# Patient Record
Sex: MALE | Race: BLACK OR AFRICAN AMERICAN | NOT HISPANIC OR LATINO | Employment: OTHER | URBAN - METROPOLITAN AREA
[De-identification: names, ages, dates, MRNs, and addresses within clinical notes are randomized per-mention and may not be internally consistent; named-entity substitution may affect disease eponyms.]

---

## 2017-03-23 DIAGNOSIS — Z01.812 PRE-OPERATIVE LABORATORY EXAMINATION: ICD-10-CM

## 2017-03-23 DIAGNOSIS — Z01.810 PRE-OPERATIVE CARDIOVASCULAR EXAMINATION: ICD-10-CM

## 2017-03-23 LAB
ALBUMIN SERPL BCP-MCNC: 4.1 G/DL (ref 3.2–4.9)
ALBUMIN/GLOB SERPL: 1.3 G/DL
ALP SERPL-CCNC: 89 U/L (ref 30–99)
ALT SERPL-CCNC: 18 U/L (ref 2–50)
ANION GAP SERPL CALC-SCNC: 5 MMOL/L (ref 0–11.9)
APTT PPP: 32 SEC (ref 24.7–36)
AST SERPL-CCNC: 24 U/L (ref 12–45)
BASOPHILS # BLD AUTO: 0.9 % (ref 0–1.8)
BASOPHILS # BLD: 0.04 K/UL (ref 0–0.12)
BILIRUB SERPL-MCNC: 0.8 MG/DL (ref 0.1–1.5)
BUN SERPL-MCNC: 6 MG/DL (ref 8–22)
CALCIUM SERPL-MCNC: 9.1 MG/DL (ref 8.4–10.2)
CHLORIDE SERPL-SCNC: 101 MMOL/L (ref 96–112)
CO2 SERPL-SCNC: 33 MMOL/L (ref 20–33)
CREAT SERPL-MCNC: 0.96 MG/DL (ref 0.5–1.4)
EOSINOPHIL # BLD AUTO: 0.39 K/UL (ref 0–0.51)
EOSINOPHIL NFR BLD: 8.6 % (ref 0–6.9)
ERYTHROCYTE [DISTWIDTH] IN BLOOD BY AUTOMATED COUNT: 58.4 FL (ref 35.9–50)
GFR SERPL CREATININE-BSD FRML MDRD: >60 ML/MIN/1.73 M 2
GLOBULIN SER CALC-MCNC: 3.2 G/DL (ref 1.9–3.5)
GLUCOSE SERPL-MCNC: 109 MG/DL (ref 65–99)
HCT VFR BLD AUTO: 41.4 % (ref 42–52)
HGB BLD-MCNC: 13.9 G/DL (ref 14–18)
IMM GRANULOCYTES # BLD AUTO: 0.01 K/UL (ref 0–0.11)
IMM GRANULOCYTES NFR BLD AUTO: 0.2 % (ref 0–0.9)
INR PPP: 0.96 (ref 0.87–1.13)
LIPASE SERPL-CCNC: 19 U/L (ref 7–58)
LYMPHOCYTES # BLD AUTO: 1.61 K/UL (ref 1–4.8)
LYMPHOCYTES NFR BLD: 35.3 % (ref 22–41)
MCH RBC QN AUTO: 34.4 PG (ref 27–33)
MCHC RBC AUTO-ENTMCNC: 33.6 G/DL (ref 33.7–35.3)
MCV RBC AUTO: 102.5 FL (ref 81.4–97.8)
MONOCYTES # BLD AUTO: 0.36 K/UL (ref 0–0.85)
MONOCYTES NFR BLD AUTO: 7.9 % (ref 0–13.4)
NEUTROPHILS # BLD AUTO: 2.15 K/UL (ref 1.82–7.42)
NEUTROPHILS NFR BLD: 47.1 % (ref 44–72)
NRBC # BLD AUTO: 0 K/UL
NRBC BLD AUTO-RTO: 0 /100 WBC
PLATELET # BLD AUTO: 171 K/UL (ref 164–446)
PMV BLD AUTO: 10.2 FL (ref 9–12.9)
POTASSIUM SERPL-SCNC: 3.9 MMOL/L (ref 3.6–5.5)
PROT SERPL-MCNC: 7.3 G/DL (ref 6–8.2)
PROTHROMBIN TIME: 12.6 SEC (ref 12–14.6)
RBC # BLD AUTO: 4.04 M/UL (ref 4.7–6.1)
SODIUM SERPL-SCNC: 139 MMOL/L (ref 135–145)
WBC # BLD AUTO: 4.6 K/UL (ref 4.8–10.8)

## 2017-03-23 PROCEDURE — 85025 COMPLETE CBC W/AUTO DIFF WBC: CPT

## 2017-03-23 PROCEDURE — 80053 COMPREHEN METABOLIC PANEL: CPT

## 2017-03-23 PROCEDURE — 85730 THROMBOPLASTIN TIME PARTIAL: CPT

## 2017-03-23 PROCEDURE — 36415 COLL VENOUS BLD VENIPUNCTURE: CPT

## 2017-03-23 PROCEDURE — 83690 ASSAY OF LIPASE: CPT

## 2017-03-23 PROCEDURE — 85610 PROTHROMBIN TIME: CPT

## 2017-03-23 RX ORDER — CETIRIZINE HYDROCHLORIDE 10 MG/1
10 TABLET ORAL
COMMUNITY
End: 2021-03-13

## 2017-03-23 RX ORDER — DIPHENHYDRAMINE HCL 25 MG
25 TABLET ORAL NIGHTLY PRN
Status: ON HOLD | COMMUNITY
End: 2019-06-18

## 2017-03-24 ENCOUNTER — HOSPITAL ENCOUNTER (OUTPATIENT)
Facility: MEDICAL CENTER | Age: 82
End: 2017-03-24
Attending: INTERNAL MEDICINE | Admitting: INTERNAL MEDICINE
Payer: OTHER MISCELLANEOUS

## 2017-03-24 VITALS
OXYGEN SATURATION: 94 % | BODY MASS INDEX: 26.1 KG/M2 | RESPIRATION RATE: 18 BRPM | TEMPERATURE: 97.7 F | HEIGHT: 68 IN | SYSTOLIC BLOOD PRESSURE: 127 MMHG | DIASTOLIC BLOOD PRESSURE: 64 MMHG | WEIGHT: 172.18 LBS | HEART RATE: 98 BPM

## 2017-03-24 PROBLEM — C25.0 MALIGNANT NEOPLASM OF HEAD OF PANCREAS (HCC): Status: ACTIVE | Noted: 2017-03-24

## 2017-03-24 LAB
EKG IMPRESSION: NORMAL
PATHOLOGY CONSULT NOTE: NORMAL

## 2017-03-24 PROCEDURE — 700101 HCHG RX REV CODE 250

## 2017-03-24 PROCEDURE — 160048 HCHG OR STATISTICAL LEVEL 1-5: Performed by: INTERNAL MEDICINE

## 2017-03-24 PROCEDURE — 160036 HCHG PACU - EA ADDL 30 MINS PHASE I: Performed by: INTERNAL MEDICINE

## 2017-03-24 PROCEDURE — 88307 TISSUE EXAM BY PATHOLOGIST: CPT

## 2017-03-24 PROCEDURE — A4606 OXYGEN PROBE USED W OXIMETER: HCPCS | Performed by: INTERNAL MEDICINE

## 2017-03-24 PROCEDURE — 700111 HCHG RX REV CODE 636 W/ 250 OVERRIDE (IP): Performed by: INTERNAL MEDICINE

## 2017-03-24 PROCEDURE — 700111 HCHG RX REV CODE 636 W/ 250 OVERRIDE (IP)

## 2017-03-24 PROCEDURE — 88341 IMHCHEM/IMCYTCHM EA ADD ANTB: CPT | Mod: 91

## 2017-03-24 PROCEDURE — 160047 HCHG PACU  - EA ADDL 30 MINS PHASE II: Performed by: INTERNAL MEDICINE

## 2017-03-24 PROCEDURE — 88173 CYTOPATH EVAL FNA REPORT: CPT

## 2017-03-24 PROCEDURE — 160046 HCHG PACU - 1ST 60 MINS PHASE II: Performed by: INTERNAL MEDICINE

## 2017-03-24 PROCEDURE — 160009 HCHG ANES TIME/MIN: Performed by: INTERNAL MEDICINE

## 2017-03-24 PROCEDURE — 160025 RECOVERY II MINUTES (STATS): Performed by: INTERNAL MEDICINE

## 2017-03-24 PROCEDURE — 502240 HCHG MISC OR SUPPLY RC 0272: Performed by: INTERNAL MEDICINE

## 2017-03-24 PROCEDURE — 88342 IMHCHEM/IMCYTCHM 1ST ANTB: CPT

## 2017-03-24 PROCEDURE — 160203 HCHG ENDO MINUTES - 1ST 30 MINS LEVEL 4: Performed by: INTERNAL MEDICINE

## 2017-03-24 PROCEDURE — 500066 HCHG BITE BLOCK, ECT: Performed by: INTERNAL MEDICINE

## 2017-03-24 PROCEDURE — 160002 HCHG RECOVERY MINUTES (STAT): Performed by: INTERNAL MEDICINE

## 2017-03-24 PROCEDURE — 160035 HCHG PACU - 1ST 60 MINS PHASE I: Performed by: INTERNAL MEDICINE

## 2017-03-24 PROCEDURE — 160208 HCHG ENDO MINUTES - EA ADDL 1 MIN LEVEL 4: Performed by: INTERNAL MEDICINE

## 2017-03-24 PROCEDURE — 88172 CYTP DX EVAL FNA 1ST EA SITE: CPT

## 2017-03-24 PROCEDURE — 88177 CYTP FNA EVAL EA ADDL: CPT | Mod: 91

## 2017-03-24 RX ORDER — CIPROFLOXACIN 500 MG/1
500 TABLET, FILM COATED ORAL 2 TIMES DAILY
Qty: 9 TAB | Refills: 0 | Status: SHIPPED | OUTPATIENT
Start: 2017-03-24 | End: 2017-03-29

## 2017-03-24 RX ORDER — CIPROFLOXACIN 2 MG/ML
INJECTION, SOLUTION INTRAVENOUS
Status: DISCONTINUED
Start: 2017-03-24 | End: 2017-03-24 | Stop reason: HOSPADM

## 2017-03-24 RX ORDER — LIDOCAINE HYDROCHLORIDE 10 MG/ML
INJECTION, SOLUTION INFILTRATION; PERINEURAL
Status: COMPLETED
Start: 2017-03-24 | End: 2017-03-24

## 2017-03-24 RX ORDER — MIDAZOLAM HYDROCHLORIDE 1 MG/ML
INJECTION INTRAMUSCULAR; INTRAVENOUS
Status: DISPENSED
Start: 2017-03-24 | End: 2017-03-24

## 2017-03-24 RX ORDER — SODIUM CHLORIDE, SODIUM LACTATE, POTASSIUM CHLORIDE, CALCIUM CHLORIDE 600; 310; 30; 20 MG/100ML; MG/100ML; MG/100ML; MG/100ML
INJECTION, SOLUTION INTRAVENOUS CONTINUOUS
Status: DISCONTINUED | OUTPATIENT
Start: 2017-03-24 | End: 2017-03-24 | Stop reason: HOSPADM

## 2017-03-24 RX ADMIN — SODIUM CHLORIDE, POTASSIUM CHLORIDE, SODIUM LACTATE AND CALCIUM CHLORIDE: 600; 310; 30; 20 INJECTION, SOLUTION INTRAVENOUS at 07:00

## 2017-03-24 RX ADMIN — LIDOCAINE HYDROCHLORIDE 0.1 ML: 10 INJECTION, SOLUTION INFILTRATION; PERINEURAL at 07:00

## 2017-03-24 ASSESSMENT — PAIN SCALES - GENERAL
PAINLEVEL_OUTOF10: 0

## 2017-03-24 NOTE — IP AVS SNAPSHOT
" Home Care Instructions                                                                                                                Name:April Becker  Medical Record Number:[id number]  CSN: [id number]    YOB: 1932   Age: 84 y.o.  Sex: male  HT:1.727 m (5' 8\") WT: 78.1 kg (172 lb 2.9 oz)          Admit Date: 3/24/2017     Discharge Date:   Today's Date: 3/24/2017  Attending Doctor:  Raphael Crawford M.D.                  Allergies:  Review of patient's allergies indicates no known allergies.                Discharge Instructions         ENDOSCOPY HOME CARE INSTRUCTIONS    GASTROSCOPY OR ERCP  1. Don't eat or drink anything for about an hour after the test. You can then resume your regular diet.  2. Don't drive or drink alcohol for 24 hours. The medication you received will make you too drowsy.  3. Don't take any coffee, tea, or aspirin products until after you see your doctor. These can harm the lining of your stomach.  4. If you begin to vomit bloody material, or develop black or bloody stools, call your doctor as soon as possible.  5. If you have any neck, chest, abdominal pain or temp of 100 degrees, call your doctor.  6. See your doctor Follow up as indicated  7. Additional instructions: call your Dr Crawford in 1-2 weeks if you have not received your test results  8. Prescriptions: Cipro    You should call [phone] if you develop problems with breathing or chest pain.  If any questions arise, call your doctor. If your doctor is not available, please feel free to call [phone]. You can also call the HEALTH HOTLINE open 24 hours/day, 7 days/week and speak to a nurse at [phone], or toll free [phone].    Depression / Suicide Risk    As you are discharged from this [facility] facility, it is important to learn how to keep safe from harming yourself.    Recognize the warning signs:  Abrupt changes in personality, positive or negative- including increase in energy   Giving away " possessions  Change in eating patterns- significant weight changes-  positive or negative  Change in sleeping patterns- unable to sleep or sleeping all the time   Unwillingness or inability to communicate  Depression  Unusual sadness, discouragement and loneliness  Talk of wanting to die  Neglect of personal appearance   Rebelliousness- reckless behavior  Withdrawal from people/activities they love  Confusion- inability to concentrate     If you or a loved one observes any of these behaviors or has concerns about self-harm, here's what you can do:  Talk about it- your feelings and reasons for harming yourself  Remove any means that you might use to hurt yourself (examples: pills, rope, extension cords, firearm)  Get professional help from the community (Mental Health, Substance Abuse, psychological counseling)  Do not be alone:Call your Safe Contact- someone whom you trust who will be there for you.  Call your local CRISIS HOTLINE 090-8982 or 030-118-8697  Call your local Children's Mobile Crisis Response Team [place] (377) 774-1750 or www.svh24.de  Call the toll free National Suicide Prevention Hotlines   National Suicide Prevention Lifeline 328-487-BDKH (7488)  [place] Hope Line Network 800-SUICIDE (731-9767)    I acknowledge receipt and understanding of these Home Care Instructions.       Medication List      START taking these medications        Instructions    ciprofloxacin 500 MG Tabs   Commonly known as:  CIPRO    Doctor's comments:  Start evening of 3/24/2017   Take 1 Tab by mouth 2 times a day for 9 doses.   Dose:  500 mg         CONTINUE taking these medications        Instructions    aspirin EC 81 MG Tbec   Commonly known as:  ECOTRIN    Take 81 mg by mouth every day.   Dose:  81 mg       cetirizine 10 MG Tabs   Commonly known as:  ZYRTEC    Take 10 mg by mouth every day.   Dose:  10 mg       diphenhydrAMINE 25 MG Tabs   Commonly known as:  BENADRYL    Take 25 mg by mouth every day.   Dose:   25 mg       hydrochlorothiazide 12.5 MG capsule   Commonly known as:  MICROZIDE    Take 12.5 mg by mouth every day.   Dose:  12.5 mg       irbesartan 75 MG tablet   Commonly known as:  AVAPRO    Take 75 mg by mouth every evening.   Dose:  75 mg       LIPITOR 10 MG Tabs   Generic drug:  atorvastatin    QDAY.       omeprazole 40 MG delayed-release capsule   Commonly known as:  PRILOSEC    Take 40 mg by mouth every day.   Dose:  40 mg               Medication Information     Above and/or attached are the medications your physician expects you to take upon discharge. Review all of your home medications and newly ordered medications with your doctor and/or pharmacist. Follow medication instructions as directed by your doctor and/or pharmacist. Please keep your medication list with you and share with your physician. Update the information when medications are discontinued, doses are changed, or new medications (including over-the-counter products) are added; and carry medication information at all times in the event of emergency situations.        Resources     Quit Smoking / Tobacco Use:    I understand the use of any tobacco products increases my chance of suffering from future heart disease or stroke and could cause other illnesses which may shorten my life. Quitting the use of tobacco products is the single most important thing I can do to improve my health. For further information on smoking / tobacco cessation call a Toll Free Quit Line at 1-345.232.1293 (*National [employer] [place]) or 1-469.112.7549 (American Lung Association) or you can access the web based program at www.lungusa.org.    [place] Tobacco Users Help Line:  (523) 100-7840       Toll Free: 1-484.992.4444  Quit Tobacco Program [facility] (125)920-3927    Crisis Hotline:    [place] Crisis Hotline:  4-143-LCSEJDC or 1-814.607.7059    [place] Crisis Hotline:    1-199.523.5519 or 676-269-5729    Discharge Survey:   Thank you for  choosing [facility]. We hope we did everything we could to make your hospital stay a pleasant one. You may be receiving a survey and we would appreciate your time and participation in answering the questions. Your input is very valuable to us in our efforts to improve our service to our patients and their families.            Signatures     My signature on this form indicates that:    1. I acknowledge receipt and understanding of these Home Care Instruction.  2. My questions regarding this information have been answered to my satisfaction.  3. I have formulated a plan with my discharge nurse to obtain my prescribed medications for home.    __________________________________      __________________________________                   Patient Signature                                 Guardian/Responsible Adult Signature      __________________________________                 __________       ________                       Nurse Signature                                               Date                 Time

## 2017-03-24 NOTE — OR NURSING
0924- pt to PACU via nilsa.  VSS.  abd soft bowel sounds hypoactive x4Q.   0940- VSS. Pt awake, coughing up phlegm, Oral suctioning as needed.  0955- Report to YOLI Man.  1015- report from April RN.  Pt on RA.  Maintaining with occassional desats to 88%, pt rebounds without stimulation to breath.  1030-VSS.  Enc coughing/ deep breathing.  1051- Pt resting quietly.  Pt staying mostly in 90's on O2 sat.  April RN spoke to glenroy Jimenez to send pt to stage 2 and Ok for pt to go home with brief dips to 89% with rebound.  1055- Report to Gill KENT in stage 2.

## 2017-03-24 NOTE — IP AVS SNAPSHOT
3/24/2017          April Becker  [address]  [place] [address]    Dear April:    [facility] wants to ensure your discharge home is safe and you or your loved ones have had all your questions answered regarding your care after you leave the hospital.    You may receive a telephone call within two days of your discharge.  This call is to make certain you understand your discharge instructions as well as ensure we provided you with the best care possible during your stay with us.     The call will only last approximately 3-5 minutes and will be done by a nurse.    Once again, we want to ensure your discharge home is safe and that you have a clear understanding of any next steps in your care.  If you have any questions or concerns, please do not hesitate to contact us, we are here for you.  Thank you for choosing [facility] for your healthcare needs.    Sincerely,    Clifton Conley    [facility]

## 2017-03-24 NOTE — OR SURGEON
Immediate Post-Operative Note      PreOp Diagnosis: Pancreatic head mass     PostOp Diagnosis: Heterogenous lesion in the head of pancreas. Measure 3.28cm x 2.9cm. Not enough for send for fluid analysis. Core biopsy x 8 passes with 22G Sharkcore/ Acquire 22G FNB needle. Abuts SMV no invasion. Dilated CBD to 0.76cm minimum     Procedure(s):  GASTROSCOPY - Wound Class: Clean Contaminated  EGD W/ENDOSCOPIC ULTRASOUND- UPPER, LINEAR, RADIAL ON STANDBY - Wound Class: Clean Contaminated  EGD WITH ASP/BX - FINE NEEDLE ASPIRATION - Wound Class: Clean Contaminated    Surgeon(s):  Raphael Crawford M.D.    Anesthesiologist/Type of Anesthesia:  Anesthesiologist: Sandie Marin M.D./MAC    Surgical Staff:  Circulator: Adelso Joy R.N.  Endoscopy Technician: Flores Kruse    Specimen: Pancreatic mass    Estimated Blood Loss: None    Findings:   EGD:  Esophagus: tortuous, GEJ 40cm  Stomach: significant amount of solid food in the fundus, patient converted to general anesthesia  Duodenum: Normal    EUS:   Celiac axis/ SMA axis normal. Celiac with an abernt early take off vessl.   Proximal PD (upstream dilation) to 0.4cm  Heterogenous mass with solid and cystic component noted 3.28cm x 2.9cm. Puncture of the cystic/anechoic component revealed bloody fluid, not enough for analysis for CEA/Amylase.   CBD HOP 0.6cm and PD HOP 0.28cm  Heterogenous pancreatic head.  No LN seen  FNB 22G Sharkcore/ FNB Acquire 22G  X 8 passes total  SMV abuts lesion.    Splenic normal.     Complications: None    Recommendations:   1. Await pathology  2. Start Ciprofloxacin 500mg BID x 5 days total.    [id number]        3/24/2017 9:14 AM Raphael Crawford

## 2017-03-24 NOTE — OR NURSING
Patient allergies and NPO status verified, home medication reconciliation completed, belongings secured. Patient verbalizes understanding of pain scale, expected course of stay and plan of care. Surgical site verified with patient, IV access established.

## 2017-03-24 NOTE — DISCHARGE INSTRUCTIONS
ENDOSCOPY HOME CARE INSTRUCTIONS    GASTROSCOPY OR ERCP  1. Don't eat or drink anything for about an hour after the test. You can then resume your regular diet.  2. Don't drive or drink alcohol for 24 hours. The medication you received will make you too drowsy.  3. Don't take any coffee, tea, or aspirin products until after you see your doctor. These can harm the lining of your stomach.  4. If you begin to vomit bloody material, or develop black or bloody stools, call your doctor as soon as possible.  5. If you have any neck, chest, abdominal pain or temp of 100 degrees, call your doctor.  6. See your doctor Follow up as indicated  7. Additional instructions: call your Dr Crawford in 1-2 weeks if you have not received your test results  8. Prescriptions: Cipro    You should call 911 if you develop problems with breathing or chest pain.  If any questions arise, call your doctor. If your doctor is not available, please feel free to call [phone]. You can also call the HEALTH HOTLINE open 24 hours/day, 7 days/week and speak to a nurse at [phone], or toll free [phone].    Depression / Suicide Risk    As you are discharged from this [facility] facility, it is important to learn how to keep safe from harming yourself.    Recognize the warning signs:  Abrupt changes in personality, positive or negative- including increase in energy   Giving away possessions  Change in eating patterns- significant weight changes-  positive or negative  Change in sleeping patterns- unable to sleep or sleeping all the time   Unwillingness or inability to communicate  Depression  Unusual sadness, discouragement and loneliness  Talk of wanting to die  Neglect of personal appearance   Rebelliousness- reckless behavior  Withdrawal from people/activities they love  Confusion- inability to concentrate     If you or a loved one observes any of these behaviors or has concerns about self-harm, here's what you can do:  Talk about  it- your feelings and reasons for harming yourself  Remove any means that you might use to hurt yourself (examples: pills, rope, extension cords, firearm)  Get professional help from the community (Mental Health, Substance Abuse, psychological counseling)  Do not be alone:Call your Safe Contact- someone whom you trust who will be there for you.  Call your local CRISIS HOTLINE 420-2530 or [phone]  Call your local Children's Mobile Crisis Response Team [place] (848) 760-9767 or www.NaHere  Call the toll free National Suicide Prevention Hotlines   National Suicide Prevention Lifeline 873-356-PDRX (2693)  National Hope Line Network 800-SUICIDE (177-5060)    I acknowledge receipt and understanding of these Home Care Instructions.

## 2017-03-24 NOTE — OR NURSING
0955 Pt awake and denies pain or nausea. Resting quietly on gurney. Breathing easy and unlabored.   1010 Titrated to RA. No changes.  1015 report to YOLI Duffy

## 2017-03-24 NOTE — PROCEDURES
DATE OF SERVICE:  03/24/2017    PROCEDURE PERFORMED:  Endoscopic gastroduodenoscopy/endoscopic ultrasound with   fine needle biopsy.    PREOPERATIVE DIAGNOSIS:  Pancreatic head mass.    POSTOPERATIVE DIAGNOSES:  Heterogeneous lesion in the head of pancreas.    Measured approximately 3.28 cm x 2.9 cm.  There were some anechoic areas,   however, not enough to sent for fluid analysis on biopsy.  Core biopsy x8   passes with 22-gauge SharkCore as well as 22-gauge Acquire needle performed.    Core sample sent.  Preliminary, but not epithelial cells, no malignancy cells   appreciated.  The lesion does abut the SMV, but no invasion.  SMA and celiac   axis were normal.  There was dilated common bile duct approximately 0.76 cm,   minimum.    PRE-ANESTHESIA ASSESSMENT:  Prior to procedure, history and physical was   performed and patient medication allergies were reviewed.  Patient's tolerance   of previous anesthesia also reviewed.    CONSENT:  Risk and benefits of procedure and option sedation risks were   discussed with patient including, but not limited to sedation, bleeding,   perforation, missed diagnosis and missed lesion as well as pancreatitis and   infection.  Patient states understanding and would like to proceed.    DESCRIPTION OF PROCEDURE:  After I obtained informed consent from the patient,   the patient was placed in the left lateral position.  Appropriate timeout   protocol was followed including correct patient, correct procedure, correct   equipment in the room were confirmed.  Throughout the procedure, patient's   blood pressure and pulse and oxygen saturation were monitored continuously by   the anesthesiologist.  After appropriate level of sedation achieved, the scope   was inserted through the oropharynx, esophagus intubated and passed down in   the gastrointestinal tract to the small intestine.  The scope was then   removed.  The endoscopic ultrasound scope was then inserted in similar   fashion.  Fine  needle biopsies performed.  Finding and intervention is   documented below.  The scope was then withdrawn.  Patient tolerated the   procedure well.  There were no immediate postoperative complications.    Preprocedure, ciprofloxacin 400 mg IV was given.    SEDATION:  As per Dr. Sandie Marin with monitored anesthesia care converted to   full general anesthesia with intubation due to food material noted in the   stomach.    SPECIMEN:  Pancreatic head mass.    ESTIMATED BLOOD LOSS:  None.    FINDINGS:    EGD:  1.  Esophagus, tortuous GE junction at 40 cm.  2.  Stomach, significant amount of solid food in the fundus, unable to be   suctioned out.  Patient was converted to general anesthesia with the scope   withdrawn.  This is performed for respiratory protection.  3.  Duodenum normal.    Bile was noted in the duodenum.    ENDOSCOPIC ULTRASOUND:  Endoscopic exam with the side-viewing echoendoscope   was normal.  The major papilla appears normal endoscopically and   sonographically.    The bile duct was dilated up to 0.76 cm proximally and 0.6 cm distally.  There   was no gallbladder seen.  There was no stone or sludge.  The pancreatic   parenchyma appears abnormal.  There were no significant features of chronic   pancreatitis.  There was a heterogeneous lesion in the head of the pancreas   measuring at least 3.28x2.9 cm.  There were some anechoic areas in this   heterogeneous lesion.  Main pancreatic duct was difficult to trace to the head   of the pancreas from the body.  Fine needle biopsy was performed with a   22-gauge SharkCore needle as well as the 22-gauge Acquire needle.  Puncture of   the anechoic area revealed some blood, but no actual fluid, not enough to send   for analysis (amylase/CEA).  Multiple passes x8 were performed utilizing sonar Doppler to   ensure there is no intervening vessel.  Core tissue was sent.  Preliminary   cytology positive for epithelial cells, but NO malignancy.    Pancreatic duct at  the head of the pancreas measured 0.28 cm.  The body of the   pancreas was 0.21 cm.  The SMA does not appear to involve.  Celiac axis was   normal.  SMV appears to abut the lesion.  Spleen appears normal.  Left   adrenal appears normal.    COMPLICATIONS:  None.    RECOMMENDATIONS:  1.  Await for pathology.  2.  Start ciprofloxacin 500 mg b.i.d. p.o. times total of 5 days.       ____________________________________     Raphael NAHUN Julio / SUSANNA    DD:  03/24/2017 09:56:16  DT:  03/24/2017 11:14:36    D#:  [id number]  Job#:  [id number]

## 2017-03-24 NOTE — PROGRESS NOTES
Indication: Pancreatic mass.   Risks, benefits, and alternatives were discussed with consenting person(s). Consenting person(s) were given an opportunity to ask questions and discuss other options. Risks including but not limited to failed or incomplete EUS, ineffective therapy, pancreatitis (with potential future complications), perforation, infection, bleeding, missed lesion(s), missed diagnosis, cardiac and/or pulmonary event, aspiration, stroke, possible need for surgery, hospitalization possibly prolonged, discomfort, unsuccessful and/or incomplete procedure, possible need for repeat procedures and/or additional testings, damage to adjacent organs and/or vascular structures, medication reaction, disability, death, and other adverse events possibly life-threatening. Discussion was undertaken with Layman's terms. Consenting persons stated understanding and acceptance of these risks, and wished to proceed. Consent was given in clear state of mind.

## 2017-03-31 ENCOUNTER — HOSPITAL ENCOUNTER (OUTPATIENT)
Dept: RADIOLOGY | Facility: MEDICAL CENTER | Age: 82
End: 2017-03-31
Attending: SURGERY
Payer: OTHER MISCELLANEOUS

## 2017-03-31 DIAGNOSIS — R10.9 ABDOMINAL PAIN, UNSPECIFIED SITE: ICD-10-CM

## 2017-03-31 DIAGNOSIS — R19.09 ABDOMINAL OR PELVIC SWELLING, MASS, OR LUMP, OTHER SPECIFIED SITE: ICD-10-CM

## 2017-03-31 PROCEDURE — 700117 HCHG RX CONTRAST REV CODE 255: Performed by: SURGERY

## 2017-03-31 PROCEDURE — 74170 CT ABD WO CNTRST FLWD CNTRST: CPT

## 2017-03-31 RX ADMIN — IOHEXOL 100 ML: 350 INJECTION, SOLUTION INTRAVENOUS at 15:45

## 2017-04-07 ENCOUNTER — HOSPITAL ENCOUNTER (OUTPATIENT)
Dept: RADIOLOGY | Facility: MEDICAL CENTER | Age: 82
End: 2017-04-07
Attending: INTERNAL MEDICINE
Payer: COMMERCIAL

## 2017-04-07 DIAGNOSIS — R19.7 DIARRHEA, UNSPECIFIED TYPE: ICD-10-CM

## 2017-04-07 DIAGNOSIS — R06.03 ACUTE RESPIRATORY DISTRESS: ICD-10-CM

## 2017-04-07 DIAGNOSIS — R63.4 LOSS OF WEIGHT: ICD-10-CM

## 2017-04-07 PROCEDURE — 71020 DX-CHEST-2 VIEWS: CPT

## 2017-04-08 ENCOUNTER — HOSPITAL ENCOUNTER (OUTPATIENT)
Facility: MEDICAL CENTER | Age: 82
End: 2017-04-08
Attending: INTERNAL MEDICINE
Payer: COMMERCIAL

## 2017-04-08 PROCEDURE — 83630 LACTOFERRIN FECAL (QUAL): CPT

## 2017-04-08 PROCEDURE — 87046 STOOL CULTR AEROBIC BACT EA: CPT

## 2017-04-08 PROCEDURE — 87045 FECES CULTURE AEROBIC BACT: CPT

## 2017-04-11 LAB — LACTOFERRIN STL QL IA: NEGATIVE

## 2017-04-12 LAB
BACTERIA STL CULT: NORMAL
SIGNIFICANT IND 70042: NORMAL
SOURCE SOURCE: NORMAL

## 2017-04-21 ENCOUNTER — HOSPITAL ENCOUNTER (OUTPATIENT)
Dept: RADIOLOGY | Facility: MEDICAL CENTER | Age: 82
End: 2017-04-21
Attending: SURGERY
Payer: COMMERCIAL

## 2017-04-21 DIAGNOSIS — D13.6 BENIGN NEOPLASM OF PANCREAS, EXCEPT ISLETS OF LANGERHANS: ICD-10-CM

## 2017-04-21 DIAGNOSIS — R19.09 ABDOMINAL OR PELVIC SWELLING, MASS, OR LUMP, OTHER SPECIFIED SITE: ICD-10-CM

## 2017-04-24 ENCOUNTER — HOSPITAL ENCOUNTER (OUTPATIENT)
Dept: RADIOLOGY | Facility: MEDICAL CENTER | Age: 82
End: 2017-04-24
Attending: SURGERY
Payer: OTHER MISCELLANEOUS

## 2017-04-24 PROCEDURE — A9552 F18 FDG: HCPCS

## 2017-06-21 ENCOUNTER — HOSPITAL ENCOUNTER (OUTPATIENT)
Dept: RADIOLOGY | Facility: MEDICAL CENTER | Age: 82
End: 2017-06-21
Attending: INTERNAL MEDICINE
Payer: COMMERCIAL

## 2017-06-21 ENCOUNTER — HOSPITAL ENCOUNTER (OUTPATIENT)
Dept: LAB | Facility: MEDICAL CENTER | Age: 82
End: 2017-06-21
Attending: INTERNAL MEDICINE
Payer: COMMERCIAL

## 2017-06-21 DIAGNOSIS — R19.8 ALTERED BOWEL FUNCTION: ICD-10-CM

## 2017-06-21 PROCEDURE — 36415 COLL VENOUS BLD VENIPUNCTURE: CPT

## 2017-06-21 PROCEDURE — 85652 RBC SED RATE AUTOMATED: CPT

## 2017-06-21 PROCEDURE — 84443 ASSAY THYROID STIM HORMONE: CPT

## 2017-06-21 PROCEDURE — 74000 DX-ABDOMEN-1 VIEW: CPT

## 2017-06-21 PROCEDURE — 86140 C-REACTIVE PROTEIN: CPT

## 2017-06-22 LAB
CRP SERPL HS-MCNC: 0.04 MG/DL (ref 0–0.75)
ERYTHROCYTE [SEDIMENTATION RATE] IN BLOOD BY WESTERGREN METHOD: 10 MM/HOUR (ref 0–20)

## 2017-07-03 LAB — TEST NAME 95000: NORMAL

## 2017-10-31 ENCOUNTER — OFFICE VISIT (OUTPATIENT)
Dept: URGENT CARE | Facility: PHYSICIAN GROUP | Age: 82
End: 2017-10-31
Payer: COMMERCIAL

## 2017-10-31 VITALS
DIASTOLIC BLOOD PRESSURE: 62 MMHG | TEMPERATURE: 97.8 F | OXYGEN SATURATION: 96 % | HEIGHT: 68 IN | BODY MASS INDEX: 25.46 KG/M2 | SYSTOLIC BLOOD PRESSURE: 122 MMHG | WEIGHT: 168 LBS | RESPIRATION RATE: 16 BRPM | HEART RATE: 70 BPM

## 2017-10-31 DIAGNOSIS — Z23 NEED FOR INFLUENZA VACCINATION: ICD-10-CM

## 2017-10-31 DIAGNOSIS — B00.1 COLD SORE: ICD-10-CM

## 2017-10-31 PROCEDURE — 90662 IIV NO PRSV INCREASED AG IM: CPT | Performed by: FAMILY MEDICINE

## 2017-10-31 PROCEDURE — G0008 ADMIN INFLUENZA VIRUS VAC: HCPCS | Performed by: FAMILY MEDICINE

## 2017-10-31 PROCEDURE — 99214 OFFICE O/P EST MOD 30 MIN: CPT | Mod: 25 | Performed by: FAMILY MEDICINE

## 2017-10-31 NOTE — PROGRESS NOTES
"SUBJECTIVE      Chief Complaint   Patient presents with   • Oral Pain     x1 week, sore under tongue               Sore   This is a new problem. The problem has been gradually worsening since onset. Pain location:  Under tongue.  The area is characterized by: an open sore and stinging, constant pain, worse with eating. Pt was exposed to nothing. Pertinent negatives include no congestion, cough, fatigue, fever or shortness of breath. Past treatments include nothing.     History   Substance Use Topics   • Smoking status: Never Smoker    • Smokeless tobacco: No    • Alcohol Use: No      Past medical history was unremarkable and not pertinent to current issue      History reviewed. No pertinent family history.      Review of Systems   Constitutional: Negative for fever and fatigue.   HENT: Negative for congestion.    Respiratory: Negative for cough and shortness of breath.    Cardiovascular: Negative for chest pain.   Gastrointestinal: Negative for abdominal pain.   Skin: Positive for rash.   Neurological: Negative for dizziness.   All other systems reviewed and are negative.         Objective:     Blood pressure 122/62, pulse 70, temperature 36.6 °C (97.8 °F), resp. rate 16, height 1.727 m (5' 8\"), weight 76.2 kg (168 lb), SpO2 96 %.      Physical Exam   Constitutional: pt is oriented to person, place, and time. Pt appears well-developed and well-nourished. No distress.   HENT:   Head: Normocephalic and atraumatic.   Oral cavity - shallow, erythematous ulceration under tongue  Lymph - no cervical LAD  Eyes: Conjunctivae are normal. No scleral icterus.   Cardiovascular: Normal rate and regular rhythm.    Pulmonary/Chest: Effort normal and breath sounds normal. No respiratory distress.        Neurological: pt is alert and oriented to person, place, and time. No cranial nerve deficit.   Skin: Skin is warm. Pt is not diaphoretic.             Nursing note and vitals reviewed.              Assessment/Plan:     1. Cold sore "      - Alum & Mag Hydroxide-Simeth (MBX) Suspension; Take 5 mL by mouth every 6 hours as needed.  Dispense: 100 mL; Refill: 0      Follow up in one week if no improvement, sooner if symptoms worsen.       - INFLUENZA VACCINE, HIGH DOSE (65+ ONLY)

## 2017-10-31 NOTE — NON-PROVIDER
"April Becker is a 85 y.o. male here for a non-provider visit for:   FLU    Reason for immunization: Annual Flu Vaccine  Immunization records indicate need for vaccine: Yes, confirmed with Epic  Minimum interval has been met for this vaccine: Yes  ABN completed: Not Indicated    Order and dose verified by: ANITRA BUENROSTRO Dated  08/07/2015 was given to patient: Yes  All IAC Questionnaire questions were answered \"No.\"    Patient tolerated injection and no adverse effects were observed or reported: Yes    Pt scheduled for next dose in series: Not Indicated    "

## 2017-11-12 ENCOUNTER — OFFICE VISIT (OUTPATIENT)
Dept: URGENT CARE | Facility: PHYSICIAN GROUP | Age: 82
End: 2017-11-12
Payer: COMMERCIAL

## 2017-11-12 VITALS
DIASTOLIC BLOOD PRESSURE: 64 MMHG | OXYGEN SATURATION: 95 % | HEART RATE: 76 BPM | RESPIRATION RATE: 16 BRPM | TEMPERATURE: 97.9 F | BODY MASS INDEX: 25.01 KG/M2 | SYSTOLIC BLOOD PRESSURE: 122 MMHG | HEIGHT: 68 IN | WEIGHT: 165 LBS

## 2017-11-12 DIAGNOSIS — J06.9 UPPER RESPIRATORY TRACT INFECTION, UNSPECIFIED TYPE: ICD-10-CM

## 2017-11-12 DIAGNOSIS — B37.0 ORAL THRUSH: ICD-10-CM

## 2017-11-12 DIAGNOSIS — K14.1 GEOGRAPHICAL TONGUE: ICD-10-CM

## 2017-11-12 PROCEDURE — 99214 OFFICE O/P EST MOD 30 MIN: CPT | Performed by: PHYSICIAN ASSISTANT

## 2017-11-12 RX ORDER — DEXTROMETHORPHAN HYDROBROMIDE AND PROMETHAZINE HYDROCHLORIDE 15; 6.25 MG/5ML; MG/5ML
SYRUP ORAL
Qty: 180 ML | Refills: 0 | Status: SHIPPED | OUTPATIENT
Start: 2017-11-12 | End: 2018-11-30

## 2017-11-12 ASSESSMENT — ENCOUNTER SYMPTOMS
HEADACHES: 0
CHILLS: 0
COUGH: 1
FEVER: 0

## 2017-11-13 NOTE — PROGRESS NOTES
"Subjective:      April Becker is a 85 y.o. male who presents with Sore (tongue, burning type pain x 3 days)            Subjective: Patient is a pleasant 85-year-old male who comes in stating that he was seen on the 31st with cough and cold symptoms and he still coughing. Denies shortness of breath chest pain or chest tightness. Is asking for cough medicine. Patient also states that he has a burning sensation of his tongue and some fissuring in the front of his tongue with some missing taste buds. He states it burns very bad. Denies recent antibiotic use but was given Magic mouthwash for a canker sore that he had. He states that the canker sore resolved. States the back of his throat also feels sore. Liquids make it feel better spell she couldn't make it worse        Review of Systems   Constitutional: Negative for chills and fever.   Respiratory: Positive for cough.    Cardiovascular: Negative for chest pain.   Skin: Negative for rash.   Neurological: Negative for headaches.          Objective:     /64   Pulse 76   Temp 36.6 °C (97.9 °F)   Resp 16   Ht 1.727 m (5' 8\")   Wt 74.8 kg (165 lb)   SpO2 95%   BMI 25.09 kg/m²      Physical Exam       Gen.: Patient is A and O ×3, no acute distress, well-nourished well-hydrated  Vitals: Are listed and unremarkable  HEENT: Heads normocephalic, atraumatic, PERRLA, extraocular movements intact, TMs are clear. Tongue has some atrophic, very red flat lesions on the palate some patchy areas of loss of filiform on the dorsum of the tongue and some spotty red areas on the buccal mucosa. There is no white plaques seen anywhere  Neck: Soft supple without cervical lymphadenopathy  Cardiovascular: Regular rate and rhythm normal S1 and S2. No murmurs, rubs or gallops  Lungs are clear to auscultation bilaterally. no wheezes rales or rhonchi  Abdomen is soft, nontender, nondistended with good bowel sounds, no hepatosplenomegaly  Skin: Is well perfused without " evidence of rash or lesions  Neurological:  cranial nerves II through XII were assessed and intact.  Musculoskeletal: Full range of motion, 5 out of 5 strength against resistance       Assessment/Plan:     1. Geographical tongue      2. Oral thrush    - nystatin (MYCOSTATIN) 693175 UNIT/ML Suspension; Take 5 mL by mouth 4 times a day for 7 days. (swish in mouth and then swallow)  Dispense: 140 mL; Refill: 0    3. Upper respiratory tract infection, unspecified type    - promethazine-dextromethorphan (PROMETHAZINE-DM) 6.25-15 MG/5ML syrup; Take 5mls every six hours as needed for cough  Dispense: 180 mL; Refill: 0

## 2018-01-09 ENCOUNTER — APPOINTMENT (OUTPATIENT)
Dept: RADIOLOGY | Facility: MEDICAL CENTER | Age: 83
End: 2018-01-09
Attending: EMERGENCY MEDICINE
Payer: COMMERCIAL

## 2018-01-09 ENCOUNTER — OFFICE VISIT (OUTPATIENT)
Dept: URGENT CARE | Facility: PHYSICIAN GROUP | Age: 83
End: 2018-01-09
Payer: COMMERCIAL

## 2018-01-09 ENCOUNTER — HOSPITAL ENCOUNTER (EMERGENCY)
Facility: MEDICAL CENTER | Age: 83
End: 2018-01-10
Attending: EMERGENCY MEDICINE
Payer: COMMERCIAL

## 2018-01-09 VITALS
TEMPERATURE: 98.6 F | OXYGEN SATURATION: 97 % | BODY MASS INDEX: 25.01 KG/M2 | WEIGHT: 165 LBS | HEART RATE: 84 BPM | DIASTOLIC BLOOD PRESSURE: 78 MMHG | HEIGHT: 68 IN | SYSTOLIC BLOOD PRESSURE: 134 MMHG

## 2018-01-09 DIAGNOSIS — R11.2 NAUSEA AND VOMITING, INTRACTABILITY OF VOMITING NOT SPECIFIED, UNSPECIFIED VOMITING TYPE: ICD-10-CM

## 2018-01-09 LAB
ALBUMIN SERPL BCP-MCNC: 4.6 G/DL (ref 3.2–4.9)
ALBUMIN/GLOB SERPL: 1.8 G/DL
ALP SERPL-CCNC: 81 U/L (ref 30–99)
ALT SERPL-CCNC: 13 U/L (ref 2–50)
ANION GAP SERPL CALC-SCNC: 8 MMOL/L (ref 0–11.9)
APTT PPP: 35.6 SEC (ref 24.7–36)
AST SERPL-CCNC: 20 U/L (ref 12–45)
BASOPHILS # BLD AUTO: 0.2 % (ref 0–1.8)
BASOPHILS # BLD: 0.01 K/UL (ref 0–0.12)
BILIRUB SERPL-MCNC: 1.2 MG/DL (ref 0.1–1.5)
BNP SERPL-MCNC: 22 PG/ML (ref 0–100)
BUN SERPL-MCNC: 10 MG/DL (ref 8–22)
CALCIUM SERPL-MCNC: 9.6 MG/DL (ref 8.5–10.5)
CHLORIDE SERPL-SCNC: 99 MMOL/L (ref 96–112)
CO2 SERPL-SCNC: 28 MMOL/L (ref 20–33)
CREAT SERPL-MCNC: 0.81 MG/DL (ref 0.5–1.4)
EKG IMPRESSION: NORMAL
EOSINOPHIL # BLD AUTO: 0.08 K/UL (ref 0–0.51)
EOSINOPHIL NFR BLD: 1.3 % (ref 0–6.9)
ERYTHROCYTE [DISTWIDTH] IN BLOOD BY AUTOMATED COUNT: 56.8 FL (ref 35.9–50)
GLOBULIN SER CALC-MCNC: 2.6 G/DL (ref 1.9–3.5)
GLUCOSE SERPL-MCNC: 100 MG/DL (ref 65–99)
HCT VFR BLD AUTO: 37.5 % (ref 42–52)
HGB BLD-MCNC: 13.5 G/DL (ref 14–18)
IMM GRANULOCYTES # BLD AUTO: 0.02 K/UL (ref 0–0.11)
IMM GRANULOCYTES NFR BLD AUTO: 0.3 % (ref 0–0.9)
INR PPP: 1.13 (ref 0.87–1.13)
LIPASE SERPL-CCNC: 9 U/L (ref 11–82)
LYMPHOCYTES # BLD AUTO: 0.84 K/UL (ref 1–4.8)
LYMPHOCYTES NFR BLD: 13.3 % (ref 22–41)
MCH RBC QN AUTO: 38 PG (ref 27–33)
MCHC RBC AUTO-ENTMCNC: 36 G/DL (ref 33.7–35.3)
MCV RBC AUTO: 105.6 FL (ref 81.4–97.8)
MONOCYTES # BLD AUTO: 0.25 K/UL (ref 0–0.85)
MONOCYTES NFR BLD AUTO: 4 % (ref 0–13.4)
NEUTROPHILS # BLD AUTO: 5.11 K/UL (ref 1.82–7.42)
NEUTROPHILS NFR BLD: 80.9 % (ref 44–72)
NRBC # BLD AUTO: 0 K/UL
NRBC BLD-RTO: 0 /100 WBC
PLATELET # BLD AUTO: 156 K/UL (ref 164–446)
PMV BLD AUTO: 10.8 FL (ref 9–12.9)
POTASSIUM SERPL-SCNC: 3.4 MMOL/L (ref 3.6–5.5)
PROT SERPL-MCNC: 7.2 G/DL (ref 6–8.2)
PROTHROMBIN TIME: 14.2 SEC (ref 12–14.6)
RBC # BLD AUTO: 3.55 M/UL (ref 4.7–6.1)
SODIUM SERPL-SCNC: 135 MMOL/L (ref 135–145)
TROPONIN I SERPL-MCNC: 0.01 NG/ML (ref 0–0.04)
WBC # BLD AUTO: 6.3 K/UL (ref 4.8–10.8)

## 2018-01-09 PROCEDURE — 700111 HCHG RX REV CODE 636 W/ 250 OVERRIDE (IP): Performed by: EMERGENCY MEDICINE

## 2018-01-09 PROCEDURE — 85610 PROTHROMBIN TIME: CPT

## 2018-01-09 PROCEDURE — 700117 HCHG RX CONTRAST REV CODE 255: Performed by: EMERGENCY MEDICINE

## 2018-01-09 PROCEDURE — 84484 ASSAY OF TROPONIN QUANT: CPT

## 2018-01-09 PROCEDURE — 85025 COMPLETE CBC W/AUTO DIFF WBC: CPT

## 2018-01-09 PROCEDURE — 96374 THER/PROPH/DIAG INJ IV PUSH: CPT

## 2018-01-09 PROCEDURE — 93005 ELECTROCARDIOGRAM TRACING: CPT | Performed by: EMERGENCY MEDICINE

## 2018-01-09 PROCEDURE — 83690 ASSAY OF LIPASE: CPT

## 2018-01-09 PROCEDURE — 99285 EMERGENCY DEPT VISIT HI MDM: CPT

## 2018-01-09 PROCEDURE — 96375 TX/PRO/DX INJ NEW DRUG ADDON: CPT

## 2018-01-09 PROCEDURE — 80053 COMPREHEN METABOLIC PANEL: CPT

## 2018-01-09 PROCEDURE — 71045 X-RAY EXAM CHEST 1 VIEW: CPT

## 2018-01-09 PROCEDURE — 99214 OFFICE O/P EST MOD 30 MIN: CPT | Performed by: PHYSICIAN ASSISTANT

## 2018-01-09 PROCEDURE — 74177 CT ABD & PELVIS W/CONTRAST: CPT

## 2018-01-09 PROCEDURE — 96361 HYDRATE IV INFUSION ADD-ON: CPT

## 2018-01-09 PROCEDURE — 700105 HCHG RX REV CODE 258: Performed by: EMERGENCY MEDICINE

## 2018-01-09 PROCEDURE — 83880 ASSAY OF NATRIURETIC PEPTIDE: CPT

## 2018-01-09 PROCEDURE — 85730 THROMBOPLASTIN TIME PARTIAL: CPT

## 2018-01-09 PROCEDURE — 81003 URINALYSIS AUTO W/O SCOPE: CPT

## 2018-01-09 RX ORDER — SODIUM CHLORIDE, SODIUM LACTATE, POTASSIUM CHLORIDE, CALCIUM CHLORIDE 600; 310; 30; 20 MG/100ML; MG/100ML; MG/100ML; MG/100ML
1000 INJECTION, SOLUTION INTRAVENOUS ONCE
Status: COMPLETED | OUTPATIENT
Start: 2018-01-09 | End: 2018-01-09

## 2018-01-09 RX ORDER — MORPHINE SULFATE 4 MG/ML
4 INJECTION, SOLUTION INTRAMUSCULAR; INTRAVENOUS ONCE
Status: DISCONTINUED | OUTPATIENT
Start: 2018-01-09 | End: 2018-01-10 | Stop reason: HOSPADM

## 2018-01-09 RX ORDER — ONDANSETRON 4 MG/1
8 TABLET, ORALLY DISINTEGRATING ORAL ONCE
Status: DISCONTINUED | OUTPATIENT
Start: 2018-01-09 | End: 2018-01-09

## 2018-01-09 RX ORDER — ONDANSETRON 2 MG/ML
4 INJECTION INTRAMUSCULAR; INTRAVENOUS ONCE
Status: COMPLETED | OUTPATIENT
Start: 2018-01-09 | End: 2018-01-09

## 2018-01-09 RX ADMIN — ONDANSETRON 4 MG: 2 INJECTION INTRAMUSCULAR; INTRAVENOUS at 22:21

## 2018-01-09 RX ADMIN — ONDANSETRON 4 MG: 2 INJECTION INTRAMUSCULAR; INTRAVENOUS at 19:15

## 2018-01-09 RX ADMIN — IOHEXOL 100 ML: 350 INJECTION, SOLUTION INTRAVENOUS at 23:30

## 2018-01-09 RX ADMIN — SODIUM CHLORIDE, POTASSIUM CHLORIDE, SODIUM LACTATE AND CALCIUM CHLORIDE 1000 ML: 600; 310; 30; 20 INJECTION, SOLUTION INTRAVENOUS at 22:24

## 2018-01-09 ASSESSMENT — ENCOUNTER SYMPTOMS
PALPITATIONS: 0
DIARRHEA: 0
CHANGE IN BOWEL HABIT: 1
CONSTIPATION: 1
FEVER: 0
NUMBER OF EPISODES OF EMESIS TODAY: 1
VOMITING: 1
ABDOMINAL PAIN: 1
BLOOD IN STOOL: 0
COUGH: 0
CHILLS: 0

## 2018-01-09 ASSESSMENT — PAIN SCALES - GENERAL: PAINLEVEL_OUTOF10: 6

## 2018-01-10 VITALS
TEMPERATURE: 98 F | OXYGEN SATURATION: 94 % | DIASTOLIC BLOOD PRESSURE: 57 MMHG | HEART RATE: 95 BPM | SYSTOLIC BLOOD PRESSURE: 148 MMHG | BODY MASS INDEX: 24.79 KG/M2 | RESPIRATION RATE: 18 BRPM | HEIGHT: 68 IN | WEIGHT: 163.58 LBS

## 2018-01-10 LAB
APPEARANCE UR: CLEAR
BILIRUB UR QL STRIP.AUTO: NEGATIVE
COLOR UR: YELLOW
GLUCOSE UR STRIP.AUTO-MCNC: NEGATIVE MG/DL
KETONES UR STRIP.AUTO-MCNC: ABNORMAL MG/DL
LEUKOCYTE ESTERASE UR QL STRIP.AUTO: NEGATIVE
MICRO URNS: ABNORMAL
NITRITE UR QL STRIP.AUTO: NEGATIVE
PH UR STRIP.AUTO: 6 [PH]
PROT UR QL STRIP: NEGATIVE MG/DL
RBC UR QL AUTO: NEGATIVE
SP GR UR STRIP.AUTO: 1.01
UROBILINOGEN UR STRIP.AUTO-MCNC: 1 MG/DL

## 2018-01-10 RX ORDER — OMEPRAZOLE 20 MG/1
20 CAPSULE, DELAYED RELEASE ORAL DAILY
Qty: 30 CAP | Refills: 0 | Status: SHIPPED | OUTPATIENT
Start: 2018-01-10 | End: 2018-11-30

## 2018-01-10 RX ORDER — OMEPRAZOLE 20 MG/1
20 CAPSULE, DELAYED RELEASE ORAL DAILY
Qty: 30 CAP | Refills: 0 | Status: SHIPPED | OUTPATIENT
Start: 2018-01-10 | End: 2018-01-10

## 2018-01-10 RX ORDER — ONDANSETRON 4 MG/1
4 TABLET, ORALLY DISINTEGRATING ORAL EVERY 6 HOURS PRN
Qty: 20 TAB | Refills: 0 | Status: SHIPPED | OUTPATIENT
Start: 2018-01-10 | End: 2018-01-10

## 2018-01-10 RX ORDER — ONDANSETRON 4 MG/1
4 TABLET, ORALLY DISINTEGRATING ORAL EVERY 6 HOURS PRN
Qty: 20 TAB | Refills: 0 | Status: SHIPPED | OUTPATIENT
Start: 2018-01-10 | End: 2018-11-30

## 2018-01-10 NOTE — ED NOTES
"Chief Complaint   Patient presents with   • Sent from Urgent Care      concerned about possible SBO/pancreatitis. Recent Dx of pancreatic head mass. Denies chemo/radiation. Pt reports \"I started throwing up this morning and sick in the pit of my stomach\". Also c/o weakness today and heartburn \"for awhile\". +chills.    • N/V     3 episodes emesis today; unable to tolerate fluids   • Heartburn     Pt amb to triage with above. Denies abd pain, reports \"severe nausea\". Concerned for dehydration. Given medication for nausea at  with minimal relief.  VSS. Pt to senior hilda. Educated on triage process and to inform staff of any changes.     /57   Pulse 89   Temp 36.7 °C (98 °F)   Resp 18   Ht 1.727 m (5' 8\")   Wt 74.2 kg (163 lb 9.3 oz)   SpO2 94%   BMI 24.87 kg/m²       "

## 2018-01-10 NOTE — ED NOTES
Hypoactive bowel sounds ausculted, pt denies pain upon palpation. Pt states he has not passed gas today and had a small bm this morning.

## 2018-01-10 NOTE — PROGRESS NOTES
Subjective:      April Becker is a 85 y.o. male who presents with Emesis (nausea, vomiting onset 4am this morning)    PMH:  has a past medical history of Anesthesia; Arthritis; Bowel habit changes; Breath shortness; Cataract; Cold (1/2017); Heart burn; Hypertension; Pain (2017); Parkinson's disease; and Pneumonia.  MEDS:   Current Outpatient Prescriptions:   •  hydrochlorothiazide (MICROZIDE) 12.5 MG capsule, Take 12.5 mg by mouth every day., Disp: , Rfl:   •  irbesartan (AVAPRO) 75 MG tablet, Take 75 mg by mouth every evening., Disp: , Rfl:   •  LIPITOR 10 MG PO TABS, QDAY. , Disp: , Rfl:   •  promethazine-dextromethorphan (PROMETHAZINE-DM) 6.25-15 MG/5ML syrup, Take 5mls every six hours as needed for cough, Disp: 180 mL, Rfl: 0  •  Alum & Mag Hydroxide-Simeth (MBX) Suspension, Take 5 mL by mouth every 6 hours as needed., Disp: 100 mL, Rfl: 0  •  aspirin EC (ECOTRIN) 81 MG Tablet Delayed Response, Take 81 mg by mouth every day., Disp: , Rfl:   •  cetirizine (ZYRTEC) 10 MG Tab, Take 10 mg by mouth every day., Disp: , Rfl:   •  diphenhydrAMINE (BENADRYL) 25 MG Tab, Take 25 mg by mouth every day., Disp: , Rfl:   •  omeprazole (PRILOSEC) 40 MG delayed-release capsule, Take 40 mg by mouth every day., Disp: , Rfl:     Current Facility-Administered Medications:   •  ondansetron (ZOFRAN) syringe/vial injection 4 mg, 4 mg, Intravenous, Once, Latricia Brar P.A.-C.  ALLERGIES: No Known Allergies  SURGHX:   Past Surgical History:   Procedure Laterality Date   • GASTROSCOPY N/A 3/24/2017    Procedure: GASTROSCOPY;  Surgeon: Raphael Crawford M.D.;  Location: SURGERY [facility];  Service:    • EGD W/ENDOSCOPIC ULTRASOUND N/A 3/24/2017    Procedure: EGD W/ENDOSCOPIC ULTRASOUND- UPPER, LINEAR, RADIAL ON STANDBY;  Surgeon: Raphael Crawford M.D.;  Location: SURGERY [facility];  Service:    • EGD WITH ASP/BX N/A 3/24/2017    Procedure: EGD WITH ASP/BX - FINE NEEDLE ASPIRATION;  Surgeon: Raphael Crawford M.D.;  Location:  SURGERY [facility];  Service:    • CATARACT EXTRACTION WITH IOL Bilateral 2016   • CHOLECYSTECTOMY  2013   • OTHER ORTHOPEDIC SURGERY  1995    Removed spinal discs   • OTHER  1990    TURP   • ULCER EXCISION  1955    gastric ulcer      SOCHX:  reports that he quit smoking about 35 years ago. He has never used smokeless tobacco. He reports that he drinks alcohol. He reports that he does not use drugs.  FH:  Reviewed with patient/family. Not pertinent to this complaint.          Patient presents to clinic today with a complaint of nausea and vomiting since 4:00 this morning. Patient has been unable to keep any food or liquid down at all today. Patient states he was constipated yesterday had a very small bowel movement, and no bowel movement today. Patient has a history of the pancreatic head mass, he is supposed to have an MRI completed on Friday to evaluate any changes in the mass.     Patient denies recent fever, chills, headache, cough, chest pain, shortness of breath.    Patient is actively vomiting, only bile, as he states he has not had any thing to eat or drink in many hours.      Emesis   This is a new problem. The current episode started today. The problem occurs constantly. The problem has been gradually worsening. Associated symptoms include abdominal pain, a change in bowel habit (constipation) and vomiting. Pertinent negatives include no chest pain, chills, coughing or fever. Nothing aggravates the symptoms. He has tried nothing for the symptoms. The treatment provided no relief.       Review of Systems   Constitutional: Negative for chills and fever.   Respiratory: Negative for cough.    Cardiovascular: Negative for chest pain and palpitations.   Gastrointestinal: Positive for abdominal pain, change in bowel habit (constipation), constipation and vomiting. Negative for blood in stool, diarrhea and melena.   All other systems reviewed and are negative.         Objective:     /78   Pulse 84    "Temp 37 °C (98.6 °F)   Ht 1.727 m (5' 8\")   Wt 74.8 kg (165 lb)   SpO2 97%   BMI 25.09 kg/m²      Physical Exam   Constitutional: He is oriented to person, place, and time. He appears well-developed and well-nourished. No distress.   HENT:   Head: Normocephalic.   Nose: Nose normal.   Mouth/Throat: Oropharynx is clear and moist.   Eyes: Conjunctivae and EOM are normal. Pupils are equal, round, and reactive to light.   Neck: Normal range of motion.   Cardiovascular: Regular rhythm, normal heart sounds and intact distal pulses.    Pulmonary/Chest: Effort normal and breath sounds normal.   Abdominal: Soft. There is tenderness in the epigastric area.   Musculoskeletal: Normal range of motion.   Neurological: He is alert and oriented to person, place, and time.   Skin: Skin is warm and dry. Capillary refill takes less than 2 seconds.   Psychiatric: He has a normal mood and affect.   Nursing note and vitals reviewed.              Assessment/Plan:     1. Nausea and vomiting, intractability of vomiting not specified, unspecified vomiting type  ondansetron (ZOFRAN) syringe/vial injection 4 mg    DISCONTINUED: ondansetron (ZOFRAN ODT) dispertab 8 mg   Due to patient's history of pancreatic head mass, I'm concerned that he may have either pancreatitis, small bowel obstruction, or an increase in size of the mass that could be causing this vomiting. The patient needs to be seen at the emergency room because we cannot fully evaluate his condition in the urgent care as we do not have stat labs or imaging. Patient verbalizes understanding of this and agrees to go to the emergency room.    PT requires evaluation and treatment at a facility that can provide a higher level of care due to acuity of illness/complaint.     Patient will go to the ER at [facility], his daughter will drive him.      "

## 2018-01-10 NOTE — ED PROVIDER NOTES
"ED Provider Note      ER PROVIDER NOTE        CHIEF COMPLAINT  Chief Complaint   Patient presents with   • Sent from Urgent Care     UC concerned about possible SBO/pancreatitis. Recent Dx of pancreatic head mass. Denies chemo/radiation. Pt reports \"I started throwing up this morning and sick in the pit of my stomach\". Also c/o weakness today and heartburn \"for awhile\". +chills.    • N/V     3 episodes emesis today; unable to tolerate fluids   • Heartburn       HPI  Vertrus Shanna Becker is a 85 y.o. male who presents to the emergency department complaining ofNausea vomiting and epigastric pain. Patient reports was feeling normal yesterday, he awoke this morning and shortly after eating began vomiting. He has had vomiting off and on all day, no blood. Initially he had some sharp epigastric pain but this has resolved.  He has not had a bowel movement today, did have a small bowel movement yesterday. No fevers or chills. No chest pain or difficulty breathing.  He does have a history of pancreatic mass diagnosed in April of last year although has not had any further imaging and is being followed by both surgery and GI for this.    Prior history of cholecystectomy, surgery for an ulcer    REVIEW OF SYSTEMS  Pertinent positives include nausea and vomiting. Pertinent negatives include no chest pain. See HPI for details. All other systems reviewed and are negative.    PAST MEDICAL HISTORY   has a past medical history of Anesthesia; Arthritis; Bowel habit changes; Breath shortness; Cataract; Cold (1/2017); Heart burn; Hypertension; Pain (2017); Parkinson's disease; and Pneumonia.    SURGICAL HISTORY   has a past surgical history that includes other orthopedic surgery (1995); cataract extraction with iol (Bilateral, 2016); cholecystectomy (2013); ulcer excision (1955); other (1990); gastroscopy (N/A, 3/24/2017); egd w/endoscopic ultrasound (N/A, 3/24/2017); and egd with asp/bx (N/A, 3/24/2017).    FAMILY HISTORY  No " "family history on file.    SOCIAL HISTORY  Social History     Social History   • Marital status:      Spouse name: N/A   • Number of children: N/A   • Years of education: N/A     Social History Main Topics   • Smoking status: Former Smoker     Quit date: 3/23/1982   • Smokeless tobacco: Never Used   • Alcohol use Yes      Comment: 2-3 per month   • Drug use: No   • Sexual activity: Not Currently     Other Topics Concern   • Not on file     Social History Narrative   • No narrative on file      History   Drug Use No       CURRENT MEDICATIONS  Home Medications     Reviewed by Saskia Shah R.N. (Registered Nurse) on 01/09/18 at 2212  Med List Status: <None>   Medication Last Dose Status   Alum & Mag Hydroxide-Simeth (MBX) Suspension  Active   aspirin EC (ECOTRIN) 81 MG Tablet Delayed Response 3/23/2017 Active   cetirizine (ZYRTEC) 10 MG Tab 3/23/2017 Active   diphenhydrAMINE (BENADRYL) 25 MG Tab 3/23/2017 Active   hydrochlorothiazide (MICROZIDE) 12.5 MG capsule 1/9/2018 Active   irbesartan (AVAPRO) 75 MG tablet 1/9/2018 Active   LIPITOR 10 MG PO TABS 1/9/2018 Active   omeprazole (PRILOSEC) 40 MG delayed-release capsule 3/23/2017 Active   promethazine-dextromethorphan (PROMETHAZINE-DM) 6.25-15 MG/5ML syrup  Active                ALLERGIES  No Known Allergies    PHYSICAL EXAM  VITAL SIGNS: /57   Pulse 98   Temp 36.7 °C (98 °F)   Resp 18   Ht 1.727 m (5' 8\")   Wt 74.2 kg (163 lb 9.3 oz)   SpO2 95%   BMI 24.87 kg/m²   Pulse ox interpretation: I interpret this pulse ox as normal.    Constitutional: Alert in no apparent distress.  HENT: No signs of trauma, Bilateral external ears normal, Nose normal. Mucous membranes mildly dry  Eyes: Pupils are equal and reactive, Conjunctiva normal, Non-icteric.   Neck: Normal range of motion, No tenderness, Supple, No stridor.   Lymphatic: No lymphadenopathy noted.   Cardiovascular: Regular rate and rhythm, no murmurs.   Thorax & Lungs: Normal breath sounds, No " respiratory distress, No wheezing, No chest tenderness.   Abdomen: Bowel sounds normal, Soft, No tenderness, No masses, No pulsatile masses. No peritoneal signs.  Skin: Warm, Dry, No erythema, No rash.   Back: No bony tenderness, No CVA tenderness.   Extremities: Intact distal pulses, No edema, No tenderness, No cyanosis, Negative Blanca's sign.  Musculoskeletal: Good range of motion in all major joints. No tenderness to palpation or major deformities noted.   Neurologic: Alert , Normal motor function, Normal sensory function, No focal deficits noted.   Psychiatric: Affect normal, Judgment normal, Mood normal.     DIAGNOSTIC STUDIES / PROCEDURES    EKG  Interpreted by me    Rhythm:  Normal sinus rhythm   Rate: 83  Axis: normal  Intervals: normal  Ectopy: none  Conduction: normal  ST Segments: no acute change  T Waves: no acute change  Q Waves: none  Old EKG shows no significant changes.    LABS  Labs Reviewed   CBC WITH DIFFERENTIAL - Abnormal; Notable for the following:        Result Value    RBC 3.55 (*)     Hemoglobin 13.5 (*)     Hematocrit 37.5 (*)     .6 (*)     MCH 38.0 (*)     MCHC 36.0 (*)     RDW 56.8 (*)     Platelet Count 156 (*)     Neutrophils-Polys 80.90 (*)     Lymphocytes 13.30 (*)     Lymphs (Absolute) 0.84 (*)     All other components within normal limits    Narrative:     Indicate which anticoagulants the patient is on:->UNKNOWN   COMP METABOLIC PANEL - Abnormal; Notable for the following:     Potassium 3.4 (*)     Glucose 100 (*)     All other components within normal limits    Narrative:     Indicate which anticoagulants the patient is on:->UNKNOWN   LIPASE - Abnormal; Notable for the following:     Lipase 9 (*)     All other components within normal limits    Narrative:     Indicate which anticoagulants the patient is on:->UNKNOWN   URINALYSIS - Abnormal; Notable for the following:     Ketones Trace (*)     All other components within normal limits   TROPONIN    Narrative:     Indicate  which anticoagulants the patient is on:->UNKNOWN   BTYPE NATRIURETIC PEPTIDE    Narrative:     Indicate which anticoagulants the patient is on:->UNKNOWN   PROTHROMBIN TIME    Narrative:     Indicate which anticoagulants the patient is on:->UNKNOWN   APTT    Narrative:     Indicate which anticoagulants the patient is on:->UNKNOWN   ESTIMATED GFR    Narrative:     Indicate which anticoagulants the patient is on:->UNKNOWN       All labs reviewed by me.    RADIOLOGY  CT-ABDOMEN-PELVIS WITH   Final Result         1. No acute inflammatory change identified in the abdomen or pelvis.      2. Redemonstration of multilobulated cystic mass in the head of the pancreas.      3. Moderate amount of stool throughout the colon.         DX-CHEST-LIMITED (1 VIEW)   Final Result         No acute cardiopulmonary abnormalities are identified.        The radiologist's interpretation of all radiological studies have been reviewed by me.    COURSE & MEDICAL DECISION MAKING  Nursing notes, VS, PMSFHx reviewed in chart.    9:59 PM Patient seen and examined at bedside. Patient will be treated withZofran, and IV fluids as he does appear dehydrated with his dry mucous membranes and vomiting all day. Ordered for labs, CT to evaluate his symptoms.     10:57 PM  Patient reevaluated, states his nausea is much improved. Updated on results of labs, pending CT    12:12 AM  Patient reevaluated, he is still fully asymptomatic and states feels much better. Abdomen soft and nontender. We'll plan for discharge      Decision Making:  This is a 85 y.o. male presenting with resolved nausea and vomiting. Unclear exact etiology for his symptoms although they have fully resolved and he is quite well-appearing, tolerating by mouth well and comfortable at the time of discharge. His CT demonstrates no surgical intra-abdominal process such as bowel obstruction or perforation and these would be unlikely given his lack of abdominal pain and resolution of symptoms as  well. He does have a pancreatic mass, this has been followed by both surgery and GI and actually has a follow-up appointment which will also serve for recheck on Friday.  I do not think this is referred cardiac process given the nature of his symptoms and workup performed here     I discussed home care, follow up and return precautions with the patient who understands well and agrees.       The patient will return for new or worsening symptoms and is stable at the time of discharge.    The patient is referred to a primary physician for blood pressure management, diabetic screening, and for all other preventative health concerns.      DISPOSITION:  Patient will be discharged home in stable condition.    FOLLOW UP:        at your appointment on Friday      OUTPATIENT MEDICATIONS:  New Prescriptions    OMEPRAZOLE (PRILOSEC) 20 MG DELAYED-RELEASE CAPSULE    Take 1 Cap by mouth every day.    ONDANSETRON (ZOFRAN ODT) 4 MG TABLET DISPERSIBLE    Take 1 Tab by mouth every 6 hours as needed.         FINAL IMPRESSION  1. Nausea and vomiting, intractability of vomiting not specified, unspecified vomiting type         The note accurately reflects work and decisions made by me.  Leonel Cross  1/10/2018  12:13 AM

## 2018-01-10 NOTE — DISCHARGE INSTRUCTIONS
Your CT scan and blood tests showed no complication or cause for your symptoms. You still have the pancreatic mass but it does not appear to be blocking anything. Please follow-up at your appointment on Friday, seek more immediate medical attention for uncontrolled vomiting, new pain, fevers or any other concerns      Nausea and Vomiting  Nausea is a sick feeling that often comes before throwing up (vomiting). Vomiting is a reflex where stomach contents come out of your mouth. Vomiting can cause severe loss of body fluids (dehydration). Children and elderly adults can become dehydrated quickly, especially if they also have diarrhea. Nausea and vomiting are symptoms of a condition or disease. It is important to find the cause of your symptoms.  CAUSES   · Direct irritation of the stomach lining. This irritation can result from increased acid production (gastroesophageal reflux disease), infection, food poisoning, taking certain medicines (such as nonsteroidal anti-inflammatory drugs), alcohol use, or tobacco use.  · Signals from the brain. These signals could be caused by a headache, heat exposure, an inner ear disturbance, increased pressure in the brain from injury, infection, a tumor, or a concussion, pain, emotional stimulus, or metabolic problems.  · An obstruction in the gastrointestinal tract (bowel obstruction).  · Illnesses such as diabetes, hepatitis, gallbladder problems, appendicitis, kidney problems, cancer, sepsis, atypical symptoms of a heart attack, or eating disorders.  · Medical treatments such as chemotherapy and radiation.  · Receiving medicine that makes you sleep (general anesthetic) during surgery.  DIAGNOSIS  Your caregiver may ask for tests to be done if the problems do not improve after a few days. Tests may also be done if symptoms are severe or if the reason for the nausea and vomiting is not clear. Tests may include:  · Urine tests.  · Blood tests.  · Stool tests.  · Cultures (to look for  evidence of infection).  · X-rays or other imaging studies.  Test results can help your caregiver make decisions about treatment or the need for additional tests.  TREATMENT  You need to stay well hydrated. Drink frequently but in small amounts. You may wish to drink water, sports drinks, clear broth, or eat frozen ice pops or gelatin dessert to help stay hydrated. When you eat, eating slowly may help prevent nausea. There are also some antinausea medicines that may help prevent nausea.  HOME CARE INSTRUCTIONS   · Take all medicine as directed by your caregiver.  · If you do not have an appetite, do not force yourself to eat. However, you must continue to drink fluids.  · If you have an appetite, eat a normal diet unless your caregiver tells you differently.  ¨ Eat a variety of complex carbohydrates (rice, wheat, potatoes, bread), lean meats, yogurt, fruits, and vegetables.  ¨ Avoid high-fat foods because they are more difficult to digest.  · Drink enough water and fluids to keep your urine clear or pale yellow.  · If you are dehydrated, ask your caregiver for specific rehydration instructions. Signs of dehydration may include:  ¨ Severe thirst.  ¨ Dry lips and mouth.  ¨ Dizziness.  ¨ Dark urine.  ¨ Decreasing urine frequency and amount.  ¨ Confusion.  ¨ Rapid breathing or pulse.  SEEK IMMEDIATE MEDICAL CARE IF:   · You have blood or brown flecks (like coffee grounds) in your vomit.  · You have black or bloody stools.  · You have a severe headache or stiff neck.  · You are confused.  · You have severe abdominal pain.  · You have chest pain or trouble breathing.  · You do not urinate at least once every 8 hours.  · You develop cold or clammy skin.  · You continue to vomit for longer than 24 to 48 hours.  · You have a fever.  MAKE SURE YOU:   · Understand these instructions.  · Will watch your condition.  · Will get help right away if you are not doing well or get worse.     This information is not intended to replace  advice given to you by your health care provider. Make sure you discuss any questions you have with your health care provider.     Document Released: 12/18/2006 Document Revised: 03/11/2013 Document Reviewed: 05/16/2012  Elsevier Interactive Patient Education ©2016 [employer]

## 2018-01-11 ENCOUNTER — PATIENT OUTREACH (OUTPATIENT)
Dept: HEALTH INFORMATION MANAGEMENT | Facility: OTHER | Age: 83
End: 2018-01-11

## 2018-01-12 ENCOUNTER — APPOINTMENT (OUTPATIENT)
Dept: RADIOLOGY | Facility: MEDICAL CENTER | Age: 83
End: 2018-01-12
Attending: SURGERY
Payer: COMMERCIAL

## 2018-01-24 ENCOUNTER — HOSPITAL ENCOUNTER (OUTPATIENT)
Dept: RADIOLOGY | Facility: MEDICAL CENTER | Age: 83
End: 2018-01-24
Attending: SURGERY
Payer: COMMERCIAL

## 2018-01-24 DIAGNOSIS — D13.6 BENIGN NEOPLASM OF PANCREAS, EXCEPT ISLETS OF LANGERHANS: ICD-10-CM

## 2018-01-24 PROCEDURE — 74181 MRI ABDOMEN W/O CONTRAST: CPT

## 2018-02-22 ENCOUNTER — HOSPITAL ENCOUNTER (OUTPATIENT)
Dept: RADIOLOGY | Facility: MEDICAL CENTER | Age: 83
End: 2018-02-22
Attending: OTOLARYNGOLOGY
Payer: COMMERCIAL

## 2018-02-22 DIAGNOSIS — R13.14 DYSPHAGIA, PHARYNGOESOPHAGEAL PHASE: ICD-10-CM

## 2018-02-22 DIAGNOSIS — R07.0 THROAT PAIN: ICD-10-CM

## 2018-02-22 PROCEDURE — A9270 NON-COVERED ITEM OR SERVICE: HCPCS | Performed by: OTOLARYNGOLOGY

## 2018-02-22 PROCEDURE — 700112 HCHG RX REV CODE 229: Performed by: OTOLARYNGOLOGY

## 2018-02-22 PROCEDURE — 700101 HCHG RX REV CODE 250: Performed by: OTOLARYNGOLOGY

## 2018-02-22 PROCEDURE — 74220 X-RAY XM ESOPHAGUS 1CNTRST: CPT

## 2018-02-22 RX ADMIN — BARIUM SULFATE 700 MG: 700 TABLET ORAL at 15:00

## 2018-02-22 RX ADMIN — ANTACID/ANTIFLATULENT 1 PACKET: 380; 550; 10; 10 GRANULE, EFFERVESCENT ORAL at 15:00

## 2018-08-31 ENCOUNTER — HOSPITAL ENCOUNTER (OUTPATIENT)
Dept: LAB | Facility: MEDICAL CENTER | Age: 83
End: 2018-08-31
Attending: SURGERY
Payer: COMMERCIAL

## 2018-08-31 LAB
BUN SERPL-MCNC: 11 MG/DL (ref 8–22)
CREAT SERPL-MCNC: 0.94 MG/DL (ref 0.5–1.4)

## 2018-08-31 PROCEDURE — 36415 COLL VENOUS BLD VENIPUNCTURE: CPT

## 2018-08-31 PROCEDURE — 82565 ASSAY OF CREATININE: CPT

## 2018-08-31 PROCEDURE — 84520 ASSAY OF UREA NITROGEN: CPT

## 2018-09-05 ENCOUNTER — HOSPITAL ENCOUNTER (OUTPATIENT)
Dept: LAB | Facility: MEDICAL CENTER | Age: 83
End: 2018-09-05
Attending: FAMILY MEDICINE
Payer: COMMERCIAL

## 2018-09-05 ENCOUNTER — HOSPITAL ENCOUNTER (OUTPATIENT)
Dept: RADIOLOGY | Facility: MEDICAL CENTER | Age: 83
End: 2018-09-05
Attending: SURGERY
Payer: COMMERCIAL

## 2018-09-05 DIAGNOSIS — D13.6 PANCREATIC CYSTADENOMA: ICD-10-CM

## 2018-09-05 LAB
BASOPHILS # BLD AUTO: 1 % (ref 0–1.8)
BASOPHILS # BLD: 0.05 K/UL (ref 0–0.12)
CRP SERPL HS-MCNC: 0.04 MG/DL (ref 0–0.75)
EOSINOPHIL # BLD AUTO: 0.27 K/UL (ref 0–0.51)
EOSINOPHIL NFR BLD: 5.6 % (ref 0–6.9)
ERYTHROCYTE [DISTWIDTH] IN BLOOD BY AUTOMATED COUNT: 47.8 FL (ref 35.9–50)
HCT VFR BLD AUTO: 41.8 % (ref 42–52)
HGB BLD-MCNC: 13.5 G/DL (ref 14–18)
IMM GRANULOCYTES # BLD AUTO: 0.01 K/UL (ref 0–0.11)
IMM GRANULOCYTES NFR BLD AUTO: 0.2 % (ref 0–0.9)
LYMPHOCYTES # BLD AUTO: 1.28 K/UL (ref 1–4.8)
LYMPHOCYTES NFR BLD: 26.7 % (ref 22–41)
MCH RBC QN AUTO: 29.1 PG (ref 27–33)
MCHC RBC AUTO-ENTMCNC: 32.3 G/DL (ref 33.7–35.3)
MCV RBC AUTO: 90.1 FL (ref 81.4–97.8)
MONOCYTES # BLD AUTO: 0.57 K/UL (ref 0–0.85)
MONOCYTES NFR BLD AUTO: 11.9 % (ref 0–13.4)
NEUTROPHILS # BLD AUTO: 2.62 K/UL (ref 1.82–7.42)
NEUTROPHILS NFR BLD: 54.6 % (ref 44–72)
NRBC # BLD AUTO: 0 K/UL
NRBC BLD-RTO: 0 /100 WBC
PLATELET # BLD AUTO: 173 K/UL (ref 164–446)
PMV BLD AUTO: 10.5 FL (ref 9–12.9)
RBC # BLD AUTO: 4.64 M/UL (ref 4.7–6.1)
WBC # BLD AUTO: 4.8 K/UL (ref 4.8–10.8)

## 2018-09-05 PROCEDURE — 86140 C-REACTIVE PROTEIN: CPT

## 2018-09-05 PROCEDURE — 36415 COLL VENOUS BLD VENIPUNCTURE: CPT | Mod: GZ

## 2018-09-05 PROCEDURE — 85025 COMPLETE CBC W/AUTO DIFF WBC: CPT | Mod: GZ

## 2018-09-05 PROCEDURE — 700117 HCHG RX CONTRAST REV CODE 255: Performed by: SURGERY

## 2018-09-05 PROCEDURE — 74170 CT ABD WO CNTRST FLWD CNTRST: CPT

## 2018-09-05 RX ADMIN — IOHEXOL 100 ML: 350 INJECTION, SOLUTION INTRAVENOUS at 15:23

## 2018-11-02 ENCOUNTER — HOSPITAL ENCOUNTER (OUTPATIENT)
Facility: MEDICAL CENTER | Age: 83
End: 2018-11-02
Attending: ORTHOPAEDIC SURGERY | Admitting: ORTHOPAEDIC SURGERY
Payer: COMMERCIAL

## 2018-11-30 VITALS — HEIGHT: 68 IN | WEIGHT: 181 LBS | BODY MASS INDEX: 27.43 KG/M2

## 2018-11-30 DIAGNOSIS — Z01.812 PRE-OPERATIVE LABORATORY EXAMINATION: ICD-10-CM

## 2018-11-30 DIAGNOSIS — Z01.810 PRE-OPERATIVE CARDIOVASCULAR EXAMINATION: ICD-10-CM

## 2018-11-30 LAB
ANION GAP SERPL CALC-SCNC: 9 MMOL/L (ref 0–11.9)
APPEARANCE UR: CLEAR
BILIRUB UR QL STRIP.AUTO: NEGATIVE
BUN SERPL-MCNC: 13 MG/DL (ref 8–22)
CALCIUM SERPL-MCNC: 9.4 MG/DL (ref 8.5–10.5)
CHLORIDE SERPL-SCNC: 101 MMOL/L (ref 96–112)
CO2 SERPL-SCNC: 29 MMOL/L (ref 20–33)
COLOR UR: YELLOW
CREAT SERPL-MCNC: 0.96 MG/DL (ref 0.5–1.4)
ERYTHROCYTE [DISTWIDTH] IN BLOOD BY AUTOMATED COUNT: 44.4 FL (ref 35.9–50)
GLUCOSE SERPL-MCNC: 87 MG/DL (ref 65–99)
GLUCOSE UR STRIP.AUTO-MCNC: NEGATIVE MG/DL
HCT VFR BLD AUTO: 45.2 % (ref 42–52)
HGB BLD-MCNC: 14.6 G/DL (ref 14–18)
KETONES UR STRIP.AUTO-MCNC: NEGATIVE MG/DL
LEUKOCYTE ESTERASE UR QL STRIP.AUTO: NEGATIVE
MCH RBC QN AUTO: 29.3 PG (ref 27–33)
MCHC RBC AUTO-ENTMCNC: 32.3 G/DL (ref 33.7–35.3)
MCV RBC AUTO: 90.6 FL (ref 81.4–97.8)
MICRO URNS: NORMAL
NITRITE UR QL STRIP.AUTO: NEGATIVE
PH UR STRIP.AUTO: 6 [PH]
PLATELET # BLD AUTO: 193 K/UL (ref 164–446)
PMV BLD AUTO: 10.5 FL (ref 9–12.9)
POTASSIUM SERPL-SCNC: 3.7 MMOL/L (ref 3.6–5.5)
PROT UR QL STRIP: NEGATIVE MG/DL
RBC # BLD AUTO: 4.99 M/UL (ref 4.7–6.1)
RBC UR QL AUTO: NEGATIVE
SCCMEC + MECA PNL NOSE NAA+PROBE: NEGATIVE
SCCMEC + MECA PNL NOSE NAA+PROBE: NEGATIVE
SODIUM SERPL-SCNC: 139 MMOL/L (ref 135–145)
SP GR UR STRIP.AUTO: 1.01
UROBILINOGEN UR STRIP.AUTO-MCNC: 0.2 MG/DL
WBC # BLD AUTO: 5.5 K/UL (ref 4.8–10.8)

## 2018-11-30 PROCEDURE — 80048 BASIC METABOLIC PNL TOTAL CA: CPT

## 2018-11-30 PROCEDURE — 87086 URINE CULTURE/COLONY COUNT: CPT

## 2018-11-30 PROCEDURE — 81003 URINALYSIS AUTO W/O SCOPE: CPT

## 2018-11-30 PROCEDURE — 87640 STAPH A DNA AMP PROBE: CPT

## 2018-11-30 PROCEDURE — 87641 MR-STAPH DNA AMP PROBE: CPT

## 2018-11-30 PROCEDURE — 36415 COLL VENOUS BLD VENIPUNCTURE: CPT

## 2018-11-30 PROCEDURE — 85027 COMPLETE CBC AUTOMATED: CPT

## 2018-11-30 RX ORDER — LORATADINE 10 MG/1
10 TABLET, ORALLY DISINTEGRATING ORAL
Status: ON HOLD | COMMUNITY
End: 2019-06-18

## 2018-11-30 RX ORDER — ACETAMINOPHEN 500 MG
1000 TABLET ORAL EVERY 6 HOURS PRN
Status: ON HOLD | COMMUNITY
End: 2019-06-18

## 2018-11-30 RX ORDER — IBUPROFEN 200 MG
800 TABLET ORAL EVERY 6 HOURS PRN
Status: ON HOLD | COMMUNITY
End: 2019-06-18

## 2018-11-30 NOTE — DISCHARGE PLANNING
DISCHARGE PLANNING NOTE - TOTAL JOINT     Procedure: Procedure(s):  HIP ARTHROPLASTY TOTAL  Procedure Date: 12/10/2018  Insurance:  Payor: [employer] / Plan: [employer] CHOICE PLUS / Product Type: *No Product type* /   Equipment currently available at home? cane  Steps into the home? 1 from garage and 2 from front  Steps within the home? 14  Toilet height? Standard  Type of shower? walk-in shower with grab bars  Who will be with you during your recovery? daughter, spouse--Annamaria  Is Outpatient Physical Therapy set up after surgery? No  will discuss with MD at appointment prior to surgery.  Did you take the Total Joint Class and where? No Scheduled for 12/4 at [facility].     Plan: Met with patient and spouse during preadmission appointment.  Reviewed Equipment Resource list and provided a copy to the patient with Care Chest recommendation.  Provided and reviewed Home Safety Checklist.  Quiet Hours form given and reviewed.There are no identified discharge needs at this time; however, spouse and pt have indicated rehab request as there will be no one home during the day and pt is quite unsteady.   No DME order found, pt will need a FWW and informed they will be signing for this after surgery.

## 2018-11-30 NOTE — OR NURSING
"Pre-admit appointment completed. \"Preparing for your procedure\" sheet given to pt along with verbal and written instructions. Pt instructed to continue regularly prescribed medications through the day before surgery. Pt will take tylenol if needed.   "

## 2018-12-03 LAB
BACTERIA UR CULT: NORMAL
SIGNIFICANT IND 70042: NORMAL
SITE SITE: NORMAL
SOURCE SOURCE: NORMAL

## 2018-12-04 ENCOUNTER — APPOINTMENT (OUTPATIENT)
Dept: OTHER | Facility: IMAGING CENTER | Age: 83
End: 2018-12-04

## 2018-12-04 ENCOUNTER — TELEPHONE (OUTPATIENT)
Dept: HEMATOLOGY ONCOLOGY | Facility: MEDICAL CENTER | Age: 83
End: 2018-12-04

## 2018-12-04 NOTE — TELEPHONE ENCOUNTER
1st attempt to schedule patient:    Left voicemail for patient requesting a return call to schedule a new patient Hematology appointment with Dr. Boswell.    NP/[facility]/Ref: Dr. Dontae Sotelo/Dx: Deficiency of other specified B group vitamins

## 2019-01-14 ENCOUNTER — APPOINTMENT (OUTPATIENT)
Dept: HEMATOLOGY ONCOLOGY | Facility: MEDICAL CENTER | Age: 84
End: 2019-01-14
Payer: COMMERCIAL

## 2019-01-24 ENCOUNTER — OFFICE VISIT (OUTPATIENT)
Dept: HEMATOLOGY ONCOLOGY | Facility: MEDICAL CENTER | Age: 84
End: 2019-01-24
Payer: COMMERCIAL

## 2019-01-24 VITALS
DIASTOLIC BLOOD PRESSURE: 79 MMHG | OXYGEN SATURATION: 95 % | HEART RATE: 75 BPM | RESPIRATION RATE: 18 BRPM | BODY MASS INDEX: 27.45 KG/M2 | HEIGHT: 68 IN | SYSTOLIC BLOOD PRESSURE: 118 MMHG | TEMPERATURE: 97.3 F | WEIGHT: 181.11 LBS

## 2019-01-24 DIAGNOSIS — E53.8 B12 DEFICIENCY: ICD-10-CM

## 2019-01-24 PROCEDURE — 99203 OFFICE O/P NEW LOW 30 MIN: CPT | Mod: GC | Performed by: INTERNAL MEDICINE

## 2019-01-24 ASSESSMENT — PAIN SCALES - GENERAL: PAINLEVEL: NO PAIN

## 2019-01-24 NOTE — PROGRESS NOTES
"Date of visit: 1/24/2019  9:12 AM      Chief Complaint- Vitamin B12 deficiency       Identification/Prior relevant history: April Becker  is a 86 y.o. year old male who is here for evaluation of vitamin B12 deficiency     Patient has been having Vitamin B12 deficiency for the past 7 years. Patient reported that at the beginning he was getting vitamin B12 shots every week. Eventually his levels were within normal and he did not required any additional shots. Then 10 months ago he was noted to have Vitamin B12 deficiency on routine labs and was started on vitamin B12 shots every month.    Patient reported that at age 25 he had abdominal surgery where part of his stomach and duodenum were removed. He is not sure why he had the surgery.  Now patient reported numbness and tingling sensation on hands and feet. He has history of osteoarthritis and require hip replacement, but he cancel the surgery. Patient denies any problems with balance, but feels fatigue and has some shortness of breath with activity. Patient also has history of heart burn that he controls with Tums. He has had EGD and colonoscopy in the past and according to patient it was normal.     Past Medical History:      Past Medical History:   Diagnosis Date   • Anesthesia     PONV, and hard to wake up   • Arthritis     osteoarthritis, hips   • Breath shortness     \"sob came before 11/2016, did a chest x-ray, and it didn't show anything\"   • Cataract     Bilateral IOL implants   • Heart burn     no meds   • High cholesterol     no meds   • Hip pain, bilateral 11/30/2018    and lower back.   • Hypertension    • Pain 2017    stomach, hips, back   • Pancreatic tumor     Inconclusive biopsy which caused pancreatitis. Follow with CT every 6 months and no changes.    • Parkinson's disease     no meds   • Pneumonia     November 2016, no admission        Past surgical history:       Past Surgical History:   Procedure Laterality Date   • GASTROSCOPY N/A " "3/24/2017    Procedure: GASTROSCOPY;  Surgeon: Raphael Crawford M.D.;  Location: SURGERY [facility];  Service:    • EGD W/ENDOSCOPIC ULTRASOUND N/A 3/24/2017    Procedure: EGD W/ENDOSCOPIC ULTRASOUND- UPPER, LINEAR, RADIAL ON STANDBY;  Surgeon: Raphael Crawford M.D.;  Location: SURGERY [facility];  Service:    • EGD WITH ASP/BX N/A 3/24/2017    Procedure: EGD WITH ASP/BX - FINE NEEDLE ASPIRATION;  Surgeon: Raphael Crawford M.D.;  Location: SURGERY [facility];  Service:    • CATARACT EXTRACTION WITH IOL Bilateral 2016   • CHOLECYSTECTOMY  2013   • KNEE ARTHROSCOPY Left 2013   • COLONOSCOPY  2013   • OTHER SURGICAL PROCEDURE  2000    salivary gland removed.    • LUMBAR LAMINECTOMY DISKECTOMY  1995    L5 \"cleaned up\"   • TURP-VAPOR  1990    TURP   • OTHER SURGICAL PROCEDURE  1988    salivary gland removed.   • ULCER EXCISION  1955    gastric ulcer        Allergies:       Patient has no known allergies.    Medications:         Current Outpatient Prescriptions   Medication Sig Dispense Refill   • hydrochlorothiazide (MICROZIDE) 12.5 MG capsule Take 12.5 mg by mouth every day.     • irbesartan (AVAPRO) 75 MG tablet Take 75 mg by mouth every evening.     • acetaminophen (TYLENOL) 500 MG Tab Take 1,000 mg by mouth every 6 hours as needed.     • ibuprofen (MOTRIN) 200 MG Tab Take 800 mg by mouth every 6 hours as needed.     • loratadine (CLARITIN REDITABS) 10 MG dissolvable tablet Take 10 mg by mouth 1 time daily as needed.     • cetirizine (ZYRTEC) 10 MG Tab Take 10 mg by mouth 1 time daily as needed.     • diphenhydrAMINE (BENADRYL) 25 MG Tab Take 25 mg by mouth at bedtime as needed.       No current facility-administered medications for this visit.          Social History:     Social History     Social History   • Marital status:      Spouse name: N/A   • Number of children: N/A   • Years of education: N/A     Occupational History   • Not on file.     Social History Main Topics   • Smoking status: Former " "Smoker     Packs/day: 0.20     Years: 10.00     Types: Cigarettes, Cigars     Quit date: 1987   • Smokeless tobacco: Never Used   • Alcohol use Yes      Comment: 2-3 per month   • Drug use: No   • Sexual activity: Not Currently     Other Topics Concern   • Not on file     Social History Narrative   • No narrative on file       Family History:    No family history on file.    Review of Systems:  All other review of systems are negative except what was mentioned above in the HPI.    Constitutional: Negative for fever, chills, weight loss and malaise. Positive for fatigue.    HEENT: No new auditory or visual complaints. No sore throat and neck pain.     Respiratory: Negative for cough, sputum production,and wheezing.  Positive for Shortness of breath   Cardiovascular: Negative for chest pain, palpitations, orthopnea and leg swelling.    Gastrointestinal: Positive for heartburn. Negative for nausea, vomiting and abdominal pain.    Genitourinary: Negative for dysuria, hematuria    Musculoskeletal: No new arthralgias or myalgias   Skin: Negative for rash and itching.    Neurological: Negative for focal weakness and headaches. Positive for numbness and tingling sensation on hands and feet   Endo/Heme/Allergies: No abnormal bleed/bruise.    Psychiatric/Behavioral: No new depression/anxiety.    Physical Exam:  Vitals: /79 (BP Location: Right arm, Patient Position: Sitting)   Pulse 75   Temp 36.3 °C (97.3 °F) (Temporal)   Resp 18   Ht 1.727 m (5' 8\")   Wt 82.2 kg (181 lb 1.7 oz)   SpO2 95%   BMI 27.54 kg/m²     General: Not in acute distress, alert and oriented x 3  HEENT: No pallor, icterus. Oropharynx clear.   Neck: Supple, no palpable masses.  Lymph nodes: No palpable cervical, supraclavicular, axillary or inguinal lymphadenopathy.    CVS: regular rate and rhythm, no rubs or gallops  RESP: Clear to auscultate bilaterally, no wheezing or crackles.   ABD: Soft, non tender, non distended, positive bowel sounds, " no palpable organomegaly  EXT: No edema or cyanosis  CNS: Alert and oriented x3, No focal deficits.  Skin- No rash      Labs:   No visits with results within 1 Week(s) from this visit.   Latest known visit with results is:   Orders Only on 11/30/2018   Component Date Value Ref Range Status   • WBC 11/30/2018 5.5  4.8 - 10.8 K/uL Final   • RBC 11/30/2018 4.99  4.70 - 6.10 M/uL Final   • Hemoglobin 11/30/2018 14.6  14.0 - 18.0 g/dL Final   • Hematocrit 11/30/2018 45.2  42.0 - 52.0 % Final   • MCV 11/30/2018 90.6  81.4 - 97.8 fL Final   • MCH 11/30/2018 29.3  27.0 - 33.0 pg Final   • MCHC 11/30/2018 32.3* 33.7 - 35.3 g/dL Final   • RDW 11/30/2018 44.4  35.9 - 50.0 fL Final   • Platelet Count 11/30/2018 193  164 - 446 K/uL Final   • MPV 11/30/2018 10.5  9.0 - 12.9 fL Final   • Sodium 11/30/2018 139  135 - 145 mmol/L Final   • Potassium 11/30/2018 3.7  3.6 - 5.5 mmol/L Final   • Chloride 11/30/2018 101  96 - 112 mmol/L Final   • Co2 11/30/2018 29  20 - 33 mmol/L Final   • Glucose 11/30/2018 87  65 - 99 mg/dL Final   • Bun 11/30/2018 13  8 - 22 mg/dL Final   • Creatinine 11/30/2018 0.96  0.50 - 1.40 mg/dL Final   • Calcium 11/30/2018 9.4  8.5 - 10.5 mg/dL Final   • Anion Gap 11/30/2018 9.0  0.0 - 11.9 Final   • Color 11/30/2018 Yellow   Final   • Character 11/30/2018 Clear   Final   • Specific Gravity 11/30/2018 1.010  <1.035 Final   • Ph 11/30/2018 6.0  5.0 - 8.0 Final   • Glucose 11/30/2018 Negative  Negative mg/dL Final   • Ketones 11/30/2018 Negative  Negative mg/dL Final   • Protein 11/30/2018 Negative  Negative mg/dL Final   • Bilirubin 11/30/2018 Negative  Negative Final   • Urobilinogen, Urine 11/30/2018 0.2  Negative Final   • Nitrite 11/30/2018 Negative  Negative Final   • Leukocyte Esterase 11/30/2018 Negative  Negative Final   • Occult Blood 11/30/2018 Negative  Negative Final   • Micro Urine Req 11/30/2018 see below   Final    Comment: Microscopic examination not performed when specimen is clear  and  chemically negative for protein, blood, leukocyte esterase  and nitrite.     • Significant Indicator 11/30/2018 NEG   Final   • Source 11/30/2018 UR   Final   • Urine Culture 11/30/2018 No growth at 48 hours   Final   • Staph aureus by PCR 11/30/2018 Negative  Negative Final   • MRSA by PCR 11/30/2018 Negative  Negative Final   • GFR If  11/30/2018 >60  >60 mL/min/1.73 m 2 Final   • GFR If Non  11/30/2018 >60  >60 mL/min/1.73 m 2 Final       Assessment and Plan:      B12 deficiency  Patient with chronic history of vitamin B12 deficiency requiring shots every month. \  He had part of stomach and duodenum removed at age 25. This could explain why he has low VItamin VB12 as it will be absorbed in this are. Also if patient were to be switch to oral supplements he may not be able to absorb enough of the vitamin. Patient should continue with IM vitamin B12 every month.   Will send additional test to evaluate for other causes of Vitamin B12 deficiency     Relevant Orders    CELIAC DISEASE AB PANEL    INTRINSIC FACTOR AB    PARIETAL CELL ABS    FERRITIN    VITAMIN B12        He agreed and verbalized  agreement and understanding with the current plan.  I answered all questions and concerns at this time     Please note that this dictation was created using voice recognition software. I have made every reasonable attempt to correct obvious errors, but I expect that there are errors of grammar and possibly content that I did not discover before finalizing the note.      SIGNATURES:  Dinah Cadet    CC:  Dontae Sotelo M.D.  Dontae Sotelo M.D.

## 2019-01-28 LAB
ENDOMYSIUM IGA SER QL: NEGATIVE
FERRITIN SERPL-MCNC: 45 NG/ML (ref 30–400)
IF BLOCK AB SER-ACNC: 324.3 AU/ML (ref 0–1.1)
IGA SERPL-MCNC: 188 MG/DL (ref 61–437)
PCA AB SER-ACNC: 41.7 UNITS (ref 0–20)
TTG IGA SER-ACNC: <2 U/ML (ref 0–3)
VIT B12 SERPL-MCNC: 284 PG/ML (ref 232–1245)

## 2019-06-18 ENCOUNTER — PATIENT OUTREACH (OUTPATIENT)
Dept: HEALTH INFORMATION MANAGEMENT | Facility: OTHER | Age: 84
End: 2019-06-18

## 2019-06-18 ENCOUNTER — APPOINTMENT (OUTPATIENT)
Dept: RADIOLOGY | Facility: MEDICAL CENTER | Age: 84
DRG: 871 | End: 2019-06-18
Attending: NURSE PRACTITIONER
Payer: COMMERCIAL

## 2019-06-18 ENCOUNTER — APPOINTMENT (OUTPATIENT)
Dept: RADIOLOGY | Facility: MEDICAL CENTER | Age: 84
DRG: 871 | End: 2019-06-18
Attending: INTERNAL MEDICINE
Payer: COMMERCIAL

## 2019-06-18 ENCOUNTER — APPOINTMENT (OUTPATIENT)
Dept: RADIOLOGY | Facility: MEDICAL CENTER | Age: 84
DRG: 871 | End: 2019-06-18
Attending: EMERGENCY MEDICINE
Payer: COMMERCIAL

## 2019-06-18 ENCOUNTER — APPOINTMENT (OUTPATIENT)
Dept: CARDIOLOGY | Facility: MEDICAL CENTER | Age: 84
DRG: 871 | End: 2019-06-18
Attending: NURSE PRACTITIONER
Payer: COMMERCIAL

## 2019-06-18 ENCOUNTER — APPOINTMENT (OUTPATIENT)
Dept: RADIOLOGY | Facility: MEDICAL CENTER | Age: 84
DRG: 871 | End: 2019-06-18
Attending: HOSPITALIST
Payer: COMMERCIAL

## 2019-06-18 ENCOUNTER — HOSPITAL ENCOUNTER (INPATIENT)
Facility: MEDICAL CENTER | Age: 84
LOS: 3 days | DRG: 871 | End: 2019-06-22
Attending: EMERGENCY MEDICINE | Admitting: INTERNAL MEDICINE
Payer: COMMERCIAL

## 2019-06-18 DIAGNOSIS — K86.89 PANCREATIC MASS: ICD-10-CM

## 2019-06-18 DIAGNOSIS — R10.10 PAIN OF UPPER ABDOMEN: ICD-10-CM

## 2019-06-18 PROBLEM — R74.01 TRANSAMINITIS: Status: ACTIVE | Noted: 2019-06-18

## 2019-06-18 PROBLEM — I10 ESSENTIAL HYPERTENSION: Status: ACTIVE | Noted: 2019-06-18

## 2019-06-18 PROBLEM — R10.13 EPIGASTRIC ABDOMINAL PAIN: Status: ACTIVE | Noted: 2019-06-18

## 2019-06-18 PROBLEM — E87.6 HYPOKALEMIA: Status: ACTIVE | Noted: 2019-06-18

## 2019-06-18 LAB
ALBUMIN SERPL BCP-MCNC: 3.9 G/DL (ref 3.2–4.9)
ALBUMIN/GLOB SERPL: 1.3 G/DL
ALP SERPL-CCNC: 128 U/L (ref 30–99)
ALT SERPL-CCNC: 58 U/L (ref 2–50)
ANION GAP SERPL CALC-SCNC: 10 MMOL/L (ref 0–11.9)
APTT PPP: 26.3 SEC (ref 24.7–36)
AST SERPL-CCNC: 124 U/L (ref 12–45)
BASOPHILS # BLD AUTO: 0.6 % (ref 0–1.8)
BASOPHILS # BLD: 0.04 K/UL (ref 0–0.12)
BILIRUB SERPL-MCNC: 0.7 MG/DL (ref 0.1–1.5)
BUN SERPL-MCNC: 14 MG/DL (ref 8–22)
CALCIUM SERPL-MCNC: 9 MG/DL (ref 8.5–10.5)
CHLORIDE SERPL-SCNC: 103 MMOL/L (ref 96–112)
CHOLEST SERPL-MCNC: 205 MG/DL (ref 100–199)
CO2 SERPL-SCNC: 24 MMOL/L (ref 20–33)
CREAT SERPL-MCNC: 1.07 MG/DL (ref 0.5–1.4)
EKG IMPRESSION: NORMAL
EKG IMPRESSION: NORMAL
EOSINOPHIL # BLD AUTO: 0.32 K/UL (ref 0–0.51)
EOSINOPHIL NFR BLD: 4.5 % (ref 0–6.9)
ERYTHROCYTE [DISTWIDTH] IN BLOOD BY AUTOMATED COUNT: 45.9 FL (ref 35.9–50)
EST. AVERAGE GLUCOSE BLD GHB EST-MCNC: 111 MG/DL
GLOBULIN SER CALC-MCNC: 2.9 G/DL (ref 1.9–3.5)
GLUCOSE SERPL-MCNC: 158 MG/DL (ref 65–99)
HBA1C MFR BLD: 5.5 % (ref 0–5.6)
HCT VFR BLD AUTO: 42 % (ref 42–52)
HDLC SERPL-MCNC: 53 MG/DL
HGB BLD-MCNC: 14.3 G/DL (ref 14–18)
IMM GRANULOCYTES # BLD AUTO: 0.02 K/UL (ref 0–0.11)
IMM GRANULOCYTES NFR BLD AUTO: 0.3 % (ref 0–0.9)
INR PPP: 1 (ref 0.87–1.13)
LACTATE BLD-SCNC: 1 MMOL/L (ref 0.5–2)
LACTATE BLD-SCNC: 1.2 MMOL/L (ref 0.5–2)
LACTATE BLD-SCNC: 1.9 MMOL/L (ref 0.5–2)
LACTATE BLD-SCNC: 2.3 MMOL/L (ref 0.5–2)
LDLC SERPL CALC-MCNC: 140 MG/DL
LIPASE SERPL-CCNC: 20 U/L (ref 11–82)
LV EJECT FRACT  99904: 60
LV EJECT FRACT MOD 4C 99902: 68.31
LYMPHOCYTES # BLD AUTO: 1.85 K/UL (ref 1–4.8)
LYMPHOCYTES NFR BLD: 25.8 % (ref 22–41)
MCH RBC QN AUTO: 30.4 PG (ref 27–33)
MCHC RBC AUTO-ENTMCNC: 34 G/DL (ref 33.7–35.3)
MCV RBC AUTO: 89.2 FL (ref 81.4–97.8)
MONOCYTES # BLD AUTO: 0.66 K/UL (ref 0–0.85)
MONOCYTES NFR BLD AUTO: 9.2 % (ref 0–13.4)
NEUTROPHILS # BLD AUTO: 4.28 K/UL (ref 1.82–7.42)
NEUTROPHILS NFR BLD: 59.6 % (ref 44–72)
NRBC # BLD AUTO: 0 K/UL
NRBC BLD-RTO: 0 /100 WBC
PLATELET # BLD AUTO: 151 K/UL (ref 164–446)
PMV BLD AUTO: 10.7 FL (ref 9–12.9)
POTASSIUM SERPL-SCNC: 3.3 MMOL/L (ref 3.6–5.5)
PROT SERPL-MCNC: 6.8 G/DL (ref 6–8.2)
PROTHROMBIN TIME: 13.5 SEC (ref 12–14.6)
RBC # BLD AUTO: 4.71 M/UL (ref 4.7–6.1)
SODIUM SERPL-SCNC: 137 MMOL/L (ref 135–145)
TRIGL SERPL-MCNC: 60 MG/DL (ref 0–149)
TROPONIN I SERPL-MCNC: 0.01 NG/ML (ref 0–0.04)
TROPONIN I SERPL-MCNC: 0.02 NG/ML (ref 0–0.04)
TROPONIN I SERPL-MCNC: <0.01 NG/ML (ref 0–0.04)
TSH SERPL DL<=0.005 MIU/L-ACNC: 1.72 UIU/ML (ref 0.38–5.33)
WBC # BLD AUTO: 7.2 K/UL (ref 4.8–10.8)

## 2019-06-18 PROCEDURE — 93970 EXTREMITY STUDY: CPT | Mod: 26,GZ | Performed by: INTERNAL MEDICINE

## 2019-06-18 PROCEDURE — 96375 TX/PRO/DX INJ NEW DRUG ADDON: CPT

## 2019-06-18 PROCEDURE — 700105 HCHG RX REV CODE 258: Performed by: INTERNAL MEDICINE

## 2019-06-18 PROCEDURE — 83036 HEMOGLOBIN GLYCOSYLATED A1C: CPT

## 2019-06-18 PROCEDURE — 83605 ASSAY OF LACTIC ACID: CPT

## 2019-06-18 PROCEDURE — 87040 BLOOD CULTURE FOR BACTERIA: CPT | Mod: 91

## 2019-06-18 PROCEDURE — 93010 ELECTROCARDIOGRAM REPORT: CPT | Performed by: INTERNAL MEDICINE

## 2019-06-18 PROCEDURE — 700117 HCHG RX CONTRAST REV CODE 255: Performed by: HOSPITALIST

## 2019-06-18 PROCEDURE — 80061 LIPID PANEL: CPT

## 2019-06-18 PROCEDURE — 71045 X-RAY EXAM CHEST 1 VIEW: CPT

## 2019-06-18 PROCEDURE — G0378 HOSPITAL OBSERVATION PER HR: HCPCS

## 2019-06-18 PROCEDURE — 700117 HCHG RX CONTRAST REV CODE 255: Performed by: EMERGENCY MEDICINE

## 2019-06-18 PROCEDURE — 74177 CT ABD & PELVIS W/CONTRAST: CPT

## 2019-06-18 PROCEDURE — 87338 HPYLORI STOOL AG IA: CPT

## 2019-06-18 PROCEDURE — 85025 COMPLETE CBC W/AUTO DIFF WBC: CPT

## 2019-06-18 PROCEDURE — 80053 COMPREHEN METABOLIC PANEL: CPT

## 2019-06-18 PROCEDURE — 99285 EMERGENCY DEPT VISIT HI MDM: CPT

## 2019-06-18 PROCEDURE — 84484 ASSAY OF TROPONIN QUANT: CPT

## 2019-06-18 PROCEDURE — 87077 CULTURE AEROBIC IDENTIFY: CPT | Mod: 91

## 2019-06-18 PROCEDURE — 96376 TX/PRO/DX INJ SAME DRUG ADON: CPT

## 2019-06-18 PROCEDURE — 93970 EXTREMITY STUDY: CPT

## 2019-06-18 PROCEDURE — 85610 PROTHROMBIN TIME: CPT

## 2019-06-18 PROCEDURE — 71275 CT ANGIOGRAPHY CHEST: CPT

## 2019-06-18 PROCEDURE — 83690 ASSAY OF LIPASE: CPT

## 2019-06-18 PROCEDURE — 700102 HCHG RX REV CODE 250 W/ 637 OVERRIDE(OP): Performed by: INTERNAL MEDICINE

## 2019-06-18 PROCEDURE — 304561 HCHG STAT O2

## 2019-06-18 PROCEDURE — 700111 HCHG RX REV CODE 636 W/ 250 OVERRIDE (IP): Performed by: NURSE PRACTITIONER

## 2019-06-18 PROCEDURE — A9270 NON-COVERED ITEM OR SERVICE: HCPCS | Performed by: INTERNAL MEDICINE

## 2019-06-18 PROCEDURE — 96366 THER/PROPH/DIAG IV INF ADDON: CPT

## 2019-06-18 PROCEDURE — 87186 SC STD MICRODIL/AGAR DIL: CPT | Mod: 91

## 2019-06-18 PROCEDURE — 93005 ELECTROCARDIOGRAM TRACING: CPT | Performed by: EMERGENCY MEDICINE

## 2019-06-18 PROCEDURE — 85730 THROMBOPLASTIN TIME PARTIAL: CPT

## 2019-06-18 PROCEDURE — 84443 ASSAY THYROID STIM HORMONE: CPT

## 2019-06-18 PROCEDURE — 700111 HCHG RX REV CODE 636 W/ 250 OVERRIDE (IP): Performed by: EMERGENCY MEDICINE

## 2019-06-18 PROCEDURE — 700102 HCHG RX REV CODE 250 W/ 637 OVERRIDE(OP): Performed by: NURSE PRACTITIONER

## 2019-06-18 PROCEDURE — 700105 HCHG RX REV CODE 258: Performed by: NURSE PRACTITIONER

## 2019-06-18 PROCEDURE — 76705 ECHO EXAM OF ABDOMEN: CPT

## 2019-06-18 PROCEDURE — A9270 NON-COVERED ITEM OR SERVICE: HCPCS | Performed by: NURSE PRACTITIONER

## 2019-06-18 PROCEDURE — 93306 TTE W/DOPPLER COMPLETE: CPT

## 2019-06-18 PROCEDURE — 93005 ELECTROCARDIOGRAM TRACING: CPT | Performed by: INTERNAL MEDICINE

## 2019-06-18 PROCEDURE — 99220 PR INITIAL OBSERVATION CARE,LEVL III: CPT | Performed by: INTERNAL MEDICINE

## 2019-06-18 PROCEDURE — 36415 COLL VENOUS BLD VENIPUNCTURE: CPT

## 2019-06-18 PROCEDURE — 93306 TTE W/DOPPLER COMPLETE: CPT | Mod: 26 | Performed by: INTERNAL MEDICINE

## 2019-06-18 PROCEDURE — 700111 HCHG RX REV CODE 636 W/ 250 OVERRIDE (IP): Performed by: INTERNAL MEDICINE

## 2019-06-18 PROCEDURE — 96365 THER/PROPH/DIAG IV INF INIT: CPT

## 2019-06-18 RX ORDER — ASPIRIN 325 MG
325 TABLET ORAL ONCE
Status: COMPLETED | OUTPATIENT
Start: 2019-06-18 | End: 2019-06-18

## 2019-06-18 RX ORDER — IRBESARTAN 150 MG/1
75 TABLET ORAL NIGHTLY
Status: DISCONTINUED | OUTPATIENT
Start: 2019-06-18 | End: 2019-06-19

## 2019-06-18 RX ORDER — ONDANSETRON 4 MG/1
4 TABLET, ORALLY DISINTEGRATING ORAL EVERY 4 HOURS PRN
Status: DISCONTINUED | OUTPATIENT
Start: 2019-06-18 | End: 2019-06-22 | Stop reason: HOSPADM

## 2019-06-18 RX ORDER — OXYCODONE HYDROCHLORIDE 5 MG/1
2.5 TABLET ORAL
Status: DISCONTINUED | OUTPATIENT
Start: 2019-06-18 | End: 2019-06-22 | Stop reason: HOSPADM

## 2019-06-18 RX ORDER — SODIUM CHLORIDE 9 MG/ML
INJECTION, SOLUTION INTRAVENOUS CONTINUOUS
Status: DISCONTINUED | OUTPATIENT
Start: 2019-06-18 | End: 2019-06-18

## 2019-06-18 RX ORDER — ASPIRIN 81 MG/1
324 TABLET, CHEWABLE ORAL ONCE
Status: COMPLETED | OUTPATIENT
Start: 2019-06-18 | End: 2019-06-18

## 2019-06-18 RX ORDER — ONDANSETRON 2 MG/ML
4 INJECTION INTRAMUSCULAR; INTRAVENOUS EVERY 4 HOURS PRN
Status: DISCONTINUED | OUTPATIENT
Start: 2019-06-18 | End: 2019-06-22 | Stop reason: HOSPADM

## 2019-06-18 RX ORDER — ASPIRIN 300 MG/1
300 SUPPOSITORY RECTAL ONCE
Status: COMPLETED | OUTPATIENT
Start: 2019-06-18 | End: 2019-06-18

## 2019-06-18 RX ORDER — AMOXICILLIN 250 MG
2 CAPSULE ORAL 2 TIMES DAILY
Status: DISCONTINUED | OUTPATIENT
Start: 2019-06-18 | End: 2019-06-22 | Stop reason: HOSPADM

## 2019-06-18 RX ORDER — BISACODYL 10 MG
10 SUPPOSITORY, RECTAL RECTAL
Status: DISCONTINUED | OUTPATIENT
Start: 2019-06-18 | End: 2019-06-22 | Stop reason: HOSPADM

## 2019-06-18 RX ORDER — ONDANSETRON 2 MG/ML
4 INJECTION INTRAMUSCULAR; INTRAVENOUS ONCE
Status: COMPLETED | OUTPATIENT
Start: 2019-06-18 | End: 2019-06-18

## 2019-06-18 RX ORDER — POLYETHYLENE GLYCOL 3350 17 G/17G
1 POWDER, FOR SOLUTION ORAL
Status: DISCONTINUED | OUTPATIENT
Start: 2019-06-18 | End: 2019-06-22 | Stop reason: HOSPADM

## 2019-06-18 RX ORDER — CANDESARTAN 32 MG/1
32 TABLET ORAL DAILY
Status: ON HOLD | COMMUNITY
Start: 2019-05-15 | End: 2019-07-13

## 2019-06-18 RX ORDER — MORPHINE SULFATE 4 MG/ML
4 INJECTION, SOLUTION INTRAMUSCULAR; INTRAVENOUS ONCE
Status: COMPLETED | OUTPATIENT
Start: 2019-06-18 | End: 2019-06-18

## 2019-06-18 RX ORDER — ACETAMINOPHEN 325 MG/1
650 TABLET ORAL EVERY 6 HOURS PRN
Status: DISCONTINUED | OUTPATIENT
Start: 2019-06-18 | End: 2019-06-22 | Stop reason: HOSPADM

## 2019-06-18 RX ORDER — MORPHINE SULFATE 4 MG/ML
2 INJECTION, SOLUTION INTRAMUSCULAR; INTRAVENOUS
Status: DISCONTINUED | OUTPATIENT
Start: 2019-06-18 | End: 2019-06-22 | Stop reason: HOSPADM

## 2019-06-18 RX ORDER — OXYCODONE HYDROCHLORIDE 5 MG/1
5 TABLET ORAL
Status: DISCONTINUED | OUTPATIENT
Start: 2019-06-18 | End: 2019-06-22 | Stop reason: HOSPADM

## 2019-06-18 RX ADMIN — SODIUM CHLORIDE: 9 INJECTION, SOLUTION INTRAVENOUS at 05:36

## 2019-06-18 RX ADMIN — ONDANSETRON 4 MG: 2 INJECTION INTRAMUSCULAR; INTRAVENOUS at 23:01

## 2019-06-18 RX ADMIN — ACETAMINOPHEN 650 MG: 325 TABLET, FILM COATED ORAL at 17:24

## 2019-06-18 RX ADMIN — ACETAMINOPHEN 650 MG: 325 TABLET, FILM COATED ORAL at 23:13

## 2019-06-18 RX ADMIN — MORPHINE SULFATE 4 MG: 4 INJECTION INTRAVENOUS at 03:08

## 2019-06-18 RX ADMIN — ONDANSETRON 4 MG: 2 INJECTION INTRAMUSCULAR; INTRAVENOUS at 09:05

## 2019-06-18 RX ADMIN — ASPIRIN 325 MG: 325 TABLET, FILM COATED ORAL at 05:36

## 2019-06-18 RX ADMIN — IOHEXOL 100 ML: 350 INJECTION, SOLUTION INTRAVENOUS at 02:52

## 2019-06-18 RX ADMIN — ONDANSETRON 4 MG: 2 INJECTION INTRAMUSCULAR; INTRAVENOUS at 03:07

## 2019-06-18 RX ADMIN — MORPHINE SULFATE 2 MG: 4 INJECTION INTRAVENOUS at 16:51

## 2019-06-18 RX ADMIN — IRBESARTAN 75 MG: 150 TABLET ORAL at 20:54

## 2019-06-18 RX ADMIN — MORPHINE SULFATE 2 MG: 4 INJECTION INTRAVENOUS at 23:02

## 2019-06-18 RX ADMIN — IOHEXOL 100 ML: 350 INJECTION, SOLUTION INTRAVENOUS at 18:33

## 2019-06-18 RX ADMIN — LIDOCAINE HYDROCHLORIDE 30 ML: 20 SOLUTION OROPHARYNGEAL at 13:45

## 2019-06-18 RX ADMIN — POTASSIUM CHLORIDE: 2 INJECTION, SOLUTION, CONCENTRATE INTRAVENOUS at 15:46

## 2019-06-18 RX ADMIN — ONDANSETRON 4 MG: 2 INJECTION INTRAMUSCULAR; INTRAVENOUS at 13:01

## 2019-06-18 ASSESSMENT — ENCOUNTER SYMPTOMS
BRUISES/BLEEDS EASILY: 0
DIARRHEA: 0
ORTHOPNEA: 0
VOMITING: 0
WHEEZING: 0
NAUSEA: 1
PALPITATIONS: 0
BLURRED VISION: 0
SEIZURES: 0
CHILLS: 0
HEADACHES: 0
COUGH: 0
BACK PAIN: 1
MYALGIAS: 0
SORE THROAT: 0
BACK PAIN: 0
ABDOMINAL PAIN: 1
DIAPHORESIS: 0
WEAKNESS: 0
SHORTNESS OF BREATH: 1
DEPRESSION: 0
WEIGHT LOSS: 0
SPUTUM PRODUCTION: 0
FOCAL WEAKNESS: 0
HEMOPTYSIS: 0
NECK PAIN: 0
FLANK PAIN: 0
CLAUDICATION: 0
CONSTIPATION: 0
DIZZINESS: 0
DIAPHORESIS: 1
BLOOD IN STOOL: 0
HEARTBURN: 0
SHORTNESS OF BREATH: 0
FEVER: 0

## 2019-06-18 ASSESSMENT — COPD QUESTIONNAIRES
COPD SCREENING SCORE: 4
IN THE PAST 12 MONTHS DO YOU DO LESS THAN YOU USED TO BECAUSE OF YOUR BREATHING PROBLEMS: DISAGREE/UNSURE
DO YOU EVER COUGH UP ANY MUCUS OR PHLEGM?: NO/ONLY WITH OCCASIONAL COLDS OR INFECTIONS
HAVE YOU SMOKED AT LEAST 100 CIGARETTES IN YOUR ENTIRE LIFE: YES
COPD SCREENING SCORE: 4
DURING THE PAST 4 WEEKS HOW MUCH DID YOU FEEL SHORT OF BREATH: NONE/LITTLE OF THE TIME
HAVE YOU SMOKED AT LEAST 100 CIGARETTES IN YOUR ENTIRE LIFE: YES
DO YOU EVER COUGH UP ANY MUCUS OR PHLEGM?: NO/ONLY WITH OCCASIONAL COLDS OR INFECTIONS
DURING THE PAST 4 WEEKS HOW MUCH DID YOU FEEL SHORT OF BREATH: NONE/LITTLE OF THE TIME

## 2019-06-18 ASSESSMENT — PATIENT HEALTH QUESTIONNAIRE - PHQ9
SUM OF ALL RESPONSES TO PHQ9 QUESTIONS 1 AND 2: 0
2. FEELING DOWN, DEPRESSED, IRRITABLE, OR HOPELESS: NOT AT ALL
1. LITTLE INTEREST OR PLEASURE IN DOING THINGS: NOT AT ALL

## 2019-06-18 ASSESSMENT — LIFESTYLE VARIABLES
ALCOHOL_USE: NO
EVER_SMOKED: YES
EVER_SMOKED: YES

## 2019-06-18 NOTE — CARE PLAN
Problem: Pain Management  Goal: Pain level will decrease to patient's comfort goal  Outcome: PROGRESSING AS EXPECTED      Problem: Bowel/Gastric:  Goal: Will not experience complications related to bowel motility  Outcome: PROGRESSING AS EXPECTED  Pt medicated per MAR for nausea, epigastric region tender to palpation and pt is distended, will monitor, emesis bag, bed elevated to prevent risk of aspiration pt educated verbalized understanding US ordered     Problem: Knowledge Deficit  Goal: Knowledge of disease process/condition, treatment plan, diagnostic tests, and medications will improve  Outcome: PROGRESSING AS EXPECTED  POC discussed, verbalized understanding

## 2019-06-18 NOTE — DISCHARGE PLANNING
Care Transition Team Assessment    Spoke with patient at bedside. Anticipate no needs @ present time. Spouse will be ride @ D/C.    Information Source  Orientation : Oriented x 4  Information Given By: Patient    Readmission Evaluation  Is this a readmission?: No    Interdisciplinary Discharge Planning  Does Admitting Nurse Feel This Could be a Complex Discharge?: No  Primary Care Physician: Dontae Sotelo  Lives with - Patient's Self Care Capacity: Spouse  Patient or legal guardian wants to designate a caregiver (see row info): No  Support Systems: Children, Spouse / Significant Other  Housing / Facility: [address]  Do You Take your Prescribed Medications Regularly: Yes  Able to Return to Previous ADL's: Yes  Mobility Issues: Yes  Prior Services: Home-Independent  Patient Expects to be Discharged to:: Home  Assistance Needed: No  Durable Medical Equipment: Other - Specify (Cane)    Discharge Preparedness  What are your discharge supports?: Child, Spouse  Prior Functional Level: Uses Cane    Functional Assesment  Prior Functional Level: Uses Cane    Finances  Prescription Coverage: Yes    Anticipated Discharge Information  Anticipated discharge disposition: Home  Discharge Address: [facility] [address]  Discharge Contact Phone Number: [phone]

## 2019-06-18 NOTE — ED TRIAGE NOTES
"Pt bib EMS for sudden epigastric/substernal pain that awoke him from his sleep an hour ago that radiates to his back. Pt states he woke up in a sweat and felt like his stomach was, \"going to burst\". Pt has hx of pancreatic benign tumor, PUD and htn. Pt hooked to monitor, resting on 1.5 lpm at baseline o2 and changed into gown. Erp at bedside.   "

## 2019-06-18 NOTE — PROGRESS NOTES
[facility] Medicine Daily Progress Note    Date of Service  6/18/2019    Chief Complaint  86 y.o. male admitted 6/18/2019 with nausea, epigastric pain and abdominal distention and inability to take p.o.    Hospital Course    Patient is an 86-year-old -American male with history of pancreatic mass, hypertension, remote peptic ulcer status post partial gastrectomy, who developed epigastric pain and bloating and nausea.  Pain reached a crescendo of 10 out of 10 in severity.  There was radiation to his back and thus his CT a aortogram was obtained and was negative for aneurysm.  He also reported swelling of his lower extremities over the past week.  With regard to his pancreatic mass this is been present for at least the last 10 years.  He underwent biopsy that was inconclusive due to low yield.  He had a astrid post operative course and thus refused to undergo endoscopy with biopsy again in the future.  He has been undergoing serial MRI and then CT scan to evaluate potential changes.  These have been minimal if any.  He follows with Dr. Garcia.       Interval Problem Update  Lactic acid 2.3, repeat showed normalization at 1.9  Hemoglobin A1c normal at 5.5  Troponins negative x3  Ultrasound right upper quadrant borderline CBD dilatation  Echocardiogram benign  Venous Doppler bilateral normal  No response today with GI cocktail  Poorly tolerant clear liquid diet  No clear response to antiemetics  Abdomen remains bloated  Partially distended with epigastric tenderness    Consultants/Specialty  None    Code Status  Full    Disposition  To be determined    Review of Systems  Review of Systems   Constitutional: Negative for chills, diaphoresis, fever, malaise/fatigue and weight loss.   Respiratory: Negative for cough, hemoptysis, sputum production, shortness of breath and wheezing.    Cardiovascular: Positive for leg swelling. Negative for chest pain, palpitations, orthopnea and claudication.   Gastrointestinal:  Positive for abdominal pain and nausea. Negative for blood in stool, constipation, diarrhea, heartburn, melena and vomiting.   Genitourinary: Negative for dysuria and urgency.   Musculoskeletal: Positive for back pain. Negative for joint pain, myalgias and neck pain.   Skin: Negative for rash.   Neurological: Negative for dizziness, weakness and headaches.   Psychiatric/Behavioral: Negative for depression.   All other systems reviewed and are negative.       Physical Exam  Temp:  [36.5 °C (97.7 °F)-37.5 °C (99.5 °F)] 37.5 °C (99.5 °F)  Pulse:  [74-92] 86  Resp:  [16-19] 19  BP: (126-135)/(55-61) 132/61  SpO2:  [89 %-99 %] 96 %    Physical Exam   Constitutional: He is oriented to person, place, and time. He appears well-developed and well-nourished. No distress.   HENT:   Head: Normocephalic and atraumatic.   Eyes: Pupils are equal, round, and reactive to light.   Neck: Neck supple.   Cardiovascular: Normal rate, regular rhythm and normal heart sounds.    Pulmonary/Chest: Effort normal and breath sounds normal. No respiratory distress. He has no wheezes. He has no rales.   Abdominal: Soft. He exhibits distension. There is tenderness in the epigastric area. There is no rigidity, no rebound, no guarding and no CVA tenderness.   Neurological: He is alert and oriented to person, place, and time. No cranial nerve deficit.   Skin: Skin is warm and dry.       Fluids    Intake/Output Summary (Last 24 hours) at 06/18/19 1505  Last data filed at 06/18/19 0536   Gross per 24 hour   Intake              200 ml   Output                0 ml   Net              200 ml       Laboratory  Recent Labs      06/18/19 0224   WBC  7.2   RBC  4.71   HEMOGLOBIN  14.3   HEMATOCRIT  42.0   MCV  89.2   MCH  30.4   MCHC  34.0   RDW  45.9   PLATELETCT  151*   MPV  10.7     Recent Labs      06/18/19 0224   SODIUM  137   POTASSIUM  3.3*   CHLORIDE  103   CO2  24   GLUCOSE  158*   BUN  14   CREATININE  1.07   CALCIUM  9.0     Recent Labs       06/18/19   0224   APTT  26.3   INR  1.00         Recent Labs      06/18/19   0654   TRIGLYCERIDE  60   HDL  53   LDL  140*       Imaging  EC-ECHOCARDIOGRAM COMPLETE W/O CONT   Final Result      US-EXTREMITY VENOUS LOWER BILAT   Final Result      US-RUQ   Final Result         1.  Borderline common bile duct dilatation, most commonly associated with postcholecystectomy physiology. Consider causes of biliary obstruction with additional workup as clinically appropriate.   2.  Mild renal cortical thinning, appearance suggests medical renal disease.   3.  Echogenic liver, compatible with fatty change versus fibrosis      CT-CTA COMPLETE THORACOABDOMINAL AORTA   Final Result         1.  Normal caliber aorta without visualized aneurysm or dissection.   2.  Enlargement of the pancreatic head with low density, concerning for infiltrating pancreatic mass. Recommend follow-up pancreatic mass protocol MRI for further characterization as clinically appropriate.   3.  Scattered low-density hepatic lesions, consider cysts versus hemangioma or other hepatic lesion.   4.  Atherosclerosis and atherosclerotic coronary artery disease      These findings were discussed with the patient's clinician, DIANA RUTLEDGE, on 6/18/2019 3:25 AM.      CT-ABDOMEN-PELVIS WITH    (Results Pending)        Assessment/Plan  Epigastric abdominal pain- (present on admission)   Assessment & Plan    Likely secondary to pancreatic mass, rule out ACS and aortic pathology  CTA thoracoabdominal aorta negative for aneurysm or dissection.  Initial EKG and troponin negative for ischemia  Continuous cardiac monitoring with serial EKG and troponin  Lipid panel, TSH and hemoglobin A1c  Recommended follow up with Dr. Goyal  Pain control with Tylenol, oxycodone and IV morphine for breakthrough.  Monitor respiratory status closely       Pancreatic mass- (present on admission)   Assessment & Plan    Repeat CT abdomen and pelvis with IV and oral contrast  Follow up  with Dr. Goyal       Essential hypertension- (present on admission)   Assessment & Plan    Continue Avapro     Transaminitis- (present on admission)   Assessment & Plan    Consistent with alcoholic transaminitis however patient denies excessive alcohol use  RUQ ultrasound overall benign  Hepatitis panel pending     Hypokalemia- (present on admission)   Assessment & Plan    Replete with K. Dur 40  Follow magnesium and potassium  Change potassium to IV given poor p.o. intake, and nausea          VTE prophylaxis: SCDs

## 2019-06-18 NOTE — ASSESSMENT & PLAN NOTE
Repeat CT abdomen and pelvis with IV and oral contrast shows stability  MRCP showed stable multiloculated cyst  Follow up with Dr. Goyal

## 2019-06-18 NOTE — ASSESSMENT & PLAN NOTE
Likely secondary to pancreatic mass, rule out ACS and aortic pathology  CTA thoracoabdominal aorta negative for aneurysm or dissection.  Serial troponins have been normal.  His pain is currently resolved.  MRCP neg for cause  Advance diet

## 2019-06-18 NOTE — ED NOTES
Pt medicated for pain and updated on POC. Pt denies needs at this time.     Pt placed on O2 after morphine administration.

## 2019-06-18 NOTE — ED NOTES
Med Rec complete per Pt's home pharmacy and RN interview with Pt  Allergies Reviewed  No ABX filled in the last 14 days

## 2019-06-18 NOTE — DISCHARGE PLANNING
Medical Social Work    Referral: Code Aorta    Intervention: MSW responded to BL19 for code aorta.  Pt is an 86 year old male.  Pt is April Becker (: 1932).  There are multiple family members at bedside including pt's wife, Annamaria ([phone]).      Plan: SW will follow as needed.

## 2019-06-18 NOTE — PROGRESS NOTES
Pt down to . SKYLAR Villasenor gave verbal order to be off telemetry during transport. VSS, no dysrythmia noted.

## 2019-06-18 NOTE — ASSESSMENT & PLAN NOTE
Consistent with alcoholic transaminitis however patient denies excessive alcohol use  RUQ ultrasound overall benign  Hepatitis panel benign  MR showed stable pancreatic cyst, normal bile duct  Resolving

## 2019-06-18 NOTE — ED PROVIDER NOTES
"ED Provider Note    Scribed for Michele Witt M.D. by Leslie Anne. 6/18/2019, 2:17 AM.    Primary care provider: Dontae Sotelo M.D.  Means of arrival: EMS  History obtained from: Patient  History limited by: None    CHIEF COMPLAINT  Chief Complaint   Patient presents with   • Epigastric Pain       HPI  April Shanna Becker is a 86 y.o. male who presents to the Emergency Department for a chief complaint of acute, constant abdominal pain onset 1 hour ago. The patient states that before going to sleep he felt as if his abdomen was bloated. He then woke up from his sleep due to his  Acute abdominal pain, he notes \" it felt as if my stomach was going to burst\". The patient rates his pain as a 6/10 in severity. He describes his abdominal pain as tight in nature and radiating into his back. The patient does not state exacerbating or alleviating factors. He denies eating or drinking out of proportion. He notes that his last bowel movement was 1 day ago. Associated symptoms of nausea, diaphoresis and shortness of breath. Negative vomiting or chest pain. Pertinent medical history includes hyperlipidemia. The patient denies smoking cigarettes.     REVIEW OF SYSTEMS  See HPI for further details. All other systems are negative.     PAST MEDICAL HISTORY  The patient has a past medical history of Anesthesia; Arthritis; Breath shortness; Cataract; Heart burn; High cholesterol; Hip pain, bilateral (11/30/2018); Hypertension; Pain (2017); Pancreatic tumor; Parkinson's disease; and Pneumonia.    SURGICAL HISTORY   The patient has a past surgical history that includes cataract extraction with iol (Bilateral, 2016); cholecystectomy (2013); ulcer excision (1955); gastroscopy (N/A, 3/24/2017); egd w/endoscopic ultrasound (N/A, 3/24/2017); egd with asp/bx (N/A, 3/24/2017); knee arthroscopy (Left, 2013); turp-vapor (1990); other surgical procedure (1988); other surgical procedure (2000); lumbar laminectomy diskectomy (1995); and " "colonoscopy (2013).    SOCIAL HISTORY  Social History   Substance Use Topics   • Smoking status: Former Smoker     Packs/day: 0.20     Years: 10.00     Types: Cigarettes, Cigars     Quit date: 1987   • Smokeless tobacco: Never Used   • Alcohol use Yes      Comment: 2-3 per month      History   Drug Use No       FAMILY HISTORY  No family history on file.    CURRENT MEDICATIONS  No current facility-administered medications for this encounter.     Current Outpatient Prescriptions:   •  acetaminophen (TYLENOL) 500 MG Tab, Take 1,000 mg by mouth every 6 hours as needed., Disp: , Rfl:   •  ibuprofen (MOTRIN) 200 MG Tab, Take 800 mg by mouth every 6 hours as needed., Disp: , Rfl:   •  loratadine (CLARITIN REDITABS) 10 MG dissolvable tablet, Take 10 mg by mouth 1 time daily as needed., Disp: , Rfl:   •  cetirizine (ZYRTEC) 10 MG Tab, Take 10 mg by mouth 1 time daily as needed., Disp: , Rfl:   •  diphenhydrAMINE (BENADRYL) 25 MG Tab, Take 25 mg by mouth at bedtime as needed., Disp: , Rfl:   •  hydrochlorothiazide (MICROZIDE) 12.5 MG capsule, Take 12.5 mg by mouth every day., Disp: , Rfl:   •  irbesartan (AVAPRO) 75 MG tablet, Take 75 mg by mouth every evening., Disp: , Rfl:        ALLERGIES  None noted    PHYSICAL EXAM  VITAL SIGNS: /55   Pulse 74   Temp 36.8 °C (98.2 °F) (Temporal)   Resp 18   Ht 1.727 m (5' 8\")   Wt 79.8 kg (176 lb)   SpO2 95%   BMI 26.76 kg/m²   Constitutional: Alert in no apparent distress.  HENT: No signs of trauma, Bilateral external ears normal, Nose normal.   Eyes: Pupils are equal and reactive, Conjunctiva normal, Non-icteric.   Neck: Normal range of motion, No tenderness, Supple, No stridor.   Cardiovascular: Regular rate and rhythm, no murmurs.   Thorax & Lungs: Normal breath sounds, No respiratory distress, No wheezing, No chest tenderness.   Abdomen: Palpable mass in epigastric that is non-pulsatile. Bowel sounds normal, Soft, No peritoneal signs.  Skin: Warm, Dry, No erythema, No " rash.   Back: No bony tenderness, No CVA tenderness.   Extremities: Intact distal pulses, No edema, No tenderness, No cyanosis  Musculoskeletal: Good range of motion in all major joints. No tenderness to palpation or major deformities noted.   Neurologic: Alert , Normal motor function, Normal sensory function, No focal deficits noted.   Psychiatric: Affect normal, Judgment normal, Mood normal.       DIAGNOSTIC STUDIES / PROCEDURES     LABS  Results for orders placed or performed during the hospital encounter of 06/18/19   CBC w/ Differential   Result Value Ref Range    WBC 7.2 4.8 - 10.8 K/uL    RBC 4.71 4.70 - 6.10 M/uL    Hemoglobin 14.3 14.0 - 18.0 g/dL    Hematocrit 42.0 42.0 - 52.0 %    MCV 89.2 81.4 - 97.8 fL    MCH 30.4 27.0 - 33.0 pg    MCHC 34.0 33.7 - 35.3 g/dL    RDW 45.9 35.9 - 50.0 fL    Platelet Count 151 (L) 164 - 446 K/uL    MPV 10.7 9.0 - 12.9 fL    Neutrophils-Polys 59.60 44.00 - 72.00 %    Lymphocytes 25.80 22.00 - 41.00 %    Monocytes 9.20 0.00 - 13.40 %    Eosinophils 4.50 0.00 - 6.90 %    Basophils 0.60 0.00 - 1.80 %    Immature Granulocytes 0.30 0.00 - 0.90 %    Nucleated RBC 0.00 /100 WBC    Neutrophils (Absolute) 4.28 1.82 - 7.42 K/uL    Lymphs (Absolute) 1.85 1.00 - 4.80 K/uL    Monos (Absolute) 0.66 0.00 - 0.85 K/uL    Eos (Absolute) 0.32 0.00 - 0.51 K/uL    Baso (Absolute) 0.04 0.00 - 0.12 K/uL    Immature Granulocytes (abs) 0.02 0.00 - 0.11 K/uL    NRBC (Absolute) 0.00 K/uL   Complete Metabolic Panel (CMP)   Result Value Ref Range    Sodium 137 135 - 145 mmol/L    Potassium 3.3 (L) 3.6 - 5.5 mmol/L    Chloride 103 96 - 112 mmol/L    Co2 24 20 - 33 mmol/L    Anion Gap 10.0 0.0 - 11.9    Glucose 158 (H) 65 - 99 mg/dL    Bun 14 8 - 22 mg/dL    Creatinine 1.07 0.50 - 1.40 mg/dL    Calcium 9.0 8.5 - 10.5 mg/dL    AST(SGOT) 124 (H) 12 - 45 U/L    ALT(SGPT) 58 (H) 2 - 50 U/L    Alkaline Phosphatase 128 (H) 30 - 99 U/L    Total Bilirubin 0.7 0.1 - 1.5 mg/dL    Albumin 3.9 3.2 - 4.9 g/dL    Total  Protein 6.8 6.0 - 8.2 g/dL    Globulin 2.9 1.9 - 3.5 g/dL    A-G Ratio 1.3 g/dL   Troponin STAT   Result Value Ref Range    Troponin I 0.01 0.00 - 0.04 ng/mL   Lipase   Result Value Ref Range    Lipase 20 11 - 82 U/L   LACTIC ACID   Result Value Ref Range    Lactic Acid 1.0 0.5 - 2.0 mmol/L   PT/INR   Result Value Ref Range    PT 13.5 12.0 - 14.6 sec    INR 1.00 0.87 - 1.13   APTT   Result Value Ref Range    APTT 26.3 24.7 - 36.0 sec   ESTIMATED GFR   Result Value Ref Range    GFR If African American >60 >60 mL/min/1.73 m 2    GFR If Non African American >60 >60 mL/min/1.73 m 2   EKG   Result Value Ref Range    Report       [facility] Emergency Dept.    Test Date:  2019  Pt Name:    AUGIE ROLLINS                Department: ER  MRN:        [id number]                      Room:       [facility]  Gender:     Male                         Technician: [id number]  :        [id number]                   Requested By:MICHELE RUTLEDGE  Order #:    [id number]                    Reading MD: Michele Rutledge    Measurements  Intervals                                Axis  Rate:       69                           P:          51  PA:         196                          QRS:        -16  QRSD:       112                          T:          30  QT:         432  QTc:        463    Interpretive Statements  SINUS RHYTHM  NONSPECIFIC INTRAVENTRICULAR CONDUCTION DELAY  Compared to ECG 2018 22:03:18  Intraventricular conduction delay now present    Electronically Signed On 2019 3:40:09 PDT by Michele Rutledge       All labs were reviewed by me.    EKG  12 Lead EKG interpreted by me to show:  Sinus rhythm  Rate 69  Left axis  Intervals: Normal  No acute ST-T wave changes  No change from prior EKG from 2018     RADIOLOGY  CT-CTA COMPLETE THORACOABDOMINAL AORTA   Final Result         1.  Normal caliber aorta without visualized aneurysm or dissection.   2.  Enlargement of the pancreatic head with low density,  concerning for infiltrating pancreatic mass. Recommend follow-up pancreatic mass protocol MRI for further characterization as clinically appropriate.   3.  Scattered low-density hepatic lesions, consider cysts versus hemangioma or other hepatic lesion.   4.  Atherosclerosis and atherosclerotic coronary artery disease      These findings were discussed with the patient's clinician, DIANA RUTLEDGE, on 6/18/2019 3:25 AM.        The radiologist's interpretation of all radiological studies and images have been reviewed by me.    COURSE & MEDICAL DECISION MAKING  Pertinent Labs & Imaging studies reviewed. (See chart for details)    2:17 AM - Patient seen and examined at bedside. Plan of care was discussed with patient. Patient will be treated with IV fluids. Ordered CT-CTA complete thoracoabdominal aorta, lactic acid, CBC with differential, CMP, troponin stat, lipase, lactic acid, PT/INR, APTT, EKG to evaluate his symptoms.     3:04 AM- Patient will be treated with morphine 4 mg and Zofran 4 mg    3:34 AM- Recheck: Patient re-evaluated at beside. Patient reports feeling improved. Discussed patient's condition and treatment plan. I discussed his lab and radiology results with the patient, which were overall unremarkable.     3:40 am -I discussed case with Dr. Estevez which is agreeable ongoing inpatient care.  Primarily at this point ACS rule out.  Additionally patient may require further evaluation of his pancreatic mass.      Decision Making:  This is a 86 y.o. year old male who presents with above complaint.  Patient presenting with awakening with chest/upper abdominal pain, shortness of breath, nausea, diaphoresis.  Concerning for ACS.  Overall low heart score although borderline to moderate risk.  Initial troponin and EKG are negative.  Patient does have a palpable mass which is nonpalpable percentile.  Blood pressures and pulses were symmetric on initial physical exam.  Code aorta was called given the presentation and  radiation to back.  Fortunately there was no evidence of dissection or aneurysm.  Furthermore there is no other significant findings in the chest abdomen pelvis CT.  He did have the known pancreatic mass which is again identified.  Furthermore there is a large renal cyst which is likely incidental and noncontributory.  At this point the patient continues to have some mild discomfort and remains nauseated.  Given the over all well appearance of labs and imaging I am concerned that there may still be a possibility of underlying ACS etiology.  At this point I discussed the case again with Dr. Estevez which revealed ongoing inpatient care at this time.    -ADMIT-      FINAL IMPRESSION  1. Pain of upper abdomen    2. Pancreatic mass          Leslei AREVALO (Scribe), am scribing for, and in the presence of, Michele Witt M.D..    Electronically signed by: Leslie Anne (Rajiv), 6/18/2019    Michele AREVALO M.D. personally performed the services described in this documentation, as scribed by Leslie Anne in my presence, and it is both accurate and complete.    C    The note accurately reflects work and decisions made by me.  Michele Witt  6/18/2019  3:59 AM

## 2019-06-18 NOTE — PROGRESS NOTES
Bedside report received 0710. POC discussed with pt; Pt c/o nausea; Medicated per mar; c/o pain to abdomen, that is tolerable at this time; No overnight events; Sister in law at bedside; all questions answered at this time.

## 2019-06-18 NOTE — H&P
Hospital Medicine History & Physical Note    Date of Service  6/18/2019    Primary Care Physician  Dontae Sotelo M.D.    Consultants  None    Code Status  Full code    Chief Complaint  Abdominal pain    History of Presenting Illness  86 y.o. male with a past medical history of pancreatic mass, hypertension who presented 6/18/2019 with abdominal pain.  The patient was at a family dinner after which he felt bloated.  He went to bed and awoke from sleep an hour later with severe epigastric abdominal pain rated at 10/10.  The pain radiated to the back.  He reported associated nausea, diaphoresis and shortness of breath.  He also reports bilateral swelling of his lower extremities for the past week.  He denies any fevers, chills or diarrhea.  He received IV pain meds in the ER with some improvement of his pain.  He states he drinks alcohol occasionally.  He denies any history of alcohol withdrawal.  Patient states that he has been diagnosed with a pancreatic mass 10 years ago.  He underwent a biopsy that was inconclusive to do low yield.  The patient and wife think it is not malignant since the cancer has not grown in size.  He follows with Dr. Goyal.    EKG interpreted by me reveals sinus rhythm with nonspecific intraventricular conduction delay.  No ST elevation or ST depression is noted    Review of Systems  Review of Systems   Constitutional: Positive for diaphoresis. Negative for chills and fever.   HENT: Negative for hearing loss and sore throat.    Eyes: Negative for blurred vision.   Respiratory: Positive for shortness of breath. Negative for cough, sputum production and wheezing.    Cardiovascular: Negative for chest pain, palpitations and leg swelling.   Gastrointestinal: Positive for abdominal pain and nausea. Negative for blood in stool, diarrhea and vomiting.   Genitourinary: Negative for dysuria, flank pain and urgency.   Musculoskeletal: Negative for back pain, joint pain, myalgias and neck pain.    Skin: Negative for rash.   Neurological: Negative for dizziness, focal weakness, seizures and headaches.   Endo/Heme/Allergies: Does not bruise/bleed easily.   Psychiatric/Behavioral: Negative for suicidal ideas.   All other systems reviewed and are negative.      Past Medical History   has a past medical history of Anesthesia; Arthritis; Breath shortness; Cataract; Heart burn; High cholesterol; Hip pain, bilateral (11/30/2018); Hypertension; Pain (2017); Pancreatic tumor; Parkinson's disease; and Pneumonia.    Surgical History   has a past surgical history that includes cataract extraction with iol (Bilateral, 2016); cholecystectomy (2013); ulcer excision (1955); gastroscopy (N/A, 3/24/2017); egd w/endoscopic ultrasound (N/A, 3/24/2017); egd with asp/bx (N/A, 3/24/2017); knee arthroscopy (Left, 2013); turp-vapor (1990); other surgical procedure (1988); other surgical procedure (2000); lumbar laminectomy diskectomy (1995); and colonoscopy (2013).     Family History  No pertinent family history    Social History   reports that he quit smoking about 32 years ago. His smoking use included Cigarettes and Cigars. He has a 2.00 pack-year smoking history. He has never used smokeless tobacco. He reports that he drinks alcohol. He reports that he does not use drugs.    Allergies  No Known Allergies    Medications  Prior to Admission Medications   Prescriptions Last Dose Informant Patient Reported? Taking?   acetaminophen (TYLENOL) 500 MG Tab   Yes No   Sig: Take 1,000 mg by mouth every 6 hours as needed.   cetirizine (ZYRTEC) 10 MG Tab 6/17/2019 at Unknown time  Yes No   Sig: Take 10 mg by mouth 1 time daily as needed.   diphenhydrAMINE (BENADRYL) 25 MG Tab   Yes No   Sig: Take 25 mg by mouth at bedtime as needed.   hydrochlorothiazide (MICROZIDE) 12.5 MG capsule 6/17/2019 at Unknown time  Yes No   Sig: Take 12.5 mg by mouth every day.   ibuprofen (MOTRIN) 200 MG Tab   Yes No   Sig: Take 800 mg by mouth every 6 hours as  needed.   irbesartan (AVAPRO) 75 MG tablet 6/17/2019 at Unknown time  Yes No   Sig: Take 75 mg by mouth every evening.   loratadine (CLARITIN REDITABS) 10 MG dissolvable tablet   Yes No   Sig: Take 10 mg by mouth 1 time daily as needed.      Facility-Administered Medications: None       Physical Exam  Temp:  [36.8 °C (98.2 °F)] 36.8 °C (98.2 °F)  Pulse:  [74-81] 81  Resp:  [16-18] 16  BP: (135)/(55) 135/55  SpO2:  [89 %-99 %] 97 %    Physical Exam   Constitutional: He is oriented to person, place, and time. He appears well-developed and well-nourished. No distress.   HENT:   Head: Normocephalic and atraumatic.   Mouth/Throat: Oropharynx is clear and moist.   Eyes: Pupils are equal, round, and reactive to light. Conjunctivae are normal. No scleral icterus.   Neck: Normal range of motion. Neck supple.   Cardiovascular: Normal rate, regular rhythm and normal heart sounds.    Pulmonary/Chest: Effort normal and breath sounds normal. No respiratory distress. He has no wheezes. He has no rales.   Abdominal: Soft. Bowel sounds are normal. He exhibits no distension. There is tenderness (Epigastric). There is no rebound.   Musculoskeletal: Normal range of motion. He exhibits no edema or tenderness.   Lymphadenopathy:     He has no cervical adenopathy.   Neurological: He is alert and oriented to person, place, and time. No cranial nerve deficit. Coordination normal.   Skin: Skin is warm. No rash noted.   Psychiatric: He has a normal mood and affect. His behavior is normal.   Nursing note and vitals reviewed.      Laboratory:  Recent Labs      06/18/19 0224   WBC  7.2   RBC  4.71   HEMOGLOBIN  14.3   HEMATOCRIT  42.0   MCV  89.2   MCH  30.4   MCHC  34.0   RDW  45.9   PLATELETCT  151*   MPV  10.7     Recent Labs      06/18/19 0224   SODIUM  137   POTASSIUM  3.3*   CHLORIDE  103   CO2  24   GLUCOSE  158*   BUN  14   CREATININE  1.07   CALCIUM  9.0     Recent Labs      06/18/19 0224   ALTSGPT  58*   ASTSGOT  124*    ALKPHOSPHAT  128*   TBILIRUBIN  0.7   LIPASE  20   GLUCOSE  158*     Recent Labs      06/18/19   0224   APTT  26.3   INR  1.00             Recent Labs      06/18/19   0224   TROPONINI  0.01       Urinalysis:    No results found     Imaging:  CT-CTA COMPLETE THORACOABDOMINAL AORTA   Final Result         1.  Normal caliber aorta without visualized aneurysm or dissection.   2.  Enlargement of the pancreatic head with low density, concerning for infiltrating pancreatic mass. Recommend follow-up pancreatic mass protocol MRI for further characterization as clinically appropriate.   3.  Scattered low-density hepatic lesions, consider cysts versus hemangioma or other hepatic lesion.   4.  Atherosclerosis and atherosclerotic coronary artery disease      These findings were discussed with the patient's clinician, DIANA RUTLEDGE, on 6/18/2019 3:25 AM.      US-RUQ    (Results Pending)         Assessment/Plan:  I anticipate this patient is appropriate for observation status at this time.    Epigastric abdominal pain- (present on admission)   Assessment & Plan    Likely secondary to pancreatic mass, rule out ACS and aortic pathology  CTA thoracoabdominal aorta was negative for aortic aneurysm or dissection.  Initial EKG and troponin negative for ischemia  Continuous cardiac monitoring with serial EKG and troponin  Patient has been given full dose of aspirin  Check lipid panel, TSH and hemoglobin A1c  Recommended follow up with Dr. Goyal  Pain control with Tylenol, oxycodone and IV morphine for breakthrough.  Monitor respiratory status closely       Pancreatic mass- (present on admission)   Assessment & Plan    Follow up with Dr. Goyal       Essential hypertension- (present on admission)   Assessment & Plan    Continue Avapro     Transaminitis- (present on admission)   Assessment & Plan    Consistent with alcoholic transaminitis however patient denies excessive alcohol use  Check RUQ ultrasound  Check hepatitis  panel     Hypokalemia- (present on admission)   Assessment & Plan    Replete with K. Dur 40  Check mag         VTE prophylaxis: scd

## 2019-06-18 NOTE — PROGRESS NOTES
Pt arrived to unit via [facility] telemonitored with ACLS RN at 0530. Pt oriented to room, unit, and plan of care. Tele-monitor placed.SR. VSS. Admit profile and assessment completed. AOx4, no neuro deficits. 4/10 ab pain at this time, just received pain management in ER will reassess. Pt has distended abdomen tender to palpation in epigastric region otherwise non tender. Pt states nausea, medicated per MAR, emesis bag at bedside. Unlabored and even breathing, 2LNC nonproductive cough present. Swallow eval passed, clear liquid diet. Skin intact. Extra pillows and blankets. IV infusing per MAR. Unable to obtain lab draw will call phlebotomist. Family at bedside. Whiteboard updated. All questions answered at this time. Call light within reach; fall precautions in place. MD notifed of patients arrival. Pt to RR steady gait, SBA

## 2019-06-19 ENCOUNTER — APPOINTMENT (OUTPATIENT)
Dept: RADIOLOGY | Facility: MEDICAL CENTER | Age: 84
DRG: 871 | End: 2019-06-19
Attending: NURSE PRACTITIONER
Payer: COMMERCIAL

## 2019-06-19 PROBLEM — R11.0 NAUSEA: Status: ACTIVE | Noted: 2019-06-19

## 2019-06-19 PROBLEM — E83.39 HYPOPHOSPHATEMIA: Status: ACTIVE | Noted: 2019-06-19

## 2019-06-19 PROBLEM — A41.9 SEPSIS (HCC): Status: ACTIVE | Noted: 2019-06-19

## 2019-06-19 PROBLEM — R78.81 BACTEREMIA: Status: ACTIVE | Noted: 2019-06-19

## 2019-06-19 LAB
ALBUMIN SERPL BCP-MCNC: 3.3 G/DL (ref 3.2–4.9)
ALBUMIN/GLOB SERPL: 1.4 G/DL
ALP SERPL-CCNC: 141 U/L (ref 30–99)
ALT SERPL-CCNC: 261 U/L (ref 2–50)
ANION GAP SERPL CALC-SCNC: 7 MMOL/L (ref 0–11.9)
APPEARANCE UR: CLEAR
AST SERPL-CCNC: 175 U/L (ref 12–45)
BASOPHILS # BLD AUTO: 0.4 % (ref 0–1.8)
BASOPHILS # BLD: 0.03 K/UL (ref 0–0.12)
BILIRUB SERPL-MCNC: 1.1 MG/DL (ref 0.1–1.5)
BILIRUB UR QL STRIP.AUTO: NEGATIVE
BUN SERPL-MCNC: 13 MG/DL (ref 8–22)
CALCIUM SERPL-MCNC: 8 MG/DL (ref 8.5–10.5)
CHLORIDE SERPL-SCNC: 104 MMOL/L (ref 96–112)
CO2 SERPL-SCNC: 23 MMOL/L (ref 20–33)
COLOR UR: YELLOW
CREAT SERPL-MCNC: 1.14 MG/DL (ref 0.5–1.4)
EOSINOPHIL # BLD AUTO: 0.02 K/UL (ref 0–0.51)
EOSINOPHIL NFR BLD: 0.2 % (ref 0–6.9)
ERYTHROCYTE [DISTWIDTH] IN BLOOD BY AUTOMATED COUNT: 47.5 FL (ref 35.9–50)
ERYTHROCYTE [DISTWIDTH] IN BLOOD BY AUTOMATED COUNT: 49 FL (ref 35.9–50)
GLOBULIN SER CALC-MCNC: 2.4 G/DL (ref 1.9–3.5)
GLUCOSE SERPL-MCNC: 123 MG/DL (ref 65–99)
GLUCOSE UR STRIP.AUTO-MCNC: NEGATIVE MG/DL
HAV IGM SERPL QL IA: NEGATIVE
HBV CORE IGM SER QL: NEGATIVE
HBV SURFACE AG SER QL: NEGATIVE
HCT VFR BLD AUTO: 35.8 % (ref 42–52)
HCT VFR BLD AUTO: 38.6 % (ref 42–52)
HCV AB SER QL: NEGATIVE
HGB BLD-MCNC: 12.1 G/DL (ref 14–18)
HGB BLD-MCNC: 12.8 G/DL (ref 14–18)
IMM GRANULOCYTES # BLD AUTO: 0.03 K/UL (ref 0–0.11)
IMM GRANULOCYTES NFR BLD AUTO: 0.4 % (ref 0–0.9)
KETONES UR STRIP.AUTO-MCNC: NEGATIVE MG/DL
LACTATE BLD-SCNC: 1.2 MMOL/L (ref 0.5–2)
LACTATE BLD-SCNC: 1.5 MMOL/L (ref 0.5–2)
LEUKOCYTE ESTERASE UR QL STRIP.AUTO: NEGATIVE
LYMPHOCYTES # BLD AUTO: 0.25 K/UL (ref 1–4.8)
LYMPHOCYTES NFR BLD: 2.9 % (ref 22–41)
MAGNESIUM SERPL-MCNC: 1.8 MG/DL (ref 1.5–2.5)
MCH RBC QN AUTO: 30 PG (ref 27–33)
MCH RBC QN AUTO: 30.2 PG (ref 27–33)
MCHC RBC AUTO-ENTMCNC: 33.2 G/DL (ref 33.7–35.3)
MCHC RBC AUTO-ENTMCNC: 33.8 G/DL (ref 33.7–35.3)
MCV RBC AUTO: 89.3 FL (ref 81.4–97.8)
MCV RBC AUTO: 90.4 FL (ref 81.4–97.8)
MICRO URNS: NORMAL
MONOCYTES # BLD AUTO: 0.39 K/UL (ref 0–0.85)
MONOCYTES NFR BLD AUTO: 4.6 % (ref 0–13.4)
NEUTROPHILS # BLD AUTO: 7.77 K/UL (ref 1.82–7.42)
NEUTROPHILS NFR BLD: 91.5 % (ref 44–72)
NITRITE UR QL STRIP.AUTO: NEGATIVE
NRBC # BLD AUTO: 0 K/UL
NRBC BLD-RTO: 0 /100 WBC
PH UR STRIP.AUTO: 6.5 [PH]
PHOSPHATE SERPL-MCNC: 1.2 MG/DL (ref 2.5–4.5)
PLATELET # BLD AUTO: 110 K/UL (ref 164–446)
PLATELET # BLD AUTO: 117 K/UL (ref 164–446)
PMV BLD AUTO: 10.5 FL (ref 9–12.9)
PMV BLD AUTO: 10.8 FL (ref 9–12.9)
POTASSIUM SERPL-SCNC: 3.5 MMOL/L (ref 3.6–5.5)
PROT SERPL-MCNC: 5.7 G/DL (ref 6–8.2)
PROT UR QL STRIP: NEGATIVE MG/DL
RBC # BLD AUTO: 4.01 M/UL (ref 4.7–6.1)
RBC # BLD AUTO: 4.27 M/UL (ref 4.7–6.1)
RBC UR QL AUTO: NEGATIVE
SODIUM SERPL-SCNC: 134 MMOL/L (ref 135–145)
SP GR UR STRIP.AUTO: <=1.005
UROBILINOGEN UR STRIP.AUTO-MCNC: 0.2 MG/DL
WBC # BLD AUTO: 12 K/UL (ref 4.8–10.8)
WBC # BLD AUTO: 8.5 K/UL (ref 4.8–10.8)

## 2019-06-19 PROCEDURE — 36415 COLL VENOUS BLD VENIPUNCTURE: CPT

## 2019-06-19 PROCEDURE — 96367 TX/PROPH/DG ADDL SEQ IV INF: CPT

## 2019-06-19 PROCEDURE — 96368 THER/DIAG CONCURRENT INF: CPT

## 2019-06-19 PROCEDURE — 770020 HCHG ROOM/CARE - TELE (206)

## 2019-06-19 PROCEDURE — 700111 HCHG RX REV CODE 636 W/ 250 OVERRIDE (IP): Performed by: NURSE PRACTITIONER

## 2019-06-19 PROCEDURE — 700111 HCHG RX REV CODE 636 W/ 250 OVERRIDE (IP): Performed by: INTERNAL MEDICINE

## 2019-06-19 PROCEDURE — 99233 SBSQ HOSP IP/OBS HIGH 50: CPT | Performed by: HOSPITALIST

## 2019-06-19 PROCEDURE — 80074 ACUTE HEPATITIS PANEL: CPT

## 2019-06-19 PROCEDURE — 700102 HCHG RX REV CODE 250 W/ 637 OVERRIDE(OP): Performed by: INTERNAL MEDICINE

## 2019-06-19 PROCEDURE — 84100 ASSAY OF PHOSPHORUS: CPT

## 2019-06-19 PROCEDURE — 85027 COMPLETE CBC AUTOMATED: CPT

## 2019-06-19 PROCEDURE — 80053 COMPREHEN METABOLIC PANEL: CPT

## 2019-06-19 PROCEDURE — 85025 COMPLETE CBC W/AUTO DIFF WBC: CPT

## 2019-06-19 PROCEDURE — 700105 HCHG RX REV CODE 258: Performed by: HOSPITALIST

## 2019-06-19 PROCEDURE — 700105 HCHG RX REV CODE 258: Performed by: NURSE PRACTITIONER

## 2019-06-19 PROCEDURE — 83735 ASSAY OF MAGNESIUM: CPT

## 2019-06-19 PROCEDURE — 96366 THER/PROPH/DIAG IV INF ADDON: CPT

## 2019-06-19 PROCEDURE — 96376 TX/PRO/DX INJ SAME DRUG ADON: CPT

## 2019-06-19 PROCEDURE — 96375 TX/PRO/DX INJ NEW DRUG ADDON: CPT

## 2019-06-19 PROCEDURE — 99221 1ST HOSP IP/OBS SF/LOW 40: CPT | Performed by: INTERNAL MEDICINE

## 2019-06-19 PROCEDURE — 81003 URINALYSIS AUTO W/O SCOPE: CPT

## 2019-06-19 PROCEDURE — 700101 HCHG RX REV CODE 250: Performed by: NURSE PRACTITIONER

## 2019-06-19 PROCEDURE — 83605 ASSAY OF LACTIC ACID: CPT

## 2019-06-19 PROCEDURE — A9270 NON-COVERED ITEM OR SERVICE: HCPCS | Performed by: INTERNAL MEDICINE

## 2019-06-19 RX ORDER — SODIUM CHLORIDE, SODIUM LACTATE, POTASSIUM CHLORIDE, CALCIUM CHLORIDE 600; 310; 30; 20 MG/100ML; MG/100ML; MG/100ML; MG/100ML
INJECTION, SOLUTION INTRAVENOUS CONTINUOUS
Status: DISCONTINUED | OUTPATIENT
Start: 2019-06-19 | End: 2019-06-21

## 2019-06-19 RX ORDER — IRBESARTAN 150 MG/1
75 TABLET ORAL DAILY
Status: DISCONTINUED | OUTPATIENT
Start: 2019-06-20 | End: 2019-06-22 | Stop reason: HOSPADM

## 2019-06-19 RX ORDER — SODIUM CHLORIDE 9 MG/ML
30 INJECTION, SOLUTION INTRAVENOUS
Status: DISCONTINUED | OUTPATIENT
Start: 2019-06-19 | End: 2019-06-22 | Stop reason: HOSPADM

## 2019-06-19 RX ORDER — SODIUM CHLORIDE 9 MG/ML
500 INJECTION, SOLUTION INTRAVENOUS ONCE
Status: COMPLETED | OUTPATIENT
Start: 2019-06-19 | End: 2019-06-19

## 2019-06-19 RX ORDER — SODIUM CHLORIDE 9 MG/ML
500 INJECTION, SOLUTION INTRAVENOUS
Status: DISCONTINUED | OUTPATIENT
Start: 2019-06-19 | End: 2019-06-22 | Stop reason: HOSPADM

## 2019-06-19 RX ORDER — LORAZEPAM 2 MG/ML
.5-1 INJECTION INTRAMUSCULAR
Status: COMPLETED | OUTPATIENT
Start: 2019-06-19 | End: 2019-06-19

## 2019-06-19 RX ADMIN — LORAZEPAM 1 MG: 2 INJECTION INTRAMUSCULAR; INTRAVENOUS at 11:40

## 2019-06-19 RX ADMIN — PIPERACILLIN SODIUM AND TAZOBACTAM SODIUM 3.38 G: 3; .375 INJECTION, POWDER, FOR SOLUTION INTRAVENOUS at 13:21

## 2019-06-19 RX ADMIN — MORPHINE SULFATE 2 MG: 4 INJECTION INTRAVENOUS at 12:22

## 2019-06-19 RX ADMIN — POTASSIUM PHOSPHATE, MONOBASIC AND POTASSIUM PHOSPHATE, DIBASIC 30 MMOL: 224; 236 INJECTION, SOLUTION, CONCENTRATE INTRAVENOUS at 08:45

## 2019-06-19 RX ADMIN — ACETAMINOPHEN 650 MG: 325 TABLET, FILM COATED ORAL at 05:51

## 2019-06-19 RX ADMIN — PIPERACILLIN SODIUM AND TAZOBACTAM SODIUM 3.38 G: 3; .375 INJECTION, POWDER, FOR SOLUTION INTRAVENOUS at 13:59

## 2019-06-19 RX ADMIN — SODIUM CHLORIDE 500 ML: 9 INJECTION, SOLUTION INTRAVENOUS at 01:30

## 2019-06-19 RX ADMIN — PIPERACILLIN SODIUM AND TAZOBACTAM SODIUM 3.38 G: 3; .375 INJECTION, POWDER, FOR SOLUTION INTRAVENOUS at 20:46

## 2019-06-19 ASSESSMENT — ENCOUNTER SYMPTOMS
CHILLS: 1
CONSTIPATION: 0
WEAKNESS: 1
MYALGIAS: 0
ORTHOPNEA: 0
DEPRESSION: 0
HEMOPTYSIS: 0
NAUSEA: 1
VOMITING: 0
COUGH: 0
BACK PAIN: 1
DIAPHORESIS: 0
DIARRHEA: 0
CLAUDICATION: 0
NECK PAIN: 0
PALPITATIONS: 0
DIZZINESS: 0
BLOOD IN STOOL: 0
ABDOMINAL PAIN: 1
FEVER: 1
WEIGHT LOSS: 0
SPUTUM PRODUCTION: 0
WHEEZING: 0
SHORTNESS OF BREATH: 0
HEADACHES: 0
HEARTBURN: 0

## 2019-06-19 NOTE — CONSULTS
"ADULT INFECTIOUS DISEASE CONSULT     Date of Service: 6/19/2019    Consult Requested By: Chloé Acharya M.D.    Reason for Consultation: Bacteremia    History of Present Illness:   April Becker is a 86 y.o. male with history of pancreatic tumor, Parkinson's disease presented to the emergency room on 6/18/2019 with complaints of constant abdominal pain.  He said he felt that his fluid was going to burst.  He was found to have increased LFTs.  The CT of the abdomen showed the persistent loculated mass in the pancreas.  CTA of the abdominal aorta did not show any leakage.  He was found to have fevers up to 101.2.  His blood cultures have come back positive for gram-negative rods.  In view of that infectious disease consult has been called.    Review Of Systems:  Gen.-has been having fevers. Denies any chills.  Complains of malaise and fatigue  HEENT- denies any sore throat, headache or vision changes  Pulmonary- denies any cough, shortness of breath  Cardiovascular- denies any chest pain, leg swelling.    GI-complains of mid epigastric pain as well as nausea and vomiting  Musculoskeletal- denies any joint pains or swelling  Neuro- denies any weakness or sensory change  Psych- denies any depression or suicidal ideation  Genitourinary- denies any frequency or dysuria        PMH:   Past Medical History:   Diagnosis Date   • Anesthesia     PONV, and hard to wake up   • Arthritis     osteoarthritis, hips   • Breath shortness     \"sob came before 11/2016, did a chest x-ray, and it didn't show anything\"   • Cataract     Bilateral IOL implants   • Heart burn     no meds   • High cholesterol     no meds   • Hip pain, bilateral 11/30/2018    and lower back.   • Hypertension    • Pain 2017    stomach, hips, back   • Pancreatic tumor     Inconclusive biopsy which caused pancreatitis. Follow with CT every 6 months and no changes.    • Parkinson's disease     no meds   • Pneumonia     November 2016, no admission  " "        PSH:  Past Surgical History:   Procedure Laterality Date   • GASTROSCOPY N/A 3/24/2017    Procedure: GASTROSCOPY;  Surgeon: Raphael Crawford M.D.;  Location: SURGERY [facility];  Service:    • EGD W/ENDOSCOPIC ULTRASOUND N/A 3/24/2017    Procedure: EGD W/ENDOSCOPIC ULTRASOUND- UPPER, LINEAR, RADIAL ON STANDBY;  Surgeon: Raphael Crawford M.D.;  Location: SURGERY [facility];  Service:    • EGD WITH ASP/BX N/A 3/24/2017    Procedure: EGD WITH ASP/BX - FINE NEEDLE ASPIRATION;  Surgeon: Raphael Crawford M.D.;  Location: SURGERY [facility];  Service:    • CATARACT EXTRACTION WITH IOL Bilateral 2016   • CHOLECYSTECTOMY  2013   • KNEE ARTHROSCOPY Left 2013   • COLONOSCOPY  2013   • OTHER SURGICAL PROCEDURE  2000    salivary gland removed.    • LUMBAR LAMINECTOMY DISKECTOMY  1995    L5 \"cleaned up\"   • TURP-VAPOR  1990    TURP   • OTHER SURGICAL PROCEDURE  1988    salivary gland removed.   • ULCER EXCISION  1955    gastric ulcer        FAMILY HX:  No family history on file.    SOCIAL HX:  Social History     Social History   • Marital status:      Spouse name: N/A   • Number of children: N/A   • Years of education: N/A     Occupational History   • Not on file.     Social History Main Topics   • Smoking status: Former Smoker     Packs/day: 0.20     Years: 10.00     Types: Cigarettes, Cigars     Quit date: 1987   • Smokeless tobacco: Never Used   • Alcohol use Yes      Comment: 2-3 per month   • Drug use: No   • Sexual activity: Not Currently     Other Topics Concern   • Not on file     Social History Narrative   • No narrative on file     History   Smoking Status   • Former Smoker   • Packs/day: 0.20   • Years: 10.00   • Types: Cigarettes, Cigars   • Quit date: 1987   Smokeless Tobacco   • Never Used     History   Alcohol Use   • Yes     Comment: 2-3 per month       Allergies/Intolerances:  No Known Allergies    History reviewed with the patient    Other Current Medications:    Current " Facility-Administered Medications:   •  potassium phosphates 30 mmol in D5W 500 mL ivpb, 30 mmol, Intravenous, Once, Laci M Olde, A.P.N., Last Rate: 83.3 mL/hr at 06/19/19 0845, 30 mmol at 06/19/19 0845  •  lactated ringers infusion, , Intravenous, Continuous, Laci M Olde, A.P.N., Last Rate: 100 mL/hr at 06/19/19 0800  •  piperacillin-tazobactam (ZOSYN) 3.375 g in  mL IVPB, 3.375 g, Intravenous, Once **AND** piperacillin-tazobactam (ZOSYN) 3.375 g in  mL IVPB, 3.375 g, Intravenous, Q8HRS, Laci M Olde, A.P.N.  •  NS infusion 2,394 mL, 30 mL/kg, Intravenous, Once PRN, Laci M Olde, A.P.N.  •  NS (BOLUS) infusion 500 mL, 500 mL, Intravenous, Once PRN, Laci M Olde, A.P.N.  •  senna-docusate (PERICOLACE or SENOKOT S) 8.6-50 MG per tablet 2 Tab, 2 Tab, Oral, BID **AND** polyethylene glycol/lytes (MIRALAX) PACKET 1 Packet, 1 Packet, Oral, QDAY PRN **AND** magnesium hydroxide (MILK OF MAGNESIA) suspension 30 mL, 30 mL, Oral, QDAY PRN **AND** bisacodyl (DULCOLAX) suppository 10 mg, 10 mg, Rectal, QDAY PRN, Rishi Estevez M.D.  •  Respiratory Care per Protocol, , Nebulization, Continuous RT, Rishi Estevez M.D.  •  acetaminophen (TYLENOL) tablet 650 mg, 650 mg, Oral, Q6HRS PRN, Rishi Estevez M.D., 650 mg at 06/19/19 0551  •  Notify provider if pain remains uncontrolled, , , CONTINUOUS **AND** Use the numeric rating scale (NRS-11) on regular floors and Critical-Care Pain Observation Tool (CPOT) on ICUs/Trauma to assess pain, , , CONTINUOUS **AND** Pulse Ox (Oximetry), , , CONTINUOUS **AND** Pharmacy Consult Request ...Pain Management Review 1 Each, 1 Each, Other, PHARMACY TO DOSE **AND** If patient difficult to arouse and/or has respiratory depression, stop any opiates that are currently infusing and call a Rapid Response., , , CONTINUOUS **AND** oxyCODONE immediate-release (ROXICODONE) tablet 2.5 mg, 2.5 mg, Oral, Q3HRS PRN **AND** oxyCODONE immediate-release (ROXICODONE) tablet 5 mg, 5 mg, Oral, Q3HRS PRN **AND**  "morphine (pf) 4 mg/ml injection 2 mg, 2 mg, Intravenous, Q3HRS PRN, Rishi Estevez M.D., 2 mg at 19  •  ondansetron (ZOFRAN) syringe/vial injection 4 mg, 4 mg, Intravenous, Q4HRS PRN, Rishi Estevez M.D., 4 mg at 19  •  ondansetron (ZOFRAN ODT) dispertab 4 mg, 4 mg, Oral, Q4HRS PRN, Rishi Estevez M.D.  •  hyoscyamine-maalox plus-lidocaine viscous (GI COCKTAIL) oral susp 30 mL, 30 mL, Oral, Q6HRS PRN, Rishi Estevez M.D.  •  irbesartan (AVAPRO) tablet 75 mg, 75 mg, Oral, Nightly, Rishi Estevez M.D., 75 mg at 19  [unfilled]    Most Recent Vital Signs:  BP (!) 164/65   Pulse (!) 109   Temp (!) 38.4 °C (101.2 °F) (Temporal)   Resp 20   Ht 1.727 m (5' 8\")   Wt 79.8 kg (176 lb)   SpO2 93%   BMI 26.76 kg/m²   Temp  Av.6 °C (99.7 °F)  Min: 36.5 °C (97.7 °F)  Max: 38.4 °C (101.2 °F)    Physical Exam:  General: Looks ill but in no acute distress  HEENT: sclera anicteric, PERRL, EOMI, MMM, no oral lesions  Neck: supple, no lymphadenopathy  Chest: CTAB, no r/r/w, normal work of breathing.  Cardiac: Regular, no murmurs no gallops heard  Abdomen: Soft.  Old surgical scar.  Epigastric tenderness  Extremities: No edema. No joint swelling.  Skin: no rashes or erythema  Neuro: Alert and oriented times 3, non-focal exam    Pertinent Lab Results:  Recent Labs      19   0224  19   0354  19   1035   WBC  7.2  8.5  12.0*      Recent Labs      19   0224  19   0354  19   1035   HEMOGLOBIN  14.3  12.1*  12.8*   HEMATOCRIT  42.0  35.8*  38.6*   MCV  89.2  89.3  90.4   MCH  30.4  30.2  30.0   PLATELETCT  151*  110*  117*         Recent Labs      194  19   0354   SODIUM  137  134*   POTASSIUM  3.3*  3.5*   CHLORIDE  103  104   CO2  24  23   CREATININE  1.07  1.14        Recent Labs      194  19   0354   ALBUMIN  3.9  3.3        Pertinent Micro:  Results     Procedure Component Value Units Date/Time    BLOOD CULTURE x2 " "[[id number]]  (Abnormal) Collected:  06/18/19 1746    Order Status:  Completed Specimen:  Blood from Peripheral Updated:  06/19/19 1149     Significant Indicator POS (POS)     Source BLD     Site PERIPHERAL     Culture Result Growth detected by Bactec instrument. 06/19/2019  11:44  Gram Stain: Gram negative rods.   (A)    Narrative:       Per Hospital Policy: Only change Specimen Src: to \"Line\" if  specified by physician order.  Right AC    BLOOD CULTURE x2 [[id number]]  (Abnormal) Collected:  06/18/19 1746    Order Status:  Completed Specimen:  Blood from Peripheral Updated:  06/19/19 1104     Significant Indicator POS (POS)     Source BLD     Site PERIPHERAL     Culture Result Growth detected by Bactec instrument. 06/19/2019  11:01  Gram Stain: Gram negative rods.   (A)    Narrative:       CALL  [facility]  [facility] tel. [phone],  CALLED  CPU tel. [phone] 06/19/2019, 11:04, RB PERF. RESULTS CALLED  TO:[id number]  Per Hospital Policy: Only change Specimen Src: to \"Line\" if  specified by physician order.  Right Hand    URINALYSIS [[id number]] Collected:  06/19/19 0119    Order Status:  Completed Specimen:  Urine from Urine, Clean Catch Updated:  06/19/19 0149     Color Yellow     Character Clear     Specific Gravity <=1.005     Ph 6.5     Glucose Negative mg/dL      Ketones Negative mg/dL      Protein Negative mg/dL      Bilirubin Negative     Urobilinogen, Urine 0.2     Nitrite Negative     Leukocyte Esterase Negative     Occult Blood Negative     Micro Urine Req see below     Comment: Microscopic examination not performed when specimen is clear  and chemically negative for protein, blood, leukocyte esterase  and nitrite.         URINALYSIS [[id number]]     Order Status:  Canceled Specimen:  Urine         Blood Culture   Date Value Ref Range Status   02/16/2008   Final    Blood culture testing and Gram stain, if indicated, are  performed at [facility], [address] [place] [address].  " Positive blood cultures are  sent to [facility], [address] [place] [address], for organism identification and  susceptibility testing.  No growth after 5 days of incubation.        Studies:  Ct-abdomen-pelvis With    Result Date: 6/18/2019 6/18/2019 6:21 PM HISTORY/REASON FOR EXAM:  Abdominal distention and pain. History of partial gastrectomy in pancreatic mass. TECHNIQUE/EXAM DESCRIPTION:   CT scan of the abdomen and pelvis with contrast. Contrast-enhanced helical scanning was obtained from the diaphragmatic domes through the pubic symphysis following the bolus administration of nonionic contrast without complication. 100 mL of Omnipaque 350 nonionic contrast was administered without complication. Low dose optimization technique was utilized for this CT exam including automated exposure control and adjustment of the mA and/or kV according to patient size. COMPARISON: 9/5/2018 FINDINGS: There is atelectasis in the lower lobes. CT Abdomen: Hypodense lesions are unchanged in the prior exam and likely represent cysts. The spleen and adrenal glands are unremarkable. A multiloculated cystic mass in the pancreatic head measures approximately 4 x 3.9 cm, previously 3.9 x 3.7 cm. The mass abuts the main portal vein and superior mesenteric vein. No definite dilatation of the pancreatic duct is appreciated. The gallbladder has been removed. There are postoperative changes at the gastroesophageal junction with a small hiatal hernia. The bowel is grossly unremarkable. No pneumoperitoneum is identified. There is mild scattered arterial calcification. The kidneys enhance symmetrically. A hypodense mass in the lower pole of the left kidney is consistent with a cyst. Hypodense masses in the upper pole of the right kidney are consistent with cysts on the prior noncontrast CT. CT Pelvis: The bladder is unremarkable. The prostate appears enlarged. No significant free fluid or adenopathy is identified.  Degenerative changes are in the spine and sacroiliac joints.     1.  No acute intra-abdominal findings. 2.  Multiloculated cystic pancreatic mass is not significantly changed from the prior exam in 2018. 3.  Small hiatal hernia with postoperative changes at the gastroesophageal junction. 4.  Atherosclerosis.    Dx-chest-portable (1 View)    Result Date: 2019 5:37 PM HISTORY/REASON FOR EXAM: Shortness of Breath. Hypertension and nausea. TECHNIQUE/EXAM DESCRIPTION AND NUMBER OF VIEWS: Single AP view of the chest. COMPARISON: 2018 FINDINGS: Hardware: Vascular clips of the gastroesophageal junction Lungs: Decreased lung volumes with some linear basilar opacity. Minor fissure is newly visualized. Pleura:  No pleural space process is seen. Heart and mediastinum: Upper normal heart size     New basal atelectasis and minor fissure visualization which could be from edema or atelectasis    Us-extremity Venous Lower Bilat    Result Date: 2019   Vascular Laboratory  CONCLUSIONS  Normal bilateral superficial and deep venous examination of the lower  extremities.  No acute thrombosis is identified.  The peroneal and posterior tibial veins are difficult to assess for  compressibility, but flow response to augmentation is demonstrated.  No prior study is available for comparison.  AUGIE ROLLINS  Exam Date:     2019 09:59  Room #:     Inpatient  Priority:     Routine  Ht (in):             Wt (lb):  Ordering Physician:        JESSICA RODRIGUEZ  Referring Physician:       [id number] Mcmullen  Sonographer:               Dulce Virgen RVT  Study Type:                Complete Bilateral  Technical Quality:         Adequate  Age:    86    Gender:     M  MRN:    [id number]  :    [id number]      BSA:  Indications:     Localized swelling, mass and lump, lower limb, bilateral  CPT Codes:       43670  ICD Codes:       R22.43  History:         Off-and-on bilateral lower extremity swelling for about one                    week; No previous duplex on file.  Limitations:  PROCEDURES:  Bilateral lower extremity venous duplex imaging.  The following venous structures were evaluated: common femoral, profunda  femoral, greater saphenous, femoral, popliteal , peroneal and posterior  tibial veins.  Serial compression, augmentation maneuvers,  color and spectral Doppler  flow evaluations were performed.  FINDINGS:  Bilateral lower extremities -  Complete color filling and compressibility with normal venous flow dynamics  including spontaneous flow, response to augmentation maneuvers, and  respiratory phasicity.  The peroneal and posterior tibial veins are difficult to assess for  compressibility, but flow response to augmentation is demonstrated.  Tiff Cross M.D.  (Electronically Signed)  Final Date:      18 June 2019                   10:43    Us-ruq    Result Date: 6/18/2019 6/18/2019 6:59 AM HISTORY/REASON FOR EXAM:  Abdominal pain TECHNIQUE/EXAM DESCRIPTION AND NUMBER OF VIEWS:  Real-time sonography of the liver and biliary tree. COMPARISON: None FINDINGS: The liver is normal in contour. There is no evidence of solid mass lesion. The liver measures 15.06 cm. The liver is echogenic. The gallbladder is is surgically absent by history. The common duct measures 0.61 cm. The pancreas is obscured by bowel gas. The aorta is obscured by bowel gas. Intrahepatic IVC is patent. The portal vein is patent with hepatopetal flow. The MPV measures 1.21 cm. The right kidney measures 10.33 cm. 10.9 mm peripelvic right renal cyst is seen. Mild renal cortical thinning is noted. There is no ascites.     1.  Borderline common bile duct dilatation, most commonly associated with postcholecystectomy physiology. Consider causes of biliary obstruction with additional workup as clinically appropriate. 2.  Mild renal cortical thinning, appearance suggests medical renal disease. 3.  Echogenic liver, compatible with fatty change versus  fibrosis    Ec-echocardiogram Complete W/o Cont    Result Date: 2019  Transthoracic Echo Report Echocardiography Laboratory CONCLUSIONS No prior study is available for comparison. Left ventricular ejection fraction is visually estimated to be 60%. Mild mitral regurgitation. Aortic sclerosis without stenosis. Mild tricuspid regurgitation. Estimated right ventricular systolic pressure  is 40 mmHg. AUGIE ROLLINS Exam Date:         2019                    09:26 Exam Location:     Inpatient Priority:          Routine Ordering Physician:        SEVERIANO BRANTLEY Referring Physician:       [id number] Macedo Sonographer:               Yael Stevens RDCS Age:    86     Gender:    M MRN:    [id number] :    1932 BSA:    1.94   Ht (in):    68     Wt (lb):    176 Exam Type:     Complete Indications:     Edema ICD Codes:       782.3 CPT Codes:       12052 BP:   135    /   55     HR:   80 Technical Quality:       Fair MEASUREMENTS  (Male / Female) Normal Values 2D ECHO LV Diastolic Diameter PLAX        3.2 cm                4.2 - 5.9 / 3.9 - 5.3 cm LV Systolic Diameter PLAX         1.7 cm                2.1 - 4.0 cm IVS Diastolic Thickness           1 cm                  LVPW Diastolic Thickness          1 cm                  LVOT Diameter                     2.1 cm                Estimated LV Ejection Fraction    60 %                  LV Ejection Fraction MOD 4C       68.3 %                DOPPLER AV Peak Velocity                  1.6 m/s               AV Peak Gradient                  10.5 mmHg             AV Mean Gradient                  5.2 mmHg              LVOT Peak Velocity                0.94 m/s              AV Area Cont Eq vti               2.4 cm²               Mitral E Point Velocity           0.81 m/s              Mitral E to A Ratio               0.87                  MV Pressure Half Time             42.2 ms               MV Area PHT                       5.2 cm²               MV Deceleration Time               146 ms                TR Peak Velocity                  273 cm/s              PV Peak Velocity                  0.93 m/s              PV Peak Gradient                  3.5 mmHg              RVOT Peak Velocity                0.73 m/s              MV E' Velocity                    6.9 cm/s              * Indicates values subject to auto-interpretation LV EF:  60    % FINDINGS Left Ventricle Normal left ventricular size and systolic function. Normal regional wall motion. Left ventricular ejection fraction is visually estimated to be 60%. Normal diastolic function. Prominent septal knuckle. Right Ventricle Normal right ventricular size and systolic function. Right Atrium Normal right atrial size. Normal inferior vena cava size and inspiratory collapse. Left Atrium Normal left atrial size. Mitral Valve Thickened mitral valve leaflets. Mild mitral regurgitation. No mitral stenosis. Aortic Valve Tricuspid aortic valve. Aortic sclerosis without stenosis. No aortic insufficiency. Tricuspid Valve Structurally normal tricuspid valve. Mild tricuspid regurgitation. Right atrial pressure is estimated to be 3 mmHg. Estimated right ventricular systolic pressure  is 40 mmHg. Pulmonic Valve Structurally normal pulmonic valve without significant stenosis or regurgitation. Pericardium Normal pericardium without effusion. Aorta The aortic root is normal.  Ascending aorta diameter is 2.9 cm. Evelyn Beck MD (Electronically Signed) Final Date:     18 June 2019                 13:11    Ct-cta Complete Thoracoabdominal Aorta    Result Date: 6/18/2019 6/18/2019 2:31 AM HISTORY/REASON FOR EXAM:  midepigastric/ cp with rad to back. known pancreatic mass TECHNIQUE/EXAM DESCRIPTION:  CT angiogram of the chest and abdomen with and without reconstructions. Initial precontrast images were obtained from the lung apices through the diaphragmatic domes. Following this, 100 mL of Omnipaque 350 nonionic contrast was administered at 5 mL/sec  and helical scanning was obtained from the lung apices thru the iliac crest and bifurcation. Thick and thin section multiplanar volume reformats were generated from the axial data set in the sagittal and coronal planes. 3D angiographic images were reviewed on PACS. Maximum intensity projection (MIP) images were generated and reviewed. Low dose optimization technique was utilized for this CT exam including automated exposure control and adjustment of the mA and/or kV according to patient size. COMPARISON:   None. FINDINGS: Noncontrast images: There is no intramural hematoma. Contrast images: Aorta and vasculature: Atherosclerotic calcifications are seen including atherosclerotic coronary artery calcifications. The superior mesenteric artery, celiac artery, bilateral renal arteries, and inferior mesenteric artery appear grossly normal. There is no mediastinal or hilar adenopathy. There is no pleural effusion or pericardial effusion. Linear densities in the bilateral lung bases favor atelectasis. Hepatomegaly is identified. Scattered low-density hepatic lesions are seen. Spleen is normal in size. Enlargement of the pancreatic head is seen with low-density Postsurgical changes of cholecystectomy are noted. Adrenal glands are normal. 6.2 cm exophytic left renal cyst is seen, otherwise the kidneys enhance symmetrically. Bowel is unremarkable. There is no adenopathy or free fluid. 3D angiographic/MIP images of the vasculature confirm the vascular findings as described above. .    1.  Normal caliber aorta without visualized aneurysm or dissection. 2.  Enlargement of the pancreatic head with low density, concerning for infiltrating pancreatic mass. Recommend follow-up pancreatic mass protocol MRI for further characterization as clinically appropriate. 3.  Scattered low-density hepatic lesions, consider cysts versus hemangioma or other hepatic lesion. 4.  Atherosclerosis and atherosclerotic coronary artery disease These  findings were discussed with the patient's clinician, DIANA RUTLEDGE, on 6/18/2019 3:25 AM.  IMPRESSION:   Gram-negative bacteremia  Likely cholangitis  Pancreatic mass  History of Parkinson's disease        PLAN:   April Becker is a 86 y.o. male with history of Parkinson's and pancreatic mass  Is being admitted with abdominal pain.  Likely causes cholangitis.  He is going for his MRCP tomorrow.  Continue with the Zosyn.  Await the ID of the cultures.  Continue with the supportive measures.    Discussed with IM. Will continue to follow    Lynn Carmen M.D.     Please note that this dictation was created using voice recognition software. I have worked with technical experts from [facility] to optimize the interface.  I have made every reasonable attempt to correct obvious errors, but there may be errors of grammar and possibly content that I did not discover before finalizing the note.

## 2019-06-19 NOTE — ASSESSMENT & PLAN NOTE
This is sepsis (without associated acute organ dysfunction).   Leukocytosis 12,000  Febrile  Bacteremia with primary source inconclusive but likely GI  Resolving

## 2019-06-19 NOTE — PROGRESS NOTES
Bedside report received at 1900. Assessment done. VSS. Telemonitored, SR. AOx4 no deficits. Unlabored and even breathing. 2LNC. 4/10 ab pain at this time, declines tx at this time. States no nausea at this time. Skin intact. IV infusing per MAR. 25% of dinner finished. Hourly patient rounding done. Pt to RR steady gait, calls for assistance. Fall precautions in place.  Call light in place, bed locked and in lowest position. Pt states claustrophobia, door left open team educated. White board updated. Reviewed POC. All questions answered at this time.

## 2019-06-19 NOTE — ASSESSMENT & PLAN NOTE
E coli  Repeat blood are neg  MR neg for biliary cause. UA was neg  ID consulted, change zosyn to cipro

## 2019-06-19 NOTE — PROGRESS NOTES
Pt rechecked. VSS other than temperature of 100.6F. Cold compress applied to head and Ice pack to chest, will continue to monitor

## 2019-06-19 NOTE — PROGRESS NOTES
Pt febrile; MD updated; Tylenol administered; Ice packs placed on chest and thighs. Orders received for labs.

## 2019-06-19 NOTE — PROGRESS NOTES
Rosemarie from Lab called with critical result of blood culture 1/2 results of gram - mary at 1105. Critical lab result read back to Rosemarie.   Dr. cAharya notified of critical lab result at 1109.  Critical lab result read back by Dr. Acharya.   Orders received, Pt updated.

## 2019-06-19 NOTE — PROGRESS NOTES
Report to YOLI Asencio [id number]. Pt transferred with all personal belongings, chart and medications.

## 2019-06-19 NOTE — PROGRESS NOTES
[facility] Medicine Daily Progress Note    Date of Service  6/19/2019    Chief Complaint  86 y.o. male admitted 6/18/2019 with nausea, epigastric pain and abdominal distention and inability to take p.o.    Hospital Course    Patient is an 86-year-old -American male with history of pancreatic mass, hypertension, remote peptic ulcer status post partial gastrectomy, who developed epigastric pain and bloating and nausea.  Pain reached a crescendo of 10 out of 10 in severity.  There was radiation to his back and thus his CT a aortogram was obtained and was negative for aneurysm.  He also reported swelling of his lower extremities over the past week.  With regard to his pancreatic mass this is been present for at least the last 10 years.  He underwent biopsy that was inconclusive due to low yield.  He had a astrid post operative course and thus refused to undergo endoscopy with biopsy again in the future.  He has been undergoing serial MRI and then CT scan to evaluate potential changes.  These have been minimal if any.  He follows with Dr. Garcia.       Interval Problem Update  Febrile  Leukocytosis 12,000  Blood cultures positive for gram-negative rods 1/2 so far  Intractable epigastric abdominal pain, nausea  Transaminitis worsening with , , alk phos 141  Patient still with complaints of epigastric pain radiating bilaterally to the upper quadrants, nausea, inability to tolerate even clear liquid diet, with associated abdominal bloating and distention.  He had rigors and malaise last night and this morning.  MRI abdomen with and without failed even with IV Ativan stomach: We will attempt with anesthesia  Urinalysis benign  Hepatitis panel benign  Normocytic anemia stable  Phosphorus and potassium low    Consultants/Specialty  None    Code Status  Full    Disposition  To be determined    Review of Systems  Review of Systems   Constitutional: Positive for chills, fever and malaise/fatigue. Negative  for diaphoresis and weight loss.   Respiratory: Negative for cough, hemoptysis, sputum production, shortness of breath and wheezing.    Cardiovascular: Negative for chest pain, palpitations, orthopnea, claudication and leg swelling.   Gastrointestinal: Positive for abdominal pain and nausea. Negative for blood in stool, constipation, diarrhea, heartburn, melena and vomiting.   Genitourinary: Negative for dysuria and urgency.   Musculoskeletal: Positive for back pain. Negative for joint pain, myalgias and neck pain.   Skin: Negative for rash.   Neurological: Positive for weakness. Negative for dizziness and headaches.   Psychiatric/Behavioral: Negative for depression.   All other systems reviewed and are negative.       Physical Exam  Temp:  [37.1 °C (98.8 °F)-38.3 °C (100.9 °F)] 37.1 °C (98.8 °F)  Pulse:  [] 75  Resp:  [14-20] 17  BP: (104-136)/(53-90) 104/57  SpO2:  [92 %-99 %] 93 %    Physical Exam   Constitutional: He is oriented to person, place, and time. He appears well-developed and well-nourished. No distress.   HENT:   Head: Normocephalic and atraumatic.   Eyes: Pupils are equal, round, and reactive to light. Scleral icterus is present.   Neck: Neck supple.   Cardiovascular: Normal rate, regular rhythm and normal heart sounds.    Pulmonary/Chest: Effort normal and breath sounds normal. No respiratory distress. He has no wheezes. He has no rales.   Abdominal: Soft. He exhibits distension. Bowel sounds are decreased. There is tenderness in the epigastric area. There is no rigidity, no rebound, no guarding and no CVA tenderness.   Neurological: He is alert and oriented to person, place, and time. No cranial nerve deficit.   Skin: Skin is warm and dry.       Fluids    Intake/Output Summary (Last 24 hours) at 06/19/19 1245  Last data filed at 06/18/19 2000   Gross per 24 hour   Intake              200 ml   Output                0 ml   Net              200 ml       Laboratory  Recent Labs      06/18/19    0224  06/19/19   0354  06/19/19   1035   WBC  7.2  8.5  12.0*   RBC  4.71  4.01*  4.27*   HEMOGLOBIN  14.3  12.1*  12.8*   HEMATOCRIT  42.0  35.8*  38.6*   MCV  89.2  89.3  90.4   MCH  30.4  30.2  30.0   MCHC  34.0  33.8  33.2*   RDW  45.9  47.5  49.0   PLATELETCT  151*  110*  117*   MPV  10.7  10.5  10.8     Recent Labs      06/18/19   0224  06/19/19   0354   SODIUM  137  134*   POTASSIUM  3.3*  3.5*   CHLORIDE  103  104   CO2  24  23   GLUCOSE  158*  123*   BUN  14  13   CREATININE  1.07  1.14   CALCIUM  9.0  8.0*     Recent Labs      06/18/19 0224   APTT  26.3   INR  1.00         Recent Labs      06/18/19   0654   TRIGLYCERIDE  60   HDL  53   LDL  140*       Imaging  CT-ABDOMEN-PELVIS WITH   Final Result      1.  No acute intra-abdominal findings.      2.  Multiloculated cystic pancreatic mass is not significantly changed from the prior exam in 2018.      3.  Small hiatal hernia with postoperative changes at the gastroesophageal junction.      4.  Atherosclerosis.      DX-CHEST-PORTABLE (1 VIEW)   Final Result      New basal atelectasis and minor fissure visualization which could be from edema or atelectasis      EC-ECHOCARDIOGRAM COMPLETE W/O CONT   Final Result      US-EXTREMITY VENOUS LOWER BILAT   Final Result      US-RUQ   Final Result         1.  Borderline common bile duct dilatation, most commonly associated with postcholecystectomy physiology. Consider causes of biliary obstruction with additional workup as clinically appropriate.   2.  Mild renal cortical thinning, appearance suggests medical renal disease.   3.  Echogenic liver, compatible with fatty change versus fibrosis      CT-CTA COMPLETE THORACOABDOMINAL AORTA   Final Result         1.  Normal caliber aorta without visualized aneurysm or dissection.   2.  Enlargement of the pancreatic head with low density, concerning for infiltrating pancreatic mass. Recommend follow-up pancreatic mass protocol MRI for further characterization as clinically  appropriate.   3.  Scattered low-density hepatic lesions, consider cysts versus hemangioma or other hepatic lesion.   4.  Atherosclerosis and atherosclerotic coronary artery disease      These findings were discussed with the patient's clinician, DIANA RUTLEDGE, on 6/18/2019 3:25 AM.      MR-ABDOMEN-WITH & W/O    (Results Pending)   MR-ABDOMEN-WITH & W/O    (Results Pending)        Assessment/Plan  Sepsis (HCC)   Assessment & Plan    This is sepsis (without associated acute organ dysfunction).   Leukocytosis 12,000  Febrile  Bacteremia with primary source inconclusive but likely GI     Bacteremia   Assessment & Plan    Gram-negative rods  Suspect GI source and likely liver versus pancreas given transaminitis and pancreatic mass  Zosyn for now  ID consult     Epigastric abdominal pain- (present on admission)   Assessment & Plan    Likely secondary to pancreatic mass, rule out ACS and aortic pathology  CTA thoracoabdominal aorta negative for aneurysm or dissection.  Initial EKG and troponin negative for ischemia  Continuous cardiac monitoring with serial EKG and troponin  Lipid panel, TSH and hemoglobin A1c  Recommended follow up with Dr. Goyal  Pain control with Tylenol, oxycodone and IV morphine for breakthrough.  Monitor respiratory status closely       Nausea   Assessment & Plan    Unable to tolerate clear liquid diet  Antiemetics PRN  Continue IV hydration  Electrolyte monitoring and replacement     Pancreatic mass- (present on admission)   Assessment & Plan    Repeat CT abdomen and pelvis with IV and oral contrast  Follow up with Dr. Goyal       Essential hypertension- (present on admission)   Assessment & Plan    Continue Avapro     Transaminitis- (present on admission)   Assessment & Plan    Consistent with alcoholic transaminitis however patient denies excessive alcohol use  RUQ ultrasound overall benign  Hepatitis panel pending     Hypokalemia- (present on admission)   Assessment & Plan     Follow magnesium and potassium  Change potassium to IV given poor p.o. intake, and nausea     Hypophosphatemia   Assessment & Plan    IV potassium phosphate replacement  Trend and replace          VTE prophylaxis: SCDs

## 2019-06-19 NOTE — PROGRESS NOTES
Pt complaining of uncontrollable shivers, vitals rechecked T 100.9, HR 130s and /90. Paged Dr. Rainey ordered a UA and a 500ml bolus of NS. Stated to recall in an hour if vital signs don't stablize

## 2019-06-19 NOTE — CARE PLAN
Problem: Pain Management  Goal: Pain level will decrease to patient's comfort goal  Outcome: PROGRESSING AS EXPECTED  Medicated per MAR    Problem: Knowledge Deficit  Goal: Knowledge of disease process/condition, treatment plan, diagnostic tests, and medications will improve  Outcome: PROGRESSING AS EXPECTED  POC discussed, verbalized understanding

## 2019-06-19 NOTE — PROGRESS NOTES
Pt states 8/10 ab pain, chills and nausea and cough. Pt medicated per MAR given warmed blankets, temp 100.5 given tylenol. Will continue to monitor

## 2019-06-19 NOTE — PROGRESS NOTES
Bedside report received 0715. POC discussed with pt; Pt denies abdominal pain and nausea; Son at bedside updated; Pt remains to be febrile, MD updated; MRI screening obtained; Pt verbalizes claustrophobia, Orders received; all questions answered at this time.

## 2019-06-20 ENCOUNTER — APPOINTMENT (OUTPATIENT)
Dept: RADIOLOGY | Facility: MEDICAL CENTER | Age: 84
DRG: 871 | End: 2019-06-20
Attending: NURSE PRACTITIONER
Payer: COMMERCIAL

## 2019-06-20 LAB
ALBUMIN SERPL BCP-MCNC: 3.5 G/DL (ref 3.2–4.9)
ALBUMIN/GLOB SERPL: 1.4 G/DL
ALP SERPL-CCNC: 127 U/L (ref 30–99)
ALT SERPL-CCNC: 184 U/L (ref 2–50)
ANION GAP SERPL CALC-SCNC: 9 MMOL/L (ref 0–11.9)
AST SERPL-CCNC: 104 U/L (ref 12–45)
BASOPHILS # BLD AUTO: 0.5 % (ref 0–1.8)
BASOPHILS # BLD: 0.05 K/UL (ref 0–0.12)
BILIRUB SERPL-MCNC: 0.9 MG/DL (ref 0.1–1.5)
BUN SERPL-MCNC: 19 MG/DL (ref 8–22)
CALCIUM SERPL-MCNC: 8.2 MG/DL (ref 8.5–10.5)
CHLORIDE SERPL-SCNC: 103 MMOL/L (ref 96–112)
CO2 SERPL-SCNC: 25 MMOL/L (ref 20–33)
CREAT SERPL-MCNC: 1.39 MG/DL (ref 0.5–1.4)
EOSINOPHIL # BLD AUTO: 0.24 K/UL (ref 0–0.51)
EOSINOPHIL NFR BLD: 2.6 % (ref 0–6.9)
ERYTHROCYTE [DISTWIDTH] IN BLOOD BY AUTOMATED COUNT: 49.7 FL (ref 35.9–50)
GLOBULIN SER CALC-MCNC: 2.5 G/DL (ref 1.9–3.5)
GLUCOSE SERPL-MCNC: 90 MG/DL (ref 65–99)
HCT VFR BLD AUTO: 38.1 % (ref 42–52)
HGB BLD-MCNC: 12.6 G/DL (ref 14–18)
IMM GRANULOCYTES # BLD AUTO: 0.03 K/UL (ref 0–0.11)
IMM GRANULOCYTES NFR BLD AUTO: 0.3 % (ref 0–0.9)
INR PPP: 1.27 (ref 0.87–1.13)
LACTATE BLD-SCNC: 1.9 MMOL/L (ref 0.5–2)
LYMPHOCYTES # BLD AUTO: 0.63 K/UL (ref 1–4.8)
LYMPHOCYTES NFR BLD: 6.8 % (ref 22–41)
MCH RBC QN AUTO: 30 PG (ref 27–33)
MCHC RBC AUTO-ENTMCNC: 33.1 G/DL (ref 33.7–35.3)
MCV RBC AUTO: 90.7 FL (ref 81.4–97.8)
MONOCYTES # BLD AUTO: 0.65 K/UL (ref 0–0.85)
MONOCYTES NFR BLD AUTO: 7.1 % (ref 0–13.4)
NEUTROPHILS # BLD AUTO: 7.61 K/UL (ref 1.82–7.42)
NEUTROPHILS NFR BLD: 82.7 % (ref 44–72)
NRBC # BLD AUTO: 0 K/UL
NRBC BLD-RTO: 0 /100 WBC
PLATELET # BLD AUTO: 115 K/UL (ref 164–446)
PMV BLD AUTO: 10.8 FL (ref 9–12.9)
POTASSIUM SERPL-SCNC: 3.5 MMOL/L (ref 3.6–5.5)
PROT SERPL-MCNC: 6 G/DL (ref 6–8.2)
PROTHROMBIN TIME: 16.2 SEC (ref 12–14.6)
RBC # BLD AUTO: 4.2 M/UL (ref 4.7–6.1)
SODIUM SERPL-SCNC: 137 MMOL/L (ref 135–145)
WBC # BLD AUTO: 9.2 K/UL (ref 4.8–10.8)

## 2019-06-20 PROCEDURE — 700105 HCHG RX REV CODE 258: Performed by: NURSE PRACTITIONER

## 2019-06-20 PROCEDURE — 700111 HCHG RX REV CODE 636 W/ 250 OVERRIDE (IP)

## 2019-06-20 PROCEDURE — 4410588 MR-ABDOMEN-WITH & W/O

## 2019-06-20 PROCEDURE — 85025 COMPLETE CBC W/AUTO DIFF WBC: CPT

## 2019-06-20 PROCEDURE — 87040 BLOOD CULTURE FOR BACTERIA: CPT

## 2019-06-20 PROCEDURE — 700117 HCHG RX CONTRAST REV CODE 255: Performed by: INTERNAL MEDICINE

## 2019-06-20 PROCEDURE — 74183 MRI ABD W/O CNTR FLWD CNTR: CPT

## 2019-06-20 PROCEDURE — 700111 HCHG RX REV CODE 636 W/ 250 OVERRIDE (IP): Performed by: NURSE PRACTITIONER

## 2019-06-20 PROCEDURE — 700102 HCHG RX REV CODE 250 W/ 637 OVERRIDE(OP): Performed by: INTERNAL MEDICINE

## 2019-06-20 PROCEDURE — 85610 PROTHROMBIN TIME: CPT

## 2019-06-20 PROCEDURE — A9585 GADOBUTROL INJECTION: HCPCS | Performed by: INTERNAL MEDICINE

## 2019-06-20 PROCEDURE — A9270 NON-COVERED ITEM OR SERVICE: HCPCS | Performed by: INTERNAL MEDICINE

## 2019-06-20 PROCEDURE — 83605 ASSAY OF LACTIC ACID: CPT

## 2019-06-20 PROCEDURE — 770020 HCHG ROOM/CARE - TELE (206)

## 2019-06-20 PROCEDURE — 36415 COLL VENOUS BLD VENIPUNCTURE: CPT

## 2019-06-20 PROCEDURE — 99233 SBSQ HOSP IP/OBS HIGH 50: CPT | Performed by: INTERNAL MEDICINE

## 2019-06-20 PROCEDURE — 99232 SBSQ HOSP IP/OBS MODERATE 35: CPT | Performed by: INTERNAL MEDICINE

## 2019-06-20 PROCEDURE — 700101 HCHG RX REV CODE 250

## 2019-06-20 PROCEDURE — 80053 COMPREHEN METABOLIC PANEL: CPT

## 2019-06-20 PROCEDURE — 160002 HCHG RECOVERY MINUTES (STAT)

## 2019-06-20 RX ORDER — SODIUM CHLORIDE, SODIUM LACTATE, POTASSIUM CHLORIDE, CALCIUM CHLORIDE 600; 310; 30; 20 MG/100ML; MG/100ML; MG/100ML; MG/100ML
INJECTION, SOLUTION INTRAVENOUS CONTINUOUS
Status: DISCONTINUED | OUTPATIENT
Start: 2019-06-20 | End: 2019-06-21

## 2019-06-20 RX ORDER — METOPROLOL TARTRATE 1 MG/ML
1 INJECTION, SOLUTION INTRAVENOUS
Status: DISCONTINUED | OUTPATIENT
Start: 2019-06-20 | End: 2019-06-20 | Stop reason: HOSPADM

## 2019-06-20 RX ORDER — ONDANSETRON 2 MG/ML
4 INJECTION INTRAMUSCULAR; INTRAVENOUS
Status: DISCONTINUED | OUTPATIENT
Start: 2019-06-20 | End: 2019-06-20 | Stop reason: HOSPADM

## 2019-06-20 RX ORDER — GADOBUTROL 604.72 MG/ML
7.5 INJECTION INTRAVENOUS ONCE
Status: COMPLETED | OUTPATIENT
Start: 2019-06-20 | End: 2019-06-20

## 2019-06-20 RX ORDER — OXYCODONE HCL 5 MG/5 ML
10 SOLUTION, ORAL ORAL
Status: DISCONTINUED | OUTPATIENT
Start: 2019-06-20 | End: 2019-06-20 | Stop reason: HOSPADM

## 2019-06-20 RX ORDER — HALOPERIDOL 5 MG/ML
1 INJECTION INTRAMUSCULAR
Status: DISCONTINUED | OUTPATIENT
Start: 2019-06-20 | End: 2019-06-20 | Stop reason: HOSPADM

## 2019-06-20 RX ORDER — HYDRALAZINE HYDROCHLORIDE 20 MG/ML
5 INJECTION INTRAMUSCULAR; INTRAVENOUS
Status: DISCONTINUED | OUTPATIENT
Start: 2019-06-20 | End: 2019-06-20 | Stop reason: HOSPADM

## 2019-06-20 RX ORDER — DIPHENHYDRAMINE HYDROCHLORIDE 50 MG/ML
12.5 INJECTION INTRAMUSCULAR; INTRAVENOUS
Status: DISCONTINUED | OUTPATIENT
Start: 2019-06-20 | End: 2019-06-20 | Stop reason: HOSPADM

## 2019-06-20 RX ORDER — OXYCODONE HCL 5 MG/5 ML
5 SOLUTION, ORAL ORAL
Status: DISCONTINUED | OUTPATIENT
Start: 2019-06-20 | End: 2019-06-20 | Stop reason: HOSPADM

## 2019-06-20 RX ADMIN — IRBESARTAN 75 MG: 150 TABLET ORAL at 05:41

## 2019-06-20 RX ADMIN — PIPERACILLIN SODIUM AND TAZOBACTAM SODIUM 3.38 G: 3; .375 INJECTION, POWDER, FOR SOLUTION INTRAVENOUS at 05:41

## 2019-06-20 RX ADMIN — PIPERACILLIN SODIUM AND TAZOBACTAM SODIUM 3.38 G: 3; .375 INJECTION, POWDER, FOR SOLUTION INTRAVENOUS at 13:29

## 2019-06-20 RX ADMIN — SODIUM CHLORIDE, POTASSIUM CHLORIDE, SODIUM LACTATE AND CALCIUM CHLORIDE: 600; 310; 30; 20 INJECTION, SOLUTION INTRAVENOUS at 13:19

## 2019-06-20 RX ADMIN — SUGAMMADEX: 100 INJECTION, SOLUTION INTRAVENOUS at 15:50

## 2019-06-20 RX ADMIN — GADOBUTROL 7.5 ML: 604.72 INJECTION INTRAVENOUS at 15:53

## 2019-06-20 RX ADMIN — PIPERACILLIN SODIUM AND TAZOBACTAM SODIUM 3.38 G: 3; .375 INJECTION, POWDER, FOR SOLUTION INTRAVENOUS at 21:28

## 2019-06-20 RX ADMIN — ACETAMINOPHEN 650 MG: 325 TABLET, FILM COATED ORAL at 08:24

## 2019-06-20 RX ADMIN — SODIUM CHLORIDE, POTASSIUM CHLORIDE, SODIUM LACTATE AND CALCIUM CHLORIDE: 600; 310; 30; 20 INJECTION, SOLUTION INTRAVENOUS at 05:44

## 2019-06-20 ASSESSMENT — ENCOUNTER SYMPTOMS
MYALGIAS: 0
CHILLS: 0
VOMITING: 0
NAUSEA: 0
ABDOMINAL PAIN: 0
FLANK PAIN: 0
SHORTNESS OF BREATH: 0
HEADACHES: 0
COUGH: 0
DIARRHEA: 0
FEVER: 0
DIZZINESS: 0

## 2019-06-20 NOTE — PROGRESS NOTES
Pt arrived on unit, oriented to room. Assessment WNL except: States some nausea. Epigastric pain, radiating to the back. Murmur. Tele: ST/SR. IV Zosyn running. LR running at 100ml/hr.3L O2 per nc. Up c cane. BLE edema 1+.

## 2019-06-20 NOTE — PROGRESS NOTES
Infectious Disease Progress Note    Author: Joleen Sung M.D. Date & Time of service: 2019  9:15 AM    Chief Complaint:  Follow-up for gram-negative bacteremia    Interval History:  86 y.o. male with history of pancreatic tumor, Parkinson's disease presented to the emergency room on 2019 with complaints of constant abdominal pain.  CT of the abdomen showed persistent loculated pancreatic mass.  Blood cultures with gram-negative bacteremia.     T-max 99.9 WBC 9.2.  Patient denies any abdominal pain.  No diarrhea.  Plan for MRCP today    Labs Reviewed and Radiology Reviewed.    Review of Systems:  Review of Systems   Constitutional: Negative for chills and fever.   Respiratory: Negative for cough and shortness of breath.    Gastrointestinal: Negative for abdominal pain, diarrhea, nausea and vomiting.   Neurological: Negative for dizziness and headaches.       Hemodynamics:  Temp (24hrs), Av.3 °C (99.2 °F), Min:36.4 °C (97.5 °F), Max:38.4 °C (101.2 °F)  Temperature: (!) 38.2 °C (100.8 °F) (Nurse notified)  Pulse  Av.8  Min: 74  Max: 130   Blood Pressure : 104/59       Physical Exam:  Physical Exam   Constitutional: He is oriented to person, place, and time. He appears well-developed.   HENT:   Mouth/Throat: No oropharyngeal exudate.   Eyes: Pupils are equal, round, and reactive to light. Conjunctivae and EOM are normal.   Neck: Neck supple.   Cardiovascular: Normal rate, regular rhythm and normal heart sounds.    Pulmonary/Chest: Effort normal. He has no wheezes. He has no rales.   Abdominal: Soft. He exhibits no distension. There is no tenderness. There is no guarding.   Musculoskeletal: He exhibits edema.   Neurological: He is alert and oriented to person, place, and time. No cranial nerve deficit. Coordination normal.   Skin: Skin is warm and dry. He is not diaphoretic.   Psychiatric: He has a normal mood and affect. His behavior is normal.       Meds:    Current Facility-Administered  Medications:   •  LR  •  [COMPLETED] piperacillin-tazobactam **AND** piperacillin-tazobactam  •  NS  •  NS  •  irbesartan  •  senna-docusate **AND** polyethylene glycol/lytes **AND** magnesium hydroxide **AND** bisacodyl  •  Respiratory Care per Protocol  •  acetaminophen  •  Notify provider if pain remains uncontrolled **AND** Use the numeric rating scale (NRS-11) on regular floors and Critical-Care Pain Observation Tool (CPOT) on ICUs/Trauma to assess pain **AND** Pulse Ox (Oximetry) **AND** Pharmacy Consult Request **AND** If patient difficult to arouse and/or has respiratory depression, stop any opiates that are currently infusing and call a Rapid Response. **AND** oxyCODONE immediate-release **AND** oxyCODONE immediate-release **AND** morphine injection  •  ondansetron  •  ondansetron  •  hyoscyamine-maalox plus-lidocaine viscous    Labs:  Recent Labs      06/18/19 0224 06/19/19 0354 06/19/19   1035  06/20/19   0112   WBC  7.2  8.5  12.0*  9.2   RBC  4.71  4.01*  4.27*  4.20*   HEMOGLOBIN  14.3  12.1*  12.8*  12.6*   HEMATOCRIT  42.0  35.8*  38.6*  38.1*   MCV  89.2  89.3  90.4  90.7   MCH  30.4  30.2  30.0  30.0   RDW  45.9  47.5  49.0  49.7   PLATELETCT  151*  110*  117*  115*   MPV  10.7  10.5  10.8  10.8   NEUTSPOLYS  59.60  91.50*   --   82.70*   LYMPHOCYTES  25.80  2.90*   --   6.80*   MONOCYTES  9.20  4.60   --   7.10   EOSINOPHILS  4.50  0.20   --   2.60   BASOPHILS  0.60  0.40   --   0.50     Recent Labs      06/18/19 0224 06/19/19   0354  06/20/19   0112   SODIUM  137  134*  137   POTASSIUM  3.3*  3.5*  3.5*   CHLORIDE  103  104  103   CO2  24  23  25   GLUCOSE  158*  123*  90   BUN  14  13  19     Recent Labs      06/18/19   0224  06/19/19   0354  06/20/19   0112   ALBUMIN  3.9  3.3  3.5   TBILIRUBIN  0.7  1.1  0.9   ALKPHOSPHAT  128*  141*  127*   TOTPROTEIN  6.8  5.7*  6.0   ALTSGPT  58*  261*  184*   ASTSGOT  124*  175*  104*   CREATININE  1.07  1.14  1.39        Imaging:  Ct-abdomen-pelvis With    Result Date: 6/18/2019 6/18/2019 6:21 PM HISTORY/REASON FOR EXAM:  Abdominal distention and pain. History of partial gastrectomy in pancreatic mass. TECHNIQUE/EXAM DESCRIPTION:   CT scan of the abdomen and pelvis with contrast. Contrast-enhanced helical scanning was obtained from the diaphragmatic domes through the pubic symphysis following the bolus administration of nonionic contrast without complication. 100 mL of Omnipaque 350 nonionic contrast was administered without complication. Low dose optimization technique was utilized for this CT exam including automated exposure control and adjustment of the mA and/or kV according to patient size. COMPARISON: 9/5/2018 FINDINGS: There is atelectasis in the lower lobes. CT Abdomen: Hypodense lesions are unchanged in the prior exam and likely represent cysts. The spleen and adrenal glands are unremarkable. A multiloculated cystic mass in the pancreatic head measures approximately 4 x 3.9 cm, previously 3.9 x 3.7 cm. The mass abuts the main portal vein and superior mesenteric vein. No definite dilatation of the pancreatic duct is appreciated. The gallbladder has been removed. There are postoperative changes at the gastroesophageal junction with a small hiatal hernia. The bowel is grossly unremarkable. No pneumoperitoneum is identified. There is mild scattered arterial calcification. The kidneys enhance symmetrically. A hypodense mass in the lower pole of the left kidney is consistent with a cyst. Hypodense masses in the upper pole of the right kidney are consistent with cysts on the prior noncontrast CT. CT Pelvis: The bladder is unremarkable. The prostate appears enlarged. No significant free fluid or adenopathy is identified. Degenerative changes are in the spine and sacroiliac joints.     1.  No acute intra-abdominal findings. 2.  Multiloculated cystic pancreatic mass is not significantly changed from the prior exam in 2018. 3.   Small hiatal hernia with postoperative changes at the gastroesophageal junction. 4.  Atherosclerosis.    Dx-chest-portable (1 View)    Result Date: 2019 5:37 PM HISTORY/REASON FOR EXAM: Shortness of Breath. Hypertension and nausea. TECHNIQUE/EXAM DESCRIPTION AND NUMBER OF VIEWS: Single AP view of the chest. COMPARISON: 2018 FINDINGS: Hardware: Vascular clips of the gastroesophageal junction Lungs: Decreased lung volumes with some linear basilar opacity. Minor fissure is newly visualized. Pleura:  No pleural space process is seen. Heart and mediastinum: Upper normal heart size     New basal atelectasis and minor fissure visualization which could be from edema or atelectasis    Us-extremity Venous Lower Bilat    Result Date: 2019   Vascular Laboratory  CONCLUSIONS  Normal bilateral superficial and deep venous examination of the lower  extremities.  No acute thrombosis is identified.  The peroneal and posterior tibial veins are difficult to assess for  compressibility, but flow response to augmentation is demonstrated.  No prior study is available for comparison.  AUGIE ROLLINS  Exam Date:     2019 09:59  Room #:     Inpatient  Priority:     Routine  Ht (in):             Wt (lb):  Ordering Physician:        JESSICA RODRIGUEZ  Referring Physician:       [id number] Mcmullen  Sonographer:               Dulce Virgen RVT  Study Type:                Complete Bilateral  Technical Quality:         Adequate  Age:    86    Gender:     M  MRN:    [id number]  :    [id number]      BSA:  Indications:     Localized swelling, mass and lump, lower limb, bilateral  CPT Codes:       55409  ICD Codes:       R22.43  History:         Off-and-on bilateral lower extremity swelling for about one                   week; No previous duplex on file.  Limitations:  PROCEDURES:  Bilateral lower extremity venous duplex imaging.  The following venous structures were evaluated: common femoral, profunda  femoral, greater  saphenous, femoral, popliteal , peroneal and posterior  tibial veins.  Serial compression, augmentation maneuvers,  color and spectral Doppler  flow evaluations were performed.  FINDINGS:  Bilateral lower extremities -  Complete color filling and compressibility with normal venous flow dynamics  including spontaneous flow, response to augmentation maneuvers, and  respiratory phasicity.  The peroneal and posterior tibial veins are difficult to assess for  compressibility, but flow response to augmentation is demonstrated.  Tiff Cross M.D.  (Electronically Signed)  Final Date:      18 June 2019                   10:43    Us-ruq    Result Date: 6/18/2019 6/18/2019 6:59 AM HISTORY/REASON FOR EXAM:  Abdominal pain TECHNIQUE/EXAM DESCRIPTION AND NUMBER OF VIEWS:  Real-time sonography of the liver and biliary tree. COMPARISON: None FINDINGS: The liver is normal in contour. There is no evidence of solid mass lesion. The liver measures 15.06 cm. The liver is echogenic. The gallbladder is is surgically absent by history. The common duct measures 0.61 cm. The pancreas is obscured by bowel gas. The aorta is obscured by bowel gas. Intrahepatic IVC is patent. The portal vein is patent with hepatopetal flow. The MPV measures 1.21 cm. The right kidney measures 10.33 cm. 10.9 mm peripelvic right renal cyst is seen. Mild renal cortical thinning is noted. There is no ascites.     1.  Borderline common bile duct dilatation, most commonly associated with postcholecystectomy physiology. Consider causes of biliary obstruction with additional workup as clinically appropriate. 2.  Mild renal cortical thinning, appearance suggests medical renal disease. 3.  Echogenic liver, compatible with fatty change versus fibrosis    Ec-echocardiogram Complete W/o Cont    Result Date: 6/18/2019  Transthoracic Echo Report Echocardiography Laboratory CONCLUSIONS No prior study is available for comparison. Left ventricular ejection fraction is  visually estimated to be 60%. Mild mitral regurgitation. Aortic sclerosis without stenosis. Mild tricuspid regurgitation. Estimated right ventricular systolic pressure  is 40 mmHg. AUGIE ROLLINS Exam Date:         2019                    09:26 Exam Location:     Inpatient Priority:          Routine Ordering Physician:        SEVERIANO BRANTLEY Referring Physician:       [id number] Macedo Sonographer:               Yael Stevens RDCS Age:    86     Gender:    M MRN:    [id number] :    1932 BSA:    1.94   Ht (in):    68     Wt (lb):    176 Exam Type:     Complete Indications:     Edema ICD Codes:       782.3 CPT Codes:       21883 BP:   135    /   55     HR:   80 Technical Quality:       Fair MEASUREMENTS  (Male / Female) Normal Values 2D ECHO LV Diastolic Diameter PLAX        3.2 cm                4.2 - 5.9 / 3.9 - 5.3 cm LV Systolic Diameter PLAX         1.7 cm                2.1 - 4.0 cm IVS Diastolic Thickness           1 cm                  LVPW Diastolic Thickness          1 cm                  LVOT Diameter                     2.1 cm                Estimated LV Ejection Fraction    60 %                  LV Ejection Fraction MOD 4C       68.3 %                DOPPLER AV Peak Velocity                  1.6 m/s               AV Peak Gradient                  10.5 mmHg             AV Mean Gradient                  5.2 mmHg              LVOT Peak Velocity                0.94 m/s              AV Area Cont Eq vti               2.4 cm²               Mitral E Point Velocity           0.81 m/s              Mitral E to A Ratio               0.87                  MV Pressure Half Time             42.2 ms               MV Area PHT                       5.2 cm²               MV Deceleration Time              146 ms                TR Peak Velocity                  273 cm/s              PV Peak Velocity                  0.93 m/s              PV Peak Gradient                  3.5 mmHg              RVOT Peak Velocity                 0.73 m/s              MV E' Velocity                    6.9 cm/s              * Indicates values subject to auto-interpretation LV EF:  60    % FINDINGS Left Ventricle Normal left ventricular size and systolic function. Normal regional wall motion. Left ventricular ejection fraction is visually estimated to be 60%. Normal diastolic function. Prominent septal knuckle. Right Ventricle Normal right ventricular size and systolic function. Right Atrium Normal right atrial size. Normal inferior vena cava size and inspiratory collapse. Left Atrium Normal left atrial size. Mitral Valve Thickened mitral valve leaflets. Mild mitral regurgitation. No mitral stenosis. Aortic Valve Tricuspid aortic valve. Aortic sclerosis without stenosis. No aortic insufficiency. Tricuspid Valve Structurally normal tricuspid valve. Mild tricuspid regurgitation. Right atrial pressure is estimated to be 3 mmHg. Estimated right ventricular systolic pressure  is 40 mmHg. Pulmonic Valve Structurally normal pulmonic valve without significant stenosis or regurgitation. Pericardium Normal pericardium without effusion. Aorta The aortic root is normal.  Ascending aorta diameter is 2.9 cm. Evelyn Beck MD (Electronically Signed) Final Date:     18 June 2019                 13:11    Ct-cta Complete Thoracoabdominal Aorta    Result Date: 6/18/2019 6/18/2019 2:31 AM HISTORY/REASON FOR EXAM:  midepigastric/ cp with rad to back. known pancreatic mass TECHNIQUE/EXAM DESCRIPTION:  CT angiogram of the chest and abdomen with and without reconstructions. Initial precontrast images were obtained from the lung apices through the diaphragmatic domes. Following this, 100 mL of Omnipaque 350 nonionic contrast was administered at 5 mL/sec and helical scanning was obtained from the lung apices thru the iliac crest and bifurcation. Thick and thin section multiplanar volume reformats were generated from the axial data set in the sagittal and coronal planes. 3D  angiographic images were reviewed on PACS. Maximum intensity projection (MIP) images were generated and reviewed. Low dose optimization technique was utilized for this CT exam including automated exposure control and adjustment of the mA and/or kV according to patient size. COMPARISON:   None. FINDINGS: Noncontrast images: There is no intramural hematoma. Contrast images: Aorta and vasculature: Atherosclerotic calcifications are seen including atherosclerotic coronary artery calcifications. The superior mesenteric artery, celiac artery, bilateral renal arteries, and inferior mesenteric artery appear grossly normal. There is no mediastinal or hilar adenopathy. There is no pleural effusion or pericardial effusion. Linear densities in the bilateral lung bases favor atelectasis. Hepatomegaly is identified. Scattered low-density hepatic lesions are seen. Spleen is normal in size. Enlargement of the pancreatic head is seen with low-density Postsurgical changes of cholecystectomy are noted. Adrenal glands are normal. 6.2 cm exophytic left renal cyst is seen, otherwise the kidneys enhance symmetrically. Bowel is unremarkable. There is no adenopathy or free fluid. 3D angiographic/MIP images of the vasculature confirm the vascular findings as described above. .    1.  Normal caliber aorta without visualized aneurysm or dissection. 2.  Enlargement of the pancreatic head with low density, concerning for infiltrating pancreatic mass. Recommend follow-up pancreatic mass protocol MRI for further characterization as clinically appropriate. 3.  Scattered low-density hepatic lesions, consider cysts versus hemangioma or other hepatic lesion. 4.  Atherosclerosis and atherosclerotic coronary artery disease These findings were discussed with the patient's clinician, DIANA RUTLEDGE, on 6/18/2019 3:25 AM.      Micro:  Results     Procedure Component Value Units Date/Time    BLOOD CULTURE x2 [[id number]]  (Abnormal) Collected:  06/18/19 5919  "   Order Status:  Completed Specimen:  Blood from Peripheral Updated:  06/19/19 1149     Significant Indicator POS (POS)     Source BLD     Site PERIPHERAL     Culture Result Growth detected by Bactec instrument. 06/19/2019  11:44  Gram Stain: Gram negative rods.   (A)    Narrative:       Per Hospital Policy: Only change Specimen Src: to \"Line\" if  specified by physician order.  Right AC    BLOOD CULTURE x2 [[id number]]  (Abnormal) Collected:  06/18/19 1746    Order Status:  Completed Specimen:  Blood from Peripheral Updated:  06/19/19 1104     Significant Indicator POS (POS)     Source BLD     Site PERIPHERAL     Culture Result Growth detected by Bactec instrument. 06/19/2019  11:01  Gram Stain: Gram negative rods.   (A)    Narrative:       CALL  [facility]  [facility] tel. [phone],  CALLED  CPU tel. [phone] 06/19/2019, 11:04, RB PERF. RESULTS CALLED  TO:[id number]  Per Hospital Policy: Only change Specimen Src: to \"Line\" if  specified by physician order.  Right Hand    URINALYSIS [[id number]] Collected:  06/19/19 0119    Order Status:  Completed Specimen:  Urine from Urine, Clean Catch Updated:  06/19/19 0149     Color Yellow     Character Clear     Specific Gravity <=1.005     Ph 6.5     Glucose Negative mg/dL      Ketones Negative mg/dL      Protein Negative mg/dL      Bilirubin Negative     Urobilinogen, Urine 0.2     Nitrite Negative     Leukocyte Esterase Negative     Occult Blood Negative     Micro Urine Req see below     Comment: Microscopic examination not performed when specimen is clear  and chemically negative for protein, blood, leukocyte esterase  and nitrite.         URINALYSIS [[id number]]     Order Status:  Canceled Specimen:  Urine           Assessment:  Active Hospital Problems    Diagnosis   • Bacteremia [R78.81]   • Sepsis (HCC) [A41.9]   • Epigastric abdominal pain [R10.13]   • Pancreatic mass [K86.9]   • Transaminitis [R74.0]       Plan:  Sepsis  2/2 Gram-negative bacteremia.  Remains bacteremic, ongoing " work-up   Source enteric  Febrile with T-max of 100.8 this morning  Leukocytosis resolved  Blood culture on 6/18-positive  Repeat blood cultures today  Continue Zosyn for now pending further cultures    Loculated pancreatic mass  Diagnosed 10 years ago.  Prior biopsy was inconclusive  Plan for MRCP today  Follows up with Dr. Garcia      Alcoholic hepatitis  Right upper quadrant ultrasound unremarkable  Hepatitis panel-negative  Avoid hepatotoxins  Monitor    Acute kidney injury, worse today  Avoid nephrotoxins  Renally dose antibiotics accordingly   monitor    Discussed with internal medicine.

## 2019-06-20 NOTE — PROGRESS NOTES
[facility] Medicine Daily Progress Note    Date of Service  6/20/2019    Chief Complaint  86 y.o. male admitted 6/18/2019 with epigastric pain.    Hospital Course    PMH HTN, HLD, parkinson's, GERD who presented with severe epigastric pain. CT aorta showed normal aorta, enlargement of pancreatic head and mass that is unchanged from 2018. While hospitalized he developed fever and grew GNR in blood cx.        Interval Problem Update  Patient denies abdominal pain, nausea, vomiting, chills.  sinus on telemetry  Rise in creat.  UA last night was unremarkable.  Continue on IV fluids and recheck BMP tomorrow  Slightly increased LFTs.  MRI with anesthesia today  Continue on Zosyn    Consultants/Specialty  ID    Code Status  Full    Disposition  Lives in [place] with wife    Review of Systems  Review of Systems   Constitutional: Negative for malaise/fatigue.   HENT: Negative for congestion.    Respiratory: Negative for shortness of breath.    Cardiovascular: Negative for chest pain.   Gastrointestinal: Negative for abdominal pain, nausea and vomiting.   Genitourinary: Negative for flank pain.   Musculoskeletal: Negative for myalgias.   Neurological: Negative for dizziness.        Physical Exam  Temp:  [36.2 °C (97.1 °F)-38.2 °C (100.8 °F)] 36.2 °C (97.1 °F)  Pulse:  [73-87] 86  Resp:  [15-18] 16  BP: ()/(49-90) 125/61  SpO2:  [90 %-98 %] 95 %    Physical Exam   Constitutional: He is oriented to person, place, and time. He appears well-developed and well-nourished.   HENT:   Head: Normocephalic.   Mouth/Throat: Oropharynx is clear and moist.   Eyes: Right eye exhibits no discharge. Left eye exhibits no discharge.   Cardiovascular: Normal rate and regular rhythm.    Pulmonary/Chest: Effort normal. He has no wheezes.   Abdominal: Soft. There is no tenderness. There is no rebound and no guarding.   Musculoskeletal: He exhibits no edema.   Neurological: He is alert and oriented to person, place, and time.   Skin: Skin is warm  and dry.   Nursing note and vitals reviewed.      Fluids  No intake or output data in the 24 hours ending 06/20/19 1611    Laboratory  Recent Labs      06/19/19   0354  06/19/19   1035  06/20/19   0112   WBC  8.5  12.0*  9.2   RBC  4.01*  4.27*  4.20*   HEMOGLOBIN  12.1*  12.8*  12.6*   HEMATOCRIT  35.8*  38.6*  38.1*   MCV  89.3  90.4  90.7   MCH  30.2  30.0  30.0   MCHC  33.8  33.2*  33.1*   RDW  47.5  49.0  49.7   PLATELETCT  110*  117*  115*   MPV  10.5  10.8  10.8     Recent Labs      06/18/19   0224  06/19/19   0354  06/20/19   0112   SODIUM  137  134*  137   POTASSIUM  3.3*  3.5*  3.5*   CHLORIDE  103  104  103   CO2  24  23  25   GLUCOSE  158*  123*  90   BUN  14  13  19   CREATININE  1.07  1.14  1.39   CALCIUM  9.0  8.0*  8.2*     Recent Labs      06/18/19   0224  06/20/19   0112   APTT  26.3   --    INR  1.00  1.27*         Recent Labs      06/18/19   0654   TRIGLYCERIDE  60   HDL  53   LDL  140*       Imaging  CT-ABDOMEN-PELVIS WITH   Final Result      1.  No acute intra-abdominal findings.      2.  Multiloculated cystic pancreatic mass is not significantly changed from the prior exam in 2018.      3.  Small hiatal hernia with postoperative changes at the gastroesophageal junction.      4.  Atherosclerosis.      DX-CHEST-PORTABLE (1 VIEW)   Final Result      New basal atelectasis and minor fissure visualization which could be from edema or atelectasis      EC-ECHOCARDIOGRAM COMPLETE W/O CONT   Final Result      US-EXTREMITY VENOUS LOWER BILAT   Final Result      US-RUQ   Final Result         1.  Borderline common bile duct dilatation, most commonly associated with postcholecystectomy physiology. Consider causes of biliary obstruction with additional workup as clinically appropriate.   2.  Mild renal cortical thinning, appearance suggests medical renal disease.   3.  Echogenic liver, compatible with fatty change versus fibrosis      CT-CTA COMPLETE THORACOABDOMINAL AORTA   Final Result         1.  Normal  caliber aorta without visualized aneurysm or dissection.   2.  Enlargement of the pancreatic head with low density, concerning for infiltrating pancreatic mass. Recommend follow-up pancreatic mass protocol MRI for further characterization as clinically appropriate.   3.  Scattered low-density hepatic lesions, consider cysts versus hemangioma or other hepatic lesion.   4.  Atherosclerosis and atherosclerotic coronary artery disease      These findings were discussed with the patient's clinician, DIANA RUTLEDGE, on 6/18/2019 3:25 AM.      MR-ABDOMEN-WITH & W/O    (Results Pending)        Assessment/Plan  Sepsis (HCC)   Assessment & Plan    This is sepsis (without associated acute organ dysfunction).   Leukocytosis 12,000  Febrile  Bacteremia with primary source inconclusive but likely GI     Bacteremia   Assessment & Plan    Gram-negative rods  Suspect GI source and likely liver versus pancreas given transaminitis and pancreatic mass  Zosyn for now  ID consult     Epigastric abdominal pain- (present on admission)   Assessment & Plan    Likely secondary to pancreatic mass, rule out ACS and aortic pathology  CTA thoracoabdominal aorta negative for aneurysm or dissection.  Serial troponins have been normal.  His pain is currently resolved.  Given bacteremia possible cholangitis.  MRCP today       Nausea   Assessment & Plan    Resolved today per patient     Pancreatic mass- (present on admission)   Assessment & Plan    Repeat CT abdomen and pelvis with IV and oral contrast shows stability  MRCP today  Follow up with Dr. Goyal       Essential hypertension- (present on admission)   Assessment & Plan    Continue Avapro     Transaminitis- (present on admission)   Assessment & Plan    Consistent with alcoholic transaminitis however patient denies excessive alcohol use  RUQ ultrasound overall benign  Hepatitis panel benign  MRI abdomen with and without contrast poorly tolerated with IV Ativan sedation  Reorder MRI  abdomen with and without with anesthesia     Hypokalemia- (present on admission)   Assessment & Plan    Continue repletion and monitor     Hypophosphatemia   Assessment & Plan    Recheck level          VTE prophylaxis: scds

## 2019-06-20 NOTE — CONSULTS
"Reason for PC Consult: Advance Care Planning    Consulted by: Dr. Acharya    Assessment:  General: 86yoM BIB EMS on 06/18/2019 for c/o abdominal pain with nausea, diaphoresis, and SOB. Pt was admitted for Abdominal pain, pancreatic mass, sepsis, and bacteremia. Blood cultures show gram neg rods and is receiving IVABX.    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    Social:  Pt lives with his wife, Annamaria, his only daughter, Monica, and \"two small lap dogs\". Pt has three sons; two live in [place] and one in [place]. He has been  for 34 years.      Consults:  ID    Dyspnea: No  Last BM: 06/19/19    Pain: No- Pt was medicated PRN Tylenol this AM, intervention was successful per pt  Depression: Mood appropriate for situation- Pt states he is in good spirits and normally feels great.  Dementia: No       Spiritual: Pt is active in his Confucianism, [employer] in [place].  Is Zoroastrianism or spirituality important for coping with this illness? Yes (Mosque)- Pt states someone from his [employer]uc[employer]ism has been by to visit him  Has a  or spiritual provider visit been requested? No    Palliative Performance Scale: 60%    Advance Directive: None- Discussed at this encounter. Pt stated he has not completed an AD and would like get one done. Pt verbalized statements of desire, but would like Annamaria (wife) to help designate an alternative  DPOA: No  POLST: No- Discussed when code status was addressed. Waiting for Annamaria    Code Status: Full- Discussed at this encounter. Pt is more interested in quality of life. He would like to discuss with his Annamaria      Outcome:  Introduced self and role of Palliative Care along with Patricia PC RN.  Pt provided health and social history.Explored pt's values, beliefs, and preferences in order to identify GOC. Pt enjoys driving, " "eating, and going on vacations. He especially enjoys sports and frequently has excursions to professional sporting events in and around the area. His independence is most important to him. He enjoys having the ability to \"do what I want, when I want\". Assessed pt's understanding of their current medical status, overall health picture, and options for future care. Pt has verbalized a good understanding of his acute issues, along w chronic issues. When queried about his current admission, he c/o \"awful stomach pain that woke me.\" He states his pancreatic mass has been in place for more than 10 years and follows up outpatient w Dr. Goyal. He is compliant w all of his appointments and is regularly seen by his PCP. He mentioned he has increasing numbness and \"feels like little needles\" in his hands- right more than left.    Discussed advance directive and pt would like to designate Annamaria. As for alternatives, pt wishes to confer with Annamaria. Assessed pt's understanding of code status and provided education with assistance from SHERLYN Gomez RN. Pt expressed that quality of life was most important and he feels his values are inline with DNAR/DNI. Again, he wishes to discuss with Annamaria.   Active listening, reflection, reminiscing, validation & normalization, empathic support and therapeutic touch utilized throughout this encounter.  All questions answered.  PC contact information given.     Discussed with/Updated: Dr Carreon and BSRMARITA    Plan: Meet with pt and Annamaria to review AD, POLST, and code status after imaging    Recommendations: I do not recommend an ethics or hospice consult at this time because pt would like to discuss POC/GOC with his wife.    Thank you for allowing Palliative Care to participate in this patient's care. Please feel free to call [phone] with any questions or concerns.            "

## 2019-06-20 NOTE — OR NURSING
1558 Patient arrived to unit, arouses to voice.  Report from anesthesia and RN.  Patient denies pain or nausea at this time.  Updated on plan of care, verbalized understanding.  1630 Patient tolerating oral intake.  1650 Report to Karo RN.  1705 Patient transferred to [place] [id number] via [facility] with transport.  YOLI Huddleston notified via phone that patient remains on 2 liter NC.

## 2019-06-20 NOTE — PROGRESS NOTES
Pt escorted off the floor at this time for MRI, via stretcher, accompanied by hospital transport. Monitor room notified.

## 2019-06-21 LAB
ALBUMIN SERPL BCP-MCNC: 3 G/DL (ref 3.2–4.9)
ALBUMIN/GLOB SERPL: 1 G/DL
ALP SERPL-CCNC: 101 U/L (ref 30–99)
ALT SERPL-CCNC: 112 U/L (ref 2–50)
ANION GAP SERPL CALC-SCNC: 11 MMOL/L (ref 0–11.9)
AST SERPL-CCNC: 52 U/L (ref 12–45)
BACTERIA BLD CULT: ABNORMAL
BACTERIA BLD CULT: ABNORMAL
BASOPHILS # BLD AUTO: 0.1 % (ref 0–1.8)
BASOPHILS # BLD: 0.01 K/UL (ref 0–0.12)
BILIRUB SERPL-MCNC: 0.6 MG/DL (ref 0.1–1.5)
BUN SERPL-MCNC: 13 MG/DL (ref 8–22)
CALCIUM SERPL-MCNC: 8 MG/DL (ref 8.5–10.5)
CHLORIDE SERPL-SCNC: 105 MMOL/L (ref 96–112)
CO2 SERPL-SCNC: 22 MMOL/L (ref 20–33)
CREAT SERPL-MCNC: 0.92 MG/DL (ref 0.5–1.4)
EOSINOPHIL # BLD AUTO: 0 K/UL (ref 0–0.51)
EOSINOPHIL NFR BLD: 0 % (ref 0–6.9)
ERYTHROCYTE [DISTWIDTH] IN BLOOD BY AUTOMATED COUNT: 47.8 FL (ref 35.9–50)
GLOBULIN SER CALC-MCNC: 2.9 G/DL (ref 1.9–3.5)
GLUCOSE SERPL-MCNC: 135 MG/DL (ref 65–99)
H PYLORI AG STL QL IA: NOT DETECTED
HCT VFR BLD AUTO: 37 % (ref 42–52)
HGB BLD-MCNC: 12.6 G/DL (ref 14–18)
IMM GRANULOCYTES # BLD AUTO: 0.02 K/UL (ref 0–0.11)
IMM GRANULOCYTES NFR BLD AUTO: 0.3 % (ref 0–0.9)
LYMPHOCYTES # BLD AUTO: 0.39 K/UL (ref 1–4.8)
LYMPHOCYTES NFR BLD: 5.2 % (ref 22–41)
MAGNESIUM SERPL-MCNC: 2.1 MG/DL (ref 1.5–2.5)
MCH RBC QN AUTO: 29.7 PG (ref 27–33)
MCHC RBC AUTO-ENTMCNC: 34.1 G/DL (ref 33.7–35.3)
MCV RBC AUTO: 87.3 FL (ref 81.4–97.8)
MONOCYTES # BLD AUTO: 0.41 K/UL (ref 0–0.85)
MONOCYTES NFR BLD AUTO: 5.5 % (ref 0–13.4)
NEUTROPHILS # BLD AUTO: 6.62 K/UL (ref 1.82–7.42)
NEUTROPHILS NFR BLD: 88.9 % (ref 44–72)
NRBC # BLD AUTO: 0 K/UL
NRBC BLD-RTO: 0 /100 WBC
PHOSPHATE SERPL-MCNC: 2 MG/DL (ref 2.5–4.5)
PLATELET # BLD AUTO: 114 K/UL (ref 164–446)
PMV BLD AUTO: 10.9 FL (ref 9–12.9)
POTASSIUM SERPL-SCNC: 3.7 MMOL/L (ref 3.6–5.5)
PROT SERPL-MCNC: 5.9 G/DL (ref 6–8.2)
RBC # BLD AUTO: 4.24 M/UL (ref 4.7–6.1)
SIGNIFICANT IND 70042: ABNORMAL
SITE SITE: ABNORMAL
SODIUM SERPL-SCNC: 138 MMOL/L (ref 135–145)
SOURCE SOURCE: ABNORMAL
WBC # BLD AUTO: 7.5 K/UL (ref 4.8–10.8)

## 2019-06-21 PROCEDURE — 700111 HCHG RX REV CODE 636 W/ 250 OVERRIDE (IP): Performed by: NURSE PRACTITIONER

## 2019-06-21 PROCEDURE — A9270 NON-COVERED ITEM OR SERVICE: HCPCS | Performed by: INTERNAL MEDICINE

## 2019-06-21 PROCEDURE — 770020 HCHG ROOM/CARE - TELE (206)

## 2019-06-21 PROCEDURE — 700102 HCHG RX REV CODE 250 W/ 637 OVERRIDE(OP): Performed by: INTERNAL MEDICINE

## 2019-06-21 PROCEDURE — 99232 SBSQ HOSP IP/OBS MODERATE 35: CPT | Performed by: INTERNAL MEDICINE

## 2019-06-21 PROCEDURE — 36415 COLL VENOUS BLD VENIPUNCTURE: CPT

## 2019-06-21 PROCEDURE — 80053 COMPREHEN METABOLIC PANEL: CPT

## 2019-06-21 PROCEDURE — 85025 COMPLETE CBC W/AUTO DIFF WBC: CPT

## 2019-06-21 PROCEDURE — 700105 HCHG RX REV CODE 258: Performed by: ANESTHESIOLOGY

## 2019-06-21 PROCEDURE — 84100 ASSAY OF PHOSPHORUS: CPT

## 2019-06-21 PROCEDURE — 83735 ASSAY OF MAGNESIUM: CPT

## 2019-06-21 PROCEDURE — 700105 HCHG RX REV CODE 258: Performed by: NURSE PRACTITIONER

## 2019-06-21 RX ORDER — CIPROFLOXACIN 500 MG/1
500 TABLET, FILM COATED ORAL EVERY 12 HOURS
Status: DISCONTINUED | OUTPATIENT
Start: 2019-06-21 | End: 2019-06-22 | Stop reason: HOSPADM

## 2019-06-21 RX ADMIN — SODIUM CHLORIDE, POTASSIUM CHLORIDE, SODIUM LACTATE AND CALCIUM CHLORIDE: 600; 310; 30; 20 INJECTION, SOLUTION INTRAVENOUS at 04:12

## 2019-06-21 RX ADMIN — CIPROFLOXACIN HYDROCHLORIDE 500 MG: 500 TABLET, FILM COATED ORAL at 21:23

## 2019-06-21 RX ADMIN — PIPERACILLIN SODIUM AND TAZOBACTAM SODIUM 3.38 G: 3; .375 INJECTION, POWDER, FOR SOLUTION INTRAVENOUS at 05:43

## 2019-06-21 RX ADMIN — IRBESARTAN 75 MG: 150 TABLET ORAL at 05:41

## 2019-06-21 RX ADMIN — CIPROFLOXACIN HYDROCHLORIDE 500 MG: 500 TABLET, FILM COATED ORAL at 14:32

## 2019-06-21 ASSESSMENT — ENCOUNTER SYMPTOMS
ABDOMINAL PAIN: 0
COUGH: 0
MYALGIAS: 0
CHILLS: 0
DIZZINESS: 0
DIARRHEA: 0
HEADACHES: 0
FEVER: 0
NAUSEA: 0
SHORTNESS OF BREATH: 0
VOMITING: 0

## 2019-06-21 NOTE — PROGRESS NOTES
Hospital Medicine Daily Progress Note    Date of Service  6/21/2019    Chief Complaint  86 y.o. male admitted 6/18/2019 with epigastric pain.    Hospital Course    PMH HTN, HLD, parkinson's, GERD who presented with severe epigastric pain. Serial troponins were negative. TTE showed EF 60%, mild MR and TR. CT aorta showed normal aorta, enlargement of pancreatic head and mass that is unchanged from 2018. While hospitalized he developed fever and found with E coli bacteremia and transaminitis. Viral hepatitis screen was negative. UA was normal. CT A/P showed stable pancreatic mass. MRCP showed stable multiloculated cyst, no bile duct dilation. Dr. Sung with ID was consulted, he was kept on zosyn and transitioned to cipro. QTC was 468. Repeat blood cx from 6/20 were negative. His epigastric pain and nausea resolved and he was tolerating a diet.       Interval Problem Update  Sinus on tele  transaminitis decreased  MRI showed multiloculated pancreatic cyst, no pancreatic duct dilatation  He complains of mild leg edema  He is tolerating clears, denies abd pain, N/V. Will advance diet    Consultants/Specialty  ID    Code Status  Full    Disposition  Lives in [place] with wife    Review of Systems  Review of Systems   Constitutional: Negative for malaise/fatigue.   Respiratory: Negative for shortness of breath.    Cardiovascular: Positive for leg swelling. Negative for chest pain.   Gastrointestinal: Negative for abdominal pain, nausea and vomiting.   Genitourinary: Negative for dysuria.   Musculoskeletal: Negative for myalgias.   Neurological: Negative for dizziness.        Physical Exam  Temp:  [36.1 °C (97 °F)-37.2 °C (98.9 °F)] 36.8 °C (98.2 °F)  Pulse:  [65-80] 78  Resp:  [16-18] 18  BP: (110-142)/(61-73) 141/73  SpO2:  [92 %-94 %] 94 %    Physical Exam   Constitutional: He is oriented to person, place, and time. He appears well-developed and well-nourished.   pleasant   HENT:   Head: Normocephalic.   Mouth/Throat:  Oropharynx is clear and moist.   Eyes: Right eye exhibits no discharge. Left eye exhibits no discharge.   Cardiovascular: Normal rate and regular rhythm.    Pulmonary/Chest: Effort normal. He has no wheezes.   Abdominal: Soft. There is no tenderness. There is no rebound and no guarding.   Musculoskeletal: He exhibits edema (minimal).   No calf tenderness b/l   Neurological: He is alert and oriented to person, place, and time.   Skin: Skin is warm and dry.   Nursing note and vitals reviewed.      Fluids    Intake/Output Summary (Last 24 hours) at 06/21/19 1723  Last data filed at 06/20/19 2130   Gross per 24 hour   Intake              240 ml   Output                0 ml   Net              240 ml       Laboratory  Recent Labs      06/19/19   1035  06/20/19   0112  06/21/19   0025   WBC  12.0*  9.2  7.5   RBC  4.27*  4.20*  4.24*   HEMOGLOBIN  12.8*  12.6*  12.6*   HEMATOCRIT  38.6*  38.1*  37.0*   MCV  90.4  90.7  87.3   MCH  30.0  30.0  29.7   MCHC  33.2*  33.1*  34.1   RDW  49.0  49.7  47.8   PLATELETCT  117*  115*  114*   MPV  10.8  10.8  10.9     Recent Labs      06/19/19   0354  06/20/19   0112  06/21/19   0025   SODIUM  134*  137  138   POTASSIUM  3.5*  3.5*  3.7   CHLORIDE  104  103  105   CO2  23  25  22   GLUCOSE  123*  90  135*   BUN  13  19  13   CREATININE  1.14  1.39  0.92   CALCIUM  8.0*  8.2*  8.0*     Recent Labs      06/20/19   0112   INR  1.27*               Imaging  MR-ABDOMEN-WITH & W/O   Final Result      1.  The multiloculated cystic mass in the pancreatic head is similar to slightly decreased in size without definite new solid enhancing components. This is most likely a benign multiloculated pseudocyst as a serous or mucinous cystic neoplasm would be    unusual in a male of this age group.   2.  There is no pancreatic ductal dilatation at this time.   3.  Gallbladder is surgically absent and there is no biliary dilatation.            CT-ABDOMEN-PELVIS WITH   Final Result      1.  No acute  intra-abdominal findings.      2.  Multiloculated cystic pancreatic mass is not significantly changed from the prior exam in 2018.      3.  Small hiatal hernia with postoperative changes at the gastroesophageal junction.      4.  Atherosclerosis.      DX-CHEST-PORTABLE (1 VIEW)   Final Result      New basal atelectasis and minor fissure visualization which could be from edema or atelectasis      EC-ECHOCARDIOGRAM COMPLETE W/O CONT   Final Result      US-EXTREMITY VENOUS LOWER BILAT   Final Result      US-RUQ   Final Result         1.  Borderline common bile duct dilatation, most commonly associated with postcholecystectomy physiology. Consider causes of biliary obstruction with additional workup as clinically appropriate.   2.  Mild renal cortical thinning, appearance suggests medical renal disease.   3.  Echogenic liver, compatible with fatty change versus fibrosis      CT-CTA COMPLETE THORACOABDOMINAL AORTA   Final Result         1.  Normal caliber aorta without visualized aneurysm or dissection.   2.  Enlargement of the pancreatic head with low density, concerning for infiltrating pancreatic mass. Recommend follow-up pancreatic mass protocol MRI for further characterization as clinically appropriate.   3.  Scattered low-density hepatic lesions, consider cysts versus hemangioma or other hepatic lesion.   4.  Atherosclerosis and atherosclerotic coronary artery disease      These findings were discussed with the patient's clinician, DIANA RUTLEDGE, on 6/18/2019 3:25 AM.           Assessment/Plan  Sepsis (HCC)   Assessment & Plan    This is sepsis (without associated acute organ dysfunction).   Leukocytosis 12,000  Febrile  Bacteremia with primary source inconclusive but likely GI  Resolving     Bacteremia   Assessment & Plan    E coli  Repeat blood are neg  MR neg for biliary cause. UA was neg  ID consulted, change zosyn to cipro     Epigastric abdominal pain- (present on admission)   Assessment & Plan    Likely  secondary to pancreatic mass, rule out ACS and aortic pathology  CTA thoracoabdominal aorta negative for aneurysm or dissection.  Serial troponins have been normal.  His pain is currently resolved.  MRCP neg for cause  Advance diet       Nausea   Assessment & Plan    Resolved, advance diet     Pancreatic mass- (present on admission)   Assessment & Plan    Repeat CT abdomen and pelvis with IV and oral contrast shows stability  MRCP showed stable multiloculated cyst  Follow up with Dr. Goyal       Essential hypertension- (present on admission)   Assessment & Plan    Continue Avapro     Transaminitis- (present on admission)   Assessment & Plan    Consistent with alcoholic transaminitis however patient denies excessive alcohol use  RUQ ultrasound overall benign  Hepatitis panel benign  MR showed stable pancreatic cyst, normal bile duct  Resolving     Hypokalemia- (present on admission)   Assessment & Plan    Continue repletion and monitor     Hypophosphatemia   Assessment & Plan    Replete          VTE prophylaxis: scds

## 2019-06-21 NOTE — PALLIATIVE CARE
"Palliative Care follow-up  PC RN to pt's bedside. Introduced self and role of palliative care to Annamaria, pt's wife. Annamaria was aware of AD left at bedside. She has a meeting this AM for work, but states, \"I will be back because I'd like to go over it with him.\"     Pt was eager to eat breakfast this AM. Pt's wife informed PCRN that pt doesn't care for apple juice. Luckily, orange juice was noticed on his tray.      Updated: BSRN, Dr. Carreon    Plan:  PCRN to return when Annamaria is at bedside with pt. PC RN can discuss AD is questions arise    Palliative care to continue to follow, provide support, and help facilitate decision making as clinical picture evolves.      Active listening, education, validation, normalization, therapeutic touch, and emotional support provided throughout encounter    Thank you for allowing Palliative Care to participate in this patient's care. Please feel free to call [phone] with any questions or concerns.              "

## 2019-06-21 NOTE — CARE PLAN
Problem: Safety  Goal: Will remain free from injury  Outcome: PROGRESSING AS EXPECTED  Bed locked in low position with fall precautions in place. Call light in reach and pt demonstrated use.  Intervention: Provide assistance with mobility  Report given to CCT r/t  SBA mobility status      Problem: Knowledge Deficit  Goal: Knowledge of disease process/condition, treatment plan, diagnostic tests, and medications will improve  Outcome: PROGRESSING AS EXPECTED  Discussed POC r/t disease process and indication for IV ABX. Pt verbalized understanding.

## 2019-06-21 NOTE — PROGRESS NOTES
Report received by day shift RN. Pt awake alert and oriented x 4 with no c/o pain. Family at bedside and updated to POC. Patient verbalized understanding. ABX and LR infusing. Bed locked in low position with fall precautions in place and call light in reach.

## 2019-06-21 NOTE — PROGRESS NOTES
Infectious Disease Progress Note    Author: Joleen Sung M.D. Date & Time of service: 2019  11:15 AM    Chief Complaint:  Follow-up for gram-negative bacteremia    Interval History:  86 y.o. male with history of pancreatic tumor, Parkinson's disease presented to the emergency room on 2019 with complaints of constant abdominal pain.  CT of the abdomen showed persistent loculated pancreatic mass.  Blood cultures with gram-negative bacteremia.     T-max 99.9 WBC 9.2.  Patient denies any abdominal pain.  No diarrhea.  Plan for MRCP today   afebrile WBC 7.5.  Patient sitting up in bed and eating breakfast without any issues.  He did have a bowel movement overnight.  He denies any abdominal pain.  MRI of the abdomen with no changes but with slight decrease in cystic mass    Labs Reviewed and Radiology Reviewed.    Review of Systems:  Review of Systems   Constitutional: Negative for chills and fever.   Respiratory: Negative for cough and shortness of breath.    Gastrointestinal: Negative for abdominal pain, diarrhea, nausea and vomiting.   Genitourinary: Negative for dysuria.   Neurological: Negative for dizziness and headaches.       Hemodynamics:  Temp (24hrs), Av.5 °C (97.7 °F), Min:36.1 °C (97 °F), Max:37.2 °C (98.9 °F)  Temperature: 36.6 °C (97.8 °F)  Pulse  Av.9  Min: 63  Max: 130Heart Rate (Monitored): 68  Blood Pressure : 110/73, NIBP: 150/66       Physical Exam:  Physical Exam   Constitutional: He is oriented to person, place, and time. He appears well-developed.   HENT:   Mouth/Throat: No oropharyngeal exudate.   Eyes: Pupils are equal, round, and reactive to light. Conjunctivae and EOM are normal.   Neck: Neck supple.   Cardiovascular: Normal rate, regular rhythm and normal heart sounds.    Pulmonary/Chest: Effort normal. He has no wheezes. He has no rales.   Abdominal: Soft. He exhibits no distension. There is no tenderness. There is no guarding.   Musculoskeletal: He exhibits  edema.   Neurological: He is alert and oriented to person, place, and time. No cranial nerve deficit. Coordination normal.   Skin: Skin is warm and dry. He is not diaphoretic.   Psychiatric: He has a normal mood and affect. His behavior is normal.       Meds:    Current Facility-Administered Medications:   •  [COMPLETED] piperacillin-tazobactam **AND** piperacillin-tazobactam  •  NS  •  NS  •  irbesartan  •  senna-docusate **AND** polyethylene glycol/lytes **AND** magnesium hydroxide **AND** bisacodyl  •  Respiratory Care per Protocol  •  acetaminophen  •  Notify provider if pain remains uncontrolled **AND** Use the numeric rating scale (NRS-11) on regular floors and Critical-Care Pain Observation Tool (CPOT) on ICUs/Trauma to assess pain **AND** Pulse Ox (Oximetry) **AND** Pharmacy Consult Request **AND** If patient difficult to arouse and/or has respiratory depression, stop any opiates that are currently infusing and call a Rapid Response. **AND** oxyCODONE immediate-release **AND** oxyCODONE immediate-release **AND** morphine injection  •  ondansetron  •  ondansetron  •  hyoscyamine-maalox plus-lidocaine viscous    Labs:  Recent Labs      06/19/19   0354  06/19/19   1035  06/20/19   0112  06/21/19   0025   WBC  8.5  12.0*  9.2  7.5   RBC  4.01*  4.27*  4.20*  4.24*   HEMOGLOBIN  12.1*  12.8*  12.6*  12.6*   HEMATOCRIT  35.8*  38.6*  38.1*  37.0*   MCV  89.3  90.4  90.7  87.3   MCH  30.2  30.0  30.0  29.7   RDW  47.5  49.0  49.7  47.8   PLATELETCT  110*  117*  115*  114*   MPV  10.5  10.8  10.8  10.9   NEUTSPOLYS  91.50*   --   82.70*  88.90*   LYMPHOCYTES  2.90*   --   6.80*  5.20*   MONOCYTES  4.60   --   7.10  5.50   EOSINOPHILS  0.20   --   2.60  0.00   BASOPHILS  0.40   --   0.50  0.10     Recent Labs      06/19/19   0354  06/20/19   0112  06/21/19   0025   SODIUM  134*  137  138   POTASSIUM  3.5*  3.5*  3.7   CHLORIDE  104  103  105   CO2  23  25  22   GLUCOSE  123*  90  135*   BUN  13  19  13     Recent  Labs      06/19/19   0354  06/20/19   0112  06/21/19   0025   ALBUMIN  3.3  3.5  3.0*   TBILIRUBIN  1.1  0.9  0.6   ALKPHOSPHAT  141*  127*  101*   TOTPROTEIN  5.7*  6.0  5.9*   ALTSGPT  261*  184*  112*   ASTSGOT  175*  104*  52*   CREATININE  1.14  1.39  0.92       Imaging:  Ct-abdomen-pelvis With    Result Date: 6/18/2019 6/18/2019 6:21 PM HISTORY/REASON FOR EXAM:  Abdominal distention and pain. History of partial gastrectomy in pancreatic mass. TECHNIQUE/EXAM DESCRIPTION:   CT scan of the abdomen and pelvis with contrast. Contrast-enhanced helical scanning was obtained from the diaphragmatic domes through the pubic symphysis following the bolus administration of nonionic contrast without complication. 100 mL of Omnipaque 350 nonionic contrast was administered without complication. Low dose optimization technique was utilized for this CT exam including automated exposure control and adjustment of the mA and/or kV according to patient size. COMPARISON: 9/5/2018 FINDINGS: There is atelectasis in the lower lobes. CT Abdomen: Hypodense lesions are unchanged in the prior exam and likely represent cysts. The spleen and adrenal glands are unremarkable. A multiloculated cystic mass in the pancreatic head measures approximately 4 x 3.9 cm, previously 3.9 x 3.7 cm. The mass abuts the main portal vein and superior mesenteric vein. No definite dilatation of the pancreatic duct is appreciated. The gallbladder has been removed. There are postoperative changes at the gastroesophageal junction with a small hiatal hernia. The bowel is grossly unremarkable. No pneumoperitoneum is identified. There is mild scattered arterial calcification. The kidneys enhance symmetrically. A hypodense mass in the lower pole of the left kidney is consistent with a cyst. Hypodense masses in the upper pole of the right kidney are consistent with cysts on the prior noncontrast CT. CT Pelvis: The bladder is unremarkable. The prostate appears enlarged.  No significant free fluid or adenopathy is identified. Degenerative changes are in the spine and sacroiliac joints.     1.  No acute intra-abdominal findings. 2.  Multiloculated cystic pancreatic mass is not significantly changed from the prior exam in 2018. 3.  Small hiatal hernia with postoperative changes at the gastroesophageal junction. 4.  Atherosclerosis.    Dx-chest-portable (1 View)    Result Date: 2019 5:37 PM HISTORY/REASON FOR EXAM: Shortness of Breath. Hypertension and nausea. TECHNIQUE/EXAM DESCRIPTION AND NUMBER OF VIEWS: Single AP view of the chest. COMPARISON: 2018 FINDINGS: Hardware: Vascular clips of the gastroesophageal junction Lungs: Decreased lung volumes with some linear basilar opacity. Minor fissure is newly visualized. Pleura:  No pleural space process is seen. Heart and mediastinum: Upper normal heart size     New basal atelectasis and minor fissure visualization which could be from edema or atelectasis    Us-extremity Venous Lower Bilat    Result Date: 2019   Vascular Laboratory  CONCLUSIONS  Normal bilateral superficial and deep venous examination of the lower  extremities.  No acute thrombosis is identified.  The peroneal and posterior tibial veins are difficult to assess for  compressibility, but flow response to augmentation is demonstrated.  No prior study is available for comparison.  AUGIE ROLLINS  Exam Date:     2019 09:59  Room #:     Inpatient  Priority:     Routine  Ht (in):             Wt (lb):  Ordering Physician:        JESSICA RODRIGUEZ  Referring Physician:       [id number]  Sonographer:               Dulce Virgen RVT  Study Type:                Complete Bilateral  Technical Quality:         Adequate  Age:    86    Gender:     M  MRN:    [id number]  :    [id number]      BSA:  Indications:     Localized swelling, mass and lump, lower limb, bilateral  CPT Codes:       60603  ICD Codes:       R22.43  History:         Off-and-on bilateral  lower extremity swelling for about one                   week; No previous duplex on file.  Limitations:  PROCEDURES:  Bilateral lower extremity venous duplex imaging.  The following venous structures were evaluated: common femoral, profunda  femoral, greater saphenous, femoral, popliteal , peroneal and posterior  tibial veins.  Serial compression, augmentation maneuvers,  color and spectral Doppler  flow evaluations were performed.  FINDINGS:  Bilateral lower extremities -  Complete color filling and compressibility with normal venous flow dynamics  including spontaneous flow, response to augmentation maneuvers, and  respiratory phasicity.  The peroneal and posterior tibial veins are difficult to assess for  compressibility, but flow response to augmentation is demonstrated.  Tiff Cross M.D.  (Electronically Signed)  Final Date:      18 June 2019                   10:43    Us-ruq    Result Date: 6/18/2019 6/18/2019 6:59 AM HISTORY/REASON FOR EXAM:  Abdominal pain TECHNIQUE/EXAM DESCRIPTION AND NUMBER OF VIEWS:  Real-time sonography of the liver and biliary tree. COMPARISON: None FINDINGS: The liver is normal in contour. There is no evidence of solid mass lesion. The liver measures 15.06 cm. The liver is echogenic. The gallbladder is is surgically absent by history. The common duct measures 0.61 cm. The pancreas is obscured by bowel gas. The aorta is obscured by bowel gas. Intrahepatic IVC is patent. The portal vein is patent with hepatopetal flow. The MPV measures 1.21 cm. The right kidney measures 10.33 cm. 10.9 mm peripelvic right renal cyst is seen. Mild renal cortical thinning is noted. There is no ascites.     1.  Borderline common bile duct dilatation, most commonly associated with postcholecystectomy physiology. Consider causes of biliary obstruction with additional workup as clinically appropriate. 2.  Mild renal cortical thinning, appearance suggests medical renal disease. 3.  Echogenic liver,  compatible with fatty change versus fibrosis    Ec-echocardiogram Complete W/o Cont    Result Date: 2019  Transthoracic Echo Report Echocardiography Laboratory CONCLUSIONS No prior study is available for comparison. Left ventricular ejection fraction is visually estimated to be 60%. Mild mitral regurgitation. Aortic sclerosis without stenosis. Mild tricuspid regurgitation. Estimated right ventricular systolic pressure  is 40 mmHg. DAHLIA ROLLINS Exam Date:         2019                    09:26 Exam Location:     Inpatient Priority:          Routine Ordering Physician:        SEVERIANO BRANTLEY Referring Physician:       [id number] Macedo Sonographer:               Yael Stevens RDCS Age:    86     Gender:    M MRN:    [id number] :    1932 BSA:    1.94   Ht (in):    68     Wt (lb):    176 Exam Type:     Complete Indications:     Edema ICD Codes:       782.3 CPT Codes:       89410 BP:   135    /   55     HR:   80 Technical Quality:       Fair MEASUREMENTS  (Male / Female) Normal Values 2D ECHO LV Diastolic Diameter PLAX        3.2 cm                4.2 - 5.9 / 3.9 - 5.3 cm LV Systolic Diameter PLAX         1.7 cm                2.1 - 4.0 cm IVS Diastolic Thickness           1 cm                  LVPW Diastolic Thickness          1 cm                  LVOT Diameter                     2.1 cm                Estimated LV Ejection Fraction    60 %                  LV Ejection Fraction MOD 4C       68.3 %                DOPPLER AV Peak Velocity                  1.6 m/s               AV Peak Gradient                  10.5 mmHg             AV Mean Gradient                  5.2 mmHg              LVOT Peak Velocity                0.94 m/s              AV Area Cont Eq vti               2.4 cm²               Mitral E Point Velocity           0.81 m/s              Mitral E to A Ratio               0.87                  MV Pressure Half Time             42.2 ms               MV Area PHT                       5.2 cm²                MV Deceleration Time              146 ms                TR Peak Velocity                  273 cm/s              PV Peak Velocity                  0.93 m/s              PV Peak Gradient                  3.5 mmHg              RVOT Peak Velocity                0.73 m/s              MV E' Velocity                    6.9 cm/s              * Indicates values subject to auto-interpretation LV EF:  60    % FINDINGS Left Ventricle Normal left ventricular size and systolic function. Normal regional wall motion. Left ventricular ejection fraction is visually estimated to be 60%. Normal diastolic function. Prominent septal knuckle. Right Ventricle Normal right ventricular size and systolic function. Right Atrium Normal right atrial size. Normal inferior vena cava size and inspiratory collapse. Left Atrium Normal left atrial size. Mitral Valve Thickened mitral valve leaflets. Mild mitral regurgitation. No mitral stenosis. Aortic Valve Tricuspid aortic valve. Aortic sclerosis without stenosis. No aortic insufficiency. Tricuspid Valve Structurally normal tricuspid valve. Mild tricuspid regurgitation. Right atrial pressure is estimated to be 3 mmHg. Estimated right ventricular systolic pressure  is 40 mmHg. Pulmonic Valve Structurally normal pulmonic valve without significant stenosis or regurgitation. Pericardium Normal pericardium without effusion. Aorta The aortic root is normal.  Ascending aorta diameter is 2.9 cm. Evelyn Beck MD (Electronically Signed) Final Date:     18 June 2019                 13:11    Ct-cta Complete Thoracoabdominal Aorta    Result Date: 6/18/2019 6/18/2019 2:31 AM HISTORY/REASON FOR EXAM:  midepigastric/ cp with rad to back. known pancreatic mass TECHNIQUE/EXAM DESCRIPTION:  CT angiogram of the chest and abdomen with and without reconstructions. Initial precontrast images were obtained from the lung apices through the diaphragmatic domes. Following this, 100 mL of Omnipaque 350 nonionic  contrast was administered at 5 mL/sec and helical scanning was obtained from the lung apices thru the iliac crest and bifurcation. Thick and thin section multiplanar volume reformats were generated from the axial data set in the sagittal and coronal planes. 3D angiographic images were reviewed on PACS. Maximum intensity projection (MIP) images were generated and reviewed. Low dose optimization technique was utilized for this CT exam including automated exposure control and adjustment of the mA and/or kV according to patient size. COMPARISON:   None. FINDINGS: Noncontrast images: There is no intramural hematoma. Contrast images: Aorta and vasculature: Atherosclerotic calcifications are seen including atherosclerotic coronary artery calcifications. The superior mesenteric artery, celiac artery, bilateral renal arteries, and inferior mesenteric artery appear grossly normal. There is no mediastinal or hilar adenopathy. There is no pleural effusion or pericardial effusion. Linear densities in the bilateral lung bases favor atelectasis. Hepatomegaly is identified. Scattered low-density hepatic lesions are seen. Spleen is normal in size. Enlargement of the pancreatic head is seen with low-density Postsurgical changes of cholecystectomy are noted. Adrenal glands are normal. 6.2 cm exophytic left renal cyst is seen, otherwise the kidneys enhance symmetrically. Bowel is unremarkable. There is no adenopathy or free fluid. 3D angiographic/MIP images of the vasculature confirm the vascular findings as described above. .    1.  Normal caliber aorta without visualized aneurysm or dissection. 2.  Enlargement of the pancreatic head with low density, concerning for infiltrating pancreatic mass. Recommend follow-up pancreatic mass protocol MRI for further characterization as clinically appropriate. 3.  Scattered low-density hepatic lesions, consider cysts versus hemangioma or other hepatic lesion. 4.  Atherosclerosis and  "atherosclerotic coronary artery disease These findings were discussed with the patient's clinician, DIANA RUTLEDGE, on 6/18/2019 3:25 AM.      Micro:  Results     Procedure Component Value Units Date/Time    BLOOD CULTURE x2 [[id number]]  (Abnormal)  (Susceptibility) Collected:  06/18/19 1746    Order Status:  Completed Specimen:  Blood from Peripheral Updated:  06/21/19 0916     Significant Indicator POS (POS)     Source BLD     Site PERIPHERAL     Culture Result Growth detected by Bactec instrument. 06/19/2019  11:44 (A)      Escherichia coli (A)    Narrative:       Per Hospital Policy: Only change Specimen Src: to \"Line\" if  specified by physician order.  Right AC    Culture & Susceptibility     ESCHERICHIA COLI     Antibiotic Sensitivity Microscan Unit Status    Ampicillin Sensitive <=8 mcg/mL Final    Method: JABIER    Cefepime Sensitive <=8 mcg/mL Final    Method: JABIER    Cefotaxime Sensitive <=2 mcg/mL Final    Method: JABIER    Cefotetan Sensitive <=16 mcg/mL Final    Method: JABIER    Ceftazidime Sensitive <=1 mcg/mL Final    Method: JABIER    Ceftriaxone Sensitive <=8 mcg/mL Final    Method: JABIER    Cefuroxime Sensitive <=4 mcg/mL Final    Method: JABIER    Ciprofloxacin Sensitive <=1 mcg/mL Final    Method: JABIER    Ertapenem Sensitive <=1 mcg/mL Final    Method: JABIER    Gentamicin Sensitive <=4 mcg/mL Final    Method: JABIER    Pip/Tazobactam Sensitive <=16 mcg/mL Final    Method: JABIER    Tigecycline Sensitive <=2 mcg/mL Final    Method: JABIER    Tobramycin Sensitive <=4 mcg/mL Final    Method: JABIER    Trimeth/Sulfa Sensitive <=2/38 mcg/mL Final    Method: JABIER                       BLOOD CULTURE [[id number]] Collected:  06/20/19 1315    Order Status:  Completed Specimen:  Blood from Peripheral Updated:  06/21/19 0848     Significant Indicator NEG     Source BLD     Site PERIPHERAL     Culture Result No Growth  Note: Blood cultures are incubated for 5 days and  are monitored continuously.Positive blood cultures  are called to the RN " "and reported as soon as  they are identified.      Narrative:       Per Hospital Policy: Only change Specimen Src: to \"Line\" if  specified by physician order.  Left Hand    BLOOD CULTURE [[id number]] Collected:  06/20/19 1327    Order Status:  Completed Specimen:  Blood from Peripheral Updated:  06/21/19 0848     Significant Indicator NEG     Source BLD     Site PERIPHERAL     Culture Result No Growth  Note: Blood cultures are incubated for 5 days and  are monitored continuously.Positive blood cultures  are called to the RN and reported as soon as  they are identified.      Narrative:       Per Hospital Policy: Only change Specimen Src: to \"Line\" if  specified by physician order.  Left Wrist    BLOOD CULTURE x2 [[id number]]  (Abnormal) Collected:  06/18/19 1746    Order Status:  Completed Specimen:  Blood from Peripheral Updated:  06/20/19 1453     Significant Indicator POS (POS)     Source BLD     Site PERIPHERAL     Culture Result Growth detected by Bactec instrument. 06/19/2019  11:01  Gram Stain: Gram negative rods.   (A)      Lactose fermenting Gram negative mary (A)    Narrative:       CALL  [facility]  CPU tel. [phone],  CALLED  CPU tel. [phone] 06/19/2019, 11:04, RB PERF. RESULTS CALLED  TO:[id number]  Per Hospital Policy: Only change Specimen Src: to \"Line\" if  specified by physician order.  Right Hand    URINALYSIS [[id number]] Collected:  06/19/19 0119    Order Status:  Completed Specimen:  Urine from Urine, Clean Catch Updated:  06/19/19 0149     Color Yellow     Character Clear     Specific Gravity <=1.005     Ph 6.5     Glucose Negative mg/dL      Ketones Negative mg/dL      Protein Negative mg/dL      Bilirubin Negative     Urobilinogen, Urine 0.2     Nitrite Negative     Leukocyte Esterase Negative     Occult Blood Negative     Micro Urine Req see below     Comment: Microscopic examination not performed when specimen is clear  and chemically negative for protein, blood, leukocyte esterase  and nitrite.      "    URINALYSIS [[id number]]     Order Status:  Canceled Specimen:  Urine           Assessment:  Active Hospital Problems    Diagnosis   • Bacteremia [R78.81]   • Sepsis (HCC) [A41.9]   • Epigastric abdominal pain [R10.13]   • Pancreatic mass [K86.9]   • Transaminitis [R74.0]       Plan:  Sepsis  2/2 E. coli bacteremia.  On treatment  Source enteric  Febrile with T-max of 100.8 this morning  Leukocytosis resolved  Blood culture on 6/18-positive  Repeat blood cultures on 6/20-negative to date  Discontinue Zosyn  Changed to p.o. Cipro 500 mg twice daily to complete a 10-day course total from 6/20  EKG reviewed and QTc interval acceptable  Estimated stop date 6/30/2019    Loculated pancreatic mass  Diagnosed 10 years ago.  Prior biopsy was inconclusive  MRI of the abdomen with no changes ?  Benign multiloculated pseudocyst  Follows up with Dr. Garcia      Alcoholic hepatitis  Right upper quadrant ultrasound unremarkable  Hepatitis panel-negative  Avoid hepatotoxins  Monitor     Acute kidney injury, improving overall  Avoid nephrotoxins  Renally dose antibiotics accordingly   monitor    I have performed a physical exam and reviewed and updated ROS and plan today 6/21/2019.  In review of yesterday's note 6/20/2019, there are no changes except as documented above.      Discussed with internal medicine/Dr Carreon

## 2019-06-21 NOTE — PROGRESS NOTES
Pt arrived back to room from MRI at this time. See flowsheet for vitals. Pt placed back on heart monitor.

## 2019-06-22 VITALS
RESPIRATION RATE: 16 BRPM | TEMPERATURE: 98.3 F | WEIGHT: 192.68 LBS | HEART RATE: 81 BPM | OXYGEN SATURATION: 94 % | HEIGHT: 68 IN | BODY MASS INDEX: 29.2 KG/M2 | DIASTOLIC BLOOD PRESSURE: 75 MMHG | SYSTOLIC BLOOD PRESSURE: 146 MMHG

## 2019-06-22 PROBLEM — R10.13 EPIGASTRIC ABDOMINAL PAIN: Status: RESOLVED | Noted: 2019-06-18 | Resolved: 2019-06-22

## 2019-06-22 PROBLEM — A41.9 SEPSIS (HCC): Status: RESOLVED | Noted: 2019-06-19 | Resolved: 2019-06-22

## 2019-06-22 PROBLEM — R11.0 NAUSEA: Status: RESOLVED | Noted: 2019-06-19 | Resolved: 2019-06-22

## 2019-06-22 LAB
ANION GAP SERPL CALC-SCNC: 11 MMOL/L (ref 0–11.9)
BASOPHILS # BLD AUTO: 0.5 % (ref 0–1.8)
BASOPHILS # BLD: 0.04 K/UL (ref 0–0.12)
BUN SERPL-MCNC: 12 MG/DL (ref 8–22)
CALCIUM SERPL-MCNC: 7.7 MG/DL (ref 8.5–10.5)
CHLORIDE SERPL-SCNC: 106 MMOL/L (ref 96–112)
CO2 SERPL-SCNC: 21 MMOL/L (ref 20–33)
CREAT SERPL-MCNC: 0.8 MG/DL (ref 0.5–1.4)
EOSINOPHIL # BLD AUTO: 0.14 K/UL (ref 0–0.51)
EOSINOPHIL NFR BLD: 1.9 % (ref 0–6.9)
ERYTHROCYTE [DISTWIDTH] IN BLOOD BY AUTOMATED COUNT: 47.5 FL (ref 35.9–50)
GLUCOSE SERPL-MCNC: 112 MG/DL (ref 65–99)
HCT VFR BLD AUTO: 36.3 % (ref 42–52)
HGB BLD-MCNC: 11.8 G/DL (ref 14–18)
IMM GRANULOCYTES # BLD AUTO: 0.03 K/UL (ref 0–0.11)
IMM GRANULOCYTES NFR BLD AUTO: 0.4 % (ref 0–0.9)
LYMPHOCYTES # BLD AUTO: 1.25 K/UL (ref 1–4.8)
LYMPHOCYTES NFR BLD: 16.8 % (ref 22–41)
MCH RBC QN AUTO: 28.8 PG (ref 27–33)
MCHC RBC AUTO-ENTMCNC: 32.5 G/DL (ref 33.7–35.3)
MCV RBC AUTO: 88.5 FL (ref 81.4–97.8)
MONOCYTES # BLD AUTO: 0.85 K/UL (ref 0–0.85)
MONOCYTES NFR BLD AUTO: 11.4 % (ref 0–13.4)
NEUTROPHILS # BLD AUTO: 5.13 K/UL (ref 1.82–7.42)
NEUTROPHILS NFR BLD: 69 % (ref 44–72)
NRBC # BLD AUTO: 0 K/UL
NRBC BLD-RTO: 0 /100 WBC
PLATELET # BLD AUTO: 134 K/UL (ref 164–446)
PMV BLD AUTO: 10.4 FL (ref 9–12.9)
POTASSIUM SERPL-SCNC: 3.3 MMOL/L (ref 3.6–5.5)
RBC # BLD AUTO: 4.1 M/UL (ref 4.7–6.1)
SODIUM SERPL-SCNC: 138 MMOL/L (ref 135–145)
WBC # BLD AUTO: 7.4 K/UL (ref 4.8–10.8)

## 2019-06-22 PROCEDURE — A9270 NON-COVERED ITEM OR SERVICE: HCPCS | Performed by: INTERNAL MEDICINE

## 2019-06-22 PROCEDURE — 99239 HOSP IP/OBS DSCHRG MGMT >30: CPT | Performed by: INTERNAL MEDICINE

## 2019-06-22 PROCEDURE — 85025 COMPLETE CBC W/AUTO DIFF WBC: CPT

## 2019-06-22 PROCEDURE — 700102 HCHG RX REV CODE 250 W/ 637 OVERRIDE(OP): Performed by: INTERNAL MEDICINE

## 2019-06-22 PROCEDURE — 36415 COLL VENOUS BLD VENIPUNCTURE: CPT

## 2019-06-22 PROCEDURE — 80048 BASIC METABOLIC PNL TOTAL CA: CPT

## 2019-06-22 PROCEDURE — 99232 SBSQ HOSP IP/OBS MODERATE 35: CPT | Performed by: INTERNAL MEDICINE

## 2019-06-22 RX ORDER — POTASSIUM CHLORIDE 20 MEQ/1
20 TABLET, EXTENDED RELEASE ORAL DAILY
Qty: 7 TAB | Refills: 0 | Status: SHIPPED | OUTPATIENT
Start: 2019-06-22 | End: 2019-06-29

## 2019-06-22 RX ORDER — POTASSIUM CHLORIDE 20 MEQ/1
40 TABLET, EXTENDED RELEASE ORAL ONCE
Status: COMPLETED | OUTPATIENT
Start: 2019-06-22 | End: 2019-06-22

## 2019-06-22 RX ORDER — CIPROFLOXACIN 500 MG/1
500 TABLET, FILM COATED ORAL EVERY 12 HOURS
Qty: 16 TAB | Refills: 0 | Status: SHIPPED | OUTPATIENT
Start: 2019-06-22 | End: 2019-06-30

## 2019-06-22 RX ADMIN — CIPROFLOXACIN HYDROCHLORIDE 500 MG: 500 TABLET, FILM COATED ORAL at 09:16

## 2019-06-22 RX ADMIN — POTASSIUM CHLORIDE 40 MEQ: 1500 TABLET, EXTENDED RELEASE ORAL at 09:16

## 2019-06-22 RX ADMIN — IRBESARTAN 75 MG: 150 TABLET ORAL at 05:32

## 2019-06-22 ASSESSMENT — ENCOUNTER SYMPTOMS
DIARRHEA: 0
ABDOMINAL PAIN: 0
HEADACHES: 0
CHILLS: 0
FEVER: 0
VOMITING: 0
DIZZINESS: 0
NAUSEA: 1
SHORTNESS OF BREATH: 0
COUGH: 0

## 2019-06-22 NOTE — DISCHARGE INSTRUCTIONS
Discharge Instructions    Discharged to home by car with relative. Discharged via wheelchair, hospital escort: Yes.  Special equipment needed: Not Applicable    Be sure to schedule a follow-up appointment with your primary care doctor or any specialists as instructed.     Discharge Plan:   Diet Plan: Discussed  Activity Level: Discussed  Confirmed Follow up Appointment: Patient to Call and Schedule Appointment  Medication Reconciliation Updated: Yes  Influenza Vaccine Indication: Not indicated: Previously immunized this influenza season and > 8 years of age    I understand that a diet low in cholesterol, fat, and sodium is recommended for good health. Unless I have been given specific instructions below for another diet, I accept this instruction as my diet prescription.   Other diet: low fiber GI soft     Special Instructions: None    · Is patient discharged on Warfarin / Coumadin?   No   Sepsis, Adult  Sepsis is a serious infection of your blood or tissues that affects your whole body. The infection that causes sepsis may be bacterial, viral, fungal, or parasitic. Sepsis may be life threatening. Sepsis can cause your blood pressure to drop. This may result in shock. Shock causes your central nervous system and your organs to stop working correctly.  What increases the risk?  Sepsis can happen in anyone, but it is more likely to happen in people who have weakened immune systems.  What are the signs or symptoms?  Symptoms of sepsis can include:  · Fever or low body temperature (hypothermia).  · Rapid breathing (hyperventilation).  · Chills.  · Rapid heartbeat (tachycardia).  · Confusion or light-headedness.  · Trouble breathing.  · Urinating much less than usual.  · Cool, clammy skin or red, flushed skin.  · Other problems with the heart, kidneys, or brain.  How is this diagnosed?  Your health care provider will likely do tests to look for an infection, to see if the infection has spread to your blood, and to see  how serious your condition is. Tests can include:  · Blood tests, including cultures of your blood.  · Cultures of other fluids from your body, such as:  ¨ Urine.  ¨ Pus from wounds.  ¨ Mucus coughed up from your lungs.  · Urine tests other than cultures.  · X-ray exams or other imaging tests.  How is this treated?  Treatment will begin with elimination of the source of infection. If your sepsis is likely caused by a bacterial or fungal infection, you will be given antibiotic or antifungal medicines.  You may also receive:  · Oxygen.  · Fluids through an IV tube.  · Medicines to increase your blood pressure.  · A machine to clean your blood (dialysis) if your kidneys fail.  · A machine to help you breathe if your lungs fail.  Get help right away if:  You get an infection or develop any of the signs and symptoms of sepsis after surgery or a hospitalization.  This information is not intended to replace advice given to you by your health care provider. Make sure you discuss any questions you have with your health care provider.  Document Released: 09/15/2004 Document Revised: 05/25/2017 Document Reviewed: 08/25/2014  [employer] Interactive Patient Education © 2017 [employer]      Bacteremia  Introduction  Bacteremia is the presence of bacteria in the blood. A small amount of bacteria may not cause any symptoms.  Sometimes, the bacteria spread and cause infection in other parts of the body, such as the heart, joints, bones, or brain. Having a great amount of bacteria can cause a serious, sometimes life-threatening infection called sepsis.  What are the causes?  This condition is caused by bacteria that get into the blood. Bacteria can enter the blood:  · During a dental or medical procedure.  · After you brush your teeth so hard that the gums bleed.  · Through a scrape or cut on your skin.  More severe types of bacteremia can be caused by:  · A bacterial infection, such as pneumonia, that spreads to the blood.  · Using  a dirty needle.  What increases the risk?  This condition is more likely to develop in:  · Children and elderly adults.  · People who have a long-lasting (chronic) disease or medical condition.  · People who have an artificial joint or heart valve.  · People who have heart valve disease.  · People who have a tube, such as a catheter or IV tube, that has been inserted for a medical treatment.  · People who have a weak body defense system (immune system).  · People who use IV drugs.  What are the signs or symptoms?  Usually, this condition does not cause symptoms when it is mild. When it is more serious, it may cause:  · Fever.  · Chills.  · Racing heart.  · Shortness of breath.  · Dizziness.  · Weakness.  · Confusion.  · Nausea or vomiting.  · Diarrhea.  Bacteremia that has spread to other parts of the body may cause symptoms in those areas.  How is this diagnosed?  This condition may be diagnosed with a physical exam and tests, such as:  · A complete blood count (CBC). This test looks for signs of infection.  · Blood cultures. These look for bacteria in your blood.  · Tests of any IV tubes. These look for a source of infection.  · Urine tests.  · Imaging tests, such as an X-ray, CT scan, MRI, or heart ultrasound.  How is this treated?  If the condition is mild, treatment is usually not needed. Usually, the body’s immune system will remove the bacteria. If the condition is more serious, it may be treated with:  · Antibiotic medicines through an IV tube. These may be given for about 2 weeks. At first, the antibiotic that is given may kill most types of blood bacteria. If your test results show that a certain kind of bacteria is causing problems, the antibiotic may be changed to kill only the bacteria that are causing problems.  · Antibiotics taken by mouth.  · Removing any catheter or IV tube that is a source of infection.  · Blood pressure and breathing support, if needed.  · Surgery to control the source or spread  of infection, if needed.  Follow these instructions at home:  · Take over-the-counter and prescription medicines only as told by your health care provider.  · If you were prescribed an antibiotic, take it as told by your health care provider. Do not stop taking the antibiotic even if you start to feel better.  · Rest at home until your condition is under control.  · Drink enough fluid to keep your urine clear or pale yellow.  · Keep all follow-up visits as told by your health care provider. This is important.  How is this prevented?  Take these actions to help prevent future episodes of bacteremia:  · Get all vaccinations as recommended by your health care provider.  · Clean and cover scrapes or cuts.  · Bathe regularly.  · Wash your hands often.  · Before any dental or surgical procedure, ask your health care provider if you should take an antibiotic.  Contact a health care provider if:  · Your symptoms get worse.  · You continue to have symptoms after treatment.  · You develop new symptoms after treatment.  Get help right away if:  · You have chest pain or trouble breathing.  · You develop confusion, dizziness, or weakness.  · You develop pale skin.  This information is not intended to replace advice given to you by your health care provider. Make sure you discuss any questions you have with your health care provider.  Document Released: 10/01/2007 Document Revised: 07/07/2017 Document Reviewed: 02/20/2016  © 2017 [employer]    Ciprofloxacin extended-release tablets  What is this medicine?  CIPROFLOXACIN (sip shea FLOX a sin) is a quinolone antibiotic. It is used to treat certain kinds of bacterial infections. It will not work for colds, flu, or other viral infections.  This medicine may be used for other purposes; ask your health care provider or pharmacist if you have questions.  COMMON BRAND NAME(S): Cipro XR, Proquin XR  What should I tell my health care provider before I take this medicine?  They need to know if  you have any of these conditions:  -bone problems  -history of low potassium levels in the blood  -irregular heartbeat  -joint problems  -kidney disease  -myasthenia gravis  -seizures  -tendon problems  -tingling of the fingers or toes, or other nerve disorder  -an unusual or allergic reaction to ciprofloxacin, other antibiotics or medicines, foods, dyes, or preservatives  -pregnant or trying to get pregnant  -breast-feeding  How should I use this medicine?  Take this medicine by mouth with a full glass of water. Follow the directions on the prescription label. Do not split, crush, or chew the tablet. Take your medicine at regular intervals. Do not take your medicine more often than directed. Take all of your medicine as directed even if you think your are better. Do not skip doses or stop your medicine early.  You can take this medicine with food or on an empty stomach. It can be taken with a meal that contains dairy or calcium, but do not take it alone with a dairy product, like milk or yogurt, or calcium-fortified juice.  A special MedGuide will be given to you by the pharmacist with each prescription and refill. Be sure to read this information carefully each time.  Talk to your pediatrician regarding the use of this medicine in children. Special care may be needed.  Overdosage: If you think you have taken too much of this medicine contact a poison control center or emergency room at once.  NOTE: This medicine is only for you. Do not share this medicine with others.  What if I miss a dose?  If you miss a dose, take it as soon as you can. If it is almost time for your next dose, take only that dose. Do not take double or extra doses. Do not take more than one dose in a day.  What may interact with this medicine?  Do not take this medicine with any of the following medications:  -cisapride  -dofetilide  -dronedarone  -flibanserin  -lomitapide  -pimozide  -thioridazine  -tizanidine  -ziprasidone  This medicine may  also interact with the following medications:  -antacids  -birth control pills  -caffeine  -certain medicines for diabetes, like glipizide or glyburide  -certain medicines that treat or prevent blood clots like warfarin  -clozapine  -cyclosporine  -didanosine (ddI) buffered tablets or powder  -duloxetine  -lanthanum carbonate  -lidocaine  -methotrexate  -multivitamins  -NSAIDS, medicines for pain and inflammation, like ibuprofen or naproxen  -olanzapine  -omeprazole  -other medicines that prolong the QT interval (cause an abnormal heart rhythm)  -phenytoin  -probenecid  -ropinirole  -sevelamer  -sildenafil  -sucralfate  -theophylline  -zolpidem  This list may not describe all possible interactions. Give your health care provider a list of all the medicines, herbs, non-prescription drugs, or dietary supplements you use. Also tell them if you smoke, drink alcohol, or use illegal drugs. Some items may interact with your medicine.  What should I watch for while using this medicine?  Tell your doctor or health care professional if your symptoms do not improve.  Do not treat diarrhea with over the counter products. Contact your doctor if you have diarrhea that lasts more than 2 days or if it is severe and watery.  You may get drowsy or dizzy. Do not drive, use machinery, or do anything that needs mental alertness until you know how this medicine affects you. Do not stand or sit up quickly, especially if you are an older patient. This reduces the risk of dizzy or fainting spells.  This medicine can make you more sensitive to the sun. Keep out of the sun. If you cannot avoid being in the sun, wear protective clothing and use sunscreen. Do not use sun lamps or tanning beds/booths.  Avoid antacids, aluminum, calcium, iron, magnesium, and zinc products for 6 hours before and 2 hours after taking a dose of this medicine.  What side effects may I notice from receiving this medicine?  Side effects that you should report to your  doctor or health care professional as soon as possible:  -allergic reactions like skin rash or hives, swelling of the face, lips, or tongue  -anxious  -confusion  -depressed mood  -diarrhea  -fast, irregular heartbeat  -hallucination, loss of contact with reality  -joint, muscle, or tendon pain or swelling  -pain, tingling, numbness in the hands or feet  -suicidal thoughts or other mood changes  -sunburn  -unusually weak or tired  Side effects that usually do not require medical attention (report to your doctor or health care professional if they continue or are bothersome):  -dry mouth  -headache  -nausea  -trouble sleeping  This list may not describe all possible side effects. Call your doctor for medical advice about side effects. You may report side effects to FDA at 3-296-FDA-8058.  Where should I keep my medicine?  Keep out of the reach of children.  Store at room temperature between 15 to 30 degrees C (59 to 86 degrees F). Keep container tightly closed. Throw away any unused medicine after the expiration date.  NOTE: This sheet is a summary. It may not cover all possible information. If you have questions about this medicine, talk to your doctor, pharmacist, or health care provider.  © 2018 [employer] (2017-07-28 14:26:55)    Depression / Suicide Risk    As you are discharged from this [facility] facility, it is important to learn how to keep safe from harming yourself.    Recognize the warning signs:  · Abrupt changes in personality, positive or negative- including increase in energy   · Giving away possessions  · Change in eating patterns- significant weight changes-  positive or negative  · Change in sleeping patterns- unable to sleep or sleeping all the time   · Unwillingness or inability to communicate  · Depression  · Unusual sadness, discouragement and loneliness  · Talk of wanting to die  · Neglect of personal appearance   · Rebelliousness- reckless behavior  · Withdrawal from  people/activities they love  · Confusion- inability to concentrate     If you or a loved one observes any of these behaviors or has concerns about self-harm, here's what you can do:  · Talk about it- your feelings and reasons for harming yourself  · Remove any means that you might use to hurt yourself (examples: pills, rope, extension cords, firearm)  · Get professional help from the community (Mental Health, Substance Abuse, psychological counseling)  · Do not be alone:Call your Safe Contact- someone whom you trust who will be there for you.  · Call your local CRISIS HOTLINE 695-5482 or 105-788-2536  · Call your local Children's Mobile Crisis Response Team [place] (237) 753-5323 or www.Unowhy  · Call the toll free National Suicide Prevention Hotlines   · National Suicide Prevention Lifeline 878-584-QMKQ (5612)  · National Hope Line Network 800-SUICIDE (782-1896)

## 2019-06-22 NOTE — PROGRESS NOTES
Report received by day shift. Pt awake alert oriented x 4 with no c/o pain. Pt updated to POC and verbalized understanding .Bed locked in low position with fall precautions in place and call light in reach.

## 2019-06-22 NOTE — CARE PLAN
Problem: Knowledge Deficit  Goal: Knowledge of disease process/condition, treatment plan, diagnostic tests, and medications will improve    Intervention: Explain information regarding disease process/condition, treatment plan, diagnostic tests, and medications and document in education  Discussed POC and indication for PO antibiotics. PT verbalized understanding.      Problem: Discharge Barriers/Planning  Goal: Patient's continuum of care needs will be met    Intervention: Involve patient and significant other/support system in setting and prioritizing goals for hospital stay and discharge  Discuss POC r/t discharge barriers and expectations. Pt. Verbalized understanding.

## 2019-06-22 NOTE — PROGRESS NOTES
Infectious Disease Progress Note    Author: Joleen Sung M.D. Date & Time of service: 2019  10:52 AM    Chief Complaint:  Follow-up for gram-negative bacteremia    Interval History:  86 y.o. male with history of pancreatic tumor, Parkinson's disease presented to the emergency room on 2019 with complaints of constant abdominal pain.  CT of the abdomen showed persistent loculated pancreatic mass.  Blood cultures with gram-negative bacteremia.     T-max 99.9 WBC 9.2.  Patient denies any abdominal pain.  No diarrhea.  Plan for MRCP today   afebrile WBC 7.5.  Patient sitting up in bed and eating breakfast without any issues.  He did have a bowel movement overnight.  He denies any abdominal pain.  MRI of the abdomen with no changes but with slight decrease in cystic mass   afebrile WBC 7.4.  Patient has some nausea associated with ciprofloxacin but no vomiting or abdominal pain.  Repeat blood cultures remain negative for 48 hours.  Patient eager to go home today    Labs Reviewed and Radiology Reviewed.    Review of Systems:  Review of Systems   Constitutional: Negative for chills and fever.   Respiratory: Negative for cough and shortness of breath.    Gastrointestinal: Positive for nausea. Negative for abdominal pain, diarrhea and vomiting.   Genitourinary: Negative for dysuria.   Neurological: Negative for dizziness and headaches.       Hemodynamics:  Temp (24hrs), Av.7 °C (98.1 °F), Min:36.3 °C (97.4 °F), Max:36.9 °C (98.5 °F)  Temperature: 36.8 °C (98.3 °F)  Pulse  Av.4  Min: 63  Max: 130   Blood Pressure : 146/75       Physical Exam:  Physical Exam   Constitutional: He is oriented to person, place, and time. He appears well-developed.   HENT:   Mouth/Throat: No oropharyngeal exudate.   Eyes: Pupils are equal, round, and reactive to light. Conjunctivae and EOM are normal.   Neck: Neck supple.   Cardiovascular: Normal rate, regular rhythm and normal heart sounds.    Pulmonary/Chest:  Effort normal. He has no wheezes. He has no rales.   Abdominal: Soft. He exhibits no distension. There is no tenderness. There is no guarding.   Musculoskeletal: He exhibits edema.   Neurological: He is alert and oriented to person, place, and time. No cranial nerve deficit. Coordination normal.   Skin: Skin is warm and dry. He is not diaphoretic.   Psychiatric: He has a normal mood and affect. His behavior is normal.       Meds:    Current Facility-Administered Medications:   •  ciprofloxacin  •  NS  •  NS  •  irbesartan  •  senna-docusate **AND** polyethylene glycol/lytes **AND** magnesium hydroxide **AND** bisacodyl  •  Respiratory Care per Protocol  •  acetaminophen  •  Notify provider if pain remains uncontrolled **AND** Use the numeric rating scale (NRS-11) on regular floors and Critical-Care Pain Observation Tool (CPOT) on ICUs/Trauma to assess pain **AND** Pulse Ox (Oximetry) **AND** Pharmacy Consult Request **AND** If patient difficult to arouse and/or has respiratory depression, stop any opiates that are currently infusing and call a Rapid Response. **AND** oxyCODONE immediate-release **AND** oxyCODONE immediate-release **AND** morphine injection  •  ondansetron  •  ondansetron  •  hyoscyamine-maalox plus-lidocaine viscous    Labs:  Recent Labs      06/20/19 0112 06/21/19 0025  06/22/19   0626   WBC  9.2  7.5  7.4   RBC  4.20*  4.24*  4.10*   HEMOGLOBIN  12.6*  12.6*  11.8*   HEMATOCRIT  38.1*  37.0*  36.3*   MCV  90.7  87.3  88.5   MCH  30.0  29.7  28.8   RDW  49.7  47.8  47.5   PLATELETCT  115*  114*  134*   MPV  10.8  10.9  10.4   NEUTSPOLYS  82.70*  88.90*  69.00   LYMPHOCYTES  6.80*  5.20*  16.80*   MONOCYTES  7.10  5.50  11.40   EOSINOPHILS  2.60  0.00  1.90   BASOPHILS  0.50  0.10  0.50     Recent Labs      06/20/19 0112 06/21/19   0025  06/22/19   0316   SODIUM  137  138  138   POTASSIUM  3.5*  3.7  3.3*   CHLORIDE  103  105  106   CO2  25  22  21   GLUCOSE  90  135*  112*   BUN  19  13  12      Recent Labs      06/20/19   0112  06/21/19   0025  06/22/19   0316   ALBUMIN  3.5  3.0*   --    TBILIRUBIN  0.9  0.6   --    ALKPHOSPHAT  127*  101*   --    TOTPROTEIN  6.0  5.9*   --    ALTSGPT  184*  112*   --    ASTSGOT  104*  52*   --    CREATININE  1.39  0.92  0.80       Imaging:  Ct-abdomen-pelvis With    Result Date: 6/18/2019 6/18/2019 6:21 PM HISTORY/REASON FOR EXAM:  Abdominal distention and pain. History of partial gastrectomy in pancreatic mass. TECHNIQUE/EXAM DESCRIPTION:   CT scan of the abdomen and pelvis with contrast. Contrast-enhanced helical scanning was obtained from the diaphragmatic domes through the pubic symphysis following the bolus administration of nonionic contrast without complication. 100 mL of Omnipaque 350 nonionic contrast was administered without complication. Low dose optimization technique was utilized for this CT exam including automated exposure control and adjustment of the mA and/or kV according to patient size. COMPARISON: 9/5/2018 FINDINGS: There is atelectasis in the lower lobes. CT Abdomen: Hypodense lesions are unchanged in the prior exam and likely represent cysts. The spleen and adrenal glands are unremarkable. A multiloculated cystic mass in the pancreatic head measures approximately 4 x 3.9 cm, previously 3.9 x 3.7 cm. The mass abuts the main portal vein and superior mesenteric vein. No definite dilatation of the pancreatic duct is appreciated. The gallbladder has been removed. There are postoperative changes at the gastroesophageal junction with a small hiatal hernia. The bowel is grossly unremarkable. No pneumoperitoneum is identified. There is mild scattered arterial calcification. The kidneys enhance symmetrically. A hypodense mass in the lower pole of the left kidney is consistent with a cyst. Hypodense masses in the upper pole of the right kidney are consistent with cysts on the prior noncontrast CT. CT Pelvis: The bladder is unremarkable. The prostate  appears enlarged. No significant free fluid or adenopathy is identified. Degenerative changes are in the spine and sacroiliac joints.     1.  No acute intra-abdominal findings. 2.  Multiloculated cystic pancreatic mass is not significantly changed from the prior exam in 2018. 3.  Small hiatal hernia with postoperative changes at the gastroesophageal junction. 4.  Atherosclerosis.    Dx-chest-portable (1 View)    Result Date: 2019 5:37 PM HISTORY/REASON FOR EXAM: Shortness of Breath. Hypertension and nausea. TECHNIQUE/EXAM DESCRIPTION AND NUMBER OF VIEWS: Single AP view of the chest. COMPARISON: 2018 FINDINGS: Hardware: Vascular clips of the gastroesophageal junction Lungs: Decreased lung volumes with some linear basilar opacity. Minor fissure is newly visualized. Pleura:  No pleural space process is seen. Heart and mediastinum: Upper normal heart size     New basal atelectasis and minor fissure visualization which could be from edema or atelectasis    Us-extremity Venous Lower Bilat    Result Date: 2019   Vascular Laboratory  CONCLUSIONS  Normal bilateral superficial and deep venous examination of the lower  extremities.  No acute thrombosis is identified.  The peroneal and posterior tibial veins are difficult to assess for  compressibility, but flow response to augmentation is demonstrated.  No prior study is available for comparison.  AUGIE ROLLINS  Exam Date:     2019 09:59  Room #:     Inpatient  Priority:     Routine  Ht (in):             Wt (lb):  Ordering Physician:        JESSICA RODRIGUEZ  Referring Physician:       [id number]  Sonographer:               Dulce Virgen RVT  Study Type:                Complete Bilateral  Technical Quality:         Adequate  Age:    86    Gender:     M  MRN:    [id number]  :    [id number]      BSA:  Indications:     Localized swelling, mass and lump, lower limb, bilateral  CPT Codes:       35510  ICD Codes:       R22.43  History:          Off-and-on bilateral lower extremity swelling for about one                   week; No previous duplex on file.  Limitations:  PROCEDURES:  Bilateral lower extremity venous duplex imaging.  The following venous structures were evaluated: common femoral, profunda  femoral, greater saphenous, femoral, popliteal , peroneal and posterior  tibial veins.  Serial compression, augmentation maneuvers,  color and spectral Doppler  flow evaluations were performed.  FINDINGS:  Bilateral lower extremities -  Complete color filling and compressibility with normal venous flow dynamics  including spontaneous flow, response to augmentation maneuvers, and  respiratory phasicity.  The peroneal and posterior tibial veins are difficult to assess for  compressibility, but flow response to augmentation is demonstrated.  Tiff Cross M.D.  (Electronically Signed)  Final Date:      18 June 2019                   10:43    Us-ruq    Result Date: 6/18/2019 6/18/2019 6:59 AM HISTORY/REASON FOR EXAM:  Abdominal pain TECHNIQUE/EXAM DESCRIPTION AND NUMBER OF VIEWS:  Real-time sonography of the liver and biliary tree. COMPARISON: None FINDINGS: The liver is normal in contour. There is no evidence of solid mass lesion. The liver measures 15.06 cm. The liver is echogenic. The gallbladder is is surgically absent by history. The common duct measures 0.61 cm. The pancreas is obscured by bowel gas. The aorta is obscured by bowel gas. Intrahepatic IVC is patent. The portal vein is patent with hepatopetal flow. The MPV measures 1.21 cm. The right kidney measures 10.33 cm. 10.9 mm peripelvic right renal cyst is seen. Mild renal cortical thinning is noted. There is no ascites.     1.  Borderline common bile duct dilatation, most commonly associated with postcholecystectomy physiology. Consider causes of biliary obstruction with additional workup as clinically appropriate. 2.  Mild renal cortical thinning, appearance suggests medical renal disease. 3.   Echogenic liver, compatible with fatty change versus fibrosis    Ec-echocardiogram Complete W/o Cont    Result Date: 2019  Transthoracic Echo Report Echocardiography Laboratory CONCLUSIONS No prior study is available for comparison. Left ventricular ejection fraction is visually estimated to be 60%. Mild mitral regurgitation. Aortic sclerosis without stenosis. Mild tricuspid regurgitation. Estimated right ventricular systolic pressure  is 40 mmHg. KATHARINA ROLLINSNINA Exam Date:         2019                    09:26 Exam Location:     Inpatient Priority:          Routine Ordering Physician:        SEVERIANO BRANTLEY Referring Physician:       [id number] Macedo Sonographer:               Yael Stevens RDCS Age:    86     Gender:    M MRN:    [id number] :    1932 BSA:    1.94   Ht (in):    68     Wt (lb):    176 Exam Type:     Complete Indications:     Edema ICD Codes:       782.3 CPT Codes:       30862 BP:   135    /   55     HR:   80 Technical Quality:       Fair MEASUREMENTS  (Male / Female) Normal Values 2D ECHO LV Diastolic Diameter PLAX        3.2 cm                4.2 - 5.9 / 3.9 - 5.3 cm LV Systolic Diameter PLAX         1.7 cm                2.1 - 4.0 cm IVS Diastolic Thickness           1 cm                  LVPW Diastolic Thickness          1 cm                  LVOT Diameter                     2.1 cm                Estimated LV Ejection Fraction    60 %                  LV Ejection Fraction MOD 4C       68.3 %                DOPPLER AV Peak Velocity                  1.6 m/s               AV Peak Gradient                  10.5 mmHg             AV Mean Gradient                  5.2 mmHg              LVOT Peak Velocity                0.94 m/s              AV Area Cont Eq vti               2.4 cm²               Mitral E Point Velocity           0.81 m/s              Mitral E to A Ratio               0.87                  MV Pressure Half Time             42.2 ms               MV Area PHT                        5.2 cm²               MV Deceleration Time              146 ms                TR Peak Velocity                  273 cm/s              PV Peak Velocity                  0.93 m/s              PV Peak Gradient                  3.5 mmHg              RVOT Peak Velocity                0.73 m/s              MV E' Velocity                    6.9 cm/s              * Indicates values subject to auto-interpretation LV EF:  60    % FINDINGS Left Ventricle Normal left ventricular size and systolic function. Normal regional wall motion. Left ventricular ejection fraction is visually estimated to be 60%. Normal diastolic function. Prominent septal knuckle. Right Ventricle Normal right ventricular size and systolic function. Right Atrium Normal right atrial size. Normal inferior vena cava size and inspiratory collapse. Left Atrium Normal left atrial size. Mitral Valve Thickened mitral valve leaflets. Mild mitral regurgitation. No mitral stenosis. Aortic Valve Tricuspid aortic valve. Aortic sclerosis without stenosis. No aortic insufficiency. Tricuspid Valve Structurally normal tricuspid valve. Mild tricuspid regurgitation. Right atrial pressure is estimated to be 3 mmHg. Estimated right ventricular systolic pressure  is 40 mmHg. Pulmonic Valve Structurally normal pulmonic valve without significant stenosis or regurgitation. Pericardium Normal pericardium without effusion. Aorta The aortic root is normal.  Ascending aorta diameter is 2.9 cm. Evelyn Beck MD (Electronically Signed) Final Date:     18 June 2019                 13:11    Ct-cta Complete Thoracoabdominal Aorta    Result Date: 6/18/2019 6/18/2019 2:31 AM HISTORY/REASON FOR EXAM:  midepigastric/ cp with rad to back. known pancreatic mass TECHNIQUE/EXAM DESCRIPTION:  CT angiogram of the chest and abdomen with and without reconstructions. Initial precontrast images were obtained from the lung apices through the diaphragmatic domes. Following this, 100 mL of Omnipaque  350 nonionic contrast was administered at 5 mL/sec and helical scanning was obtained from the lung apices thru the iliac crest and bifurcation. Thick and thin section multiplanar volume reformats were generated from the axial data set in the sagittal and coronal planes. 3D angiographic images were reviewed on PACS. Maximum intensity projection (MIP) images were generated and reviewed. Low dose optimization technique was utilized for this CT exam including automated exposure control and adjustment of the mA and/or kV according to patient size. COMPARISON:   None. FINDINGS: Noncontrast images: There is no intramural hematoma. Contrast images: Aorta and vasculature: Atherosclerotic calcifications are seen including atherosclerotic coronary artery calcifications. The superior mesenteric artery, celiac artery, bilateral renal arteries, and inferior mesenteric artery appear grossly normal. There is no mediastinal or hilar adenopathy. There is no pleural effusion or pericardial effusion. Linear densities in the bilateral lung bases favor atelectasis. Hepatomegaly is identified. Scattered low-density hepatic lesions are seen. Spleen is normal in size. Enlargement of the pancreatic head is seen with low-density Postsurgical changes of cholecystectomy are noted. Adrenal glands are normal. 6.2 cm exophytic left renal cyst is seen, otherwise the kidneys enhance symmetrically. Bowel is unremarkable. There is no adenopathy or free fluid. 3D angiographic/MIP images of the vasculature confirm the vascular findings as described above. .    1.  Normal caliber aorta without visualized aneurysm or dissection. 2.  Enlargement of the pancreatic head with low density, concerning for infiltrating pancreatic mass. Recommend follow-up pancreatic mass protocol MRI for further characterization as clinically appropriate. 3.  Scattered low-density hepatic lesions, consider cysts versus hemangioma or other hepatic lesion. 4.  Atherosclerosis and  "atherosclerotic coronary artery disease These findings were discussed with the patient's clinician, DIANA RUTLEDGE, on 6/18/2019 3:25 AM.      Micro:  Results     Procedure Component Value Units Date/Time    BLOOD CULTURE x2 [[id number]]  (Abnormal)  (Susceptibility) Collected:  06/18/19 1746    Order Status:  Completed Specimen:  Blood from Peripheral Updated:  06/21/19 1632     Significant Indicator POS (POS)     Source BLD     Site PERIPHERAL     Culture Result Growth detected by Bactec instrument. 06/19/2019  11:01 (A)      Escherichia coli (A)    Narrative:       CALL  [facility]  CPU tel. [phone],  CALLED  CPU tel. [phone] 06/19/2019, 11:04, RB PERF. RESULTS CALLED  TO:[id number]  Per Hospital Policy: Only change Specimen Src: to \"Line\" if  specified by physician order.  Right Hand    Culture & Susceptibility     ESCHERICHIA COLI     Antibiotic Sensitivity Microscan Unit Status    Ampicillin Sensitive <=8 mcg/mL Preliminary    Method: JABIER    Cefepime Sensitive <=8 mcg/mL Preliminary    Method: JABIER    Cefotaxime Sensitive <=2 mcg/mL Preliminary    Method: JABIER    Cefotetan Sensitive <=16 mcg/mL Preliminary    Method: JABIER    Ceftazidime Sensitive <=1 mcg/mL Preliminary    Method: JABIER    Ceftriaxone Sensitive <=8 mcg/mL Preliminary    Method: JABIER    Cefuroxime Sensitive <=4 mcg/mL Preliminary    Method: JABIER    Ciprofloxacin Sensitive <=1 mcg/mL Preliminary    Method: JABIER    Ertapenem Sensitive <=1 mcg/mL Preliminary    Method: JABIER    Gentamicin Sensitive <=4 mcg/mL Preliminary    Method: JABIER    Pip/Tazobactam Sensitive <=16 mcg/mL Preliminary    Method: JABIER    Tigecycline Sensitive <=2 mcg/mL Preliminary    Method: JABIER    Tobramycin Sensitive <=4 mcg/mL Preliminary    Method: JABIER    Trimeth/Sulfa Sensitive <=2/38 mcg/mL Preliminary    Method: JABIER                       BLOOD CULTURE x2 [[id number]]  (Abnormal)  (Susceptibility) Collected:  06/18/19 1746    Order Status:  Completed Specimen:  Blood from Peripheral " "Updated:  06/21/19 0916     Significant Indicator POS (POS)     Source BLD     Site PERIPHERAL     Culture Result Growth detected by Bactec instrument. 06/19/2019  11:44 (A)      Escherichia coli (A)    Narrative:       Per Hospital Policy: Only change Specimen Src: to \"Line\" if  specified by physician order.  Right AC    Culture & Susceptibility     ESCHERICHIA COLI     Antibiotic Sensitivity Microscan Unit Status    Ampicillin Sensitive <=8 mcg/mL Final    Method: JABIER    Cefepime Sensitive <=8 mcg/mL Final    Method: JABIER    Cefotaxime Sensitive <=2 mcg/mL Final    Method: JABIER    Cefotetan Sensitive <=16 mcg/mL Final    Method: JABIER    Ceftazidime Sensitive <=1 mcg/mL Final    Method: JABIER    Ceftriaxone Sensitive <=8 mcg/mL Final    Method: JABIER    Cefuroxime Sensitive <=4 mcg/mL Final    Method: JABIER    Ciprofloxacin Sensitive <=1 mcg/mL Final    Method: JABIER    Ertapenem Sensitive <=1 mcg/mL Final    Method: JABIER    Gentamicin Sensitive <=4 mcg/mL Final    Method: JABIER    Pip/Tazobactam Sensitive <=16 mcg/mL Final    Method: JABIER    Tigecycline Sensitive <=2 mcg/mL Final    Method: JABIER    Tobramycin Sensitive <=4 mcg/mL Final    Method: JABIER    Trimeth/Sulfa Sensitive <=2/38 mcg/mL Final    Method: JABIER                       BLOOD CULTURE [[id number]] Collected:  06/20/19 1315    Order Status:  Completed Specimen:  Blood from Peripheral Updated:  06/21/19 0848     Significant Indicator NEG     Source BLD     Site PERIPHERAL     Culture Result No Growth  Note: Blood cultures are incubated for 5 days and  are monitored continuously.Positive blood cultures  are called to the RN and reported as soon as  they are identified.      Narrative:       Per Hospital Policy: Only change Specimen Src: to \"Line\" if  specified by physician order.  Left Hand    BLOOD CULTURE [[id number]] Collected:  06/20/19 1327    Order Status:  Completed Specimen:  Blood from Peripheral Updated:  06/21/19 0848     Significant Indicator NEG     Source BLD " "    Site PERIPHERAL     Culture Result No Growth  Note: Blood cultures are incubated for 5 days and  are monitored continuously.Positive blood cultures  are called to the RN and reported as soon as  they are identified.      Narrative:       Per Hospital Policy: Only change Specimen Src: to \"Line\" if  specified by physician order.  Left Wrist    URINALYSIS [[id number]] Collected:  06/19/19 0119    Order Status:  Completed Specimen:  Urine from Urine, Clean Catch Updated:  06/19/19 0149     Color Yellow     Character Clear     Specific Gravity <=1.005     Ph 6.5     Glucose Negative mg/dL      Ketones Negative mg/dL      Protein Negative mg/dL      Bilirubin Negative     Urobilinogen, Urine 0.2     Nitrite Negative     Leukocyte Esterase Negative     Occult Blood Negative     Micro Urine Req see below     Comment: Microscopic examination not performed when specimen is clear  and chemically negative for protein, blood, leukocyte esterase  and nitrite.         URINALYSIS [[id number]]     Order Status:  Canceled Specimen:  Urine           Assessment:  Active Hospital Problems    Diagnosis   • Bacteremia [R78.81]   • Sepsis (HCC) [A41.9]   • Epigastric abdominal pain [R10.13]   • Pancreatic mass [K86.9]   • Transaminitis [R74.0]       Plan:  Sepsis  2/2 E. coli bacteremia.  On treatment  Source enteric  Febrile with T-max of 100.8 this morning  Leukocytosis resolved  Blood culture on 6/18-positive  Repeat blood cultures on 6/20-negative to date  Discontinued Zosyn on 6/21  Continue p.o. Cipro 500 mg twice daily to complete a 10-day course total from 6/20  EKG reviewed and QTc interval acceptable  Estimated stop date 6/30/2019    Loculated pancreatic mass  Diagnosed 10 years ago.  Prior biopsy was inconclusive  MRI of the abdomen with no changes ?  Benign multiloculated pseudocyst  Follows up with Dr. Garcia      Alcoholic hepatitis  Right upper quadrant ultrasound unremarkable  Hepatitis panel-negative  Avoid " hepatotoxins  Monitor     Acute kidney injury, improving overall  Avoid nephrotoxins  Renally dose antibiotics accordingly   monitor    I have performed a physical exam and reviewed and updated ROS and plan today 6/22/2019.  In review of yesterday's note 6/21/2019, there are no changes except as documented above.    No ID clinic follow-up needed.  Plan for discharge today    Discussed with internal medicine/Dr Carreon.  Will sign off.

## 2019-06-22 NOTE — PROGRESS NOTES
Pt. being discharged. Pt. educated on discharge instructions and new prescriptions. Pt verbalizes understanding. Follow up appointment made with PCP. PIV removed, monitor checked in. Pt. Going home with wife. RN to call transport for escort out, will monitor pt. until off unit.

## 2019-06-22 NOTE — PALLIATIVE CARE
"Palliative Care follow-up  PC RN met pt at BS. Pt is getting dressed and states, \"We haven't looked over it together. I could fill it out and bring it back.\" Explained need for notarization. Pt is agreeable. Pt states,\"I'm not going to go to [facility] tomorrow. I think I will rest. I have some swelling on my legs.\" Pt informs PC RN that he has a recliner to elevate his legs. Pt request contact info for PC. Pt given business card.    Updated: BS RN    Plan: Pt to d/c and states he will bring back AD      Active listening, education, validation, normalization, therapeutic touch, and emotional support provided throughout encounter    Thank you for allowing Palliative Care to participate in this patient's care. Please feel free to call [phone] with any questions or concerns.              "

## 2019-06-22 NOTE — PROGRESS NOTES
Bedside report received. No overnight events per night RN. Patient A&O x 4. VS'S. RA. Denies pain or SOB at this time. Pt states the the PO Cipro does give him an upset stomach / bloating.  POC discussed with patient. Pt verbalizes understanding. Call light and belongings with in reach. Bed locked and in lowest position, alarm and fall precautions in place.

## 2019-06-23 LAB
BACTERIA BLD CULT: ABNORMAL
SIGNIFICANT IND 70042: ABNORMAL
SITE SITE: ABNORMAL
SOURCE SOURCE: ABNORMAL

## 2019-06-25 LAB
BACTERIA BLD CULT: NORMAL
BACTERIA BLD CULT: NORMAL
SIGNIFICANT IND 70042: NORMAL
SIGNIFICANT IND 70042: NORMAL
SITE SITE: NORMAL
SITE SITE: NORMAL
SOURCE SOURCE: NORMAL
SOURCE SOURCE: NORMAL

## 2019-07-04 NOTE — ADDENDUM NOTE
Encounter addended by: Melinda Carreon M.D. on: 7/4/2019  4:00 PM<BR>    Actions taken: Sign clinical note

## 2019-07-12 ENCOUNTER — APPOINTMENT (OUTPATIENT)
Dept: RADIOLOGY | Facility: MEDICAL CENTER | Age: 84
End: 2019-07-12
Attending: EMERGENCY MEDICINE
Payer: COMMERCIAL

## 2019-07-12 ENCOUNTER — HOSPITAL ENCOUNTER (OUTPATIENT)
Facility: MEDICAL CENTER | Age: 84
End: 2019-07-13
Attending: EMERGENCY MEDICINE | Admitting: HOSPITALIST
Payer: COMMERCIAL

## 2019-07-12 DIAGNOSIS — K62.5 RECTAL BLEEDING: ICD-10-CM

## 2019-07-12 DIAGNOSIS — R53.1 GENERALIZED WEAKNESS: ICD-10-CM

## 2019-07-12 DIAGNOSIS — I95.9 HYPOTENSION, UNSPECIFIED HYPOTENSION TYPE: ICD-10-CM

## 2019-07-12 DIAGNOSIS — R19.7 DIARRHEA, UNSPECIFIED TYPE: ICD-10-CM

## 2019-07-12 LAB
ABO GROUP BLD: NORMAL
ALBUMIN SERPL BCP-MCNC: 3.9 G/DL (ref 3.2–4.9)
ALBUMIN/GLOB SERPL: 1.2 G/DL
ALP SERPL-CCNC: 85 U/L (ref 30–99)
ALT SERPL-CCNC: 12 U/L (ref 2–50)
ANION GAP SERPL CALC-SCNC: 9 MMOL/L (ref 0–11.9)
APPEARANCE UR: CLEAR
APTT PPP: 31.1 SEC (ref 24.7–36)
AST SERPL-CCNC: 20 U/L (ref 12–45)
BASOPHILS # BLD AUTO: 0.6 % (ref 0–1.8)
BASOPHILS # BLD: 0.03 K/UL (ref 0–0.12)
BILIRUB SERPL-MCNC: 0.4 MG/DL (ref 0.1–1.5)
BILIRUB UR QL STRIP.AUTO: NEGATIVE
BLD GP AB SCN SERPL QL: NORMAL
BUN SERPL-MCNC: 13 MG/DL (ref 8–22)
CALCIUM SERPL-MCNC: 8.7 MG/DL (ref 8.5–10.5)
CHLORIDE SERPL-SCNC: 101 MMOL/L (ref 96–112)
CO2 SERPL-SCNC: 26 MMOL/L (ref 20–33)
COLOR UR: YELLOW
CREAT SERPL-MCNC: 1.25 MG/DL (ref 0.5–1.4)
EKG IMPRESSION: NORMAL
EOSINOPHIL # BLD AUTO: 0.28 K/UL (ref 0–0.51)
EOSINOPHIL NFR BLD: 5.8 % (ref 0–6.9)
ERYTHROCYTE [DISTWIDTH] IN BLOOD BY AUTOMATED COUNT: 46 FL (ref 35.9–50)
GLOBULIN SER CALC-MCNC: 3.2 G/DL (ref 1.9–3.5)
GLUCOSE SERPL-MCNC: 110 MG/DL (ref 65–99)
GLUCOSE UR STRIP.AUTO-MCNC: NEGATIVE MG/DL
HCT VFR BLD AUTO: 39.8 % (ref 42–52)
HGB BLD-MCNC: 13.6 G/DL (ref 14–18)
IMM GRANULOCYTES # BLD AUTO: 0 K/UL (ref 0–0.11)
IMM GRANULOCYTES NFR BLD AUTO: 0 % (ref 0–0.9)
INR PPP: 0.98 (ref 0.87–1.13)
KETONES UR STRIP.AUTO-MCNC: NEGATIVE MG/DL
LACTATE BLD-SCNC: 1.2 MMOL/L (ref 0.5–2)
LEUKOCYTE ESTERASE UR QL STRIP.AUTO: NEGATIVE
LIPASE SERPL-CCNC: 36 U/L (ref 11–82)
LYMPHOCYTES # BLD AUTO: 1.34 K/UL (ref 1–4.8)
LYMPHOCYTES NFR BLD: 28 % (ref 22–41)
MCH RBC QN AUTO: 29.6 PG (ref 27–33)
MCHC RBC AUTO-ENTMCNC: 34.2 G/DL (ref 33.7–35.3)
MCV RBC AUTO: 86.5 FL (ref 81.4–97.8)
MICRO URNS: NORMAL
MONOCYTES # BLD AUTO: 0.98 K/UL (ref 0–0.85)
MONOCYTES NFR BLD AUTO: 20.5 % (ref 0–13.4)
NEUTROPHILS # BLD AUTO: 2.16 K/UL (ref 1.82–7.42)
NEUTROPHILS NFR BLD: 45.1 % (ref 44–72)
NITRITE UR QL STRIP.AUTO: NEGATIVE
NRBC # BLD AUTO: 0 K/UL
NRBC BLD-RTO: 0 /100 WBC
PH UR STRIP.AUTO: 5 [PH]
PLATELET # BLD AUTO: 172 K/UL (ref 164–446)
PMV BLD AUTO: 10.6 FL (ref 9–12.9)
POTASSIUM SERPL-SCNC: 3.5 MMOL/L (ref 3.6–5.5)
PROT SERPL-MCNC: 7.1 G/DL (ref 6–8.2)
PROT UR QL STRIP: NEGATIVE MG/DL
PROTHROMBIN TIME: 13.2 SEC (ref 12–14.6)
RBC # BLD AUTO: 4.6 M/UL (ref 4.7–6.1)
RBC UR QL AUTO: NEGATIVE
RH BLD: NORMAL
SODIUM SERPL-SCNC: 136 MMOL/L (ref 135–145)
SP GR UR STRIP.AUTO: 1.01
UROBILINOGEN UR STRIP.AUTO-MCNC: 0.2 MG/DL
WBC # BLD AUTO: 4.8 K/UL (ref 4.8–10.8)

## 2019-07-12 PROCEDURE — 86901 BLOOD TYPING SEROLOGIC RH(D): CPT

## 2019-07-12 PROCEDURE — 87040 BLOOD CULTURE FOR BACTERIA: CPT | Mod: 91

## 2019-07-12 PROCEDURE — 71045 X-RAY EXAM CHEST 1 VIEW: CPT

## 2019-07-12 PROCEDURE — 81003 URINALYSIS AUTO W/O SCOPE: CPT

## 2019-07-12 PROCEDURE — 80053 COMPREHEN METABOLIC PANEL: CPT

## 2019-07-12 PROCEDURE — G0378 HOSPITAL OBSERVATION PER HR: HCPCS

## 2019-07-12 PROCEDURE — 94760 N-INVAS EAR/PLS OXIMETRY 1: CPT

## 2019-07-12 PROCEDURE — 93005 ELECTROCARDIOGRAM TRACING: CPT | Performed by: EMERGENCY MEDICINE

## 2019-07-12 PROCEDURE — 83690 ASSAY OF LIPASE: CPT

## 2019-07-12 PROCEDURE — 700105 HCHG RX REV CODE 258: Performed by: EMERGENCY MEDICINE

## 2019-07-12 PROCEDURE — 86850 RBC ANTIBODY SCREEN: CPT

## 2019-07-12 PROCEDURE — 82272 OCCULT BLD FECES 1-3 TESTS: CPT

## 2019-07-12 PROCEDURE — 85610 PROTHROMBIN TIME: CPT

## 2019-07-12 PROCEDURE — 85730 THROMBOPLASTIN TIME PARTIAL: CPT

## 2019-07-12 PROCEDURE — 36415 COLL VENOUS BLD VENIPUNCTURE: CPT

## 2019-07-12 PROCEDURE — 99285 EMERGENCY DEPT VISIT HI MDM: CPT

## 2019-07-12 PROCEDURE — 93005 ELECTROCARDIOGRAM TRACING: CPT

## 2019-07-12 PROCEDURE — 83605 ASSAY OF LACTIC ACID: CPT

## 2019-07-12 PROCEDURE — 86900 BLOOD TYPING SEROLOGIC ABO: CPT

## 2019-07-12 PROCEDURE — 85025 COMPLETE CBC W/AUTO DIFF WBC: CPT

## 2019-07-12 PROCEDURE — 87493 C DIFF AMPLIFIED PROBE: CPT

## 2019-07-12 RX ORDER — SODIUM CHLORIDE AND POTASSIUM CHLORIDE 150; 900 MG/100ML; MG/100ML
INJECTION, SOLUTION INTRAVENOUS CONTINUOUS
Status: DISCONTINUED | OUTPATIENT
Start: 2019-07-12 | End: 2019-07-13 | Stop reason: HOSPADM

## 2019-07-12 RX ORDER — BISACODYL 10 MG
10 SUPPOSITORY, RECTAL RECTAL
Status: DISCONTINUED | OUTPATIENT
Start: 2019-07-12 | End: 2019-07-13 | Stop reason: HOSPADM

## 2019-07-12 RX ORDER — AMOXICILLIN 250 MG
2 CAPSULE ORAL 2 TIMES DAILY
Status: DISCONTINUED | OUTPATIENT
Start: 2019-07-12 | End: 2019-07-13 | Stop reason: HOSPADM

## 2019-07-12 RX ORDER — ACETAMINOPHEN 325 MG/1
650 TABLET ORAL EVERY 6 HOURS PRN
Status: DISCONTINUED | OUTPATIENT
Start: 2019-07-12 | End: 2019-07-13 | Stop reason: HOSPADM

## 2019-07-12 RX ORDER — CIPROFLOXACIN 500 MG/1
500 TABLET, FILM COATED ORAL 2 TIMES DAILY
Status: ON HOLD | COMMUNITY
Start: 2019-06-23 | End: 2019-07-13

## 2019-07-12 RX ORDER — POTASSIUM CHLORIDE 20 MEQ/1
20 TABLET, EXTENDED RELEASE ORAL ONCE
Status: COMPLETED | OUTPATIENT
Start: 2019-07-12 | End: 2019-07-13

## 2019-07-12 RX ORDER — HEPARIN SODIUM 5000 [USP'U]/ML
5000 INJECTION, SOLUTION INTRAVENOUS; SUBCUTANEOUS EVERY 8 HOURS
Status: DISCONTINUED | OUTPATIENT
Start: 2019-07-12 | End: 2019-07-13 | Stop reason: HOSPADM

## 2019-07-12 RX ORDER — SODIUM CHLORIDE 9 MG/ML
1000 INJECTION, SOLUTION INTRAVENOUS ONCE
Status: COMPLETED | OUTPATIENT
Start: 2019-07-12 | End: 2019-07-12

## 2019-07-12 RX ORDER — POLYETHYLENE GLYCOL 3350 17 G/17G
1 POWDER, FOR SOLUTION ORAL
Status: DISCONTINUED | OUTPATIENT
Start: 2019-07-12 | End: 2019-07-13 | Stop reason: HOSPADM

## 2019-07-12 RX ADMIN — SODIUM CHLORIDE 1000 ML: 9 INJECTION, SOLUTION INTRAVENOUS at 19:50

## 2019-07-12 NOTE — ED TRIAGE NOTES
Chief Complaint   Patient presents with   • Blood Pressure Problem   • Diarrhea   • Bloody Stools   • Chills   • Generalized Body Aches   • Shortness of Breath   Pt to triage in NAD.  EKG completed.  Pt with multiple complaints x 7-8 days.  Pt reports home BP 85/44 and 84/41.  Pt currently normotensive.  Pt started on new BP medication 2 weeks ago.  Pt reports diarrhea and bright red blood in stools.  Pt recently admitted for Sepsis.  Pt educated on triage process and instructed to notify triage RN of any change in status.

## 2019-07-13 VITALS
RESPIRATION RATE: 16 BRPM | DIASTOLIC BLOOD PRESSURE: 55 MMHG | HEART RATE: 82 BPM | WEIGHT: 173.28 LBS | BODY MASS INDEX: 26.26 KG/M2 | SYSTOLIC BLOOD PRESSURE: 137 MMHG | OXYGEN SATURATION: 95 % | TEMPERATURE: 97.4 F | HEIGHT: 68 IN

## 2019-07-13 PROBLEM — R53.1 GENERALIZED WEAKNESS: Status: ACTIVE | Noted: 2019-07-13

## 2019-07-13 LAB
ANION GAP SERPL CALC-SCNC: 10 MMOL/L (ref 0–11.9)
BUN SERPL-MCNC: 9 MG/DL (ref 8–22)
C DIFF DNA SPEC QL NAA+PROBE: NEGATIVE
C DIFF TOX GENS STL QL NAA+PROBE: NEGATIVE
CALCIUM SERPL-MCNC: 8 MG/DL (ref 8.5–10.5)
CHLORIDE SERPL-SCNC: 107 MMOL/L (ref 96–112)
CO2 SERPL-SCNC: 21 MMOL/L (ref 20–33)
CREAT SERPL-MCNC: 0.91 MG/DL (ref 0.5–1.4)
ERYTHROCYTE [DISTWIDTH] IN BLOOD BY AUTOMATED COUNT: 51.8 FL (ref 35.9–50)
GLUCOSE SERPL-MCNC: 131 MG/DL (ref 65–99)
HCT VFR BLD AUTO: 43.6 % (ref 42–52)
HGB BLD-MCNC: 13.7 G/DL (ref 14–18)
MAGNESIUM SERPL-MCNC: 2 MG/DL (ref 1.5–2.5)
MCH RBC QN AUTO: 29.9 PG (ref 27–33)
MCHC RBC AUTO-ENTMCNC: 31.4 G/DL (ref 33.7–35.3)
MCV RBC AUTO: 95.2 FL (ref 81.4–97.8)
PLATELET # BLD AUTO: 148 K/UL (ref 164–446)
PMV BLD AUTO: 10.8 FL (ref 9–12.9)
POTASSIUM SERPL-SCNC: 3.3 MMOL/L (ref 3.6–5.5)
RBC # BLD AUTO: 4.58 M/UL (ref 4.7–6.1)
SODIUM SERPL-SCNC: 138 MMOL/L (ref 135–145)
WBC # BLD AUTO: 4.5 K/UL (ref 4.8–10.8)

## 2019-07-13 PROCEDURE — 36415 COLL VENOUS BLD VENIPUNCTURE: CPT

## 2019-07-13 PROCEDURE — 80048 BASIC METABOLIC PNL TOTAL CA: CPT

## 2019-07-13 PROCEDURE — 700102 HCHG RX REV CODE 250 W/ 637 OVERRIDE(OP): Performed by: STUDENT IN AN ORGANIZED HEALTH CARE EDUCATION/TRAINING PROGRAM

## 2019-07-13 PROCEDURE — 700111 HCHG RX REV CODE 636 W/ 250 OVERRIDE (IP): Performed by: STUDENT IN AN ORGANIZED HEALTH CARE EDUCATION/TRAINING PROGRAM

## 2019-07-13 PROCEDURE — 83735 ASSAY OF MAGNESIUM: CPT

## 2019-07-13 PROCEDURE — 96372 THER/PROPH/DIAG INJ SC/IM: CPT

## 2019-07-13 PROCEDURE — G0378 HOSPITAL OBSERVATION PER HR: HCPCS

## 2019-07-13 PROCEDURE — A9270 NON-COVERED ITEM OR SERVICE: HCPCS | Performed by: STUDENT IN AN ORGANIZED HEALTH CARE EDUCATION/TRAINING PROGRAM

## 2019-07-13 PROCEDURE — 85027 COMPLETE CBC AUTOMATED: CPT

## 2019-07-13 PROCEDURE — 700101 HCHG RX REV CODE 250: Performed by: STUDENT IN AN ORGANIZED HEALTH CARE EDUCATION/TRAINING PROGRAM

## 2019-07-13 RX ORDER — POTASSIUM CHLORIDE 20 MEQ/1
20 TABLET, EXTENDED RELEASE ORAL ONCE
Status: COMPLETED | OUTPATIENT
Start: 2019-07-13 | End: 2019-07-13

## 2019-07-13 RX ADMIN — POTASSIUM CHLORIDE 20 MEQ: 1500 TABLET, EXTENDED RELEASE ORAL at 08:59

## 2019-07-13 RX ADMIN — POTASSIUM CHLORIDE 20 MEQ: 1500 TABLET, EXTENDED RELEASE ORAL at 01:19

## 2019-07-13 RX ADMIN — HEPARIN SODIUM 5000 UNITS: 5000 INJECTION, SOLUTION INTRAVENOUS; SUBCUTANEOUS at 01:20

## 2019-07-13 RX ADMIN — POTASSIUM CHLORIDE AND SODIUM CHLORIDE: 900; 150 INJECTION, SOLUTION INTRAVENOUS at 01:22

## 2019-07-13 ASSESSMENT — COGNITIVE AND FUNCTIONAL STATUS - GENERAL
TURNING FROM BACK TO SIDE WHILE IN FLAT BAD: A LITTLE
DAILY ACTIVITIY SCORE: 19
MOVING FROM LYING ON BACK TO SITTING ON SIDE OF FLAT BED: A LITTLE
TOILETING: A LITTLE
CLIMB 3 TO 5 STEPS WITH RAILING: A LITTLE
SUGGESTED CMS G CODE MODIFIER DAILY ACTIVITY: CK
MOBILITY SCORE: 18
PERSONAL GROOMING: A LITTLE
DRESSING REGULAR UPPER BODY CLOTHING: A LITTLE
STANDING UP FROM CHAIR USING ARMS: A LITTLE
HELP NEEDED FOR BATHING: A LITTLE
SUGGESTED CMS G CODE MODIFIER MOBILITY: CK
DRESSING REGULAR LOWER BODY CLOTHING: A LITTLE
WALKING IN HOSPITAL ROOM: A LITTLE
MOVING TO AND FROM BED TO CHAIR: A LITTLE

## 2019-07-13 NOTE — PROGRESS NOTES
Senior Admit Note       PATIENT ID:  NAME:  April Becker  MRN:               [id number]  YOB: 1932    Date of Admission: 7/12/2019      Attending: TONA Torres M.D.   Senior Resident: Zac Monterroso M.D. (PGY-3)  Guillermo Resident: NEREYDA Oneal M.D. (PGY-1)    Primary Care Physician:  Dontae Sotelo M.D.    CC: Low blood pressure at home    S:  This is a very pleasant 87-year-old gentleman with a significant past medical history of hypertension with a recent hospitalization in June for E. coli bacteremia with unclear source.  Patient is accompanied by his wife and 2 adult children who state that he has not been himself since going home from the hospital last month and that he is also had a gradual decline over the past year.  Today the patient was feeling unwell and his wife took his blood pressure which revealed a blood pressure of 80s systolic.  For this reason the patient presented to the emergency room.  On review of systems the patient does not have chest pain, shortness of breath, headache.  The patient does endorse a few day history of loose stools.  He also endorses some blood in his stool.  No fever chills or weight loss though the patient has had increasing fatigue over the past year.    ER course: Patient had normal blood pressures in the emergency room, RACHELLE with evidence of a likely bleeding hemorrhoid, grossly normal labs including normal hemoglobin.  Patient was found to have moderate dehydration and was given IV fluids.  ER provider discussed options with family and they would be more comfortable being observed overnight in the hospital.  [facility] called to admit patient of Dr. Dontae Sotelo's.    O:  Temp:  [36.4 °C (97.5 °F)] 36.4 °C (97.5 °F)  Pulse:  [69-77] 75  Resp:  [12-20] 20  BP: (122)/(57) 122/57  SpO2:  [94 %-97 %] 94 %  PE:  General: Well-appearing well-nourished 87-year-old gentleman in no acute distress  HEENT: Normocephalic atraumatic  Pulm: No respiratory  distress  Extremities: Normal    Recent Labs      07/12/19   1824   WBC  4.8   RBC  4.60*   HEMOGLOBIN  13.6*   HEMATOCRIT  39.8*   MCV  86.5   MCH  29.6   RDW  46.0   PLATELETCT  172   MPV  10.6   NEUTSPOLYS  45.10   LYMPHOCYTES  28.00   MONOCYTES  20.50*   EOSINOPHILS  5.80   BASOPHILS  0.60     Recent Labs      07/12/19   1824   SODIUM  136   POTASSIUM  3.5*   CHLORIDE  101   CO2  26   GLUCOSE  110*   BUN  13   CREATININE  1.25       A/P: This is a 87-year-old gentleman presented with dehydration in the setting of diarrhea possible episodes of hypotension at home.  Patient is neurologically intact, without chest pain or shortness of breath and there is currently low clinical suspicion of major cardiopulmonary or neurologic cause of his current symptoms such as stroke, heart attack, PE, pneumonia.  Patient is currently normotensive however given his recent admission for sepsis blood cultures have been drawn and patient will be admitted overnight for observation and IV fluid hydration.  Will place patient on contact precautions given that he is having diarrhea and was recently on antibiotics for E. coli bacteremia.    See full history and physical by Dr. Dean Oneal for details.    Zac Monterroso M.D.   PGY-3  [facility]   [phone]

## 2019-07-13 NOTE — PROGRESS NOTES
Dr. Monterroso notified via telephone that patient's HR dropped to 40. New order received:    If patient experiences another similar bradycardic event, do an ekg and call him back with result.

## 2019-07-13 NOTE — DISCHARGE INSTRUCTIONS
Discharge Instructions    Discharged to home by car with relative. Discharged via wheelchair, hospital escort: Yes.  Special equipment needed: Not Applicable    Be sure to schedule a follow-up appointment with your primary care doctor or any specialists as instructed.     Discharge Plan:     May return to pre-hospital activities as tolerated   No dietary restrictions   Return to medical care if symptoms acutely worsen or return, fevers over 100.5 F, intractable pain or vomiting, lethargy, unable to eat, shortness of breath, or any other concerning symptom.     Follow up with Dr. Sotelo in 1-2 week.    Diet Plan: Discussed  Activity Level: Discussed  Confirmed Follow up Appointment: Patient to Call and Schedule Appointment  Confirmed Symptoms Management: Discussed  Medication Reconciliation Updated: Yes  Influenza Vaccine Indication: Not indicated: Previously immunized this influenza season and > 8 years of age    I understand that a diet low in cholesterol, fat, and sodium is recommended for good health. Unless I have been given specific instructions below for another diet, I accept this instruction as my diet prescription.   Other diet: Regular    Special Instructions: None    · Is patient discharged on Warfarin / Coumadin?   No     Depression / Suicide Risk    As you are discharged from this [facility] facility, it is important to learn how to keep safe from harming yourself.    Recognize the warning signs:  · Abrupt changes in personality, positive or negative- including increase in energy   · Giving away possessions  · Change in eating patterns- significant weight changes-  positive or negative  · Change in sleeping patterns- unable to sleep or sleeping all the time   · Unwillingness or inability to communicate  · Depression  · Unusual sadness, discouragement and loneliness  · Talk of wanting to die  · Neglect of personal appearance   · Rebelliousness- reckless behavior  · Withdrawal from people/activities  they love  · Confusion- inability to concentrate     If you or a loved one observes any of these behaviors or has concerns about self-harm, here's what you can do:  · Talk about it- your feelings and reasons for harming yourself  · Remove any means that you might use to hurt yourself (examples: pills, rope, extension cords, firearm)  · Get professional help from the community (Mental Health, Substance Abuse, psychological counseling)  · Do not be alone:Call your Safe Contact- someone whom you trust who will be there for you.  · Call your local CRISIS HOTLINE 005-3159 or 764-704-3413  · Call your local Children's Mobile Crisis Response Team [place] (416) 450-7818 or www.DocTree  · Call the toll free National Suicide Prevention Hotlines   · National Suicide Prevention Lifeline 894-766-AMJS (0856)  · National Hope Line Network 800-SUICIDE (852-7971)

## 2019-07-13 NOTE — ED NOTES
Report received, assumed care of patient. Brought patient water and warm blanket. Updated pt and family on POC, no other needs at this time, call light within reach

## 2019-07-13 NOTE — ED PROVIDER NOTES
ED Provider Note    Scribed for Josef Phillips M.D. by Randal Oneal. 7/12/2019, 6:58 PM.    Primary care provider: Dontae Sotelo M.D.  Means of arrival: Walk in  History obtained from: Patient, Family  History limited by: None    CHIEF COMPLAINT  Chief Complaint   Patient presents with   • Blood Pressure Problem   • Diarrhea   • Bloody Stools   • Chills   • Generalized Body Aches   • Shortness of Breath       HPI  April Becker is a 87 y.o. male who presents to the Emergency Department for evaluation of hypotension, diarrhea and hematochezia, and myalgias. He states that earlier today he had an episode where he started to feel lightheaded. Polotrus check his blood pressure which was 85/44 at that time. Family notes that he was started on Candesartan 32 mg on 7/2/19. He notes that today he also started to have a fever (101.7 °F), chills and myalgias. Last night April started having diarrhea which has continued into today. His last two episodes had blood mixed in with the stool which was bright red. He denies any abdominal pain, however has had some nausea without vomiting. Family notes that he has a history of hemorrhoids which caused some GI bleeding previously. Mena has had a colonoscopy performed within the last 10 years that was benign. He denies any chest pain, coughing, sore throat, or having any sick contacts.     REVIEW OF SYSTEMS  Pertinent positives include hypotension, fever, chills, myalgias, diarrhea, hematochezia, nausea. Pertinent negatives include vomiting, chest pain, coughing, sore throat. All other systems negative.    PAST MEDICAL HISTORY   has a past medical history of Anesthesia; Arthritis; Breath shortness; Cataract; Heart burn; High cholesterol; Hip pain, bilateral (11/30/2018); Hypertension; Pain (2017); Pancreatic tumor; Parkinson's disease; and Pneumonia.    SURGICAL HISTORY   has a past surgical history that includes cataract extraction with iol (Bilateral, 2016);  "cholecystectomy (2013); ulcer excision (1955); gastroscopy (N/A, 3/24/2017); egd w/endoscopic ultrasound (N/A, 3/24/2017); egd with asp/bx (N/A, 3/24/2017); knee arthroscopy (Left, 2013); turp-vapor (1990); other surgical procedure (1988); other surgical procedure (2000); lumbar laminectomy diskectomy (1995); and colonoscopy (2013).    SOCIAL HISTORY  Social History   Substance Use Topics   • Smoking status: Former Smoker     Packs/day: 0.20     Years: 10.00     Types: Cigarettes, Cigars     Quit date: 1987   • Smokeless tobacco: Never Used   • Alcohol use Yes      Comment: 2-3 per month      History   Drug Use No       FAMILY HISTORY  Family history reviewed. Family history not pertinent.    CURRENT MEDICATIONS  Home Medications     Reviewed by Mojgan Santiago R.N. (Registered Nurse) on 07/12/19 at 1658  Med List Status: Partial   Medication Last Dose Status   candesartan (ATACAND) 32 MG tablet 7/12/2019 Active   cetirizine (ZYRTEC) 10 MG Tab PRN Active   hydrochlorothiazide (MICROZIDE) 12.5 MG capsule 7/12/2019 Active                ALLERGIES  No Known Allergies    PHYSICAL EXAM  VITAL SIGNS: /57   Pulse 75   Temp 36.4 °C (97.5 °F) (Temporal)   Resp 16   Ht 1.727 m (5' 8\")   Wt 78.6 kg (173 lb 4.5 oz)   SpO2 95%   BMI 26.35 kg/m²     Constitutional: Well developed, Well nourished, No acute distress.   HENT: Normocephalic, Atraumatic, Slightly dry mucous membranes  Eyes: Conjunctiva normal, No discharge.    Cardiovascular: Normal heart rate, Slightly irregular heart rhythm, No murmurs, equal pulses.   Pulmonary: Normal breath sounds, No respiratory distress, No wheezing, No rales, No rhonchi.  Chest: No chest wall tenderness or deformity.   Abdomen:Soft, No tenderness, No masses, no rebound, no guarding.   Rectal: Palpable Internal hemorrhoid on the left at the 9 O'clock position with trace amounts of bright red blood. Guaiac positive, okay controls,    Back: No CVA tenderness.   Musculoskeletal: " No major deformities noted, No tenderness.   Skin: Warm, Dry, No erythema, No rash.   Neurologic: Alert & oriented x 3, Normal motor function,  No focal deficits noted.   Psychiatric: Affect normal, Judgment normal, Mood normal.       LABS  Results for orders placed or performed during the hospital encounter of 07/12/19   COD (ADULT)   Result Value Ref Range    ABO Grouping Only O     Rh Grouping Only NEG     Antibody Screen-Cod NEG    COMP METABOLIC PANEL   Result Value Ref Range    Sodium 136 135 - 145 mmol/L    Potassium 3.5 (L) 3.6 - 5.5 mmol/L    Chloride 101 96 - 112 mmol/L    Co2 26 20 - 33 mmol/L    Anion Gap 9.0 0.0 - 11.9    Glucose 110 (H) 65 - 99 mg/dL    Bun 13 8 - 22 mg/dL    Creatinine 1.25 0.50 - 1.40 mg/dL    Calcium 8.7 8.5 - 10.5 mg/dL    AST(SGOT) 20 12 - 45 U/L    ALT(SGPT) 12 2 - 50 U/L    Alkaline Phosphatase 85 30 - 99 U/L    Total Bilirubin 0.4 0.1 - 1.5 mg/dL    Albumin 3.9 3.2 - 4.9 g/dL    Total Protein 7.1 6.0 - 8.2 g/dL    Globulin 3.2 1.9 - 3.5 g/dL    A-G Ratio 1.2 g/dL   LIPASE   Result Value Ref Range    Lipase 36 11 - 82 U/L   PROTHROMBIN TIME   Result Value Ref Range    PT 13.2 12.0 - 14.6 sec    INR 0.98 0.87 - 1.13   APTT   Result Value Ref Range    APTT 31.1 24.7 - 36.0 sec   CBC WITH DIFFERENTIAL   Result Value Ref Range    WBC 4.8 4.8 - 10.8 K/uL    RBC 4.60 (L) 4.70 - 6.10 M/uL    Hemoglobin 13.6 (L) 14.0 - 18.0 g/dL    Hematocrit 39.8 (L) 42.0 - 52.0 %    MCV 86.5 81.4 - 97.8 fL    MCH 29.6 27.0 - 33.0 pg    MCHC 34.2 33.7 - 35.3 g/dL    RDW 46.0 35.9 - 50.0 fL    Platelet Count 172 164 - 446 K/uL    MPV 10.6 9.0 - 12.9 fL    Neutrophils-Polys 45.10 44.00 - 72.00 %    Lymphocytes 28.00 22.00 - 41.00 %    Monocytes 20.50 (H) 0.00 - 13.40 %    Eosinophils 5.80 0.00 - 6.90 %    Basophils 0.60 0.00 - 1.80 %    Immature Granulocytes 0.00 0.00 - 0.90 %    Nucleated RBC 0.00 /100 WBC    Neutrophils (Absolute) 2.16 1.82 - 7.42 K/uL    Lymphs (Absolute) 1.34 1.00 - 4.80 K/uL     Monos (Absolute) 0.98 (H) 0.00 - 0.85 K/uL    Eos (Absolute) 0.28 0.00 - 0.51 K/uL    Baso (Absolute) 0.03 0.00 - 0.12 K/uL    Immature Granulocytes (abs) 0.00 0.00 - 0.11 K/uL    NRBC (Absolute) 0.00 K/uL   ESTIMATED GFR   Result Value Ref Range    GFR If African American >60 >60 mL/min/1.73 m 2    GFR If Non African American 55 (A) >60 mL/min/1.73 m 2   LACTIC ACID   Result Value Ref Range    Lactic Acid 1.2 0.5 - 2.0 mmol/L   URINALYSIS CULTURE, IF INDICATED   Result Value Ref Range    Color Yellow     Character Clear     Specific Gravity 1.009 <1.035    Ph 5.0 5.0 - 8.0    Glucose Negative Negative mg/dL    Ketones Negative Negative mg/dL    Protein Negative Negative mg/dL    Bilirubin Negative Negative    Urobilinogen, Urine 0.2 Negative    Nitrite Negative Negative    Leukocyte Esterase Negative Negative    Occult Blood Negative Negative    Micro Urine Req see below    EKG (NOW)   Result Value Ref Range    Report       [facility] Emergency Dept.    Test Date:  2019  Pt Name:    AUGIE ROLLINS                Department: ER  MRN:        [id number]                      Room:  Gender:     Male                         Technician: [id number]  :        1932                   Requested By:ER TRIAGE PROTOCOL  Order #:    [id number]                    Reading MD: EDUARDO DUNCAN MD    Measurements  Intervals                                Axis  Rate:       75                           P:          59  NM:         172                          QRS:        113  QRSD:       106                          T:          40  QT:         408  QTc:        456    Interpretive Statements  SINUS RHYTHM  BORDERLINE INTRAVENTRICULAR CONDUCTION DELAY  Compared to ECG 2019 08:59:02  Right-axis deviation no longer present    Electronically Signed On 2019 21:47:08 PDT by EDUARDO DUNCAN MD       All labs reviewed by me.    EKG  Interpreted by me, see above.    RADIOLOGY  DX-CHEST-PORTABLE (1  VIEW)   Final Result      No acute cardiopulmonary abnormality.        The radiologist's interpretation of all radiological studies have been reviewed by me.    COURSE & MEDICAL DECISION MAKING  Pertinent Labs & Imaging studies reviewed. (See chart for details)    Review of the patients past medical history shows he was admited 6/18 epigastric pain and diagnosed with E. Coli bacteremia. He was discharged on 6/22.    6:58 PM - Patient seen and examined at bedside. Patient will be treated with 1 Liter of IV Fluids. Ordered for Estimated GFR to evaluate his symptoms. COD adult, CMP, Lipase, Prothrombin Time, APTT, CBC with differential and EKG were previously ordered to evaluate his symptoms. The differential diagnoses include but are not limited to: Hypotension, GI bleed, Diarrhea. Discussed with the patient and family that while his diarrhea had blood I suspect this is more likely being caused by his hemorrhoid. I am however concerned given his hypotensive episode and thus have ordered for labs and imaging to evaluate his symptoms further. They will be informed of the results when they return.    9:43 PM - Discussed with family and patient that his lab results are reassuring and do not suggest signs of infection. Blood cultures were ordered given his previous bacteremia and will return in the next few days. Given his recent history of bacteremia and his hypotensive episodes, the options of overnight admission and monitoring or being discharged were given, to which the family and patient are both comfortable with admission.     9:47 PM - Paged [facility]    10:00 PM - I spoke with [facility] who agrees to admit the patient.    HYDRATION: Based on the patient's presentation of Acute Diarrhea, Dehydration and Hypotension the patient was given IV fluids. IV Hydration was used because oral hydration was not adequate alone. Upon recheck following hydration, the patient was showing improved hydration and his blood pressure was  stable.    Medical Decision Making: At this point time I think the patient would benefit from admission for observation given the hypotension he had at home.  Patient did have a gram-negative bacteremia recently without clear source therefore I am concerned given the hypotension that he could decompensate quickly if sent home.  I think the patient's bloody stools is likely secondary to his hemorrhoid but he would benefit from careful observation or repeat hemoglobins.  Patient's abdomen is otherwise soft nontender I do not feel he needs imaging at this point time.  Patient does not appear to have a urinary tract infection or pneumonia.    DISPOSITION:  Patient will be admitted to [facility] in guarded condition.     FINAL IMPRESSION  1. Hypotension, unspecified hypotension type    2. Rectal bleeding    3. Diarrhea, unspecified type          I, Randal Oneal (Rajiv), am scribing for, and in the presence of, Josef Phillips M.D.    Electronically signed by: Randal Oneal (Rajiv), 7/12/2019    IJosef M.D. personally performed the services described in this documentation, as scribed by Randal Oneal in my presence, and it is both accurate and complete. C.    The note accurately reflects work and decisions made by me.  Josef Phillips  7/13/2019  1:59 AM

## 2019-07-13 NOTE — PROGRESS NOTES
Patient discharged to home at this time. Patient educated on discharge instructions, home medications and follow up appointment. Patient verbalized understanding. Patient discharged in a stable condition.

## 2019-07-13 NOTE — DISCHARGE SUMMARY
PATIENT SUMMARY     Admit Date:  7/12/2019       Discharge Date:   7/13/19    Service:   [facility]  Attending Physician(s):   Jamaica Torres MD       Senior Resident(s):   Elif Bynum MD  Guillermo Resident(s):   Hector Law MD      Overnight Events:  No acute events overnight, Mr. Becker reports feeling much better with no weakness. He is eager to get up out of bed and ambulate.  Discussed POC, he agrees to discharge and followup with Dr. Sotelo and outpt PT.     Physical Exam:  General: No acute distress, resting comfortably in bed.  HEENT: NC/AT. PERRLA. EOMI. MMM  Cardiovascular: Normal S1/S2, RRR, 2/6 systolic murmur best heard over R 2nd intercostal space, no r/g  Respiratory: Symmetric inspiratory effort. CTAB with no adventitious sounds  Abdomen: BS+, soft, NT/ND   EXT:  DIAMOND, 5/5 strength, 2+ pulses, no rashes, bruising, or bleeding.  Neuro: Non focal with no numbness, tingling or changes in sensation    Primary Diagnosis:   Hypotension  Fatigue  Secondary Diagnoses:                Diarrhea  Hematochezia  Dehydration  Hypokalemia     Hospital Summary (Brief Narrative):       Mr. Becker is an 88yo man with a several week long history of generalized fatigue and weakness, who presented to the ED after multiple home blood pressure readings in the 80/40s.  He has been slowly declining since recent d/c from [facility] for E. Coli bacteremia treated with Cipro. He followed up with his PCP Dontae Sotelo MD, and started a new Htn medication Candesartan.  Since then his weakness and fatigue have worsened, and he developed diarrhea, all contributing to his presentation here.    In the ED, he was given fluid resuscitation, GFR, CMP, Lipase, clotting eval, CBC, andECG all reassuring. He was admitted for observation, Candesartan and HCTZ were stopped d/t low pressures, and CDiff testing was initiated d/t diarrhea.  Once SBP >120, HCTZ was restarted, CDiff PCR was negative, and he was determined to be stable to d/c  to home with home PT and d/c candesartan.    Patient /Hospital Summary (Details -- Problem Oriented) :        #Fatigue  - Worsening over past few weeks since d/c from E. Coli bacteremia  - Candesartan added on 7/2/19  - Multiple bp readings in the 80s/40s at home on day of admission, prompting him to visit the ER  - Likely related to hypotension after manipulation of BP medications on 7/2  - SBPs 120s-150s here on HCTZ alone since d/c candesartan.    - IVF with NS + 20KCl at 120ml/h  - Reports much improvement in fatigue and weakness since BPs have normalized for him. Eager to ambulate and get home  - General decline, interested in outpatient Physical Therapy, referral provided.    #Diarrhea - resolved  - Presented with 1d hx of diarrhea  - s/p Cipro for E. Coli bacteremia on last admission ~2 weeks ago  - BMP normal other than hypokalemia described below  - Negative C Diff Result  - Currently denies diarrhea    #Hematochezia - resolved  - Reported mild hematochezia along with diarrhea on admission  - History of hemorrhoids - possible explanation for hematochezia  - H/H not too low and increasing throughout hospitalization, no concern for large occult bleed  - Currently denies - f/u outpt    #Dehydration  - IVF and PO hydration improved    #Hypokalemia  - Hx of Hypokalemia  - Treated with IVF including 20mEq KCl plus 1 oral dose of KCl 20mEq in the ED  - Decreased since yesterday  - Repeat oral dose of KCl 20mEq given prior to d/c    Consultants:      None    Procedures:        None    Imaging/ Testing:      Results for orders placed or performed during the hospital encounter of 07/12/19   COD (ADULT)   Result Value Ref Range    ABO Grouping Only O     Rh Grouping Only NEG     Antibody Screen-Cod NEG    COMP METABOLIC PANEL   Result Value Ref Range    Sodium 136 135 - 145 mmol/L    Potassium 3.5 (L) 3.6 - 5.5 mmol/L    Chloride 101 96 - 112 mmol/L    Co2 26 20 - 33 mmol/L    Anion Gap 9.0 0.0 - 11.9    Glucose 110  (H) 65 - 99 mg/dL    Bun 13 8 - 22 mg/dL    Creatinine 1.25 0.50 - 1.40 mg/dL    Calcium 8.7 8.5 - 10.5 mg/dL    AST(SGOT) 20 12 - 45 U/L    ALT(SGPT) 12 2 - 50 U/L    Alkaline Phosphatase 85 30 - 99 U/L    Total Bilirubin 0.4 0.1 - 1.5 mg/dL    Albumin 3.9 3.2 - 4.9 g/dL    Total Protein 7.1 6.0 - 8.2 g/dL    Globulin 3.2 1.9 - 3.5 g/dL    A-G Ratio 1.2 g/dL   LIPASE   Result Value Ref Range    Lipase 36 11 - 82 U/L   PROTHROMBIN TIME   Result Value Ref Range    PT 13.2 12.0 - 14.6 sec    INR 0.98 0.87 - 1.13   APTT   Result Value Ref Range    APTT 31.1 24.7 - 36.0 sec   CBC WITH DIFFERENTIAL   Result Value Ref Range    WBC 4.8 4.8 - 10.8 K/uL    RBC 4.60 (L) 4.70 - 6.10 M/uL    Hemoglobin 13.6 (L) 14.0 - 18.0 g/dL    Hematocrit 39.8 (L) 42.0 - 52.0 %    MCV 86.5 81.4 - 97.8 fL    MCH 29.6 27.0 - 33.0 pg    MCHC 34.2 33.7 - 35.3 g/dL    RDW 46.0 35.9 - 50.0 fL    Platelet Count 172 164 - 446 K/uL    MPV 10.6 9.0 - 12.9 fL    Neutrophils-Polys 45.10 44.00 - 72.00 %    Lymphocytes 28.00 22.00 - 41.00 %    Monocytes 20.50 (H) 0.00 - 13.40 %    Eosinophils 5.80 0.00 - 6.90 %    Basophils 0.60 0.00 - 1.80 %    Immature Granulocytes 0.00 0.00 - 0.90 %    Nucleated RBC 0.00 /100 WBC    Neutrophils (Absolute) 2.16 1.82 - 7.42 K/uL    Lymphs (Absolute) 1.34 1.00 - 4.80 K/uL    Monos (Absolute) 0.98 (H) 0.00 - 0.85 K/uL    Eos (Absolute) 0.28 0.00 - 0.51 K/uL    Baso (Absolute) 0.03 0.00 - 0.12 K/uL    Immature Granulocytes (abs) 0.00 0.00 - 0.11 K/uL    NRBC (Absolute) 0.00 K/uL   ESTIMATED GFR   Result Value Ref Range    GFR If African American >60 >60 mL/min/1.73 m 2    GFR If Non African American 55 (A) >60 mL/min/1.73 m 2   Cdiff By PCR Rflx Toxin   Result Value Ref Range    C Diff by PCR Negative Negative    [id number] Presumptive Negative Negative   BLOOD CULTURE   Result Value Ref Range    Significant Indicator NEG     Source BLD     Site PERIPHERAL     Culture Result       No Growth  Note: Blood cultures are  incubated for 5 days and  are monitored continuously.Positive blood cultures  are called to the RN and reported as soon as  they are identified.     BLOOD CULTURE   Result Value Ref Range    Significant Indicator NEG     Source BLD     Site PERIPHERAL     Culture Result       No Growth  Note: Blood cultures are incubated for 5 days and  are monitored continuously.Positive blood cultures  are called to the RN and reported as soon as  they are identified.     LACTIC ACID   Result Value Ref Range    Lactic Acid 1.2 0.5 - 2.0 mmol/L   URINALYSIS CULTURE, IF INDICATED   Result Value Ref Range    Color Yellow     Character Clear     Specific Gravity 1.009 <1.035    Ph 5.0 5.0 - 8.0    Glucose Negative Negative mg/dL    Ketones Negative Negative mg/dL    Protein Negative Negative mg/dL    Bilirubin Negative Negative    Urobilinogen, Urine 0.2 Negative    Nitrite Negative Negative    Leukocyte Esterase Negative Negative    Occult Blood Negative Negative    Micro Urine Req see below    Basic Metabolic Panel (BMP)   Result Value Ref Range    Sodium 138 135 - 145 mmol/L    Potassium 3.3 (L) 3.6 - 5.5 mmol/L    Chloride 107 96 - 112 mmol/L    Co2 21 20 - 33 mmol/L    Glucose 131 (H) 65 - 99 mg/dL    Bun 9 8 - 22 mg/dL    Creatinine 0.91 0.50 - 1.40 mg/dL    Calcium 8.0 (L) 8.5 - 10.5 mg/dL    Anion Gap 10.0 0.0 - 11.9   CBC without Differential   Result Value Ref Range    WBC 4.5 (L) 4.8 - 10.8 K/uL    RBC 4.58 (L) 4.70 - 6.10 M/uL    Hemoglobin 13.7 (L) 14.0 - 18.0 g/dL    Hematocrit 43.6 42.0 - 52.0 %    MCV 95.2 81.4 - 97.8 fL    MCH 29.9 27.0 - 33.0 pg    MCHC 31.4 (L) 33.7 - 35.3 g/dL    RDW 51.8 (H) 35.9 - 50.0 fL    Platelet Count 148 (L) 164 - 446 K/uL    MPV 10.8 9.0 - 12.9 fL   Magnesium   Result Value Ref Range    Magnesium 2.0 1.5 - 2.5 mg/dL   ESTIMATED GFR   Result Value Ref Range    GFR If African American >60 >60 mL/min/1.73 m 2    GFR If Non African American >60 >60 mL/min/1.73 m 2   EKG (NOW)   Result Value  Ref Range    Report       [facility] Emergency Dept.    Test Date:  2019  Pt Name:    AUGIE ROLLINS                Department: ER  MRN:        [id number]                      Room:  Gender:     Male                         Technician: [id number]  :        [id number]                   Requested By:ER TRIAGE PROTOCOL  Order #:    [id number]                    Reading MD: EDUARDO DUNCAN MD    Measurements  Intervals                                Axis  Rate:       75                           P:          59  PA:         172                          QRS:        113  QRSD:       106                          T:          40  QT:         408  QTc:        456    Interpretive Statements  SINUS RHYTHM  BORDERLINE INTRAVENTRICULAR CONDUCTION DELAY  Compared to ECG 2019 08:59:02  Right-axis deviation no longer present    Electronically Signed On 2019 21:47:08 PDT by EDUARDO DUNCAN MD       DX-CHEST-PORTABLE (1 VIEW)   Final Result      No acute cardiopulmonary abnormality.            Discharge Medications:           Medication List      CONTINUE taking these medications      Instructions   cetirizine 10 MG Tabs  Commonly known as:  ZYRTEC   Take 10 mg by mouth 1 time daily as needed.  Dose:  10 mg     hydrochlorothiazide 12.5 MG capsule  Commonly known as:  MICROZIDE   Take 12.5 mg by mouth every day.  Dose:  12.5 mg        STOP taking these medications    candesartan 32 MG tablet  Commonly known as:  ATACAND     ciprofloxacin 500 MG Tabs  Commonly known as:  CIPRO            Disposition:   D/c to home in stable condition    Diet:   Regular    Activity:   As Tolerated.  Outpatient PT referral provided for evaluation and treatment to help with weakness and fatigue.    Instructions:      Followup in 1 week with Dontae Sotelo MD.  Stop taking Candesartan, and monitor home BP for further evaluation outpatient.     The patient was instructed to return to the ER in the event of worsening  symptoms. I have counseled the patient on the importance of compliance and the patient has agreed to proceed with all medical recommendations and follow up plan indicated above.   The patient understands that all medications come with benefits and risks. Risks may include permanent injury or death and these risks can be minimized with close reassessment and monitoring.        Primary Care Provider:    Please CC Dontae Sotelo MD      Follow up appointment details :      Schedule f/u appointment with Dr. Sotelo in 1 week.      Pending Studies:        None      Hector Law MD  PGY1  [facility]

## 2019-07-13 NOTE — ED NOTES
The Medication Reconciliation process has been completed by interviewing the patient    Allergies have been reviewed  Antibiotic use in 30 days - Cipro completed 6/30    Home Pharmacy:  [facility] [place]

## 2019-07-13 NOTE — H&P
FAMILY MEDICINE HISTORY AND PHYSICAL       PATIENT ID:  NAME:  April Becker  MRN:               [id number]  YOB: 1932    Date of Admission: 7/12/2019      Attending: Jamaica Torres M.D.   Admitting  Resident: Zac Monterroso M.D. (PGY-3)  Guillermo Resident: Dean Oneal M.D. (PGY-1)      Primary Care Physician:  Dontae Sotelo M.D.    CC:  Fatigue    HPI: April Becker is a 87 y.o. male with past medical history of hyper tension and osteoarthritis, coming into the emergency department with complaints of fatigue and weakness.  About a month ago he was admitted to the hospital for E. coli sepsis.  Since his discharge, he states that he has not felt like himself and has been very weak, tired, and has been having some shortness of breath periodically. Starting about a week or so ago he started getting some aches and some chills which have progressively worsened.  For about the last 3 days he states he has been having chills, sweats, aches, fevers up to a max of 102 °F, and some intermittent cramping pain on the left side of his abdomen.  He also states that he has been having some changes in his stool, and they have been quite variable.  Some will be pale, than one will be black, and recently he started seeing bright red blood in his stool as well.  He also reports that he has not been sleeping well since he was discharged from the hospital.  He used to sleep about 7 hours straight, but now he can only sleep 1 to 2 hours at a time.     In addition to these issues, his wife took his blood pressure today and found that it was low.  In the left arm there were multiple readings of systolic pressures in the 80s and diastolic pressures in the 40s.  In the right arm the systolic pressures were in the 100s and the diastolic pressures were in the 60s.  He had a follow-up appointment with his primary care physician on July 2, and his hypertension medication was changed to candesartan 32.5 mg daily.  This is the  "first time they checked his blood pressure since he had that appointment.  He also takes hydrochlorothiazide 12.5 mg daily which he has taken for many months.    ED Course: Because of his hypotension episode, he was given 1 L of IV fluids.  They also ordered a CMP, lipase, PT, APTT, CBC with differential, U/A, and EKG.  A chest x-ray was also ordered.  All of the labs came back unremarkable as well as the EKG and chest x-ray.    REVIEW OF SYSTEMS:   Constitutional: Fevers, loss of appetite, fatigue, no recent unintended weight loss   HEENT: No HA, No changes in vision, no recent loss of hearing   CVS: No Chest pain, no orthopnea   Pulm: SOB, no cough, or sputum production  GI: No N/V, diarrhea, blood in stool, no abdominal pain  : No dysuria, no urgency/frequence, no hematuria   MSK: Weakness, no myalgia, no joint swelling or pain   Skin: No recent rashes   Psych: No thoughts of self harm                PAST MEDICAL HISTORY:  Past Medical History:   Diagnosis Date   • Anesthesia     PONV, and hard to wake up   • Arthritis     osteoarthritis, hips   • Breath shortness     \"sob came before 11/2016, did a chest x-ray, and it didn't show anything\"   • Cataract     Bilateral IOL implants   • Heart burn     no meds   • High cholesterol     no meds   • Hip pain, bilateral 11/30/2018    and lower back.   • Hypertension    • Pain 2017    stomach, hips, back   • Pancreatic tumor     Inconclusive biopsy which caused pancreatitis. Follow with CT every 6 months and no changes.    • Parkinson's disease     no meds   • Pneumonia     November 2016, no admission        PAST SURGICAL HISTORY:  Past Surgical History:   Procedure Laterality Date   • GASTROSCOPY N/A 3/24/2017    Procedure: GASTROSCOPY;  Surgeon: Raphael Crawford M.D.;  Location: SURGERY [facility];  Service:    • EGD W/ENDOSCOPIC ULTRASOUND N/A 3/24/2017    Procedure: EGD W/ENDOSCOPIC ULTRASOUND- UPPER, LINEAR, RADIAL ON STANDBY;  Surgeon: Raphael Crawford M.D.;  " "Location: SURGERY [facility];  Service:    • EGD WITH ASP/BX N/A 3/24/2017    Procedure: EGD WITH ASP/BX - FINE NEEDLE ASPIRATION;  Surgeon: Raphael Crawford M.D.;  Location: SURGERY [facility];  Service:    • CATARACT EXTRACTION WITH IOL Bilateral 2016   • CHOLECYSTECTOMY     • KNEE ARTHROSCOPY Left    • COLONOSCOPY     • OTHER SURGICAL PROCEDURE      salivary gland removed.    • LUMBAR LAMINECTOMY DISKECTOMY      L5 \"cleaned up\"   • TURP-VAPOR      TURP   • OTHER SURGICAL PROCEDURE      salivary gland removed.   • ULCER EXCISION      gastric ulcer        FAMILY HISTORY:  Father: Diabetes mellitus,  of MI at age 89.  Mother:  of stroke at age 93.      SOCIAL HISTORY:   Social History     Social History   • Marital status:      Spouse name: N/A   • Number of children: N/A   • Years of education: N/A     Occupational History   • Not on file.     Social History Main Topics   • Smoking status: Former Smoker     Packs/day: 0.20     Years: 10.00     Types: Cigarettes, Cigars     Quit date:    • Smokeless tobacco: Never Used   • Alcohol use Yes      Comment: 2-3 per month   • Drug use: No   • Sexual activity: Not Currently     Other Topics Concern   • Not on file     Social History Narrative   • No narrative on file       ALLERGIES:  Seasonal    OUTPATIENT MEDICATIONS:  No current facility-administered medications for this encounter.     Current Outpatient Prescriptions:   •  candesartan (ATACAND) 32 MG tablet, Take 32 mg by mouth every day.   •  cetirizine (ZYRTEC) 10 MG Tab, Take 10 mg by mouth 1 time daily as needed.  •  hydrochlorothiazide (MICROZIDE) 12.5 MG capsule, Take 12.5 mg by mouth every day.     PHYSICAL EXAM:  Vitals:    19 2130   BP:       Pulse: 72 72 74 75   Resp: 15 12 16 20   Temp:       TempSrc:       SpO2: 95% 96% 96% 94%   Weight:       Height:       , Temp (24hrs), Av.4 °C (97.5 °F), " Min:36.4 °C (97.5 °F), Max:36.4 °C (97.5 °F)  , Pulse Oximetry: 94 %    Gen: No Acute Distress   HEENT: Normocephalic; atraumatic  Pulm: Clear to auscultation bilaterally, no respiratory distress   Cardio: Normal S1&S2; no rubs, murmurs, or gallops   Abdom: Non-tender; non-distended; bowel sounds present  Ext: No edema; 2+ pulses     LAB TESTS:   Recent Labs      07/12/19   1824   WBC  4.8   RBC  4.60*   HEMOGLOBIN  13.6*   HEMATOCRIT  39.8*   MCV  86.5   MCH  29.6   RDW  46.0   PLATELETCT  172   MPV  10.6   NEUTSPOLYS  45.10   LYMPHOCYTES  28.00   MONOCYTES  20.50*   EOSINOPHILS  5.80   BASOPHILS  0.60         Recent Labs      07/12/19   1824   SODIUM  136   POTASSIUM  3.5*   CHLORIDE  101   CO2  26   BUN  13   CREATININE  1.25   CALCIUM  8.7   ALBUMIN  3.9       CULTURES:   Results     Procedure Component Value Units Date/Time    Cdiff By PCR Rflx Toxin [[id number]] Collected:  07/12/19 2245    Order Status:  Completed Specimen:  Stool from Stool Updated:  07/12/19 2251    URINALYSIS CULTURE, IF INDICATED [[id number]] Collected:  07/12/19 2100    Order Status:  Completed Specimen:  Urine Updated:  07/12/19 2115     Color Yellow     Character Clear     Specific Gravity 1.009     Ph 5.0     Glucose Negative mg/dL      Ketones Negative mg/dL      Protein Negative mg/dL      Bilirubin Negative     Urobilinogen, Urine 0.2     Nitrite Negative     Leukocyte Esterase Negative     Occult Blood Negative     Micro Urine Req see below     Comment: Microscopic examination not performed when specimen is clear  and chemically negative for protein, blood, leukocyte esterase  and nitrite.         BLOOD CULTURE [[id number]] Collected:  07/12/19 1938    Order Status:  Completed Specimen:  Blood from Peripheral Updated:  07/12/19 2006            BLOOD CULTURE [[id number]] Collected:  07/12/19 1950    Order Status:  Completed Specimen:  Blood from Peripheral Updated:  07/12/19 2005           IMAGES:  DX-CHEST-PORTABLE (1 VIEW)   Final  Result      No acute cardiopulmonary abnormality.          ASSESSMENT/PLAN: 87 y.o. male admitted for dehydration, debilitation, and hypotension.    1. Hypotension.  On July 2, he was switched from a previous blood pressure medication to candesartan.  The hydrochlorothiazide stayed the same, but this change in education likely has caused him to become hypotensive.  He has also been having some diarrhea, and has not been maintaining adequate fluid intake.  All of these things combined, attributed to his hypotension.  -Hold candesartan 32 mg until blood pressures come back up. Follow up with PCP for medication management.  -Hold HCTZ 12.5 mg until BP comes up. Continue once systolic BP >120.  -IV fluids NS with 20 mEq KCl at rate of 120 ml/hr  -Regular diet to encourage PO intake    2. Diarrhea.  His bowel movements have been consistent quality.  The color and texture have been variable, but since last night he has been having some diarrhea.  Considering he was recently on antibiotics, we want to rule out C. Difficile.  -Stool antigen for C Diff.  -IV fluids to maintain hydration  -Regular diet  -BMP in AM to check electrolytes    3. Hematochezia. There is a some bright red blood in his stool. He has a history of some hemorrhoids, so the blood is likely a result of that. With fatigue and weakness I am concerned about a bleed, although his H&H are normal.  -Repeat CBC in AM to monitor for stability of H&H    4. Dehydration.  He has been having some diarrhea, and his oral fluid intake has been low.  -IV fluids as above.  -Encourage PO hydration    5. Mild hypokalemia. His potassium today was measured 3.5, but since he has a history of hypokalemia we want to prevent that here.  -IV NS with 20 mEq KCl at 120 ml/hr.   -20 mEq KCl PO    #Dispo  -Admit to medicine for observation    #Core Measures   VTE PPx: SCDs, Heparin  Abx:None  Diet: Regular  Fluids: NS with 20 mEq KCl  Lines and Tube: Peripheral IV   Code Status:  Full        Dean Oneal M.D.   PGY-1  [facility]   [phone]

## 2019-07-13 NOTE — ED NOTES
Gave report to Price KENT, addressed any questions or concerns, patient updated on POC, waiting for transport to [facility].

## 2019-07-13 NOTE — ED NOTES
Bedside commode placed in room. Pt able to provide stool sample. Stool noted to be mucous with blood. Sample sent to lab.

## 2019-07-13 NOTE — CARE PLAN
Problem: Safety  Goal: Will remain free from falls    Intervention: Assess risk factors for falls  Fall risk assessed (see flow sheet). Bed in low position.      Problem: Infection  Goal: Will remain free from infection    Intervention: Implement standard precautions and perform hand washing before and after patient contact    Hand hygiene and standard precautions implemented, contact precautions implemented

## 2020-01-14 ENCOUNTER — APPOINTMENT (OUTPATIENT)
Dept: URGENT CARE | Facility: CLINIC | Age: 85
End: 2020-01-14
Payer: COMMERCIAL

## 2020-01-14 ENCOUNTER — HOSPITAL ENCOUNTER (EMERGENCY)
Facility: MEDICAL CENTER | Age: 85
End: 2020-01-14
Attending: EMERGENCY MEDICINE
Payer: COMMERCIAL

## 2020-01-14 ENCOUNTER — APPOINTMENT (OUTPATIENT)
Dept: RADIOLOGY | Facility: MEDICAL CENTER | Age: 85
End: 2020-01-14
Attending: EMERGENCY MEDICINE
Payer: COMMERCIAL

## 2020-01-14 VITALS
OXYGEN SATURATION: 99 % | SYSTOLIC BLOOD PRESSURE: 166 MMHG | RESPIRATION RATE: 18 BRPM | DIASTOLIC BLOOD PRESSURE: 84 MMHG | HEART RATE: 68 BPM | HEIGHT: 68 IN | BODY MASS INDEX: 25.01 KG/M2 | TEMPERATURE: 97.4 F | WEIGHT: 165 LBS

## 2020-01-14 DIAGNOSIS — R42 DIZZINESS: ICD-10-CM

## 2020-01-14 DIAGNOSIS — E87.6 HYPOKALEMIA: ICD-10-CM

## 2020-01-14 DIAGNOSIS — H93.8X2 SENSATION OF FULLNESS IN LEFT EAR: ICD-10-CM

## 2020-01-14 LAB
ALBUMIN SERPL BCP-MCNC: 4.7 G/DL (ref 3.2–4.9)
ALBUMIN/GLOB SERPL: 1.4 G/DL
ALP SERPL-CCNC: 105 U/L (ref 30–99)
ALT SERPL-CCNC: 21 U/L (ref 2–50)
ANION GAP SERPL CALC-SCNC: 12 MMOL/L (ref 0–11.9)
APPEARANCE UR: CLEAR
AST SERPL-CCNC: 27 U/L (ref 12–45)
BASOPHILS # BLD AUTO: 0.6 % (ref 0–1.8)
BASOPHILS # BLD: 0.04 K/UL (ref 0–0.12)
BILIRUB SERPL-MCNC: 0.5 MG/DL (ref 0.1–1.5)
BILIRUB UR QL STRIP.AUTO: NEGATIVE
BUN SERPL-MCNC: 11 MG/DL (ref 8–22)
CALCIUM SERPL-MCNC: 9.4 MG/DL (ref 8.4–10.2)
CHLORIDE SERPL-SCNC: 100 MMOL/L (ref 96–112)
CO2 SERPL-SCNC: 26 MMOL/L (ref 20–33)
COLOR UR: YELLOW
CREAT SERPL-MCNC: 1.1 MG/DL (ref 0.5–1.4)
EKG IMPRESSION: NORMAL
EOSINOPHIL # BLD AUTO: 0.27 K/UL (ref 0–0.51)
EOSINOPHIL NFR BLD: 4.3 % (ref 0–6.9)
ERYTHROCYTE [DISTWIDTH] IN BLOOD BY AUTOMATED COUNT: 45.7 FL (ref 35.9–50)
GLOBULIN SER CALC-MCNC: 3.4 G/DL (ref 1.9–3.5)
GLUCOSE BLD-MCNC: 90 MG/DL (ref 65–99)
GLUCOSE SERPL-MCNC: 105 MG/DL (ref 65–99)
GLUCOSE UR STRIP.AUTO-MCNC: NEGATIVE MG/DL
HCT VFR BLD AUTO: 48.1 % (ref 42–52)
HGB BLD-MCNC: 15.8 G/DL (ref 14–18)
IMM GRANULOCYTES # BLD AUTO: 0.01 K/UL (ref 0–0.11)
IMM GRANULOCYTES NFR BLD AUTO: 0.2 % (ref 0–0.9)
KETONES UR STRIP.AUTO-MCNC: NEGATIVE MG/DL
LEUKOCYTE ESTERASE UR QL STRIP.AUTO: NEGATIVE
LYMPHOCYTES # BLD AUTO: 2.12 K/UL (ref 1–4.8)
LYMPHOCYTES NFR BLD: 34.1 % (ref 22–41)
MAGNESIUM SERPL-MCNC: 2.2 MG/DL (ref 1.5–2.5)
MCH RBC QN AUTO: 28.9 PG (ref 27–33)
MCHC RBC AUTO-ENTMCNC: 32.8 G/DL (ref 33.7–35.3)
MCV RBC AUTO: 87.9 FL (ref 81.4–97.8)
MICRO URNS: NORMAL
MONOCYTES # BLD AUTO: 0.57 K/UL (ref 0–0.85)
MONOCYTES NFR BLD AUTO: 9.2 % (ref 0–13.4)
NEUTROPHILS # BLD AUTO: 3.21 K/UL (ref 1.82–7.42)
NEUTROPHILS NFR BLD: 51.6 % (ref 44–72)
NITRITE UR QL STRIP.AUTO: NEGATIVE
NRBC # BLD AUTO: 0 K/UL
NRBC BLD-RTO: 0 /100 WBC
PH UR STRIP.AUTO: 7 [PH] (ref 5–8)
PHOSPHATE SERPL-MCNC: 2.4 MG/DL (ref 2.5–4.5)
PLATELET # BLD AUTO: 181 K/UL (ref 164–446)
PMV BLD AUTO: 10.2 FL (ref 9–12.9)
POTASSIUM SERPL-SCNC: 3.1 MMOL/L (ref 3.6–5.5)
PROT SERPL-MCNC: 8.1 G/DL (ref 6–8.2)
PROT UR QL STRIP: NEGATIVE MG/DL
RBC # BLD AUTO: 5.47 M/UL (ref 4.7–6.1)
RBC UR QL AUTO: NEGATIVE
SODIUM SERPL-SCNC: 138 MMOL/L (ref 135–145)
SP GR UR STRIP.AUTO: 1.01
TROPONIN T SERPL-MCNC: 11 NG/L (ref 6–19)
WBC # BLD AUTO: 6.2 K/UL (ref 4.8–10.8)

## 2020-01-14 PROCEDURE — 82962 GLUCOSE BLOOD TEST: CPT

## 2020-01-14 PROCEDURE — 85025 COMPLETE CBC W/AUTO DIFF WBC: CPT

## 2020-01-14 PROCEDURE — 84484 ASSAY OF TROPONIN QUANT: CPT

## 2020-01-14 PROCEDURE — 36415 COLL VENOUS BLD VENIPUNCTURE: CPT

## 2020-01-14 PROCEDURE — 81003 URINALYSIS AUTO W/O SCOPE: CPT

## 2020-01-14 PROCEDURE — 80053 COMPREHEN METABOLIC PANEL: CPT

## 2020-01-14 PROCEDURE — 99284 EMERGENCY DEPT VISIT MOD MDM: CPT

## 2020-01-14 PROCEDURE — 83735 ASSAY OF MAGNESIUM: CPT

## 2020-01-14 PROCEDURE — 93005 ELECTROCARDIOGRAM TRACING: CPT | Performed by: EMERGENCY MEDICINE

## 2020-01-14 PROCEDURE — 84100 ASSAY OF PHOSPHORUS: CPT

## 2020-01-14 PROCEDURE — 71046 X-RAY EXAM CHEST 2 VIEWS: CPT

## 2020-01-14 ASSESSMENT — LIFESTYLE VARIABLES: DO YOU DRINK ALCOHOL: NO

## 2020-01-15 NOTE — ED NOTES
"Pt states dizziness started \"yesterday some time\".  \"This afternoon\", pressure to front of head and tingling to upper lip.  Family states word finding impairment recently, worse today.    "

## 2020-01-15 NOTE — DISCHARGE INSTRUCTIONS
If you experience consistent dizziness with associated gait problems that is difficulty walking vision changes speech difficulty or unilateral weakness numbness or tingling please return the emergency department immediately    Your blood pressure is high today.  This requires followup by a primary care doctor.  Please keep a log of your blood pressures if possible to take to your doctor appointment.  Try to increase the amount of exercise you do in your day to day living, and eliminate sodas and other sweetened beverages from your diet.

## 2020-01-15 NOTE — ED TRIAGE NOTES
"Chief Complaint   Patient presents with   • Dizziness   • Headache     BP (!) 175/86   Pulse 84   Temp 36.3 °C (97.4 °F) (Temporal)   Resp 16   Ht 1.727 m (5' 8\")   Wt 74.8 kg (165 lb)   SpO2 95%   BMI 25.09 kg/m²     Pt c/o dizziness when he stood up this morning. States sometimes loses his balance when he first stands up from sitting. Also c/o headache earlier today.   "

## 2020-01-15 NOTE — ED PROVIDER NOTES
ED Provider Note    CHIEF COMPLAINT  Chief Complaint   Patient presents with   • Dizziness   • Headache       HPI  Vertrus Shanna Becker is a 87 y.o. male who presents to the emergency department with chief complaint of intermittent lightheadedness and mild headaches.  Patient states that over the last 2 days he has had this feeling of lightheadedness when he goes from sitting to standing or from laying to sitting.  He states that it resolves and gets better on its own.  He did however notice a little bit of twitching of his upper lip today no weakness numbness or tingling no vision changes no hearing changes no unilateral weakness in the legs or arms no chest pain or shortness of breath.  He states he had a mild headache off and on but currently denies any headache.  He states however he was told a month ago because he had some left ear fullness on and off that this lightheadedness and a little bit of vertigo been present due to an ear symptom he states that sensation is the same he circles fall and the symptoms of the dizziness feel the same as they did the last time.  Otherwise states he is feeling well    REVIEW OF SYSTEMS  Positives as above. Pertinent negatives include chest pain shortness of breath weakness numbness tingling vision changes speech difficulty hearing loss nausea vomiting easy bleeding or bruising  All other review of systems are negative    PAST MEDICAL HISTORY   has a past medical history of Anesthesia, Arthritis, Breath shortness, Cataract, Heart burn, High cholesterol, Hip pain, bilateral (2018), Hypertension, Pain (), Pancreatic tumor, Parkinson's disease, and Pneumonia.    SOCIAL HISTORY  Social History     Tobacco Use   • Smoking status: Former Smoker     Packs/day: 0.20     Years: 10.00     Pack years: 2.00     Types: Cigarettes, Cigars     Last attempt to quit: 1987     Years since quittin.0   • Smokeless tobacco: Never Used   Substance and Sexual Activity   • Alcohol  use: Yes     Comment: 2-3 per month   • Drug use: No   • Sexual activity: Not Currently       SURGICAL HISTORY   has a past surgical history that includes cataract extraction with iol (Bilateral, 2016); cholecystectomy (2013); ulcer excision (1955); gastroscopy (N/A, 3/24/2017); egd w/endoscopic ultrasound (N/A, 3/24/2017); egd with asp/bx (N/A, 3/24/2017); knee arthroscopy (Left, 2013); turp-vapor (1990); other surgical procedure (1988); other surgical procedure (2000); lumbar laminectomy diskectomy (1995); and colonoscopy (2013).    CURRENT MEDICATIONS  Home Medications    **Home medications have not yet been reviewed for this encounter**         ALLERGIES  No Known Allergies    PHYSICAL EXAM  VITAL SIGNS: Blood pressure 160/75, heart rate 84, respiratory rate 1696% on room air  Pulse ox interpretation: I interpret this pulse ox as normal.  Constitutional: Alert in no apparent distress.  HENT: Normocephalic atraumatic, MMM  Eyes: PERRLA, Conjunctiva normal, Non-icteric.   Neck: Normal range of motion, No tenderness, Supple, No stridor.   Cardiovascular: Regular rate and rhythm, no murmurs.   Thorax & Lungs: Normal breath sounds, No respiratory distress, No wheezing, No chest tenderness.   Abdomen: Bowel sounds normal, Soft, No tenderness, No pulsatile masses. No peritoneal signs.  Skin: Warm, Dry, No erythema, No rash.   Back: No bony tenderness, No CVA tenderness.   Extremities/MSK: Intact distal pulses, No edema, No tenderness, No cyanosis, no major deformities noted  Neurologic: Alert and oriented x3  Symmetric smile, eyes shut tight bilaterally, forehead wrinkles bilaterally, sensation intact to light touch bilateral face, tongue midline, head turn and shoulder shrug with full strength. Hearing intact grossly bilaterally. 5/5  strength bilaterally, 5/5 tricep and bicep strength bilaterally. Sensation intact to light touch r, m, u, axillary nerves bilaterally. 5/5 strength quadricep,  plantarflexion/dorsiflexion/extensor hallicus longus bilaterally. Sensation intact to light touch bilateral lower extremities in all nerve distributions  Normal finger to nose bilateral. Normal rapid alternating movements. No nystagmus. Normal gait.    DIFFERENTIAL DIAGNOSIS AND WORK UP PLAN    This is a 87 y.o. male who presents with intermittent dizziness or lightheadedness with a normal neurologic examination and currently no symptoms, there is no evidence or concern for TIA or CVA at this time and his NIH score is 0.  However this could be peripheral versus central cause although the relapsing remitting and worse with position is more likely peripheral.  Will perform laboratory analysis including evaluate for signs and symptoms of infection or arrhythmia or electrolyte disturbance    DIAGNOSTIC STUDIES / PROCEDURES    EKG  Results for orders placed or performed during the hospital encounter of 20   EKG (NOW)   Result Value Ref Range    Report       [facility] Emergency Dept.    Test Date:  2020  Pt Name:    AUGIE ROLLINS                Department: [facility]  MRN:        [id number]                      Room:       [facility]  Gender:     Male                         Technician: HRS  :        [id number]                   Requested By:QUEENIE BANKS  Order #:    [id number]                    Reading MD: Queenie Banks MD    Measurements  Intervals                                Axis  Rate:       79                           P:          61  ME:         172                          QRS:        133  QRSD:       106                          T:          33  QT:         400  QTc:        459    Interpretive Statements  Sinus rhythm at a rate of 79 no ST elevations or ST depressions no abnormal T    wave inversions no pathognomonic Q waves normal intervals normal axis  Compared to ECG 2019 16:48:27  No significant changes  Electronically Signed On 2020 20:18:33 PST by Queenie  "MD Kimberly         LABS  Pertinent Lab Findings  CBC within normal limits, CMP with mild hypokalemia otherwise no significant change phosphorus mildly low at 2.4 normal troponin magnesium Accu-Chek normal urinalysis within normal limits      RADIOLOGY  DX-CHEST-2 VIEWS   Final Result      No acute cardiopulmonary abnormality.        The radiologist's interpretation of all radiological studies have been reviewed by me.      COURSE & MEDICAL DECISION MAKING  Pertinent Labs & Imaging studies reviewed. (See chart for details)    7:36 PM  I reassessed the patient he is resting comfortably states he is doing well still pending a urinalysis    8:14 PM  Patient states he is doing well feeling well going from sitting standing states he feels fine currently.  With a similar history of possible positional vertigo in the past normal examination and an NIH of 0 with a normal laboratory analysis beyond a mild hypokalemia I feel comfortable with patient going home he will follow-up as primary care provider states he has an appointment next week and will call their office in the morning.  He will return immediately to the emergency department for any new or worsening speech changes visual changes unilateral weakness numbness or tingling or difficulty walking    BP (!) 166/84   Pulse 68   Temp 36.3 °C (97.4 °F) (Temporal)   Resp 18   Ht 1.727 m (5' 8\")   Wt 74.8 kg (165 lb)   SpO2 99%   BMI 25.09 kg/m²       The patient will return for new or worsening symptoms and is stable at the time of discharge.    The patient is referred to a primary physician for blood pressure management, diabetic screening, and for all other preventative health concerns.    DISPOSITION:  Patient will be discharged home in stable condition.    FOLLOW UP:  Dontae Sotelo M.D.  [id number]  [address]  [place] [id number]  [phone]    Call       [facility], Emergency Dept  [address]  [place] " [id number]  [phone]    If symptoms worsen      OUTPATIENT MEDICATIONS:  Discharge Medication List as of 1/14/2020  8:28 PM            FINAL IMPRESSION  1. Dizziness     2. Sensation of fullness in left ear             Electronically signed by: Queenie Harris M.D., 1/14/2020 5:56 PM    This dictation has been created using voice recognition software and/or scribes. The accuracy of the dictation is limited by the abilities of the software and the expertise of the scribes. I expect there may be some errors of grammar and possibly content. I made every attempt to manually correct the errors within my dictation. However, errors related to voice recognition software and/or scribes may still exist and should be interpreted within the appropriate context.

## 2020-10-17 ENCOUNTER — HOSPITAL ENCOUNTER (OUTPATIENT)
Facility: MEDICAL CENTER | Age: 85
End: 2020-10-17
Payer: COMMERCIAL

## 2020-10-17 PROCEDURE — U0003 INFECTIOUS AGENT DETECTION BY NUCLEIC ACID (DNA OR RNA); SEVERE ACUTE RESPIRATORY SYNDROME CORONAVIRUS 2 (SARS-COV-2) (CORONAVIRUS DISEASE [COVID-19]), AMPLIFIED PROBE TECHNIQUE, MAKING USE OF HIGH THROUGHPUT TECHNOLOGIES AS DESCRIBED BY CMS-2020-01-R: HCPCS

## 2020-10-18 LAB
COVID ORDER STATUS COVID19: NORMAL
SARS-COV-2 RNA RESP QL NAA+PROBE: NOTDETECTED
SPECIMEN SOURCE: NORMAL

## 2021-01-14 DIAGNOSIS — Z23 NEED FOR VACCINATION: ICD-10-CM

## 2021-03-13 ENCOUNTER — OFFICE VISIT (OUTPATIENT)
Dept: URGENT CARE | Facility: PHYSICIAN GROUP | Age: 86
End: 2021-03-13
Payer: COMMERCIAL

## 2021-03-13 VITALS
TEMPERATURE: 98 F | HEIGHT: 68 IN | DIASTOLIC BLOOD PRESSURE: 70 MMHG | OXYGEN SATURATION: 96 % | HEART RATE: 79 BPM | RESPIRATION RATE: 16 BRPM | WEIGHT: 175 LBS | BODY MASS INDEX: 26.52 KG/M2 | SYSTOLIC BLOOD PRESSURE: 130 MMHG

## 2021-03-13 DIAGNOSIS — H69.91 DYSFUNCTION OF RIGHT EUSTACHIAN TUBE: ICD-10-CM

## 2021-03-13 DIAGNOSIS — J34.89 IRRITATION OF NOSE: ICD-10-CM

## 2021-03-13 DIAGNOSIS — R09.82 POST-NASAL DRIP: ICD-10-CM

## 2021-03-13 DIAGNOSIS — J02.9 PHARYNGITIS, UNSPECIFIED ETIOLOGY: ICD-10-CM

## 2021-03-13 DIAGNOSIS — Z20.818 EXPOSURE TO STREP THROAT: ICD-10-CM

## 2021-03-13 LAB
INT CON NEG: NEGATIVE
INT CON POS: POSITIVE
S PYO AG THROAT QL: NEGATIVE

## 2021-03-13 PROCEDURE — 99203 OFFICE O/P NEW LOW 30 MIN: CPT | Performed by: PHYSICIAN ASSISTANT

## 2021-03-13 PROCEDURE — 87880 STREP A ASSAY W/OPTIC: CPT | Performed by: PHYSICIAN ASSISTANT

## 2021-03-13 RX ORDER — ACETAMINOPHEN 500 MG
500-1000 TABLET ORAL EVERY 6 HOURS PRN
COMMUNITY

## 2021-03-13 RX ORDER — HYDROCHLOROTHIAZIDE 12.5 MG/1
CAPSULE, GELATIN COATED ORAL
COMMUNITY
End: 2021-11-09 | Stop reason: SDUPTHER

## 2021-03-13 RX ORDER — FLUTICASONE PROPIONATE 50 MCG
1 SPRAY, SUSPENSION (ML) NASAL DAILY
Qty: 15.8 ML | Refills: 0 | Status: SHIPPED | OUTPATIENT
Start: 2021-03-13

## 2021-03-13 RX ORDER — CETIRIZINE HYDROCHLORIDE 10 MG/1
10 TABLET ORAL DAILY
Qty: 30 TABLET | Refills: 0 | Status: SHIPPED | OUTPATIENT
Start: 2021-03-13 | End: 2022-10-26 | Stop reason: SDUPTHER

## 2021-03-13 RX ORDER — DIPHENHYDRAMINE HCL 25 MG
25 CAPSULE ORAL EVERY 6 HOURS PRN
COMMUNITY

## 2021-03-13 ASSESSMENT — FIBROSIS 4 INDEX: FIB4 SCORE: 2.86

## 2021-03-13 NOTE — PATIENT INSTRUCTIONS
Eustachian Tube Dysfunction    Eustachian tube dysfunction refers to a condition in which a blockage develops in the narrow passage that connects the middle ear to the back of the nose (eustachian tube). The eustachian tube regulates air pressure in the middle ear by letting air move between the ear and nose. It also helps to drain fluid from the middle ear space.  Eustachian tube dysfunction can affect one or both ears. When the eustachian tube does not function properly, air pressure, fluid, or both can build up in the middle ear.  What are the causes?  This condition occurs when the eustachian tube becomes blocked or cannot open normally. Common causes of this condition include:  · Ear infections.  · Colds and other infections that affect the nose, mouth, and throat (upper respiratory tract).  · Allergies.  · Irritation from cigarette smoke.  · Irritation from stomach acid coming up into the esophagus (gastroesophageal reflux). The esophagus is the tube that carries food from the mouth to the stomach.  · Sudden changes in air pressure, such as from descending in an airplane or scuba diving.  · Abnormal growths in the nose or throat, such as:  ? Growths that line the nose (nasal polyps).  ? Abnormal growth of cells (tumors).  ? Enlarged tissue at the back of the throat (adenoids).  What increases the risk?  You are more likely to develop this condition if:  · You smoke.  · You are overweight.  · You are a child who has:  ? Certain birth defects of the mouth, such as cleft palate.  ? Large tonsils or adenoids.  What are the signs or symptoms?  Common symptoms of this condition include:  · A feeling of fullness in the ear.  · Ear pain.  · Clicking or popping noises in the ear.  · Ringing in the ear.  · Hearing loss.  · Loss of balance.  · Dizziness.  Symptoms may get worse when the air pressure around you changes, such as when you travel to an area of high elevation, fly on an airplane, or go scuba diving.  How is  "this diagnosed?  This condition may be diagnosed based on:  · Your symptoms.  · A physical exam of your ears, nose, and throat.  · Tests, such as those that measure:  ? The movement of your eardrum (tympanogram).  ? Your hearing (audiometry).  How is this treated?  Treatment depends on the cause and severity of your condition.  · In mild cases, you may relieve your symptoms by moving air into your ears. This is called \"popping the ears.\"  · In more severe cases, or if you have symptoms of fluid in your ears, treatment may include:  ? Medicines to relieve congestion (decongestants).  ? Medicines that treat allergies (antihistamines).  ? Nasal sprays or ear drops that contain medicines that reduce swelling (steroids).  ? A procedure to drain the fluid in your eardrum (myringotomy). In this procedure, a small tube is placed in the eardrum to:  § Drain the fluid.  § Restore the air in the middle ear space.  ? A procedure to insert a balloon device through the nose to inflate the opening of the eustachian tube (balloon dilation).  Follow these instructions at home:  Lifestyle  · Do not do any of the following until your health care provider approves:  ? Travel to high altitudes.  ? Fly in airplanes.  ? Work in a pressurized cabin or room.  ? Scuba dive.  · Do not use any products that contain nicotine or tobacco, such as cigarettes and e-cigarettes. If you need help quitting, ask your health care provider.  · Keep your ears dry. Wear fitted earplugs during showering and bathing. Dry your ears completely after.  General instructions  · Take over-the-counter and prescription medicines only as told by your health care provider.  · Use techniques to help pop your ears as recommended by your health care provider. These may include:  ? Chewing gum.  ? Yawning.  ? Frequent, forceful swallowing.  ? Closing your mouth, holding your nose closed, and gently blowing as if you are trying to blow air out of your nose.  · Keep all " follow-up visits as told by your health care provider. This is important.  Contact a health care provider if:  · Your symptoms do not go away after treatment.  · Your symptoms come back after treatment.  · You are unable to pop your ears.  · You have:  ? A fever.  ? Pain in your ear.  ? Pain in your head or neck.  ? Fluid draining from your ear.  · Your hearing suddenly changes.  · You become very dizzy.  · You lose your balance.  Summary  · Eustachian tube dysfunction refers to a condition in which a blockage develops in the eustachian tube.  · It can be caused by ear infections, allergies, inhaled irritants, or abnormal growths in the nose or throat.  · Symptoms include ear pain, hearing loss, or ringing in the ears.  · Mild cases are treated with maneuvers to unblock the ears, such as yawning or ear popping.  · Severe cases are treated with medicines. Surgery may also be done (rare).  This information is not intended to replace advice given to you by your health care provider. Make sure you discuss any questions you have with your health care provider.  Document Released: 01/13/2017 Document Revised: 04/09/2019 Document Reviewed: 04/09/2019  [employer] Patient Education © 2020 [employer]

## 2021-03-13 NOTE — PROGRESS NOTES
"Subjective:      Huong Becker is a 88 y.o. male who presents with Bump (Pt reports a sore in his right nostril and a dry throat. Onset 3 weeks. )    HPI:  This is a new problem. April Becker is a 88 y.o. male who presents today for evaluation of URI symptoms.  Patient states that for the past 3 months or so he has noticed some irritation in his nostrils.  States that initially he had a \"bump\" near the outside of the right nostril but over time it is been irritated in both nostrils bilaterally.  He also reports that he has had issues with a \"dry\" throat.  Additionally, he notes some mild ear pain/fullness.  He says that the dry throat and ear pain/fullness has been ongoing for the past 3 weeks or so.  He does report that his daughter tested positive for strep pharyngitis in the past 2 weeks and he would like to be tested to rule this out.  He has not had any fever/chills, chest pain, shortness of breath, lightheadedness/dizziness.      Review of Systems   Constitutional: Negative for chills, fever and malaise/fatigue.   HENT: Positive for congestion, ear pain and sore throat. Negative for sinus pain.    Eyes: Negative for blurred vision and pain.   Respiratory: Negative for cough and shortness of breath.    Cardiovascular: Negative for chest pain and palpitations.   Gastrointestinal: Negative for abdominal pain, diarrhea, nausea and vomiting.   Musculoskeletal: Negative for myalgias.   Skin: Negative for rash.   Neurological: Negative for dizziness and headaches.       PMH:  has a past medical history of Anesthesia, Arthritis, Breath shortness, Cataract, Heart burn, High cholesterol, Hip pain, bilateral (11/30/2018), Hypertension, Pain (2017), Pancreatic tumor, Parkinson's disease, and Pneumonia.  MEDS:   Current Outpatient Medications:   •  hydrochlorothiazide (MICROZIDE) 12.5 MG capsule, hydrochlorothiazide 12.5 mg capsule  TAKE ONE CAPSULE BY MOUTH EVERY DAY, Disp: , Rfl:   •  Cyanocobalamin " "(B-12 COMPLIANCE INJECTION INJ), Inject  as directed. Once a month, Disp: , Rfl:   •  diphenhydrAMINE (BENADRYL ALLERGY) 25 MG capsule, Take 25 mg by mouth every 6 hours as needed., Disp: , Rfl:   •  acetaminophen (TYLENOL) 500 MG Tab, Take 500-1,000 mg by mouth every 6 hours as needed., Disp: , Rfl:   •  cetirizine (ZYRTEC) 10 MG Tab, Take 10 mg by mouth 1 time daily as needed., Disp: , Rfl:   •  hydrochlorothiazide (MICROZIDE) 12.5 MG capsule, Take 12.5 mg by mouth every day., Disp: , Rfl:   ALLERGIES: No Known Allergies  SURGHX:   Past Surgical History:   Procedure Laterality Date   • GASTROSCOPY N/A 3/24/2017    Procedure: GASTROSCOPY;  Surgeon: Raphael Crawford M.D.;  Location: [facility];  Service:    • EGD W/ENDOSCOPIC ULTRASOUND N/A 3/24/2017    Procedure: EGD W/ENDOSCOPIC ULTRASOUND- UPPER, LINEAR, RADIAL ON STANDBY;  Surgeon: Raphael Crawford M.D.;  Location: [facility];  Service:    • EGD WITH ASP/BX N/A 3/24/2017    Procedure: EGD WITH ASP/BX - FINE NEEDLE ASPIRATION;  Surgeon: Raphael Crawford M.D.;  Location: [facility];  Service:    • CATARACT EXTRACTION WITH IOL Bilateral 2016   • CHOLECYSTECTOMY  2013   • KNEE ARTHROSCOPY Left 2013   • COLONOSCOPY  2013   • OTHER SURGICAL PROCEDURE  2000    salivary gland removed.    • LUMBAR LAMINECTOMY DISKECTOMY  1995    L5 \"cleaned up\"   • TURP-VAPOR  1990    TURP   • OTHER SURGICAL PROCEDURE  1988    salivary gland removed.   • ULCER EXCISION  1955    gastric ulcer      SOCHX:  reports that he quit smoking about 34 years ago. His smoking use included cigarettes and cigars. He has a 2.00 pack-year smoking history. He has never used smokeless tobacco. He reports current alcohol use. He reports that he does not use drugs.  FH: Family history was reviewed, no pertinent findings to report     Objective:     /70 (BP Location: Left arm, Patient Position: Sitting, BP Cuff Size: Adult)   Pulse 79   Temp 36.7 °C (98 °F) (Temporal)   " "Resp 16   Ht 1.727 m (5' 8\")   Wt 79.4 kg (175 lb)   SpO2 96%   BMI 26.61 kg/m²      Physical Exam  Constitutional:       Appearance: He is well-developed.   HENT:      Head: Normocephalic and atraumatic.      Right Ear: Ear canal and external ear normal. A middle ear effusion is present. Tympanic membrane is bulging. Tympanic membrane is not erythematous.      Left Ear: Tympanic membrane, ear canal and external ear normal.      Nose: Mucosal edema and congestion present. No rhinorrhea.      Mouth/Throat:      Lips: Pink.      Mouth: Mucous membranes are moist.      Pharynx: Oropharynx is clear.   Eyes:      Conjunctiva/sclera: Conjunctivae normal.      Pupils: Pupils are equal, round, and reactive to light.   Cardiovascular:      Rate and Rhythm: Normal rate and regular rhythm.      Heart sounds: Normal heart sounds. No murmur.   Pulmonary:      Effort: Pulmonary effort is normal.      Breath sounds: Normal breath sounds. No wheezing.   Musculoskeletal:      Cervical back: Normal range of motion.   Lymphadenopathy:      Cervical: No cervical adenopathy.   Skin:     General: Skin is warm and dry.      Capillary Refill: Capillary refill takes less than 2 seconds.   Neurological:      Mental Status: He is alert and oriented to person, place, and time.   Psychiatric:         Behavior: Behavior normal.         Judgment: Judgment normal.        POCT Rapid Strep A - Negative       Assessment/Plan:     1. Dysfunction of right eustachian tube  - cetirizine (ZYRTEC) 10 MG Tab; Take 1 tablet by mouth every day.  Dispense: 30 tablet; Refill: 0  - fluticasone (FLONASE) 50 MCG/ACT nasal spray; Administer 1 Spray into affected nostril(S) every day.  Dispense: 15.8 mL; Refill: 0    2. Post-nasal drip  - cetirizine (ZYRTEC) 10 MG Tab; Take 1 tablet by mouth every day.  Dispense: 30 tablet; Refill: 0  - fluticasone (FLONASE) 50 MCG/ACT nasal spray; Administer 1 Spray into affected nostril(S) every day.  Dispense: 15.8 mL; " Refill: 0    3. Irritation of nose  -The mild local irritation seems to be consistent with irritation from blowing his nose frequently.  He was advised to apply antibiotic ointment gently to each nostril twice daily    4. Pharyngitis, unspecified etiology  - POCT Rapid Strep A  -Likely related to postnasal drip.  Should clear up as he starts taking the cetirizine and fluticasone  -Supportive care discussed to include salt water gargles, throat lozenges, and increased fluid intake    5. Exposure to strep throat  - POCT Rapid Strep A              Differential Diagnosis, natural history, and supportive care discussed. Return to the Urgent Care or follow up with your PCP if symptoms fail to resolve, or for any new or worsening symptoms. Emergency room precautions discussed. Patient and/or family appears understanding of information.

## 2021-03-20 ASSESSMENT — ENCOUNTER SYMPTOMS
NAUSEA: 0
PALPITATIONS: 0
CHILLS: 0
ABDOMINAL PAIN: 0
BLURRED VISION: 0
SHORTNESS OF BREATH: 0
HEADACHES: 0
FEVER: 0
EYE PAIN: 0
DIZZINESS: 0
DIARRHEA: 0
SORE THROAT: 1
MYALGIAS: 0
COUGH: 0
VOMITING: 0
SINUS PAIN: 0

## 2021-07-18 ENCOUNTER — HOSPITAL ENCOUNTER (OUTPATIENT)
Facility: MEDICAL CENTER | Age: 86
End: 2021-07-18
Attending: NURSE PRACTITIONER
Payer: COMMERCIAL

## 2021-07-18 ENCOUNTER — OFFICE VISIT (OUTPATIENT)
Dept: URGENT CARE | Facility: PHYSICIAN GROUP | Age: 86
End: 2021-07-18
Payer: COMMERCIAL

## 2021-07-18 VITALS
HEART RATE: 81 BPM | TEMPERATURE: 98.2 F | OXYGEN SATURATION: 94 % | HEIGHT: 68 IN | BODY MASS INDEX: 26.52 KG/M2 | DIASTOLIC BLOOD PRESSURE: 62 MMHG | WEIGHT: 175 LBS | RESPIRATION RATE: 20 BRPM | SYSTOLIC BLOOD PRESSURE: 124 MMHG

## 2021-07-18 DIAGNOSIS — J30.1 SEASONAL ALLERGIC RHINITIS DUE TO POLLEN: ICD-10-CM

## 2021-07-18 DIAGNOSIS — J02.9 PHARYNGITIS, UNSPECIFIED ETIOLOGY: ICD-10-CM

## 2021-07-18 PROBLEM — I95.9 HYPOTENSIVE EPISODE: Status: ACTIVE | Noted: 2019-07-18

## 2021-07-18 PROBLEM — I10 HTN (HYPERTENSION): Status: ACTIVE | Noted: 2021-07-18

## 2021-07-18 PROBLEM — R06.00 DYSPNEA: Status: ACTIVE | Noted: 2017-03-07

## 2021-07-18 PROBLEM — M19.049 ARTHRITIS OF HAND: Status: ACTIVE | Noted: 2021-06-17

## 2021-07-18 PROBLEM — E53.8 VITAMIN B12 DEFICIENCY: Status: ACTIVE | Noted: 2018-03-02

## 2021-07-18 PROBLEM — R35.0 URINARY FREQUENCY: Status: ACTIVE | Noted: 2018-01-03

## 2021-07-18 PROBLEM — K14.0 GLOSSITIS: Status: ACTIVE | Noted: 2018-01-03

## 2021-07-18 PROBLEM — R07.89 CHEST PAIN, ATYPICAL: Status: ACTIVE | Noted: 2017-01-17

## 2021-07-18 PROBLEM — M26.629 TENDERNESS OF TEMPOROMANDIBULAR JOINT: Status: ACTIVE | Noted: 2020-05-22

## 2021-07-18 PROBLEM — D51.0 PERNICIOUS ANEMIA: Status: ACTIVE | Noted: 2019-03-06

## 2021-07-18 PROBLEM — R53.83 FATIGUE: Status: ACTIVE | Noted: 2019-07-18

## 2021-07-18 PROBLEM — R19.7 DIARRHEA: Status: ACTIVE | Noted: 2017-06-23

## 2021-07-18 PROBLEM — K21.9 GASTROESOPHAGEAL REFLUX DISEASE: Status: ACTIVE | Noted: 2021-07-18

## 2021-07-18 PROBLEM — G47.00 INSOMNIA: Status: ACTIVE | Noted: 2019-07-02

## 2021-07-18 PROBLEM — J11.1 INFLUENZA: Status: ACTIVE | Noted: 2017-02-07

## 2021-07-18 PROBLEM — A41.9 SEPSIS (HCC): Status: ACTIVE | Noted: 2019-07-02

## 2021-07-18 PROBLEM — M62.81 MUSCLE WEAKNESS (GENERALIZED): Status: ACTIVE | Noted: 2019-07-18

## 2021-07-18 PROBLEM — R30.0 DYSURIA: Status: ACTIVE | Noted: 2020-04-22

## 2021-07-18 PROBLEM — E04.1 THYROID NODULE: Status: ACTIVE | Noted: 2021-07-18

## 2021-07-18 PROBLEM — M16.10 ARTHRITIS OF HIP: Status: ACTIVE | Noted: 2018-08-07

## 2021-07-18 PROBLEM — M79.672 PAIN OF LEFT HEEL: Status: ACTIVE | Noted: 2019-11-25

## 2021-07-18 PROBLEM — H92.02 EAR PAIN, LEFT: Status: ACTIVE | Noted: 2020-09-18

## 2021-07-18 PROBLEM — F41.9 ANXIETY: Status: ACTIVE | Noted: 2017-04-25

## 2021-07-18 PROBLEM — K86.9 DISORDER OF PANCREAS: Status: ACTIVE | Noted: 2017-02-17

## 2021-07-18 PROBLEM — H69.92 EUSTACHIAN TUBE DYSFUNCTION, LEFT: Status: ACTIVE | Noted: 2019-12-24

## 2021-07-18 PROBLEM — J34.89 NASAL DISCHARGE: Status: ACTIVE | Noted: 2021-02-24

## 2021-07-18 PROBLEM — R51.9 HEADACHE: Status: ACTIVE | Noted: 2020-01-16

## 2021-07-18 PROBLEM — H10.10 ALLERGIC CONJUNCTIVITIS: Status: ACTIVE | Noted: 2020-04-22

## 2021-07-18 PROBLEM — E66.9 OBESITY WITH BODY MASS INDEX 30 OR GREATER: Status: ACTIVE | Noted: 2019-06-10

## 2021-07-18 PROBLEM — G56.01 CARPAL TUNNEL SYNDROME, RIGHT: Status: ACTIVE | Noted: 2019-11-25

## 2021-07-18 PROBLEM — B37.2 INTERTRIGINOUS CANDIDIASIS: Status: ACTIVE | Noted: 2019-07-31

## 2021-07-18 PROBLEM — R35.89 POLYURIA: Status: ACTIVE | Noted: 2020-04-22

## 2021-07-18 PROBLEM — M54.50 LOW BACK PAIN: Status: ACTIVE | Noted: 2021-07-18

## 2021-07-18 PROBLEM — D17.0 LIPOMA OF SKIN AND SUBCUTANEOUS TISSUE OF NECK: Status: ACTIVE | Noted: 2021-05-17

## 2021-07-18 LAB
INT CON NEG: NEGATIVE
INT CON POS: POSITIVE
S PYO AG THROAT QL: NORMAL

## 2021-07-18 PROCEDURE — 87070 CULTURE OTHR SPECIMN AEROBIC: CPT

## 2021-07-18 PROCEDURE — 87880 STREP A ASSAY W/OPTIC: CPT | Performed by: NURSE PRACTITIONER

## 2021-07-18 PROCEDURE — 99214 OFFICE O/P EST MOD 30 MIN: CPT | Performed by: NURSE PRACTITIONER

## 2021-07-18 RX ORDER — AZELASTINE 1 MG/ML
1 SPRAY, METERED NASAL 2 TIMES DAILY
Qty: 30 ML | Refills: 0 | Status: SHIPPED | OUTPATIENT
Start: 2021-07-18 | End: 2021-07-28

## 2021-07-18 ASSESSMENT — ENCOUNTER SYMPTOMS
HEADACHES: 1
MYALGIAS: 0
VOMITING: 0
ABDOMINAL PAIN: 0
SPUTUM PRODUCTION: 0
SORE THROAT: 1
SHORTNESS OF BREATH: 0
FEVER: 0
DIARRHEA: 0
NAUSEA: 0
COUGH: 1
HEMOPTYSIS: 0
WHEEZING: 0
CHILLS: 0

## 2021-07-18 ASSESSMENT — FIBROSIS 4 INDEX: FIB4 SCORE: 2.9

## 2021-07-18 NOTE — PROGRESS NOTES
"Subjective:     April Becker is a 89 y.o. male who presents for Sore Throat (x 4 days)      HPI  Pt presents for evaluation of a new problem.  Patient is a very pleasant 89-year-old male who presents to urgent care today with complaints of a sore throat on the right-hand side for the past 4 days.  He notes that his sore throat is worse in the morning and at night.  He has been using Robitussin cough and sore throat relief as well as multiple allergy relief's.  He does use Flonase daily as he does suffer from allergies.  Associated symptoms include headache and mild cough.  He does have a postnasal drip.  He denies any known ill contacts, fever, chills, ear pain, nausea/vomiting or diarrhea.    Review of Systems   Constitutional: Negative for chills and fever.   HENT: Positive for congestion and sore throat.    Respiratory: Positive for cough. Negative for hemoptysis, sputum production, shortness of breath and wheezing.    Gastrointestinal: Negative for abdominal pain, diarrhea, nausea and vomiting.   Musculoskeletal: Negative for myalgias.   Neurological: Positive for headaches.       PMH:   Past Medical History:   Diagnosis Date   • Anesthesia     PONV, and hard to wake up   • Arthritis     osteoarthritis, hips   • Breath shortness     \"sob came before 11/2016, did a chest x-ray, and it didn't show anything\"   • Cataract     Bilateral IOL implants   • Heart burn     no meds   • High cholesterol     no meds   • Hip pain, bilateral 11/30/2018    and lower back.   • Hypertension    • Pain 2017    stomach, hips, back   • Pancreatic tumor     Inconclusive biopsy which caused pancreatitis. Follow with CT every 6 months and no changes.    • Parkinson's disease     no meds   • Pneumonia     November 2016, no admission      ALLERGIES: No Known Allergies  SURGHX:   Past Surgical History:   Procedure Laterality Date   • GASTROSCOPY N/A 3/24/2017    Procedure: GASTROSCOPY;  Surgeon: Raphael Crawford M.D.;  Location: " "SURGERY [facility];  Service:    • EGD W/ENDOSCOPIC ULTRASOUND N/A 3/24/2017    Procedure: EGD W/ENDOSCOPIC ULTRASOUND- UPPER, LINEAR, RADIAL ON STANDBY;  Surgeon: Raphael Crawford M.D.;  Location: [facility];  Service:    • EGD WITH ASP/BX N/A 3/24/2017    Procedure: EGD WITH ASP/BX - FINE NEEDLE ASPIRATION;  Surgeon: Raphael Crawford M.D.;  Location: [facility];  Service:    • CATARACT EXTRACTION WITH IOL Bilateral    • CHOLECYSTECTOMY     • KNEE ARTHROSCOPY Left    • COLONOSCOPY     • OTHER SURGICAL PROCEDURE      salivary gland removed.    • LUMBAR LAMINECTOMY DISKECTOMY      L5 \"cleaned up\"   • TURP-VAPOR      TURP   • OTHER SURGICAL PROCEDURE      salivary gland removed.   • ULCER EXCISION      gastric ulcer      SOCHX:   Social History     Socioeconomic History   • Marital status:      Spouse name: Not on file   • Number of children: Not on file   • Years of education: Not on file   • Highest education level: Not on file   Occupational History   • Not on file   Tobacco Use   • Smoking status: Former Smoker     Packs/day: 0.20     Years: 10.00     Pack years: 2.00     Types: Cigarettes, Cigars     Quit date:      Years since quittin.5   • Smokeless tobacco: Never Used   Vaping Use   • Vaping Use: Never used   Substance and Sexual Activity   • Alcohol use: Yes     Comment: 2-3 per month   • Drug use: No   • Sexual activity: Not Currently   Other Topics Concern   • Not on file   Social History Narrative   • Not on file     Social Determinants of Health     Financial Resource Strain:    • Difficulty of Paying Living Expenses:    Food Insecurity:    • Worried About Running Out of Food in the Last Year:    • Ran Out of Food in the Last Year:    Transportation Needs:    • Lack of Transportation (Medical):    • Lack of Transportation (Non-Medical):    Physical Activity:    • Days of Exercise per Week:    • Minutes of Exercise per Session:  " "  Stress:    • Feeling of Stress :    Social Connections:    • Frequency of Communication with Friends and Family:    • Frequency of Social Gatherings with Friends and Family:    • Attends [employer] Services:    • Active Member of Clubs or Organizations:    • Attends Club or Organization Meetings:    • Marital Status:    Intimate Partner Violence:    • Fear of Current or Ex-Partner:    • Emotionally Abused:    • Physically Abused:    • Sexually Abused:      FH: No family history on file.      Objective:   /62 (BP Location: Left arm, Patient Position: Sitting, BP Cuff Size: Adult)   Pulse 81   Temp 36.8 °C (98.2 °F) (Temporal)   Resp 20   Ht 1.727 m (5' 8\")   Wt 79.4 kg (175 lb)   SpO2 94%   BMI 26.61 kg/m²     Physical Exam  Vitals and nursing note reviewed.   Constitutional:       General: He is not in acute distress.     Appearance: Normal appearance. He is not ill-appearing or toxic-appearing.   HENT:      Head: Normocephalic and atraumatic.      Right Ear: Tympanic membrane, ear canal and external ear normal. There is no impacted cerumen.      Left Ear: Tympanic membrane, ear canal and external ear normal. There is no impacted cerumen.      Nose: No congestion or rhinorrhea.      Mouth/Throat:      Mouth: Mucous membranes are moist.      Pharynx: No oropharyngeal exudate or posterior oropharyngeal erythema.   Eyes:      General:         Right eye: No discharge.         Left eye: No discharge.      Extraocular Movements: Extraocular movements intact.      Pupils: Pupils are equal, round, and reactive to light.   Cardiovascular:      Rate and Rhythm: Normal rate and regular rhythm.      Pulses: Normal pulses.      Heart sounds: Normal heart sounds.   Pulmonary:      Effort: Pulmonary effort is normal. No respiratory distress.      Breath sounds: Normal breath sounds. No stridor. No wheezing, rhonchi or rales.   Chest:      Chest wall: No tenderness.   Abdominal:      General: Abdomen is flat. Bowel " sounds are normal.      Palpations: Abdomen is soft.      Tenderness: There is no abdominal tenderness. There is no right CVA tenderness or left CVA tenderness.   Musculoskeletal:         General: Normal range of motion.      Cervical back: Normal range of motion and neck supple. Tenderness present.   Lymphadenopathy:      Cervical: No cervical adenopathy.   Skin:     General: Skin is warm and dry.      Capillary Refill: Capillary refill takes less than 2 seconds.   Neurological:      General: No focal deficit present.      Mental Status: He is alert and oriented to person, place, and time. Mental status is at baseline.   Psychiatric:         Mood and Affect: Mood normal.         Behavior: Behavior normal.         Thought Content: Thought content normal.         Judgment: Judgment normal.       POCT strep: Negative  Assessment/Plan:   Assessment    1. Seasonal allergic rhinitis due to pollen  azelastine (ASTELIN) 137 MCG/SPRAY nasal spray   2. Pharyngitis, unspecified etiology  CULTURE THROAT     Patient tested negative for strep today in the clinic.  Throat culture was obtained to further evaluate for bacteria.  Discussed differential diagnoses.  At this time I believe that his symptoms are related to allergies.  He was prescribed Astelin nasal spray for relief.  I will call him with results.  AVS handout given and reviewed with patient. Pt educated on red flags and when to seek treatment back in ER or UC.

## 2021-07-19 DIAGNOSIS — J02.9 PHARYNGITIS, UNSPECIFIED ETIOLOGY: ICD-10-CM

## 2021-07-21 LAB
BACTERIA SPEC RESP CULT: NORMAL
SIGNIFICANT IND 70042: NORMAL
SITE SITE: NORMAL
SOURCE SOURCE: NORMAL

## 2021-07-23 ENCOUNTER — TELEPHONE (OUTPATIENT)
Dept: URGENT CARE | Facility: PHYSICIAN GROUP | Age: 86
End: 2021-07-23

## 2021-10-26 ENCOUNTER — OFFICE VISIT (OUTPATIENT)
Dept: MEDICAL GROUP | Facility: CLINIC | Age: 86
End: 2021-10-26
Payer: COMMERCIAL

## 2021-10-26 VITALS
DIASTOLIC BLOOD PRESSURE: 81 MMHG | HEART RATE: 71 BPM | WEIGHT: 174.7 LBS | RESPIRATION RATE: 16 BRPM | OXYGEN SATURATION: 92 % | HEIGHT: 68 IN | SYSTOLIC BLOOD PRESSURE: 148 MMHG | BODY MASS INDEX: 26.48 KG/M2

## 2021-10-26 DIAGNOSIS — E53.8 B12 DEFICIENCY: ICD-10-CM

## 2021-10-26 DIAGNOSIS — R14.0 ABDOMINAL BLOATING: ICD-10-CM

## 2021-10-26 DIAGNOSIS — F41.9 ANXIETY: ICD-10-CM

## 2021-10-26 PROCEDURE — 99213 OFFICE O/P EST LOW 20 MIN: CPT | Performed by: FAMILY MEDICINE

## 2021-10-26 ASSESSMENT — FIBROSIS 4 INDEX: FIB4 SCORE: 2.9

## 2021-10-26 NOTE — PROGRESS NOTES
"Subjective:     CC: general f/u     HPI:   Verfeliciaus presents today to discuss the following issues     Problem   Abdominal Bloating    Relieved by BM's. No blood, diarrhea, emesis. Reviewed role of stress. OK to trial omeprazole. Pending improvement will decide if furthher eval is needed.      B12 Deficiency    Due for B12 shot for pernicious anemia. Assymptomatic     Anxiety    Has stopped watching so much news. Recognizes effect the distress is having on his physical sx's.   No meds         Current Outpatient Medications Ordered in Epic   Medication Sig Dispense Refill   • hydrochlorothiazide (MICROZIDE) 12.5 MG capsule hydrochlorothiazide 12.5 mg capsule   TAKE ONE CAPSULE BY MOUTH EVERY DAY     • diphenhydrAMINE (BENADRYL ALLERGY) 25 MG capsule Take 25 mg by mouth every 6 hours as needed.     • cetirizine (ZYRTEC) 10 MG Tab Take 1 tablet by mouth every day. 30 tablet 0   • fluticasone (FLONASE) 50 MCG/ACT nasal spray Administer 1 Spray into affected nostril(S) every day. 15.8 mL 0   • Cyanocobalamin (B-12 COMPLIANCE INJECTION INJ) Inject  as directed. Once a month     • acetaminophen (TYLENOL) 500 MG Tab Take 500-1,000 mg by mouth every 6 hours as needed.       No current Epic-ordered facility-administered medications on file.       Health Maintenance:     ROS:  Gen: no fevers/chills, no changes in weight  Eyes: no changes in vision  ENT: no sore throat, no hearing loss, no bloody nose  Pulm: no sob, no cough  CV: no chest pain, no palpitations  GI: no nausea/vomiting, no diarrhea  : no dysuria  MSk: no myalgias  Skin: no rash  Neuro: no headaches, no numbness/tingling  Heme/Lymph: no easy bruising      Objective:     Exam:  /81 (BP Location: Right arm, Patient Position: Sitting, BP Cuff Size: Adult)   Pulse 71   Resp 16   Ht 1.727 m (5' 8\")   Wt 79.2 kg (174 lb 11.2 oz)   SpO2 92%   BMI 26.56 kg/m²  Body mass index is 26.56 kg/m².    Gen: Alert and oriented, No apparent distress.  Neck: Neck is " supple without lymphadenopathy.  Lungs: Normal effort, CTA bilaterally, no wheezes, rhonchi, or rales  CV: Regular rate and rhythm. No murmurs, rubs, or gallops.  Ext: No clubbing, cyanosis, edema.          Assessment & Plan:     89 y.o. male with the following -     Problem List Items Addressed This Visit     B12 deficiency     B12 given. RTC 1 mo         Anxiety     Continue to monitor. No meds or counselling recommended at this point.            Abdominal bloating          I spent a total of 25 minutes with record review, exam, communication with the patient, communication with other providers, and documentation of this encounter.      No follow-ups on file.

## 2021-10-28 ENCOUNTER — HOSPITAL ENCOUNTER (OUTPATIENT)
Facility: MEDICAL CENTER | Age: 86
End: 2021-10-29
Attending: EMERGENCY MEDICINE | Admitting: FAMILY MEDICINE
Payer: COMMERCIAL

## 2021-10-28 ENCOUNTER — APPOINTMENT (OUTPATIENT)
Dept: RADIOLOGY | Facility: MEDICAL CENTER | Age: 86
End: 2021-10-28
Attending: EMERGENCY MEDICINE
Payer: COMMERCIAL

## 2021-10-28 DIAGNOSIS — E86.0 DEHYDRATION: ICD-10-CM

## 2021-10-28 DIAGNOSIS — K86.89 PANCREATIC MASS: ICD-10-CM

## 2021-10-28 DIAGNOSIS — R19.7 ABDOMINAL PAIN, VOMITING, AND DIARRHEA: ICD-10-CM

## 2021-10-28 DIAGNOSIS — R10.9 ABDOMINAL PAIN, VOMITING, AND DIARRHEA: ICD-10-CM

## 2021-10-28 DIAGNOSIS — R11.10 ABDOMINAL PAIN, VOMITING, AND DIARRHEA: ICD-10-CM

## 2021-10-28 LAB
ALBUMIN SERPL BCP-MCNC: 4.4 G/DL (ref 3.2–4.9)
ALBUMIN/GLOB SERPL: 1.5 G/DL
ALP SERPL-CCNC: 105 U/L (ref 30–99)
ALT SERPL-CCNC: 14 U/L (ref 2–50)
ANION GAP SERPL CALC-SCNC: 11 MMOL/L (ref 7–16)
APPEARANCE UR: CLEAR
AST SERPL-CCNC: 20 U/L (ref 12–45)
BASOPHILS # BLD AUTO: 0.5 % (ref 0–1.8)
BASOPHILS # BLD: 0.04 K/UL (ref 0–0.12)
BILIRUB SERPL-MCNC: 0.6 MG/DL (ref 0.1–1.5)
BILIRUB UR QL STRIP.AUTO: NEGATIVE
BUN SERPL-MCNC: 8 MG/DL (ref 8–22)
CALCIUM SERPL-MCNC: 9.5 MG/DL (ref 8.5–10.5)
CHLORIDE SERPL-SCNC: 102 MMOL/L (ref 96–112)
CO2 SERPL-SCNC: 26 MMOL/L (ref 20–33)
COLOR UR: YELLOW
CREAT SERPL-MCNC: 0.77 MG/DL (ref 0.5–1.4)
EKG IMPRESSION: NORMAL
EOSINOPHIL # BLD AUTO: 0.01 K/UL (ref 0–0.51)
EOSINOPHIL NFR BLD: 0.1 % (ref 0–6.9)
ERYTHROCYTE [DISTWIDTH] IN BLOOD BY AUTOMATED COUNT: 46.2 FL (ref 35.9–50)
GLOBULIN SER CALC-MCNC: 2.9 G/DL (ref 1.9–3.5)
GLUCOSE SERPL-MCNC: 138 MG/DL (ref 65–99)
GLUCOSE UR STRIP.AUTO-MCNC: NEGATIVE MG/DL
HCT VFR BLD AUTO: 46.9 % (ref 42–52)
HGB BLD-MCNC: 15.7 G/DL (ref 14–18)
IMM GRANULOCYTES # BLD AUTO: 0.04 K/UL (ref 0–0.11)
IMM GRANULOCYTES NFR BLD AUTO: 0.5 % (ref 0–0.9)
KETONES UR STRIP.AUTO-MCNC: 15 MG/DL
LACTATE BLD-SCNC: 1.5 MMOL/L (ref 0.5–2)
LEUKOCYTE ESTERASE UR QL STRIP.AUTO: NEGATIVE
LIPASE SERPL-CCNC: 20 U/L (ref 11–82)
LYMPHOCYTES # BLD AUTO: 0.73 K/UL (ref 1–4.8)
LYMPHOCYTES NFR BLD: 9 % (ref 22–41)
MCH RBC QN AUTO: 30.3 PG (ref 27–33)
MCHC RBC AUTO-ENTMCNC: 33.5 G/DL (ref 33.7–35.3)
MCV RBC AUTO: 90.5 FL (ref 81.4–97.8)
MICRO URNS: ABNORMAL
MONOCYTES # BLD AUTO: 0.37 K/UL (ref 0–0.85)
MONOCYTES NFR BLD AUTO: 4.6 % (ref 0–13.4)
NEUTROPHILS # BLD AUTO: 6.94 K/UL (ref 1.82–7.42)
NEUTROPHILS NFR BLD: 85.3 % (ref 44–72)
NITRITE UR QL STRIP.AUTO: NEGATIVE
NRBC # BLD AUTO: 0 K/UL
NRBC BLD-RTO: 0 /100 WBC
PH UR STRIP.AUTO: 7 [PH] (ref 5–8)
PLATELET # BLD AUTO: 155 K/UL (ref 164–446)
PMV BLD AUTO: 10.3 FL (ref 9–12.9)
POTASSIUM SERPL-SCNC: 3.7 MMOL/L (ref 3.6–5.5)
PROT SERPL-MCNC: 7.3 G/DL (ref 6–8.2)
PROT UR QL STRIP: NEGATIVE MG/DL
RBC # BLD AUTO: 5.18 M/UL (ref 4.7–6.1)
RBC UR QL AUTO: NEGATIVE
SODIUM SERPL-SCNC: 139 MMOL/L (ref 135–145)
SP GR UR STRIP.AUTO: 1.04
TROPONIN T SERPL-MCNC: 14 NG/L (ref 6–19)
UROBILINOGEN UR STRIP.AUTO-MCNC: 1 MG/DL
WBC # BLD AUTO: 8.1 K/UL (ref 4.8–10.8)

## 2021-10-28 PROCEDURE — 700111 HCHG RX REV CODE 636 W/ 250 OVERRIDE (IP): Performed by: STUDENT IN AN ORGANIZED HEALTH CARE EDUCATION/TRAINING PROGRAM

## 2021-10-28 PROCEDURE — 96375 TX/PRO/DX INJ NEW DRUG ADDON: CPT

## 2021-10-28 PROCEDURE — 71045 X-RAY EXAM CHEST 1 VIEW: CPT

## 2021-10-28 PROCEDURE — 84484 ASSAY OF TROPONIN QUANT: CPT

## 2021-10-28 PROCEDURE — 83605 ASSAY OF LACTIC ACID: CPT

## 2021-10-28 PROCEDURE — 83690 ASSAY OF LIPASE: CPT

## 2021-10-28 PROCEDURE — 700111 HCHG RX REV CODE 636 W/ 250 OVERRIDE (IP): Performed by: EMERGENCY MEDICINE

## 2021-10-28 PROCEDURE — 74177 CT ABD & PELVIS W/CONTRAST: CPT | Mod: ME

## 2021-10-28 PROCEDURE — 700105 HCHG RX REV CODE 258: Performed by: STUDENT IN AN ORGANIZED HEALTH CARE EDUCATION/TRAINING PROGRAM

## 2021-10-28 PROCEDURE — G0378 HOSPITAL OBSERVATION PER HR: HCPCS

## 2021-10-28 PROCEDURE — 81003 URINALYSIS AUTO W/O SCOPE: CPT

## 2021-10-28 PROCEDURE — 93005 ELECTROCARDIOGRAM TRACING: CPT

## 2021-10-28 PROCEDURE — 93005 ELECTROCARDIOGRAM TRACING: CPT | Performed by: EMERGENCY MEDICINE

## 2021-10-28 PROCEDURE — 96374 THER/PROPH/DIAG INJ IV PUSH: CPT

## 2021-10-28 PROCEDURE — 80053 COMPREHEN METABOLIC PANEL: CPT

## 2021-10-28 PROCEDURE — 87040 BLOOD CULTURE FOR BACTERIA: CPT | Mod: 91

## 2021-10-28 PROCEDURE — 96376 TX/PRO/DX INJ SAME DRUG ADON: CPT

## 2021-10-28 PROCEDURE — 700117 HCHG RX CONTRAST REV CODE 255: Performed by: EMERGENCY MEDICINE

## 2021-10-28 PROCEDURE — 99285 EMERGENCY DEPT VISIT HI MDM: CPT

## 2021-10-28 PROCEDURE — 700105 HCHG RX REV CODE 258: Performed by: EMERGENCY MEDICINE

## 2021-10-28 PROCEDURE — 85025 COMPLETE CBC W/AUTO DIFF WBC: CPT

## 2021-10-28 PROCEDURE — 96372 THER/PROPH/DIAG INJ SC/IM: CPT

## 2021-10-28 RX ORDER — SODIUM CHLORIDE 9 MG/ML
INJECTION, SOLUTION INTRAVENOUS CONTINUOUS
Status: DISCONTINUED | OUTPATIENT
Start: 2021-10-28 | End: 2021-10-29 | Stop reason: HOSPADM

## 2021-10-28 RX ORDER — ENALAPRILAT 1.25 MG/ML
1.25 INJECTION INTRAVENOUS EVERY 6 HOURS PRN
Status: DISCONTINUED | OUTPATIENT
Start: 2021-10-28 | End: 2021-10-29 | Stop reason: HOSPADM

## 2021-10-28 RX ORDER — LABETALOL HYDROCHLORIDE 5 MG/ML
10 INJECTION, SOLUTION INTRAVENOUS EVERY 4 HOURS PRN
Status: DISCONTINUED | OUTPATIENT
Start: 2021-10-28 | End: 2021-10-29 | Stop reason: HOSPADM

## 2021-10-28 RX ORDER — ONDANSETRON 2 MG/ML
4 INJECTION INTRAMUSCULAR; INTRAVENOUS ONCE
Status: COMPLETED | OUTPATIENT
Start: 2021-10-28 | End: 2021-10-28

## 2021-10-28 RX ORDER — BISACODYL 10 MG
10 SUPPOSITORY, RECTAL RECTAL
Status: DISCONTINUED | OUTPATIENT
Start: 2021-10-28 | End: 2021-10-29 | Stop reason: HOSPADM

## 2021-10-28 RX ORDER — AMOXICILLIN 250 MG
2 CAPSULE ORAL 2 TIMES DAILY
Status: DISCONTINUED | OUTPATIENT
Start: 2021-10-28 | End: 2021-10-29 | Stop reason: HOSPADM

## 2021-10-28 RX ORDER — ACETAMINOPHEN 325 MG/1
650 TABLET ORAL EVERY 6 HOURS PRN
Status: DISCONTINUED | OUTPATIENT
Start: 2021-10-28 | End: 2021-10-29 | Stop reason: HOSPADM

## 2021-10-28 RX ORDER — HEPARIN SODIUM 5000 [USP'U]/ML
5000 INJECTION, SOLUTION INTRAVENOUS; SUBCUTANEOUS EVERY 8 HOURS
Status: DISCONTINUED | OUTPATIENT
Start: 2021-10-28 | End: 2021-10-29 | Stop reason: HOSPADM

## 2021-10-28 RX ORDER — ONDANSETRON 4 MG/1
4 TABLET, ORALLY DISINTEGRATING ORAL EVERY 4 HOURS PRN
Status: DISCONTINUED | OUTPATIENT
Start: 2021-10-28 | End: 2021-10-29 | Stop reason: HOSPADM

## 2021-10-28 RX ORDER — SODIUM CHLORIDE, SODIUM LACTATE, POTASSIUM CHLORIDE, AND CALCIUM CHLORIDE .6; .31; .03; .02 G/100ML; G/100ML; G/100ML; G/100ML
500 INJECTION, SOLUTION INTRAVENOUS
Status: COMPLETED | OUTPATIENT
Start: 2021-10-28 | End: 2021-10-28

## 2021-10-28 RX ORDER — MORPHINE SULFATE 4 MG/ML
4 INJECTION, SOLUTION INTRAMUSCULAR; INTRAVENOUS ONCE
Status: COMPLETED | OUTPATIENT
Start: 2021-10-28 | End: 2021-10-28

## 2021-10-28 RX ORDER — ONDANSETRON 2 MG/ML
4 INJECTION INTRAMUSCULAR; INTRAVENOUS EVERY 4 HOURS PRN
Status: DISCONTINUED | OUTPATIENT
Start: 2021-10-28 | End: 2021-10-29 | Stop reason: HOSPADM

## 2021-10-28 RX ORDER — CYANOCOBALAMIN 1000 UG/ML
1000 INJECTION, SOLUTION INTRAMUSCULAR; SUBCUTANEOUS ONCE
Status: DISCONTINUED | OUTPATIENT
Start: 2021-10-28 | End: 2021-10-28

## 2021-10-28 RX ORDER — POLYETHYLENE GLYCOL 3350 17 G/17G
1 POWDER, FOR SOLUTION ORAL
Status: DISCONTINUED | OUTPATIENT
Start: 2021-10-28 | End: 2021-10-29 | Stop reason: HOSPADM

## 2021-10-28 RX ADMIN — SODIUM CHLORIDE, POTASSIUM CHLORIDE, SODIUM LACTATE AND CALCIUM CHLORIDE 500 ML: 600; 310; 30; 20 INJECTION, SOLUTION INTRAVENOUS at 19:00

## 2021-10-28 RX ADMIN — ONDANSETRON 4 MG: 2 INJECTION INTRAMUSCULAR; INTRAVENOUS at 23:54

## 2021-10-28 RX ADMIN — MORPHINE SULFATE 4 MG: 4 INJECTION INTRAVENOUS at 18:33

## 2021-10-28 RX ADMIN — SODIUM CHLORIDE: 9 INJECTION, SOLUTION INTRAVENOUS at 23:38

## 2021-10-28 RX ADMIN — IOHEXOL 100 ML: 350 INJECTION, SOLUTION INTRAVENOUS at 19:03

## 2021-10-28 RX ADMIN — FAMOTIDINE 20 MG: 10 INJECTION INTRAVENOUS at 23:41

## 2021-10-28 RX ADMIN — ONDANSETRON 4 MG: 2 INJECTION INTRAMUSCULAR; INTRAVENOUS at 20:45

## 2021-10-28 RX ADMIN — HEPARIN SODIUM 5000 UNITS: 5000 INJECTION, SOLUTION INTRAVENOUS; SUBCUTANEOUS at 23:41

## 2021-10-28 RX ADMIN — ONDANSETRON 4 MG: 2 INJECTION INTRAMUSCULAR; INTRAVENOUS at 18:32

## 2021-10-28 ASSESSMENT — FIBROSIS 4 INDEX: FIB4 SCORE: 2.9

## 2021-10-28 ASSESSMENT — PAIN DESCRIPTION - PAIN TYPE
TYPE: ACUTE PAIN
TYPE: ACUTE PAIN

## 2021-10-28 ASSESSMENT — LIFESTYLE VARIABLES
DOES PATIENT WANT TO STOP DRINKING: NO
HAVE PEOPLE ANNOYED YOU BY CRITICIZING YOUR DRINKING: NO
TOTAL SCORE: 0
DO YOU DRINK ALCOHOL: NO
EVER FELT BAD OR GUILTY ABOUT YOUR DRINKING: NO
TOTAL SCORE: 0
HAVE YOU EVER FELT YOU SHOULD CUT DOWN ON YOUR DRINKING: NO
CONSUMPTION TOTAL: INCOMPLETE
EVER HAD A DRINK FIRST THING IN THE MORNING TO STEADY YOUR NERVES TO GET RID OF A HANGOVER: NO
TOTAL SCORE: 0

## 2021-10-28 ASSESSMENT — PAIN SCALES - WONG BAKER: WONGBAKER_NUMERICALRESPONSE: DOESN'T HURT AT ALL

## 2021-10-28 NOTE — ED TRIAGE NOTES
"Chief Complaint   Patient presents with   • N/V     Pt states he has been nauseas and vomiting all morning.    • Abdominal Pain     Pt states he woke up this AM with abd pain in the middle region.    • Dizziness     Pt states he is dizzy when he stands up.    • Chills     Pt states he \"has chills every 20 mins\".      /80   Pulse 77   Temp 35.9 °C (96.6 °F) (Temporal)   Resp 18   Ht 1.715 m (5' 7.5\")   Wt 77.1 kg (170 lb)   SpO2 94%   BMI 26.23 kg/m²     Patient arrived to ED for the above complaint. Triage process explained to patient. Patient placed in lobby and told to notify staff of any changes.   "

## 2021-10-29 VITALS
TEMPERATURE: 98 F | HEIGHT: 68 IN | RESPIRATION RATE: 20 BRPM | OXYGEN SATURATION: 93 % | SYSTOLIC BLOOD PRESSURE: 125 MMHG | DIASTOLIC BLOOD PRESSURE: 60 MMHG | WEIGHT: 170 LBS | BODY MASS INDEX: 25.76 KG/M2 | HEART RATE: 94 BPM

## 2021-10-29 LAB
ANION GAP SERPL CALC-SCNC: 12 MMOL/L (ref 7–16)
BUN SERPL-MCNC: 8 MG/DL (ref 8–22)
CALCIUM SERPL-MCNC: 8.8 MG/DL (ref 8.5–10.5)
CHLORIDE SERPL-SCNC: 104 MMOL/L (ref 96–112)
CO2 SERPL-SCNC: 24 MMOL/L (ref 20–33)
CREAT SERPL-MCNC: 0.82 MG/DL (ref 0.5–1.4)
GLUCOSE SERPL-MCNC: 113 MG/DL (ref 65–99)
MAGNESIUM SERPL-MCNC: 1.9 MG/DL (ref 1.5–2.5)
POTASSIUM SERPL-SCNC: 3.4 MMOL/L (ref 3.6–5.5)
SODIUM SERPL-SCNC: 140 MMOL/L (ref 135–145)

## 2021-10-29 PROCEDURE — A9270 NON-COVERED ITEM OR SERVICE: HCPCS | Performed by: STUDENT IN AN ORGANIZED HEALTH CARE EDUCATION/TRAINING PROGRAM

## 2021-10-29 PROCEDURE — 700105 HCHG RX REV CODE 258: Performed by: STUDENT IN AN ORGANIZED HEALTH CARE EDUCATION/TRAINING PROGRAM

## 2021-10-29 PROCEDURE — 83735 ASSAY OF MAGNESIUM: CPT

## 2021-10-29 PROCEDURE — 700111 HCHG RX REV CODE 636 W/ 250 OVERRIDE (IP): Performed by: STUDENT IN AN ORGANIZED HEALTH CARE EDUCATION/TRAINING PROGRAM

## 2021-10-29 PROCEDURE — 700102 HCHG RX REV CODE 250 W/ 637 OVERRIDE(OP): Performed by: STUDENT IN AN ORGANIZED HEALTH CARE EDUCATION/TRAINING PROGRAM

## 2021-10-29 PROCEDURE — 99217 PR OBSERVATION CARE DISCHARGE: CPT | Performed by: STUDENT IN AN ORGANIZED HEALTH CARE EDUCATION/TRAINING PROGRAM

## 2021-10-29 PROCEDURE — 80048 BASIC METABOLIC PNL TOTAL CA: CPT

## 2021-10-29 PROCEDURE — G0378 HOSPITAL OBSERVATION PER HR: HCPCS

## 2021-10-29 PROCEDURE — 96372 THER/PROPH/DIAG INJ SC/IM: CPT

## 2021-10-29 PROCEDURE — 96376 TX/PRO/DX INJ SAME DRUG ADON: CPT

## 2021-10-29 RX ORDER — HYDROCHLOROTHIAZIDE 12.5 MG/1
12.5 TABLET ORAL
Status: DISCONTINUED | OUTPATIENT
Start: 2021-10-29 | End: 2021-10-29 | Stop reason: HOSPADM

## 2021-10-29 RX ORDER — FLUTICASONE PROPIONATE 50 MCG
1 SPRAY, SUSPENSION (ML) NASAL DAILY
Status: DISCONTINUED | OUTPATIENT
Start: 2021-10-29 | End: 2021-10-29

## 2021-10-29 RX ORDER — CETIRIZINE HYDROCHLORIDE 10 MG/1
10 TABLET ORAL DAILY
Status: DISCONTINUED | OUTPATIENT
Start: 2021-10-29 | End: 2021-10-29 | Stop reason: HOSPADM

## 2021-10-29 RX ADMIN — HEPARIN SODIUM 5000 UNITS: 5000 INJECTION, SOLUTION INTRAVENOUS; SUBCUTANEOUS at 05:28

## 2021-10-29 RX ADMIN — HYDROCHLOROTHIAZIDE 12.5 MG: 12.5 TABLET ORAL at 05:28

## 2021-10-29 RX ADMIN — CETIRIZINE HYDROCHLORIDE 10 MG: 10 TABLET, FILM COATED ORAL at 05:27

## 2021-10-29 RX ADMIN — FAMOTIDINE 20 MG: 10 INJECTION INTRAVENOUS at 05:28

## 2021-10-29 RX ADMIN — SODIUM CHLORIDE: 9 INJECTION, SOLUTION INTRAVENOUS at 06:36

## 2021-10-29 ASSESSMENT — PAIN DESCRIPTION - PAIN TYPE: TYPE: ACUTE PAIN

## 2021-10-29 NOTE — ED PROVIDER NOTES
"ED Provider Note    Scribed for Mari Wilson M.D. by Hamida Swanson. 10/28/2021  5:37 PM    Primary care provider: Dontae Sotelo M.D.  Means of arrival: Walk in  History obtained from: Patient, daughter  History limited by: None    CHIEF COMPLAINT  Chief Complaint   Patient presents with    N/V     Pt states he has been nauseas and vomiting all morning.     Abdominal Pain     Pt states he woke up this AM with abd pain in the middle region.     Dizziness     Pt states he is dizzy when he stands up.     Chills     Pt states he \"has chills every 20 mins\".        HPI  April Becker is a 89 y.o. male, with a history of benign pancreatic tumor, who presents to the Emergency Department accompanied by wife and son, for mid epigastric abdominal pain with an onset of this 4:00 AM morning. His pain does not radiate. He has not eaten anything today, but has drank a small amounts of fluid. Patient has taken TUMS without any relief. Exacerbating factors include eating. No alleviating factors were identified. He reports associated intermittent chills every 20 minutes, dizziness when standing, generalized weakness, indigestion, nausea, and vomiting (non bloody). He denies any associated diarrhea, leg swelling, fever, cough, congestion, or sore throat. He denies any history of MI, stroke, or diabetes. Patient denies using any prescribed antiacid medications. Surgical history include cholecystectomy. He is fully vaccinated against COVID.     REVIEW OF SYSTEMS  HEENT:  No sore throat   PULMONARY: no cough, or congestion   GI: abdominal pain, indigestion, nausea, and vomiting (non bloody), no diarrhea   Neuro: generalized weakness, and dizziness  Musculoskeletal: no leg swelling  Endocrine: chills, no fevers    See history of present illness. All other systems are negative.     PAST MEDICAL HISTORY   has a past medical history of Anesthesia, Arthritis, Breath shortness, Cataract, Heart burn, High cholesterol, Hip pain, " "bilateral (2018), Hypertension, Pain (), Pancreatic tumor, Parkinson's disease, and Pneumonia.    SURGICAL HISTORY   has a past surgical history that includes cataract extraction with iol (Bilateral, ); cholecystectomy (); ulcer excision (); gastroscopy (N/A, 3/24/2017); egd w/endoscopic ultrasound (N/A, 3/24/2017); egd with asp/bx (N/A, 3/24/2017); knee arthroscopy (Left, ); turp-vapor (); other surgical procedure (); other surgical procedure (); lumbar laminectomy diskectomy (); and colonoscopy ().    SOCIAL HISTORY  Social History     Tobacco Use    Smoking status: Former Smoker     Packs/day: 0.20     Years: 10.00     Pack years: 2.00     Types: Cigarettes, Cigars     Quit date:      Years since quittin.8    Smokeless tobacco: Never Used   Vaping Use    Vaping Use: Never used   Substance Use Topics    Alcohol use: Yes     Comment: 2-3 per month    Drug use: No      Social History     Substance and Sexual Activity   Drug Use No       FAMILY HISTORY  None noted.    CURRENT MEDICATIONS  Home Medications       Reviewed by Sandra Gramajo (Pharmacy Tech) on 10/28/21 at   Med List Status: Complete     Medication Last Dose Status   acetaminophen (TYLENOL) 500 MG Tab 10/27/2021 Active   cetirizine (ZYRTEC) 10 MG Tab 10/27/2021 Active   Cyanocobalamin (B-12 COMPLIANCE INJECTION INJ) >28 days Active   cyanocobalamin (VITAMIN B-12) injection 1,000 mcg  Active   diphenhydrAMINE (BENADRYL ALLERGY) 25 MG capsule >2 days Active   fluticasone (FLONASE) 50 MCG/ACT nasal spray prn Active   hydrochlorothiazide (MICROZIDE) 12.5 MG capsule 10/28/2021 Active                    ALLERGIES  No Known Allergies    PHYSICAL EXAM  VITAL SIGNS: /80   Pulse 77   Temp 35.9 °C (96.6 °F) (Temporal)   Resp 18   Ht 1.715 m (5' 7.5\")   Wt 77.1 kg (170 lb)   SpO2 94%   BMI 26.23 kg/m²     Constitutional: Well developed, Well nourished, Mild acute distress, dehydrated " appearance.   HEENT: Normocephalic, Atraumatic,  external ears normal, pharynx pink,  Mucous Membranes dry No rhinorrhea or mucosal edema  Eyes: PERRL, EOMI, Conjunctiva normal, No discharge.   Neck: Normal range of motion, No tenderness, Supple, No stridor.   Lymphatic: No lymphadenopathy    Cardiovascular: Regular Rate and Rhythm, No murmurs,  rubs, or gallops.   Thorax & Lungs: Lungs clear to auscultation bilaterally, No respiratory distress, No wheezes, rhales or rhonchi, No chest wall tenderness.   Abdomen: Bowel sounds normal, Soft, Pain in epigastric and right upper quadrant region, mildly distended,  No pulsatile masses., no rebound guarding or peritoneal signs.   Skin: Warm, Dry, No erythema, No rash,   Neurologic: Alert & oriented clear speech no focal deficits  Extremities: Equal, intact distal pulses, No cyanosis, clubbing or extremity edema,  No tenderness.   Musculoskeletal: Good range of motion in all major joints. No tenderness to palpation or major deformities noted.     DIAGNOSTIC STUDIES / PROCEDURES    LABS  Results for orders placed or performed during the hospital encounter of 10/28/21   CBC WITH DIFFERENTIAL   Result Value Ref Range    WBC 8.1 4.8 - 10.8 K/uL    RBC 5.18 4.70 - 6.10 M/uL    Hemoglobin 15.7 14.0 - 18.0 g/dL    Hematocrit 46.9 42.0 - 52.0 %    MCV 90.5 81.4 - 97.8 fL    MCH 30.3 27.0 - 33.0 pg    MCHC 33.5 (L) 33.7 - 35.3 g/dL    RDW 46.2 35.9 - 50.0 fL    Platelet Count 155 (L) 164 - 446 K/uL    MPV 10.3 9.0 - 12.9 fL    Neutrophils-Polys 85.30 (H) 44.00 - 72.00 %    Lymphocytes 9.00 (L) 22.00 - 41.00 %    Monocytes 4.60 0.00 - 13.40 %    Eosinophils 0.10 0.00 - 6.90 %    Basophils 0.50 0.00 - 1.80 %    Immature Granulocytes 0.50 0.00 - 0.90 %    Nucleated RBC 0.00 /100 WBC    Neutrophils (Absolute) 6.94 1.82 - 7.42 K/uL    Lymphs (Absolute) 0.73 (L) 1.00 - 4.80 K/uL    Monos (Absolute) 0.37 0.00 - 0.85 K/uL    Eos (Absolute) 0.01 0.00 - 0.51 K/uL    Baso (Absolute) 0.04 0.00 -  0.12 K/uL    Immature Granulocytes (abs) 0.04 0.00 - 0.11 K/uL    NRBC (Absolute) 0.00 K/uL   COMP METABOLIC PANEL   Result Value Ref Range    Sodium 139 135 - 145 mmol/L    Potassium 3.7 3.6 - 5.5 mmol/L    Chloride 102 96 - 112 mmol/L    Co2 26 20 - 33 mmol/L    Anion Gap 11.0 7.0 - 16.0    Glucose 138 (H) 65 - 99 mg/dL    Bun 8 8 - 22 mg/dL    Creatinine 0.77 0.50 - 1.40 mg/dL    Calcium 9.5 8.5 - 10.5 mg/dL    AST(SGOT) 20 12 - 45 U/L    ALT(SGPT) 14 2 - 50 U/L    Alkaline Phosphatase 105 (H) 30 - 99 U/L    Total Bilirubin 0.6 0.1 - 1.5 mg/dL    Albumin 4.4 3.2 - 4.9 g/dL    Total Protein 7.3 6.0 - 8.2 g/dL    Globulin 2.9 1.9 - 3.5 g/dL    A-G Ratio 1.5 g/dL   LIPASE   Result Value Ref Range    Lipase 20 11 - 82 U/L   URINALYSIS    Specimen: Urine   Result Value Ref Range    Color Yellow     Character Clear     Specific Gravity 1.044 <1.035    Ph 7.0 5.0 - 8.0    Glucose Negative Negative mg/dL    Ketones 15 (A) Negative mg/dL    Protein Negative Negative mg/dL    Bilirubin Negative Negative    Urobilinogen, Urine 1.0 Negative    Nitrite Negative Negative    Leukocyte Esterase Negative Negative    Occult Blood Negative Negative    Micro Urine Req see below    ESTIMATED GFR   Result Value Ref Range    GFR If African American >60 >60 mL/min/1.73 m 2    GFR If Non African American >60 >60 mL/min/1.73 m 2   Lactic Acid -STAT Once   Result Value Ref Range    Lactic Acid 1.5 0.5 - 2.0 mmol/L   TROPONIN   Result Value Ref Range    Troponin T 14 6 - 19 ng/L   EKG (NOW)   Result Value Ref Range    Report       [facility] Emergency Dept.    Test Date:  2021-10-28  Pt Name:    AUGIE ROLLINS                Department: ER  MRN:        [id number]                      Room:  Gender:     Male                         Technician: [id number]  :        [id number]                   Requested By:ER TRIAGE PROTOCOL  Order #:    [id number]                    Reading MD: HOSSEIN WESTON MD    Measurements  Intervals                                 Axis  Rate:       83                           P:          81  MD:         176                          QRS:        112  QRSD:       120                          T:          66  QT:         408  QTc:        480    Interpretive Statements  SINUS RHYTHM  NONSPECIFIC INTRAVENTRICULAR CONDUCTION DELAY  Compared to ECG 01/14/2020 18:02:57  Intraventricular conduction delay now present  ST (T wave) deviation no longer present  T-wave abnormality no longer present  Q waves no longer present  Electronically Signed On 10- 17:26:30 PDT by HOSSEIN WESTON MD        All labs reviewed by me.    EKG  12 lead EKG; Interpreted by emergency department physician    RADIOLOGY  CT-ABDOMEN-PELVIS WITH   Final Result         1. No acute inflammatory process in the abdomen or pelvis.   2. Multiloculated cystic pancreatic head mass, slightly larger compared to thousand 19 study. Serous cystadenoma is the primary differential consideration. No upstream pancreatic ductal dilatation.   3. Small hiatal hernia.      DX-CHEST-PORTABLE (1 VIEW)   Final Result      No acute cardiopulmonary abnormality.           The radiologist's interpretation of all radiological studies have been reviewed by me.    COURSE & MEDICAL DECISION MAKING  Nursing notes, VS, PMSFHx reviewed in chart.    5:37 PM - Patient seen and examined at bedside. Discussed plan of care with patient and his family. I informed him I will obtain lab and imaging studies to rule out any acute processes. He will be treated with fluids for dehydration, and nausea medications. They are understanding and agreeable to plan. Patient will be treated with lactated ringers infusion bolus, morphine 4 mg injection, and Zofran 4 mg injection. Intravenous fluids administered for dehydration, and acute vomiting. Ordered EKG, UA, Lipase, CMP, CBC with diff, Estimated GFR, Troponin, Blood culture, Lactic acid, CT Abdomen Pelvis, and DX Chest to evaluate his symptoms. The  differential diagnoses include but are not limited to: pancreatitis, peptic ulcer disesase, hepatitis, cardiac ischemia.    7:47 PM - Patient was reevaluated at bedside. He is resting in bed, still feeling nauseous. Discussed lab and radiology results with the patient as outlined above. He is understanding and agreeable to plan for hospitalization.     7:50 PM - Paged [facility].     8:09 PM - I discussed the patient's case and the above findings with R IM who will evaluate the patient for hospitalization.     8:17 PM - Patient was reevaluated at bedside. He is vomiting and more nausea. I informed him I will treat him with Zofran 4 mg injection for his nausea. Patient verbalizes understanding and agreement to this plan of care.      HYDRATION: Based on the patient's presentation of Acute Vomiting and Dehydration the patient was given IV fluids. IV Hydration was used because oral hydration was not adequate alone. Upon recheck following hydration, the patient was mildy improved.    DISPOSITION:  Patient will be hospitalized by R IM in guarded condition.    FINAL IMPRESSION  1. Dehydration    2. Abdominal pain, vomiting, and diarrhea    3. Pancreatic mass        Hamida AREVALO (Rajiv), am scribing for, and in the presence of, Mari Wilson M.D..    Electronically signed by: Hamida Swanson (Rajiv), 10/28/2021    IMari M.D. personally performed the services described in this documentation, as scribed by Hamida Swanson in my presence, and it is both accurate and complete.    C    The note accurately reflects work and decisions made by me.  Mari Wilson M.D.  10/28/2021  10:35 PM

## 2021-10-29 NOTE — H&P
"[facility] MEDICINE HISTORY AND PHYSICAL     PATIENT ID:  NAME:  April Becker  MRN:               [id number]  YOB: 1932    Date of Admission:   10/28/2021     Attending:   Dr. Edward     Resident:   Zac Boyer D.O.  Family Medicine Resident PGY-2      Primary Care Physician:    Dr. Sotelo     CC:    Vomiting and abdominal pain       HPI:   April Becker is a 89 y.o. male who presented with history of pancreatic mass and hypertension presented to the emergency department for nausea, vomiting and abdominal pain for a few days.  He has been experiencing epigastric abdominal pain as well as what he describes as heartburn.  The heartburn is described as pain and mucus-like sensation near his upper chest.  Abdominal pain and vomiting does not change with consumption of food.  He says he is able to tolerate eating and drinking, but has had 5 episodes of emesis today.  He has had associated chills and weakness. Denies fevers, chest pain, shortness of breath, diarrhea, hematochezia, or dysuria.  Denies recent sick contacts.  Reports receiving Covid vaccination.     In emergency department, patient tachycardic with heart rate up to 110s and hypertensive with systolic blood pressure in the 160s.  CBC essentially normal with absence of anemia or leukocytosis.  CMP essentially normal.  Lactic acid within normal limits.  Troponin 14.  UA negative for UTI.  Chest x-ray essentially unremarkable.  ECG negative for ST changes.  CT abdomen showed multiloculated cystic pancreatic mass which is enlarged since last CT scan     REVIEW OF SYSTEMS:   See HPI.             PAST MEDICAL HISTORY:  Past Medical History:   Diagnosis Date   • Anesthesia     PONV, and hard to wake up   • Arthritis     osteoarthritis, hips   • Breath shortness     \"sob came before 11/2016, did a chest x-ray, and it didn't show anything\"   • Cataract     Bilateral IOL implants   • Heart burn     no meds   • High cholesterol     no meds   • Hip " "pain, bilateral 11/30/2018    and lower back.   • Hypertension    • Pain 2017    stomach, hips, back   • Pancreatic tumor     Inconclusive biopsy which caused pancreatitis. Follow with CT every 6 months and no changes.    • Parkinson's disease     no meds   • Pneumonia     November 2016, no admission        PAST SURGICAL HISTORY:  Past Surgical History:   Procedure Laterality Date   • GASTROSCOPY N/A 3/24/2017    Procedure: GASTROSCOPY;  Surgeon: Raphael Crawford M.D.;  Location: SURGERY [facility];  Service:    • EGD W/ENDOSCOPIC ULTRASOUND N/A 3/24/2017    Procedure: EGD W/ENDOSCOPIC ULTRASOUND- UPPER, LINEAR, RADIAL ON STANDBY;  Surgeon: Raphael Crawford M.D.;  Location: SURGERY [facility];  Service:    • EGD WITH ASP/BX N/A 3/24/2017    Procedure: EGD WITH ASP/BX - FINE NEEDLE ASPIRATION;  Surgeon: Raphael Crawford M.D.;  Location: SURGERY [facility];  Service:    • CATARACT EXTRACTION WITH IOL Bilateral 2016   • CHOLECYSTECTOMY  2013   • KNEE ARTHROSCOPY Left 2013   • COLONOSCOPY  2013   • OTHER SURGICAL PROCEDURE  2000    salivary gland removed.    • LUMBAR LAMINECTOMY DISKECTOMY  1995    L5 \"cleaned up\"   • TURP-VAPOR  1990    TURP   • OTHER SURGICAL PROCEDURE  1988    salivary gland removed.   • ULCER EXCISION  1955    gastric ulcer        FAMILY HISTORY:  No family history on file.    SOCIAL HISTORY:   Lives with wife at home.  Denies heavy alcohol use.  Denies drug or tobacco use.  Retired.    DIET:   No orders of the defined types were placed in this encounter.      ALLERGIES:  No Known Allergies    OUTPATIENT MEDICATIONS:    Current Facility-Administered Medications:   •  cyanocobalamin (VITAMIN B-12) injection 1,000 mcg, 1,000 mcg, Intramuscular, Once, Dontae Sotelo M.D.    Current Outpatient Medications:   •  hydrochlorothiazide (MICROZIDE) 12.5 MG capsule, hydrochlorothiazide 12.5 mg capsule  TAKE ONE CAPSULE BY MOUTH EVERY DAY, Disp: , Rfl:   •  Cyanocobalamin (B-12 COMPLIANCE INJECTION " "INJ), Inject  as directed. Once a month, Disp: , Rfl:   •  diphenhydrAMINE (BENADRYL ALLERGY) 25 MG capsule, Take 25 mg by mouth every 6 hours as needed., Disp: , Rfl:   •  acetaminophen (TYLENOL) 500 MG Tab, Take 500-1,000 mg by mouth every 6 hours as needed., Disp: , Rfl:   •  cetirizine (ZYRTEC) 10 MG Tab, Take 1 tablet by mouth every day., Disp: 30 tablet, Rfl: 0  •  fluticasone (FLONASE) 50 MCG/ACT nasal spray, Administer 1 Spray into affected nostril(S) every day., Disp: 15.8 mL, Rfl: 0    PHYSICAL EXAM:  Vitals:    10/28/21 1429 10/28/21 1435 10/28/21 1837   BP: 153/80  145/66   Pulse: 77  86   Resp: 18  (!) 24   Temp: 35.9 °C (96.6 °F)     TempSrc: Temporal     SpO2: 94%  95%   Weight:  77.1 kg (170 lb)    Height: 1.702 m (5' 7\") 1.715 m (5' 7.5\")    , Temp (24hrs), Av.9 °C (96.6 °F), Min:35.9 °C (96.6 °F), Max:35.9 °C (96.6 °F)  , Pulse Oximetry: 95 %, O2 Delivery Device: None - Room Air    General: Pt resting in NAD, cooperative   Skin:  Pink, warm and dry.  No rashes  HEENT: NC/AT. PERRL. EOMI. MMM. No nasal discharge. Oropharynx nonerythematous without exudate/plaques  Neck:  Supple without lymphadenopathy   Lungs:  Symmetrical.  CTAB with no W/R/R.    Cardiovascular:  S1/S2 RRR without M/R/G.  Abdomen:  Abdomen is soft, non-distended. TTP epigastrium no rebound or guarding. +BS  Extremities:  No LE edema. Bilateral palpable DP/PT pulses.   CNS:  No gross focal neurologic deficits.       LAB TESTS:   Recent Labs     10/28/21  1512   WBC 8.1   RBC 5.18   HEMOGLOBIN 15.7   HEMATOCRIT 46.9   MCV 90.5   MCH 30.3   RDW 46.2   PLATELETCT 155*   MPV 10.3   NEUTSPOLYS 85.30*   LYMPHOCYTES 9.00*   MONOCYTES 4.60   EOSINOPHILS 0.10   BASOPHILS 0.50         Recent Labs     10/28/21  1512   SODIUM 139   POTASSIUM 3.7   CHLORIDE 102   CO2 26   BUN 8   CREATININE 0.77   CALCIUM 9.5   ALBUMIN 4.4       CULTURES:   Results     Procedure Component Value Units Date/Time    URINALYSIS [[id number]] Collected: 10/28/21 " "1940    Order Status: Completed Specimen: Urine Updated: 10/28/21 1955     Micro Urine Req see below     Comment: Microscopic examination not performed when specimen is clear  and chemically negative for protein, blood, leukocyte esterase  and nitrite.         Blood Culture [[id number]] Collected: 10/28/21 1840    Order Status: Completed Specimen: Blood from Peripheral Updated: 10/28/21 1943    Narrative:      From different peripheral sites, if not done within the last  24 hours (Per Hospital Policy: Only change specimen source to  \"Line\" if specified by physician order)    Blood Culture [[id number]]     Order Status: Sent Specimen: Blood from Peripheral           IMAGES:  CT-ABDOMEN-PELVIS WITH   Final Result         1. No acute inflammatory process in the abdomen or pelvis.   2. Multiloculated cystic pancreatic head mass, slightly larger compared to thousand 19 study. Serous cystadenoma is the primary differential consideration. No upstream pancreatic ductal dilatation.   3. Small hiatal hernia.      DX-CHEST-PORTABLE (1 VIEW)   Final Result      No acute cardiopulmonary abnormality.               ASSESSMENT/PLAN:   89 y.o. male admitted for:       #Abdominal pain   #Emesis   Patient presenting with few days of epigastric abdominal pain, emesis, nausea and chills.  CT scan significant for hiatal hernia as well as enlarging pancreatic mass.  CBC and CMP essentially normal.  Abdominal pain and emesis may be secondary to hiatal mass versus viral gastritis.  -Clear liquid diet as tolerated  -IV Pepcid  -GI cocktail as needed  -IV fluids  -Zofran as needed for nausea  -Tylenol n as needed for abdominal pain    #Pancreatic mass  -Multiloculated cystic mass at head of pancreas has enlarged  -Followed by Dr. Goyal   -Day team consider consultation with general surgery     #HTN   -Continue home HCTZ    Core Measures:  Lines: PIV  Tubes: None  Diet: Clear liquid diet  Abx: None  DVT Ppx: Heparin  GI Ppx: Pepcid  PCP: " Dr. Sotelo   CODE STATUS: CPR okay/DNI     Dispo: inpatient for nausea and vomiting requiring IVF and IV medication

## 2021-10-29 NOTE — DISCHARGE SUMMARY
"Discharge Summary    CHIEF COMPLAINT ON ADMISSION  Chief Complaint   Patient presents with   • N/V     Pt states he has been nauseas and vomiting all morning.    • Abdominal Pain     Pt states he woke up this AM with abd pain in the middle region.    • Dizziness     Pt states he is dizzy when he stands up.    • Chills     Pt states he \"has chills every 20 mins\".        Reason for Admission  OTHER     Admission Date  10/28/2021    CODE STATUS  Partial Code    HPI & HOSPITAL COURSE  This is a 89 y.o. male here with subjective dizziness  April Becker is a 89 y.o. male who presented with history of pancreatic mass and hypertension presented to the emergency department for nausea, vomiting and abdominal pain for a few days.  He has been experiencing epigastric abdominal pain as well as what he describes as heartburn.  The heartburn is described as pain and mucus-like sensation near his upper chest.  Abdominal pain and vomiting does not change with consumption of food.  He says he is able to tolerate eating and drinking, but has had 5 episodes of emesis today.  He has had associated chills and weakness. Denies fevers, chest pain, shortness of breath, diarrhea, hematochezia, or dysuria.  Denies recent sick contacts.  Reports receiving Covid vaccination.      In emergency department, patient tachycardic with heart rate up to 110s and hypertensive with systolic blood pressure in the 160s.  CBC essentially normal with absence of anemia or leukocytosis.  CMP essentially normal.  Lactic acid within normal limits.  Troponin 14.  UA negative for UTI.  Chest x-ray essentially unremarkable.  ECG negative for ST changes.  CT abdomen showed multiloculated cystic pancreatic mass which is enlarged since last CT scan     Upon further work-up and evaluations in the hospital patient underwent fluid resuscitation with positive response in addition patient got minimal amounts of antiemetic medications also with favorable response.  On " morning of discharge patient was tolerating and his meals with out any nausea vomiting patient was ambulating with only the assistance of his cane which he normally uses.  Furthermore patient with is without subjective dizziness was without orthostatic hypotension.  Patient did not require medications upon discharge.    Therefore, he is discharged in good and stable condition to home with close outpatient follow-up.    The patient recovered much more quickly than anticipated on admission.    Discharge Date  10/29/2021    FOLLOW UP ITEMS POST DISCHARGE  Continue with regular medications  Continue to use cane as needed    DISCHARGE DIAGNOSES  Active Problems:    * No active hospital problems. *  Resolved Problems:    * No resolved hospital problems. *      FOLLOW UP  Future Appointments   Date Time Provider Department Center   11/24/2021  1:20 PM [facility]  [facility] RICE MA [facility] Rosangela Sotelo M.D.  [address] [place] [id number]  [phone]    In 2 weeks  As needed post hospital discharge      MEDICATIONS ON DISCHARGE     Medication List      CONTINUE taking these medications      Instructions   acetaminophen 500 MG Tabs  Commonly known as: TYLENOL   Take 500-1,000 mg by mouth every 6 hours as needed.  Dose: 500-1,000 mg     B-12 COMPLIANCE INJECTION INJ   Inject  as directed. Once a month     Benadryl Allergy 25 MG capsule  Generic drug: diphenhydrAMINE   Take 25 mg by mouth every 6 hours as needed.  Dose: 25 mg     cetirizine 10 MG Tabs  Commonly known as: ZYRTEC   Take 1 tablet by mouth every day.  Dose: 10 mg     fluticasone 50 MCG/ACT nasal spray  Commonly known as: FLONASE   Administer 1 Spray into affected nostril(S) every day.  Dose: 1 Spray     hydrochlorothiazide 12.5 MG capsule  Commonly known as: MICROZIDE   hydrochlorothiazide 12.5 mg capsule   TAKE ONE CAPSULE BY MOUTH EVERY DAY            Allergies  No Known Allergies    DIET  Orders Placed This Encounter   Procedures   • Diet Order Diet:  Regular     Standing Status:   Standing     Number of Occurrences:   1     Order Specific Question:   Diet:     Answer:   Regular [1]       ACTIVITY  As tolerated.  Weight bearing as tolerated    CONSULTATIONS  None    PROCEDURES  None    LABORATORY  Lab Results   Component Value Date    SODIUM 140 10/29/2021    POTASSIUM 3.4 (L) 10/29/2021    CHLORIDE 104 10/29/2021    CO2 24 10/29/2021    GLUCOSE 113 (H) 10/29/2021    BUN 8 10/29/2021    CREATININE 0.82 10/29/2021    CREATININE 1.2 02/16/2008        Lab Results   Component Value Date    WBC 8.1 10/28/2021    HEMOGLOBIN 15.7 10/28/2021    HEMATOCRIT 46.9 10/28/2021    PLATELETCT 155 (L) 10/28/2021        Total time of the discharge process exceeds 35 minutes.

## 2021-10-29 NOTE — HOSPITAL COURSE
April Becker is a 89 y.o. male who presented with history of pancreatic mass and hypertension presented to the emergency department for nausea, vomiting and abdominal pain for a few days.  He has been experiencing epigastric abdominal pain as well as what he describes as heartburn.  The heartburn is described as pain and mucus-like sensation near his upper chest.  Abdominal pain and vomiting does not change with consumption of food.  He says he is able to tolerate eating and drinking, but has had 5 episodes of emesis today.  He has had associated chills and weakness. Denies fevers, chest pain, shortness of breath, diarrhea, hematochezia, or dysuria.  Denies recent sick contacts.  Reports receiving Covid vaccination.      In emergency department, patient tachycardic with heart rate up to 110s and hypertensive with systolic blood pressure in the 160s.  CBC essentially normal with absence of anemia or leukocytosis.  CMP essentially normal.  Lactic acid within normal limits.  Troponin 14.  UA negative for UTI.  Chest x-ray essentially unremarkable.  ECG negative for ST changes.  CT abdomen showed multiloculated cystic pancreatic mass which is enlarged since last CT scan

## 2021-10-29 NOTE — DISCHARGE INSTRUCTIONS
Discharge Instructions    Discharged to home by car with relative. Discharged via wheelchair, hospital escort: Yes.  Special equipment needed: Not Applicable    Be sure to schedule a follow-up appointment with your primary care doctor or any specialists as instructed.     Discharge Plan:   Diet Plan: Discussed  Activity Level: Discussed  Confirmed Follow up Appointment: Patient to Call and Schedule Appointment  Confirmed Symptoms Management: Discussed  Medication Reconciliation Updated: Yes  Influenza Vaccine Indication: Patient Refuses    I understand that a diet low in cholesterol, fat, and sodium is recommended for good health. Unless I have been given specific instructions below for another diet, I accept this instruction as my diet prescription.   Other diet: Regular    Special Instructions: None    · Is patient discharged on Warfarin / Coumadin?   No     Depression / Suicide Risk    As you are discharged from this [facility] facility, it is important to learn how to keep safe from harming yourself.    Recognize the warning signs:  · Abrupt changes in personality, positive or negative- including increase in energy   · Giving away possessions  · Change in eating patterns- significant weight changes-  positive or negative  · Change in sleeping patterns- unable to sleep or sleeping all the time   · Unwillingness or inability to communicate  · Depression  · Unusual sadness, discouragement and loneliness  · Talk of wanting to die  · Neglect of personal appearance   · Rebelliousness- reckless behavior  · Withdrawal from people/activities they love  · Confusion- inability to concentrate     If you or a loved one observes any of these behaviors or has concerns about self-harm, here's what you can do:  · Talk about it- your feelings and reasons for harming yourself  · Remove any means that you might use to hurt yourself (examples: pills, rope, extension cords, firearm)  · Get professional help from the community  (Mental Health, Substance Abuse, psychological counseling)  · Do not be alone:Call your Safe Contact- someone whom you trust who will be there for you.  · Call your local CRISIS HOTLINE 391-1209 or 475-754-0734  · Call your local Children's Mobile Crisis Response Team [place] (223) 317-6083 or www.HALFPOPS  · Call the toll free National Suicide Prevention Hotlines   · National Suicide Prevention Lifeline 979-939-PDYL (4911)  · [employer] 800-SUICIDE (258-7497)      Abdominal Pain (Nonspecific)  Your exam might not show the exact reason you have abdominal pain. Since there are many different causes of abdominal pain, another checkup and more tests may be needed. It is very important to follow up for lasting (persistent) or worsening symptoms. A possible cause of abdominal pain in any person who still has his or her appendix is acute appendicitis. Appendicitis is often hard to diagnose. Normal blood tests, urine tests, ultrasound, and CT scans do not completely rule out early appendicitis or other causes of abdominal pain. Sometimes, only the changes that happen over time will allow appendicitis and other causes of abdominal pain to be determined. Other potential problems that may require surgery may also take time to become more apparent. Because of this, it is important that you follow all of the instructions below.  HOME CARE INSTRUCTIONS   · Rest as much as possible.   · Do not eat solid food until your pain is gone.   · While adults or children have pain: A diet of water, weak decaffeinated tea, broth or bouillon, gelatin, oral rehydration solutions (ORS), frozen ice pops, or ice chips may be helpful.   · When pain is gone in adults or children: Start a light diet (dry toast, crackers, applesauce, or white rice). Increase the diet slowly as long as it does not bother you. Eat no dairy products (including cheese and eggs) and no spicy, fatty, fried, or high-fiber foods.   · Use no  alcohol, caffeine, or cigarettes.   · Take your regular medicines unless your caregiver told you not to.   · Take any prescribed medicine as directed.   · Only take over-the-counter or prescription medicines for pain, discomfort, or fever as directed by your caregiver. Do not give aspirin to children.   If your caregiver has given you a follow-up appointment, it is very important to keep that appointment. Not keeping the appointment could result in a permanent injury and/or lasting (chronic) pain and/or disability. If there is any problem keeping the appointment, you must call to reschedule.   SEEK IMMEDIATE MEDICAL CARE IF:   · Your pain is not gone in 24 hours.   · Your pain becomes worse, changes location, or feels different.   · You or your child has an oral temperature above 102° F (38.9° C), not controlled by medicine.   · Your baby is older than 3 months with a rectal temperature of 102° F (38.9° C) or higher.   · Your baby is 3 months old or younger with a rectal temperature of 100.4° F (38° C) or higher.   · You have shaking chills.   · You keep throwing up (vomiting) or cannot drink liquids.   · There is blood in your vomit or you see blood in your bowel movements.   · Your bowel movements become dark or black.   · You have frequent bowel movements.   · Your bowel movements stop (become blocked) or you cannot pass gas.   · You have bloody, frequent, or painful urination.   · You have yellow discoloration in the skin or whites of the eyes.   · Your stomach becomes bloated or bigger.   · You have dizziness or fainting.   · You have chest or back pain.   MAKE SURE YOU:   · Understand these instructions.   · Will watch your condition.   · Will get help right away if you are not doing well or get worse.   Document Released: 12/18/2006 Document Revised: 03/11/2013 Document Reviewed: 11/15/2010  [employer]® Patient Information ©2013 [employer].

## 2021-10-29 NOTE — PROGRESS NOTES
Discharge instructions, medications and follow-up reviewed with pt, pt verbalized understanding and denies questions. Discharge paperwork given to pt. PIV removed, armband removed. Pt son arrive to unit. Pt transported off unit via wheelchair with hospital escort.

## 2021-11-04 ENCOUNTER — OFFICE VISIT (OUTPATIENT)
Dept: URGENT CARE | Facility: PHYSICIAN GROUP | Age: 86
End: 2021-11-04
Payer: COMMERCIAL

## 2021-11-04 VITALS
WEIGHT: 174 LBS | TEMPERATURE: 97.9 F | BODY MASS INDEX: 26.37 KG/M2 | SYSTOLIC BLOOD PRESSURE: 158 MMHG | HEIGHT: 68 IN | HEART RATE: 90 BPM | OXYGEN SATURATION: 95 % | RESPIRATION RATE: 20 BRPM | DIASTOLIC BLOOD PRESSURE: 82 MMHG

## 2021-11-04 DIAGNOSIS — K21.9 GASTROESOPHAGEAL REFLUX DISEASE, UNSPECIFIED WHETHER ESOPHAGITIS PRESENT: ICD-10-CM

## 2021-11-04 DIAGNOSIS — R03.0 ELEVATED BLOOD PRESSURE READING: ICD-10-CM

## 2021-11-04 DIAGNOSIS — C25.0 MALIGNANT NEOPLASM OF HEAD OF PANCREAS (HCC): ICD-10-CM

## 2021-11-04 PROBLEM — M25.549 ARTHRALGIA OF HAND: Status: ACTIVE | Noted: 2021-07-29

## 2021-11-04 PROCEDURE — 99214 OFFICE O/P EST MOD 30 MIN: CPT | Performed by: FAMILY MEDICINE

## 2021-11-04 RX ORDER — OMEPRAZOLE 20 MG/1
20 CAPSULE, DELAYED RELEASE ORAL DAILY
Qty: 30 CAPSULE | Refills: 0 | Status: SHIPPED | OUTPATIENT
Start: 2021-11-04 | End: 2021-11-09 | Stop reason: SDUPTHER

## 2021-11-04 ASSESSMENT — FIBROSIS 4 INDEX: FIB4 SCORE: 3.07

## 2021-11-04 NOTE — PROGRESS NOTES
"  Subjective:      89 y.o. male presents to urgent care for mid epigastric pain that started last Thursday.  He presented to the emergency department the same day, and was admitted overnight.  He had also been experiencing vomiting, so he was given antiemetic and IV fluids.  He was discharged on Friday, 10/29/2021 as he was able to tolerate foods.  His pain returned on Saturday.  It is intermittent, he is unsure of alleviating or aggravating factors, is described as burning.  He has tried Gaviscon and Tums, both of which have resolved the pain for approximately 1 hour, and then it returns.  He does note an associated cough.  Bowel movements remain regular and soft.  No change in urinary urgency, frequency, dysuria, or hematuria.    Blood pressure is elevated today in urgent care.  He does have a history of hypertension for which he takes hydrochlorothiazide.  He did take his medications today.  He denies any chest pain, palpitations, or shortness of breath.    He denies any other questions or concerns at this time.    Current problem list, medication, and past medical/surgical history were reviewed in Epic.    ROS  See HPI     Objective:      /82 (BP Location: Right arm, Patient Position: Sitting, BP Cuff Size: Adult)   Pulse 90   Temp 36.6 °C (97.9 °F) (Temporal)   Resp 20   Ht 1.727 m (5' 8\")   Wt 78.9 kg (174 lb)   SpO2 95%   BMI 26.46 kg/m²     Physical Exam  Constitutional:       General: He is not in acute distress.     Appearance: He is not diaphoretic.   Cardiovascular:      Rate and Rhythm: Normal rate and regular rhythm.      Heart sounds: Normal heart sounds.   Pulmonary:      Effort: Pulmonary effort is normal. No respiratory distress.      Breath sounds: Normal breath sounds.   Abdominal:      General: Abdomen is flat. Bowel sounds are normal. There is no distension.      Tenderness: There is abdominal tenderness (epi-gastric region).   Neurological:      Mental Status: He is alert. "   Psychiatric:         Mood and Affect: Affect normal.         Judgment: Judgment normal.       Assessment/Plan:     1. Gastroesophageal reflux disease, unspecified whether esophagitis present  2. 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  We will start Prilosec 20 mg daily, if by Sunday symptoms are persisting he will increase this to 2 times daily.  Of note, he does have a history of pancreatic neoplasm, which has enlarged on imaging done last week in the emergency department.  He does already follow with primary care, GI, and oncology.  - omeprazole (PRILOSEC) 20 MG delayed-release capsule; Take 1 Capsule by mouth every day.  Dispense: 30 Capsule; Refill: 0    3. Elevated blood pressure reading  He is asymptomatic.  He will follow with his primary care provider.      Instructed to return to Urgent Care or nearest Emergency Department if symptoms fail to improve, for any change in condition, further concerns, or new concerning symptoms. Patient states understanding of the plan of care and discharge instructions.    Chioma Dunbar M.D.

## 2021-11-09 ENCOUNTER — OFFICE VISIT (OUTPATIENT)
Dept: MEDICAL GROUP | Facility: CLINIC | Age: 86
End: 2021-11-09
Payer: COMMERCIAL

## 2021-11-09 VITALS
TEMPERATURE: 98.6 F | WEIGHT: 168.6 LBS | HEIGHT: 67 IN | DIASTOLIC BLOOD PRESSURE: 64 MMHG | SYSTOLIC BLOOD PRESSURE: 140 MMHG | BODY MASS INDEX: 26.46 KG/M2 | RESPIRATION RATE: 16 BRPM | HEART RATE: 92 BPM | OXYGEN SATURATION: 93 %

## 2021-11-09 DIAGNOSIS — R03.0 ELEVATED BLOOD PRESSURE READING: ICD-10-CM

## 2021-11-09 DIAGNOSIS — I10 PRIMARY HYPERTENSION: ICD-10-CM

## 2021-11-09 DIAGNOSIS — R14.0 ABDOMINAL BLOATING: ICD-10-CM

## 2021-11-09 DIAGNOSIS — K86.9 DISORDER OF PANCREAS: ICD-10-CM

## 2021-11-09 DIAGNOSIS — F51.01 PRIMARY INSOMNIA: ICD-10-CM

## 2021-11-09 PROCEDURE — 99213 OFFICE O/P EST LOW 20 MIN: CPT | Mod: GE | Performed by: STUDENT IN AN ORGANIZED HEALTH CARE EDUCATION/TRAINING PROGRAM

## 2021-11-09 RX ORDER — ONDANSETRON 4 MG/1
4 TABLET, FILM COATED ORAL EVERY 4 HOURS PRN
Qty: 20 TABLET | Refills: 1 | Status: SHIPPED | OUTPATIENT
Start: 2021-11-09

## 2021-11-09 RX ORDER — HYDROCHLOROTHIAZIDE 12.5 MG/1
CAPSULE, GELATIN COATED ORAL
Qty: 90 CAPSULE | Refills: 3 | Status: SHIPPED | OUTPATIENT
Start: 2021-11-09 | End: 2022-10-26 | Stop reason: SDUPTHER

## 2021-11-09 RX ORDER — OMEPRAZOLE 20 MG/1
20 CAPSULE, DELAYED RELEASE ORAL DAILY
Qty: 30 CAPSULE | Refills: 0 | Status: SHIPPED | OUTPATIENT
Start: 2021-11-09 | End: 2021-12-30

## 2021-11-09 ASSESSMENT — FIBROSIS 4 INDEX: FIB4 SCORE: 3.07

## 2021-11-09 NOTE — PROGRESS NOTES
"HPI  April Becker is a 89 y.o. male presenting to followup hospitalization as well as abdominal bloating.    Problem   Abdominal Bloating    Several month history of fluctuating midepigastric bloating relieved with bowel movements.  Not previously associated with burning pain, nausea, vomiting.    Recent hospitalization for intractable vomiting which resolved with IVF hydration and minimal antiemetics.   Now back to baseline abdominal discomfort - was started on omeprazole by an urgent care.     Htn (Hypertension)    Needs refill of HCTZ     Disorder of Pancreas    Pancreatic mass with slight increase in size since prior imaging               PMH   Born at Gestational Age: <None>  Past Medical History:   Diagnosis Date   • Anesthesia     PONV, and hard to wake up   • Arthritis     osteoarthritis, hips   • Breath shortness     \"sob came before 11/2016, did a chest x-ray, and it didn't show anything\"   • Cataract     Bilateral IOL implants   • Heart burn     no meds   • High cholesterol     no meds   • Hip pain, bilateral 11/30/2018    and lower back.   • Hypertension    • Pain 2017    stomach, hips, back   • Pancreatic tumor     Inconclusive biopsy which caused pancreatitis. Follow with CT every 6 months and no changes.    • Parkinson's disease     no meds   • Pneumonia     November 2016, no admission        Past Surgical History:   Procedure Laterality Date   • GASTROSCOPY N/A 3/24/2017    Procedure: GASTROSCOPY;  Surgeon: Raphael Crawford M.D.;  Location: [facility];  Service:    • EGD W/ENDOSCOPIC ULTRASOUND N/A 3/24/2017    Procedure: EGD W/ENDOSCOPIC ULTRASOUND- UPPER, LINEAR, RADIAL ON STANDBY;  Surgeon: Raphael Crawford M.D.;  Location: [facility];  Service:    • EGD WITH ASP/BX N/A 3/24/2017    Procedure: EGD WITH ASP/BX - FINE NEEDLE ASPIRATION;  Surgeon: Raphael Crawford M.D.;  Location: [facility];  Service:    • CATARACT EXTRACTION WITH IOL Bilateral 2016   • " "CHOLECYSTECTOMY     • KNEE ARTHROSCOPY Left 2013   • COLONOSCOPY     • OTHER SURGICAL PROCEDURE      salivary gland removed.    • LUMBAR LAMINECTOMY DISKECTOMY      L5 \"cleaned up\"   • TURP-VAPOR      TURP   • OTHER SURGICAL PROCEDURE      salivary gland removed.   • ULCER EXCISION      gastric ulcer        Social History     Tobacco Use   • Smoking status: Former Smoker     Packs/day: 0.20     Years: 10.00     Pack years: 2.00     Types: Cigarettes, Cigars     Quit date:      Years since quittin.8   • Smokeless tobacco: Never Used   Substance Use Topics   • Alcohol use: Yes     Comment: 2-3 per month       No family history on file.      PE  /64 (BP Location: Right arm, Patient Position: Sitting, BP Cuff Size: Adult)   Pulse 92   Temp 37 °C (98.6 °F) (Temporal)   Resp 16   Ht 1.702 m (5' 7\")   Wt 76.5 kg (168 lb 9.6 oz)   SpO2 93%   BMI 26.41 kg/m²     Physical Exam  Constitutional:       General: He is not in acute distress.     Appearance: Normal appearance. He is not ill-appearing.   HENT:      Head: Normocephalic and atraumatic.      Nose: Nose normal.      Mouth/Throat:      Mouth: Mucous membranes are moist.      Pharynx: Oropharynx is clear.   Eyes:      Extraocular Movements: Extraocular movements intact.      Conjunctiva/sclera: Conjunctivae normal.   Cardiovascular:      Rate and Rhythm: Normal rate and regular rhythm.      Pulses: Normal pulses.      Heart sounds: Normal heart sounds. No murmur heard.      Pulmonary:      Effort: Pulmonary effort is normal. No respiratory distress.      Breath sounds: Normal breath sounds. No wheezing, rhonchi or rales.   Abdominal:      General: Abdomen is flat. Bowel sounds are normal. There is no distension.      Palpations: Abdomen is soft. There is no mass.      Tenderness: There is no abdominal tenderness.   Musculoskeletal:         General: No swelling, deformity or signs of injury. Normal range of motion.      " Cervical back: Normal range of motion and neck supple.   Lymphadenopathy:      Cervical: No cervical adenopathy.   Skin:     General: Skin is warm and dry.      Findings: No rash.   Neurological:      General: No focal deficit present.      Mental Status: He is alert and oriented to person, place, and time.          A/P:  Abdominal bloating  Desires trial of omeprazole prior to investigating symptoms further  Possibly related to pancreatic mass which has slightly increased in size  Zofran PRN in case severe N/V recurs    Disorder of pancreas  Possibly contributor to abominal discomfort/bloating symptoms  Followup with Dr. Stark    HTN (hypertension)  Refill HCTZ

## 2021-11-09 NOTE — ASSESSMENT & PLAN NOTE
Desires trial of omeprazole prior to investigating symptoms further  Possibly related to pancreatic mass which has slightly increased in size  Zofran PRN in case severe N/V recurs

## 2021-11-29 ENCOUNTER — OFFICE VISIT (OUTPATIENT)
Dept: MEDICAL GROUP | Facility: CLINIC | Age: 86
End: 2021-11-29
Payer: COMMERCIAL

## 2021-11-29 VITALS
HEART RATE: 96 BPM | RESPIRATION RATE: 12 BRPM | DIASTOLIC BLOOD PRESSURE: 76 MMHG | OXYGEN SATURATION: 94 % | WEIGHT: 165.5 LBS | BODY MASS INDEX: 25.08 KG/M2 | SYSTOLIC BLOOD PRESSURE: 137 MMHG | TEMPERATURE: 96.8 F | HEIGHT: 68 IN

## 2021-11-29 DIAGNOSIS — E53.8 VITAMIN B12 DEFICIENCY: ICD-10-CM

## 2021-11-29 DIAGNOSIS — R14.0 ABDOMINAL BLOATING: ICD-10-CM

## 2021-11-29 DIAGNOSIS — R11.14 BILIOUS VOMITING WITH NAUSEA: ICD-10-CM

## 2021-11-29 PROCEDURE — 99214 OFFICE O/P EST MOD 30 MIN: CPT | Performed by: FAMILY MEDICINE

## 2021-11-29 ASSESSMENT — PATIENT HEALTH QUESTIONNAIRE - PHQ9: CLINICAL INTERPRETATION OF PHQ2 SCORE: 0

## 2021-11-29 ASSESSMENT — FIBROSIS 4 INDEX: FIB4 SCORE: 3.07

## 2021-11-29 NOTE — PROGRESS NOTES
Subjective:     CC: abd pain, emesis.     HPI:   Vertrus presents today to discuss the following issues     Problem   Bilious Vomiting With Nausea    Heart had an acute onset of abdominal pain with nausea and vomiting that took him to the emergency room.  This was on about the 11th of this month.  He was admitted overnight for symptomatic treatment.  Work-up which included a CAT scan of the abdomen were grossly unremarkable, but there was a note of some slight increase in size of the known pancreatic mass.  Other labs were unremarkable.  He was placed on antiemetics and given omeprazole.  Symptoms are much improved, but are episodic.  He does have waves of recurrent nausea no more vomiting.  The bowel movements are variable there is occasionally mucus and some dark color but there is been no blood.  He has had no fever or other pain.  No NSAIDs no exposures to sick people.         Current Outpatient Medications Ordered in Epic   Medication Sig Dispense Refill   • hydrochlorothiazide (MICROZIDE) 12.5 MG capsule TAKE ONE CAPSULE BY MOUTH EVERY DAY 90 Capsule 3   • omeprazole (PRILOSEC) 20 MG delayed-release capsule Take 1 Capsule by mouth every day. 30 Capsule 0   • ondansetron (ZOFRAN) 4 MG Tab tablet Take 1 Tablet by mouth every four hours as needed for Nausea/Vomiting. 20 Tablet 1   • Cyanocobalamin (B-12 COMPLIANCE INJECTION INJ) Inject  as directed. Once a month     • diphenhydrAMINE (BENADRYL ALLERGY) 25 MG capsule Take 25 mg by mouth every 6 hours as needed.     • acetaminophen (TYLENOL) 500 MG Tab Take 500-1,000 mg by mouth every 6 hours as needed.     • cetirizine (ZYRTEC) 10 MG Tab Take 1 tablet by mouth every day. 30 tablet 0   • fluticasone (FLONASE) 50 MCG/ACT nasal spray Administer 1 Spray into affected nostril(S) every day. 15.8 mL 0     No current Epic-ordered facility-administered medications on file.       Health Maintenance:     ROS:  Gen: no fevers/chills, no changes in weight  Eyes: no changes in  "vision  ENT: no sore throat, no hearing loss, no bloody nose  Pulm: no sob, no cough  CV: no chest pain, no palpitations  GI: no nausea/vomiting, no diarrhea  : no dysuria  MSk: no myalgias  Skin: no rash  Neuro: no headaches, no numbness/tingling  Heme/Lymph: no easy bruising      Objective:     Exam:  /76   Pulse 96   Temp 36 °C (96.8 °F)   Resp 12   Ht 1.727 m (5' 8\")   Wt 75.1 kg (165 lb 8 oz)   SpO2 94%   BMI 25.16 kg/m²  Body mass index is 25.16 kg/m².    Gen: Alert and oriented, No apparent distress.  Neck: Neck is supple without lymphadenopathy.  Lungs: Normal effort, CTA bilaterally, no wheezes, rhonchi, or rales  Abd: soft, BS pos, no mass or rebound  CV: Regular rate and rhythm. No murmurs, rubs, or gallops.  Ext: No clubbing, cyanosis, edema.          Assessment & Plan:     89 y.o. male with the following -     Problem List Items Addressed This Visit     Abdominal bloating    Relevant Orders    Referral to Gastroenterology    Bilious vomiting with nausea     Exam is normal now.  I reviewed his hospital records and ER visit.  I am going to check an H. pylori, CBC and an amylase.  I have also asked him to double up on his omeprazole to 20 mg twice daily.  Reviewed ER precautions, and I am going to refer back to GI.  Of note there is some slight increase in the size of his known pancreatic mass, but I do believe this is incidental and not contributing to current symptoms.  He already knows not to use NSAIDs, and does not drink alcohol or use any nicotine.         Relevant Orders    H.PYLORI STOOL ANTIGEN    AMYLASE    CBC WITH DIFFERENTIAL    Referral to Gastroenterology          I spent a total of 40 minutes with record review, exam, communication with the patient, communication with other providers, and documentation of this encounter.      No follow-ups on file.            "

## 2021-11-29 NOTE — ASSESSMENT & PLAN NOTE
Exam is normal now.  I reviewed his hospital records and ER visit.  I am going to check an H. pylori, CBC and an amylase.  I have also asked him to double up on his omeprazole to 20 mg twice daily.  Reviewed ER precautions, and I am going to refer back to GI.  Of note there is some slight increase in the size of his known pancreatic mass, but I do believe this is incidental and not contributing to current symptoms.  He already knows not to use NSAIDs, and does not drink alcohol or use any nicotine.

## 2021-12-03 DIAGNOSIS — R11.14 BILIOUS VOMITING WITH NAUSEA: ICD-10-CM

## 2021-12-30 ENCOUNTER — OFFICE VISIT (OUTPATIENT)
Dept: MEDICAL GROUP | Facility: CLINIC | Age: 86
End: 2021-12-30
Payer: COMMERCIAL

## 2021-12-30 VITALS
RESPIRATION RATE: 12 BRPM | BODY MASS INDEX: 24.43 KG/M2 | HEIGHT: 68 IN | WEIGHT: 161.2 LBS | DIASTOLIC BLOOD PRESSURE: 68 MMHG | HEART RATE: 60 BPM | SYSTOLIC BLOOD PRESSURE: 142 MMHG

## 2021-12-30 DIAGNOSIS — E53.8 VITAMIN B12 DEFICIENCY: ICD-10-CM

## 2021-12-30 DIAGNOSIS — R11.14 BILIOUS VOMITING WITH NAUSEA: ICD-10-CM

## 2021-12-30 PROCEDURE — 99214 OFFICE O/P EST MOD 30 MIN: CPT | Mod: 25 | Performed by: FAMILY MEDICINE

## 2021-12-30 PROCEDURE — 96372 THER/PROPH/DIAG INJ SC/IM: CPT | Performed by: FAMILY MEDICINE

## 2021-12-30 RX ORDER — OMEPRAZOLE 40 MG/1
40 CAPSULE, DELAYED RELEASE ORAL DAILY
Qty: 30 CAPSULE | Refills: 2 | Status: SHIPPED | OUTPATIENT
Start: 2021-12-30 | End: 2022-01-25

## 2021-12-30 RX ORDER — CYANOCOBALAMIN 1000 UG/ML
1000 INJECTION, SOLUTION INTRAMUSCULAR; SUBCUTANEOUS ONCE
Status: COMPLETED | OUTPATIENT
Start: 2021-12-30 | End: 2021-12-30

## 2021-12-30 RX ADMIN — CYANOCOBALAMIN 1000 MCG: 1000 INJECTION, SOLUTION INTRAMUSCULAR; SUBCUTANEOUS at 15:34

## 2021-12-30 ASSESSMENT — FIBROSIS 4 INDEX: FIB4 SCORE: 3.05

## 2021-12-30 NOTE — ASSESSMENT & PLAN NOTE
I am going to order some stool studies to include ova and parasites and culture with sensitivities.  I will reorder an amylase is that did not get done on the last visit along with a lipase.  Will refill his omeprazole so that he can take 40 mg a day and recommend supplementing with Mylanta if that is needed.  He has had imaging in the hospital so I am not going to repeat that.  As long as symptoms are stable we will wait for his GI appointment.

## 2021-12-30 NOTE — PROGRESS NOTES
Subjective:     CC: B12 injection, f/u abd pain, change in bowel habits.     HPI:   April presents today to discuss the following issues     Problem   Bilious Vomiting With Nausea    Still having some midepigastric pain.  The nausea is better overall though and symptoms are better than his prior ER visit.  Does have variable bowel movements, with diarrhea, then normal bowel movements intermixed and occasionally with mucus.  There has been no blood no fever no vomiting.  He did double up on his omeprazole as I requested and this did help some.  He is supplementing with Gaviscon as well.  He does finally have an appointment scheduled with gastroenterology but this is not for another month.  Has lost 10 pounds since November.  Prior hx as below:     Huong had an acute onset of abdominal pain with nausea and vomiting that took him to the emergency room.  This was on about the 11th of this month.  He was admitted overnight.  Work-up which included a CAT scan of the abdomen, labs and urine were grossly unremarkable.  there was a note of some slight increase in size of the known pancreatic mass.    He was placed on antiemetics and given omeprazole.  Symptoms are much improved, but are episodic.  He does have waves of recurrent nausea,  no more vomiting.  His bowel movements are variable there is occasionally mucus and some dark color but there is been no blood.  He has had no fever or other pain.  No NSAIDs no exposures to sick people.     Vitamin B12 Deficiency    Doing well with this and due for a B12 injection         Current Outpatient Medications Ordered in Epic   Medication Sig Dispense Refill   • omeprazole (PRILOSEC) 40 MG delayed-release capsule Take 1 Capsule by mouth every day. 30 Capsule 2   • hydrochlorothiazide (MICROZIDE) 12.5 MG capsule TAKE ONE CAPSULE BY MOUTH EVERY DAY 90 Capsule 3   • ondansetron (ZOFRAN) 4 MG Tab tablet Take 1 Tablet by mouth every four hours as needed for Nausea/Vomiting. 20 Tablet 1  "  • Cyanocobalamin (B-12 COMPLIANCE INJECTION INJ) Inject  as directed. Once a month     • diphenhydrAMINE (BENADRYL ALLERGY) 25 MG capsule Take 25 mg by mouth every 6 hours as needed.     • acetaminophen (TYLENOL) 500 MG Tab Take 500-1,000 mg by mouth every 6 hours as needed.     • cetirizine (ZYRTEC) 10 MG Tab Take 1 tablet by mouth every day. 30 tablet 0   • fluticasone (FLONASE) 50 MCG/ACT nasal spray Administer 1 Spray into affected nostril(S) every day. 15.8 mL 0     No current Epic-ordered facility-administered medications on file.       Health Maintenance:     ROS:  Gen: no fevers/chills, no changes in weight  Eyes: no changes in vision  ENT: no sore throat, no hearing loss, no bloody nose  Pulm: no sob, no cough  CV: no chest pain, no palpitations  GI: no nausea/vomiting, no diarrhea  : no dysuria  MSk: no myalgias  Skin: no rash  Neuro: no headaches, no numbness/tingling  Heme/Lymph: no easy bruising      Objective:     Exam:  /68   Pulse 60   Resp 12   Ht 1.727 m (5' 8\")   Wt 73.1 kg (161 lb 3.2 oz)   BMI 24.51 kg/m²  Body mass index is 24.51 kg/m².    Gen: Alert and oriented, No apparent distress.  Neck: Neck is supple without lymphadenopathy.  Lungs: Normal effort, CTA bilaterally, no wheezes, rhonchi, or rales  CV: Regular rate and rhythm. No murmurs, rubs, or gallops.  Ext: No clubbing, cyanosis, edema.          Assessment & Plan:     89 y.o. male with the following -     Problem List Items Addressed This Visit     Vitamin B12 deficiency     B12 injected today.         Bilious vomiting with nausea     I am going to order some stool studies to include ova and parasites and culture with sensitivities.  I will reorder an amylase is that did not get done on the last visit along with a lipase.  Will refill his omeprazole so that he can take 40 mg a day and recommend supplementing with Mylanta if that is needed.  He has had imaging in the hospital so I am not going to repeat that.  As long as " symptoms are stable we will wait for his GI appointment.         Relevant Medications    omeprazole (PRILOSEC) 40 MG delayed-release capsule    Other Relevant Orders    Complete O&P    LIPASE    AMYLASE    CULTURE STOOL            No follow-ups on file.

## 2022-01-03 ENCOUNTER — HOSPITAL ENCOUNTER (OUTPATIENT)
Dept: LAB | Facility: MEDICAL CENTER | Age: 87
End: 2022-01-03
Attending: FAMILY MEDICINE
Payer: COMMERCIAL

## 2022-01-03 DIAGNOSIS — R11.14 BILIOUS VOMITING WITH NAUSEA: ICD-10-CM

## 2022-01-03 LAB
AMYLASE SERPL-CCNC: 137 U/L (ref 20–103)
BASOPHILS # BLD AUTO: 1.2 % (ref 0–1.8)
BASOPHILS # BLD: 0.06 K/UL (ref 0–0.12)
EOSINOPHIL # BLD AUTO: 0.22 K/UL (ref 0–0.51)
EOSINOPHIL NFR BLD: 4.4 % (ref 0–6.9)
ERYTHROCYTE [DISTWIDTH] IN BLOOD BY AUTOMATED COUNT: 50.3 FL (ref 35.9–50)
HCT VFR BLD AUTO: 46.5 % (ref 42–52)
HGB BLD-MCNC: 14.8 G/DL (ref 14–18)
IMM GRANULOCYTES # BLD AUTO: 0.01 K/UL (ref 0–0.11)
IMM GRANULOCYTES NFR BLD AUTO: 0.2 % (ref 0–0.9)
LIPASE SERPL-CCNC: 22 U/L (ref 11–82)
LYMPHOCYTES # BLD AUTO: 1.15 K/UL (ref 1–4.8)
LYMPHOCYTES NFR BLD: 22.9 % (ref 22–41)
MCH RBC QN AUTO: 29.8 PG (ref 27–33)
MCHC RBC AUTO-ENTMCNC: 31.8 G/DL (ref 33.7–35.3)
MCV RBC AUTO: 93.6 FL (ref 81.4–97.8)
MONOCYTES # BLD AUTO: 0.41 K/UL (ref 0–0.85)
MONOCYTES NFR BLD AUTO: 8.2 % (ref 0–13.4)
NEUTROPHILS # BLD AUTO: 3.17 K/UL (ref 1.82–7.42)
NEUTROPHILS NFR BLD: 63.1 % (ref 44–72)
NRBC # BLD AUTO: 0 K/UL
NRBC BLD-RTO: 0 /100 WBC
PLATELET # BLD AUTO: 164 K/UL (ref 164–446)
PMV BLD AUTO: 11.4 FL (ref 9–12.9)
RBC # BLD AUTO: 4.97 M/UL (ref 4.7–6.1)
WBC # BLD AUTO: 5 K/UL (ref 4.8–10.8)

## 2022-01-03 PROCEDURE — 36415 COLL VENOUS BLD VENIPUNCTURE: CPT

## 2022-01-03 PROCEDURE — 82150 ASSAY OF AMYLASE: CPT

## 2022-01-03 PROCEDURE — 85025 COMPLETE CBC W/AUTO DIFF WBC: CPT

## 2022-01-03 PROCEDURE — 83690 ASSAY OF LIPASE: CPT

## 2022-01-04 ENCOUNTER — HOSPITAL ENCOUNTER (OUTPATIENT)
Facility: MEDICAL CENTER | Age: 87
End: 2022-01-04
Attending: FAMILY MEDICINE
Payer: COMMERCIAL

## 2022-01-04 DIAGNOSIS — R11.14 BILIOUS VOMITING WITH NAUSEA: ICD-10-CM

## 2022-01-04 LAB
G LAMBLIA+C PARVUM AG STL QL RAPID: NORMAL
SIGNIFICANT IND 70042: NORMAL
SITE SITE: NORMAL
SOURCE SOURCE: NORMAL

## 2022-01-04 PROCEDURE — 87328 CRYPTOSPORIDIUM AG IA: CPT

## 2022-01-04 PROCEDURE — 87329 GIARDIA AG IA: CPT

## 2022-01-06 ENCOUNTER — HOSPITAL ENCOUNTER (OUTPATIENT)
Facility: MEDICAL CENTER | Age: 87
End: 2022-01-06
Attending: FAMILY MEDICINE
Payer: COMMERCIAL

## 2022-01-06 DIAGNOSIS — R11.14 BILIOUS VOMITING WITH NAUSEA: ICD-10-CM

## 2022-01-06 LAB — H PYLORI AG STL QL IA: NOT DETECTED

## 2022-01-06 PROCEDURE — 87338 HPYLORI STOOL AG IA: CPT

## 2022-01-06 PROCEDURE — 87899 AGENT NOS ASSAY W/OPTIC: CPT

## 2022-01-06 PROCEDURE — 87045 FECES CULTURE AEROBIC BACT: CPT

## 2022-01-07 LAB
E COLI SXT1+2 STL IA: NORMAL
SIGNIFICANT IND 70042: NORMAL
SITE SITE: NORMAL
SOURCE SOURCE: NORMAL

## 2022-01-08 LAB
BACTERIA STL CULT: NORMAL
C JEJUNI+C COLI AG STL QL: NORMAL
E COLI SXT1+2 STL IA: NORMAL
SIGNIFICANT IND 70042: NORMAL
SITE SITE: NORMAL
SOURCE SOURCE: NORMAL

## 2022-01-24 NOTE — TELEPHONE ENCOUNTER
Received request via: Pharmacy    Was the patient seen in the last year in this department? Yes    Does the patient have an active prescription (recently filled or refills available) for medication(s) requested? No     Omeprazole

## 2022-01-25 RX ORDER — OMEPRAZOLE 40 MG/1
40 CAPSULE, DELAYED RELEASE ORAL DAILY
Qty: 30 CAPSULE | Refills: 2 | Status: SHIPPED | OUTPATIENT
Start: 2022-01-25 | End: 2022-10-26

## 2022-02-02 ENCOUNTER — OFFICE VISIT (OUTPATIENT)
Dept: MEDICAL GROUP | Facility: CLINIC | Age: 87
End: 2022-02-02
Payer: MEDICARE

## 2022-02-02 VITALS
RESPIRATION RATE: 16 BRPM | OXYGEN SATURATION: 95 % | SYSTOLIC BLOOD PRESSURE: 133 MMHG | DIASTOLIC BLOOD PRESSURE: 73 MMHG | HEIGHT: 68 IN | HEART RATE: 68 BPM | TEMPERATURE: 97.5 F | WEIGHT: 160 LBS | BODY MASS INDEX: 24.25 KG/M2

## 2022-02-02 DIAGNOSIS — R42 VERTIGO: ICD-10-CM

## 2022-02-02 DIAGNOSIS — K21.9 GASTROESOPHAGEAL REFLUX DISEASE, UNSPECIFIED WHETHER ESOPHAGITIS PRESENT: ICD-10-CM

## 2022-02-02 DIAGNOSIS — K86.89 PANCREATIC MASS: ICD-10-CM

## 2022-02-02 DIAGNOSIS — D51.0 PERNICIOUS ANEMIA: ICD-10-CM

## 2022-02-02 PROCEDURE — 96372 THER/PROPH/DIAG INJ SC/IM: CPT | Performed by: FAMILY MEDICINE

## 2022-02-02 PROCEDURE — 99214 OFFICE O/P EST MOD 30 MIN: CPT | Mod: 25 | Performed by: FAMILY MEDICINE

## 2022-02-02 RX ORDER — HYDROCODONE BITARTRATE AND ACETAMINOPHEN 5; 325 MG/1; MG/1
1 TABLET ORAL EVERY 4 HOURS PRN
Qty: 20 TABLET | Refills: 0 | Status: SHIPPED | OUTPATIENT
Start: 2022-02-02 | End: 2022-03-04

## 2022-02-02 RX ORDER — CYANOCOBALAMIN 1000 UG/ML
1000 INJECTION, SOLUTION INTRAMUSCULAR; SUBCUTANEOUS ONCE
Status: COMPLETED | OUTPATIENT
Start: 2022-02-02 | End: 2022-02-02

## 2022-02-02 RX ADMIN — CYANOCOBALAMIN 1000 MCG: 1000 INJECTION, SOLUTION INTRAMUSCULAR; SUBCUTANEOUS at 09:15

## 2022-02-02 ASSESSMENT — FIBROSIS 4 INDEX: FIB4 SCORE: 2.9

## 2022-02-02 NOTE — ASSESSMENT & PLAN NOTE
Agree with change in medicine and scope.   Requests something for pain when it gets bad.   We had a pretty lengthy discussion about the use of opiate medications for abdominal pain.  I would like him to have something available for severe episodesand will give him 10 hydrocodone.  It is stressed with him though he should not use this in place of emergency room visit if the pain remains intolerable, is not helped quickly by the medicine or lasts longer than expected.     I did have the patient complete a pain contract although I expect this to be a very limited prescription.

## 2022-02-02 NOTE — ASSESSMENT & PLAN NOTE
Exam is normal including cardiovascular and both ears.  This may be a reflection of anxiety or possibly some positional vertigo.  I see no sign that there is a cardiac or neurologic abnormality but we will follow this.  ER precautions were given.

## 2022-02-02 NOTE — PROGRESS NOTES
Subjective:     CC: abd pain, bloating. Pernicious anemia    HPI:   April presents today to discuss the following issues     Problem   Gastroesophageal Reflux Disease    Huong has had a several month history of alternating bowel habits, from diarrhea to constipation with flecks of mucus.  He continues to have mid abdominal aching pain.  I had increased his omeprazole to 40 a day and he was able to see GI yesterday. They changed omeprazole to pantoprazole. Also reportedly want to do an EGD and colonoscopy.  Continues to have epigastric dull pain.  Few times a week the pain becomes intolerable for a few hours.     Pernicious Anemia    Doing well with this.  It is time for his  monthly injection of B12.     Vertigo    There is some recurrence of lightheaded feeling with head motion.  He would like his ears checked.  There is some increase in overall anxiety and he feels his heart pounding but there is no true chest pain or palpitations.  He has had similar episodes with anxiety in the past.         Current Outpatient Medications Ordered in Epic   Medication Sig Dispense Refill   • HYDROcodone-acetaminophen (NORCO) 5-325 MG Tab per tablet Take 1 Tablet by mouth every four hours as needed for up to 30 days. 20 Tablet 0   • omeprazole (PRILOSEC) 40 MG delayed-release capsule TAKE 1 CAPSULE BY MOUTH EVERY DAY 30 Capsule 2   • hydrochlorothiazide (MICROZIDE) 12.5 MG capsule TAKE ONE CAPSULE BY MOUTH EVERY DAY 90 Capsule 3   • ondansetron (ZOFRAN) 4 MG Tab tablet Take 1 Tablet by mouth every four hours as needed for Nausea/Vomiting. 20 Tablet 1   • Cyanocobalamin (B-12 COMPLIANCE INJECTION INJ) Inject  as directed. Once a month     • diphenhydrAMINE (BENADRYL ALLERGY) 25 MG capsule Take 25 mg by mouth every 6 hours as needed.     • acetaminophen (TYLENOL) 500 MG Tab Take 500-1,000 mg by mouth every 6 hours as needed.     • cetirizine (ZYRTEC) 10 MG Tab Take 1 tablet by mouth every day. 30 tablet 0   • fluticasone (FLONASE)  "50 MCG/ACT nasal spray Administer 1 Spray into affected nostril(S) every day. 15.8 mL 0     No current Epic-ordered facility-administered medications on file.       Health Maintenance:     ROS:  Gen: no fevers/chills, no changes in weight  Eyes: no changes in vision  ENT: no sore throat, no hearing loss, no bloody nose  Pulm: no sob, no cough  CV: no chest pain, no palpitations  GI: no nausea/vomiting, no diarrhea  : no dysuria  MSk: no myalgias  Skin: no rash  Neuro: no headaches, no numbness/tingling  Heme/Lymph: no easy bruising      Objective:     Exam:  /73 (BP Location: Right arm, Patient Position: Sitting, BP Cuff Size: Adult)   Pulse 68   Temp 36.4 °C (97.5 °F) (Tympanic)   Resp 16   Ht 1.727 m (5' 8\")   Wt 72.6 kg (160 lb)   SpO2 95%   BMI 24.33 kg/m²  Body mass index is 24.33 kg/m².    Gen: Alert and oriented, No apparent distress.  Neck: Neck is supple without lymphadenopathy.  Lungs: Normal effort, CTA bilaterally, no wheezes, rhonchi, or rales  CV: Regular rate and rhythm. No murmurs, rubs, or gallops.  Ext: No clubbing, cyanosis, edema.          Assessment & Plan:     89 y.o. male with the following -     Problem List Items Addressed This Visit     Pancreatic mass    Relevant Medications    HYDROcodone-acetaminophen (NORCO) 5-325 MG Tab per tablet    Other Relevant Orders    Controlled Substance Treatment Agreement    Gastroesophageal reflux disease     Agree with change in medicine and scope.   Requests something for pain when it gets bad.   We had a pretty lengthy discussion about the use of opiate medications for abdominal pain.  I would like him to have something available for severe episodesand will give him 10 hydrocodone.  It is stressed with him though he should not use this in place of emergency room visit if the pain remains intolerable, is not helped quickly by the medicine or lasts longer than expected.     I did have the patient complete a pain contract although I expect " this to be a very limited prescription.         Relevant Medications    HYDROcodone-acetaminophen (NORCO) 5-325 MG Tab per tablet    Other Relevant Orders    Controlled Substance Treatment Agreement    Pernicious anemia     IM cyanocobalamin given today.         Vertigo     Exam is normal including cardiovascular and both ears.  This may be a reflection of anxiety or possibly some positional vertigo.  I see no sign that there is a cardiac or neurologic abnormality but we will follow this.  ER precautions were given.                   No follow-ups on file.

## 2022-03-03 ENCOUNTER — OFFICE VISIT (OUTPATIENT)
Dept: MEDICAL GROUP | Facility: CLINIC | Age: 87
End: 2022-03-03
Payer: MEDICARE

## 2022-03-03 VITALS
SYSTOLIC BLOOD PRESSURE: 145 MMHG | HEIGHT: 68 IN | DIASTOLIC BLOOD PRESSURE: 80 MMHG | OXYGEN SATURATION: 93 % | TEMPERATURE: 98 F | HEART RATE: 67 BPM | BODY MASS INDEX: 24.4 KG/M2 | RESPIRATION RATE: 16 BRPM | WEIGHT: 161 LBS

## 2022-03-03 DIAGNOSIS — R14.0 ABDOMINAL BLOATING: ICD-10-CM

## 2022-03-03 DIAGNOSIS — E53.8 B12 DEFICIENCY: ICD-10-CM

## 2022-03-03 DIAGNOSIS — F41.9 ANXIETY: ICD-10-CM

## 2022-03-03 DIAGNOSIS — I10 PRIMARY HYPERTENSION: ICD-10-CM

## 2022-03-03 PROCEDURE — 99214 OFFICE O/P EST MOD 30 MIN: CPT | Mod: 25 | Performed by: FAMILY MEDICINE

## 2022-03-03 RX ORDER — CYANOCOBALAMIN 1000 UG/ML
1000 INJECTION, SOLUTION INTRAMUSCULAR; SUBCUTANEOUS ONCE
Status: COMPLETED | OUTPATIENT
Start: 2022-03-03 | End: 2022-03-03

## 2022-03-03 RX ADMIN — CYANOCOBALAMIN 1000 MCG: 1000 INJECTION, SOLUTION INTRAMUSCULAR; SUBCUTANEOUS at 14:23

## 2022-03-03 ASSESSMENT — FIBROSIS 4 INDEX: FIB4 SCORE: 2.9

## 2022-03-03 NOTE — ASSESSMENT & PLAN NOTE
Offered antidepressants or counselling. Does not want at this point. Will discuss again next month.

## 2022-03-03 NOTE — PROGRESS NOTES
Subjective:     CC: Bloating, diarrhea, Pernicious anemia, anxiety     HPI:   April presents today to discuss the following issues     Problem   Abdominal Bloating    We have been following this symptom for a few months.  He did see GI as well and they are under taking a work-up.  Huong reports that his midepigastric pain and nausea is considerably improved.  He does however still have the irregular bowel movements which alternate from mushy soft diarrhea to mostly normal. Has lost over 20 lbs since December         Htn (Hypertension)    Stable. Has enough meds     B12 Deficiency    Due for B12 shot for pernicious anemia. Assymptomatic     Anxiety     Notes some ongoing anxiety, partly related to health concerns. It effects his sleep at times.   Stopped watching [employer] news which helped         Current Outpatient Medications Ordered in Epic   Medication Sig Dispense Refill   • HYDROcodone-acetaminophen (NORCO) 5-325 MG Tab per tablet Take 1 Tablet by mouth every four hours as needed for up to 30 days. 20 Tablet 0   • omeprazole (PRILOSEC) 40 MG delayed-release capsule TAKE 1 CAPSULE BY MOUTH EVERY DAY 30 Capsule 2   • hydrochlorothiazide (MICROZIDE) 12.5 MG capsule TAKE ONE CAPSULE BY MOUTH EVERY DAY 90 Capsule 3   • ondansetron (ZOFRAN) 4 MG Tab tablet Take 1 Tablet by mouth every four hours as needed for Nausea/Vomiting. 20 Tablet 1   • Cyanocobalamin (B-12 COMPLIANCE INJECTION INJ) Inject  as directed. Once a month     • diphenhydrAMINE (BENADRYL ALLERGY) 25 MG capsule Take 25 mg by mouth every 6 hours as needed.     • acetaminophen (TYLENOL) 500 MG Tab Take 500-1,000 mg by mouth every 6 hours as needed.     • cetirizine (ZYRTEC) 10 MG Tab Take 1 tablet by mouth every day. 30 tablet 0   • fluticasone (FLONASE) 50 MCG/ACT nasal spray Administer 1 Spray into affected nostril(S) every day. 15.8 mL 0     No current Epic-ordered facility-administered medications on file.       Health Maintenance:     ROS:  Gen: no  "fevers/chills, no changes in weight  Eyes: no changes in vision  ENT: no sore throat, no hearing loss, no bloody nose  Pulm: no sob, no cough  CV: no chest pain, no palpitations  GI: no nausea/vomiting, no diarrhea  : no dysuria  MSk: no myalgias  Skin: no rash  Neuro: no headaches, no numbness/tingling  Heme/Lymph: no easy bruising      Objective:     Exam:  /80   Pulse 67   Temp 36.7 °C (98 °F)   Resp 16   Ht 1.727 m (5' 8\")   Wt 73 kg (161 lb)   SpO2 93%   BMI 24.48 kg/m²  Body mass index is 24.48 kg/m².    Gen: Alert and oriented, No apparent distress.  Neck: Neck is supple without lymphadenopathy.  Lungs: Normal effort, CTA bilaterally, no wheezes, rhonchi, or rales  CV: Regular rate and rhythm. No murmurs, rubs, or gallops.  Ext: No clubbing, cyanosis, edema.          Assessment & Plan:     89 y.o. male with the following -     Problem List Items Addressed This Visit     B12 deficiency     Cyanocobalmin given today         Anxiety     Offered antidepressants or counselling. Does not want at this point. Will discuss again next month.          HTN (hypertension)     Will continue to monitor         Abdominal bloating     Vert was given 12-week course of pantoprazole, with the plan to do an EGD I believe and colonoscopy at the end of that period. GI f/u is pending.                    No follow-ups on file.          "

## 2022-03-03 NOTE — ASSESSMENT & PLAN NOTE
Vert was given 12-week course of pantoprazole, with the plan to do an EGD I believe and colonoscopy at the end of that period. GI f/u is pending.

## 2022-04-06 ENCOUNTER — OFFICE VISIT (OUTPATIENT)
Dept: MEDICAL GROUP | Facility: CLINIC | Age: 87
End: 2022-04-06
Payer: MEDICARE

## 2022-04-06 VITALS
WEIGHT: 165.4 LBS | BODY MASS INDEX: 25.07 KG/M2 | DIASTOLIC BLOOD PRESSURE: 78 MMHG | OXYGEN SATURATION: 98 % | HEIGHT: 68 IN | HEART RATE: 74 BPM | SYSTOLIC BLOOD PRESSURE: 120 MMHG | TEMPERATURE: 97 F

## 2022-04-06 DIAGNOSIS — F41.9 ANXIETY: ICD-10-CM

## 2022-04-06 DIAGNOSIS — R14.0 ABDOMINAL BLOATING: ICD-10-CM

## 2022-04-06 DIAGNOSIS — E53.8 B12 DEFICIENCY: ICD-10-CM

## 2022-04-06 PROCEDURE — 99214 OFFICE O/P EST MOD 30 MIN: CPT | Mod: 25 | Performed by: FAMILY MEDICINE

## 2022-04-06 PROCEDURE — 96372 THER/PROPH/DIAG INJ SC/IM: CPT | Performed by: FAMILY MEDICINE

## 2022-04-06 RX ORDER — CYANOCOBALAMIN 1000 UG/ML
1000 INJECTION, SOLUTION INTRAMUSCULAR; SUBCUTANEOUS ONCE
Status: COMPLETED | OUTPATIENT
Start: 2022-04-06 | End: 2022-04-06

## 2022-04-06 RX ADMIN — CYANOCOBALAMIN 1000 MCG: 1000 INJECTION, SOLUTION INTRAMUSCULAR; SUBCUTANEOUS at 11:20

## 2022-04-06 ASSESSMENT — FIBROSIS 4 INDEX: FIB4 SCORE: 2.9

## 2022-04-06 NOTE — PROGRESS NOTES
Subjective:     CC: f/u bloating, diarrhea, anxiety and pernicious anemia    HPI:   April presents today to discuss the following issues     Problem   Abdominal Bloating    Pain associated with his bloating has improved considerably.  He has not required any of the hydrocodone I gave him on the last visit.  He does continue to have some gas.  He is being followed by gastroenterology and a scope and EGD are pending.  He remains careful with his diet and is limiting sugars and caffeine.  His weight loss also seems to have reversed.  He has lost about 18 pounds in a 3 to 4-month period and has regained maybe 6 since then.       B12 Deficiency    Remains asymptomatic and does wish to continue the monthly B12 shots.       Diarrhea    Diarrhea finally settled down, more solids. Stools still vary.       Anxiety    Much improved   Notes some ongoing anxiety, partly related to health concerns. It effects his sleep at times.   Stopped watching [employer] which helped         Current Outpatient Medications Ordered in Epic   Medication Sig Dispense Refill   • omeprazole (PRILOSEC) 40 MG delayed-release capsule TAKE 1 CAPSULE BY MOUTH EVERY DAY 30 Capsule 2   • hydrochlorothiazide (MICROZIDE) 12.5 MG capsule TAKE ONE CAPSULE BY MOUTH EVERY DAY 90 Capsule 3   • ondansetron (ZOFRAN) 4 MG Tab tablet Take 1 Tablet by mouth every four hours as needed for Nausea/Vomiting. 20 Tablet 1   • diphenhydrAMINE (BENADRYL) 25 MG capsule Take 25 mg by mouth every 6 hours as needed.     • acetaminophen (TYLENOL) 500 MG Tab Take 500-1,000 mg by mouth every 6 hours as needed.     • cetirizine (ZYRTEC) 10 MG Tab Take 1 tablet by mouth every day. 30 tablet 0   • fluticasone (FLONASE) 50 MCG/ACT nasal spray Administer 1 Spray into affected nostril(S) every day. 15.8 mL 0     No current Epic-ordered facility-administered medications on file.       Health Maintenance:     ROS:  Gen: no fevers/chills, no changes in weight  Eyes: no changes in  "vision  ENT: no sore throat, no hearing loss, no bloody nose  Pulm: no sob, no cough  CV: no chest pain, no palpitations  GI: no nausea/vomiting, no diarrhea  : no dysuria  MSk: no myalgias  Skin: no rash  Neuro: no headaches, no numbness/tingling  Heme/Lymph: no easy bruising      Objective:     Exam:  /78 (BP Location: Right arm, Patient Position: Sitting, BP Cuff Size: Adult)   Pulse 74   Temp 36.1 °C (97 °F) (Temporal)   Ht 1.727 m (5' 8\")   Wt 75 kg (165 lb 6.4 oz)   SpO2 98%   BMI 25.15 kg/m²  Body mass index is 25.15 kg/m².    Gen: Alert and oriented, No apparent distress.  Neck: Neck is supple without lymphadenopathy.  Lungs: Normal effort, CTA bilaterally, no wheezes, rhonchi, or rales  CV: Regular rate and rhythm. No murmurs, rubs, or gallops.  Ext: No clubbing, cyanosis, edema.          Assessment & Plan:     89 y.o. male with the following -     Problem List Items Addressed This Visit     B12 deficiency     Cyanocobalamin is administered today at the prior dose.  Follow-up in 1 month         Anxiety     We will continue to monitor this with Vert. He has not required medications         Abdominal bloating     Will follow up as directed by GI.  He is told to stay on the omeprazole until EGD and colonoscopy, and he believes that will happen within the month.               I spent a total of 35 minutes with record review, exam, communication with the patient, communication with other providers, and documentation of this encounter.      No follow-ups on file.          "

## 2022-04-06 NOTE — ASSESSMENT & PLAN NOTE
Will follow up as directed by GI.  He is told to stay on the omeprazole until EGD and colonoscopy, and he believes that will happen within the month.

## 2022-05-03 ENCOUNTER — NON-PROVIDER VISIT (OUTPATIENT)
Dept: MEDICAL GROUP | Facility: CLINIC | Age: 87
End: 2022-05-03
Payer: MEDICARE

## 2022-05-03 PROCEDURE — 96372 THER/PROPH/DIAG INJ SC/IM: CPT | Performed by: FAMILY MEDICINE

## 2022-05-03 RX ORDER — PANTOPRAZOLE SODIUM 20 MG/1
TABLET, DELAYED RELEASE ORAL
COMMUNITY
Start: 2022-03-03 | End: 2022-05-03 | Stop reason: SDUPTHER

## 2022-05-03 RX ORDER — PANTOPRAZOLE SODIUM 20 MG/1
20 TABLET, DELAYED RELEASE ORAL DAILY
Qty: 30 TABLET | Refills: 5 | Status: SHIPPED | OUTPATIENT
Start: 2022-05-03 | End: 2022-10-26

## 2022-05-03 RX ORDER — CYANOCOBALAMIN 1000 UG/ML
1000 INJECTION, SOLUTION INTRAMUSCULAR; SUBCUTANEOUS ONCE
Status: COMPLETED | OUTPATIENT
Start: 2022-05-03 | End: 2022-05-03

## 2022-05-03 RX ADMIN — CYANOCOBALAMIN 1000 MCG: 1000 INJECTION, SOLUTION INTRAMUSCULAR; SUBCUTANEOUS at 10:42

## 2022-05-03 NOTE — TELEPHONE ENCOUNTER
Received request via: Patient    Was the patient seen in the last year in this department? Yes , pt here on clinic requeting  medicaiton  refill  ASAP x 90 days supplies     Does the patient have an active prescription (recently filled or refills available) for medication(s) requested? No

## 2022-05-03 NOTE — PROGRESS NOTES
Huong Becker is a 89 y.o. male here for a non-provider visit for B-12  injection.    Reason for injection:  B-12 Deficiency E53.8 ,  For pernicious anemia  Order in MAR?: Yes  Patient supplied?:No  Minimum interval has been met for this injection (per MAR order): Yes    Patient tolerated injection and no adverse effects were observed or reported: Yes

## 2022-06-07 ENCOUNTER — OFFICE VISIT (OUTPATIENT)
Dept: MEDICAL GROUP | Facility: CLINIC | Age: 87
End: 2022-06-07
Payer: MEDICARE

## 2022-06-07 ENCOUNTER — APPOINTMENT (OUTPATIENT)
Dept: LAB | Facility: MEDICAL CENTER | Age: 87
End: 2022-06-07
Payer: MEDICARE

## 2022-06-07 VITALS
HEART RATE: 74 BPM | TEMPERATURE: 97.4 F | OXYGEN SATURATION: 97 % | DIASTOLIC BLOOD PRESSURE: 70 MMHG | HEIGHT: 68 IN | SYSTOLIC BLOOD PRESSURE: 118 MMHG | WEIGHT: 163.6 LBS | BODY MASS INDEX: 24.8 KG/M2

## 2022-06-07 DIAGNOSIS — K21.9 GASTROESOPHAGEAL REFLUX DISEASE, UNSPECIFIED WHETHER ESOPHAGITIS PRESENT: ICD-10-CM

## 2022-06-07 DIAGNOSIS — R20.2 PARESTHESIA AND PAIN OF BOTH UPPER EXTREMITIES: ICD-10-CM

## 2022-06-07 DIAGNOSIS — E53.8 VITAMIN B12 DEFICIENCY: ICD-10-CM

## 2022-06-07 DIAGNOSIS — R68.83 CHILLS: ICD-10-CM

## 2022-06-07 DIAGNOSIS — M79.601 PARESTHESIA AND PAIN OF BOTH UPPER EXTREMITIES: ICD-10-CM

## 2022-06-07 DIAGNOSIS — M79.602 PARESTHESIA AND PAIN OF BOTH UPPER EXTREMITIES: ICD-10-CM

## 2022-06-07 PROCEDURE — 99215 OFFICE O/P EST HI 40 MIN: CPT | Performed by: FAMILY MEDICINE

## 2022-06-07 ASSESSMENT — FIBROSIS 4 INDEX: FIB4 SCORE: 2.9

## 2022-06-07 NOTE — PROGRESS NOTES
Subjective:     CC:  Abd pain, chills, paresthesias    Chills, tingling fingers and feet. Foot falling asleep.   Scope:     HPI:   April presents today to discuss the following issues     Problem   Chills    He is getting brief and episodic chills that go throughout his body.  These are intermittent, but occur at least once a day.  He has not had any obvious fever.  He is urinating more frequently would be the only other change.     Paresthesia and Pain of Both Upper Extremities    He is getting tingling and discomfort down into both arms and hands.  This seems to happen mostly after he has been lying down or sleeping.  He is experiencing some as well in the lower extremities primarily of the right.  There is some mild neck discomfort but no trauma or history of impingement.     Gastroesophageal Reflux Disease    Huong continues to have some mid midepigastric fullness and occasionally pain.  He experiences nausea and gas on occasion but there is been no vomiting.  His bowel movements have remained pretty regular.  He is no longer having any diarrhea:  the bowel movements are pretty regular and solid.  There is been no blood or mucus.  He has been followed by GI for this and a an upper GI/EGD along with colonoscopy are planned however this is taking longer than anticipated.  He has now been given a schedule for these to occur  August 11.  He has tried calling them to see if there are any sooner opportunities but there has not been any.    Prior visit:     Huong has had a several month history of alternating bowel habits, from diarrhea to constipation with flecks of mucus.  He continues to have mid abdominal aching pain.  I had increased his omeprazole to 40 a day and he was able to see GI yesterday. They changed omeprazole to pantoprazole. Also reportedly want to do an EGD and colonoscopy.  Continues to have epigastric dull pain.  Few times a week the pain becomes intolerable for a few hours.     Vitamin B12  "Deficiency    We continue monthly B12 injections. He is doing well with this.          Current Outpatient Medications Ordered in Epic   Medication Sig Dispense Refill   • pantoprazole (PROTONIX) 20 MG tablet Take 1 Tablet by mouth every day. 30 Tablet 5   • hydrochlorothiazide (MICROZIDE) 12.5 MG capsule TAKE ONE CAPSULE BY MOUTH EVERY DAY 90 Capsule 3   • diphenhydrAMINE (BENADRYL) 25 MG capsule Take 25 mg by mouth every 6 hours as needed.     • acetaminophen (TYLENOL) 500 MG Tab Take 500-1,000 mg by mouth every 6 hours as needed.     • fluticasone (FLONASE) 50 MCG/ACT nasal spray Administer 1 Spray into affected nostril(S) every day. 15.8 mL 0   • omeprazole (PRILOSEC) 40 MG delayed-release capsule TAKE 1 CAPSULE BY MOUTH EVERY DAY (Patient not taking: Reported on 6/7/2022) 30 Capsule 2   • ondansetron (ZOFRAN) 4 MG Tab tablet Take 1 Tablet by mouth every four hours as needed for Nausea/Vomiting. (Patient not taking: Reported on 6/7/2022) 20 Tablet 1   • cetirizine (ZYRTEC) 10 MG Tab Take 1 tablet by mouth every day. (Patient not taking: Reported on 6/7/2022) 30 tablet 0     No current Epic-ordered facility-administered medications on file.       Health Maintenance:     ROS:  Gen: no fevers/chills, no changes in weight  Eyes: no changes in vision  ENT: no sore throat, no hearing loss, no bloody nose  Pulm: no sob, no cough  CV: no chest pain, no palpitations  GI: no nausea/vomiting, no diarrhea  : no dysuria  MSk: no myalgias  Skin: no rash  Neuro: no headaches, no numbness/tingling  Heme/Lymph: no easy bruising      Objective:     Exam:  /70 (BP Location: Right arm, Patient Position: Sitting, BP Cuff Size: Adult)   Pulse 74   Temp 36.3 °C (97.4 °F) (Temporal)   Ht 1.727 m (5' 8\")   Wt 74.2 kg (163 lb 9.6 oz)   SpO2 97%   BMI 24.88 kg/m²  Body mass index is 24.88 kg/m².    Gen: Alert and oriented, No apparent distress.  Neck: Neck is supple without lymphadenopathy.  Lungs: Normal effort, CTA " bilaterally, no wheezes, rhonchi, or rales  CV: Regular rate and rhythm. No murmurs, rubs, or gallops.  Ext: No clubbing, cyanosis, edema.          Assessment & Plan:     89 y.o. male with the following -     Problem List Items Addressed This Visit     Gastroesophageal reflux disease     Huong continues to take pantoprazole on a daily basis and this does not seem to affect his current symptoms.  He continues Gaviscon as well and this may help a little bit of the dyspepsia and bloating symptoms.  I am reassured by the essentially normal CAT scan, but I am concerned along with Huong that is taking so long to complete this evaluation.  He is going to continue to call them on a fairly regular basis to ask about cancellations.  I am ordering labs for other reasons and will add an amylase and lipase and we will see if there is any change in these.           Relevant Orders    Comp Metabolic Panel    LIPASE    Vitamin B12 deficiency     cyanocobalmin 1000 mcg today IM           Chills     I will check a urinalysis along with some labs to make sure he is not suffering from urinary tract infection.  We will add a blood count and chemistry panel as well.           Relevant Orders    CBC WITH DIFFERENTIAL    URINALYSIS,CULTURE IF INDICATED    Paresthesia and pain of both upper extremities     Exam is normal.  There is good strength, pulses and sensation.  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  I am checking electrolytes as well.  Huong has suffered from anxiety in the past and there might be an element of hyperventilation contributing to this as well.                 I spent a total of 62  minutes with record review, exam, communication with the patient, communication with other providers, and documentation of this encounter.      No follow-ups on file.

## 2022-06-07 NOTE — ASSESSMENT & PLAN NOTE
Huong continues to take pantoprazole on a daily basis and this does not seem to affect his current symptoms.  He continues Gaviscon as well and this may help a little bit of the dyspepsia and bloating symptoms.  I am reassured by the essentially normal CAT scan, but I am concerned along with Huong that is taking so long to complete this evaluation.  He is going to continue to call them on a fairly regular basis to ask about cancellations.  I am ordering labs for other reasons and will add an amylase and lipase and we will see if there is any change in these.

## 2022-06-07 NOTE — ASSESSMENT & PLAN NOTE
I will check a urinalysis along with some labs to make sure he is not suffering from urinary tract infection.  We will add a blood count and chemistry panel as well.  U/A is clear. I will look for lab results and follow along.

## 2022-06-07 NOTE — ASSESSMENT & PLAN NOTE
Exam is normal.  There is good strength, pulses and sensation.  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  I am checking electrolytes as well.  Huong has suffered from anxiety in the past and there might be an element of hyperventilation contributing to this as well.

## 2022-06-24 NOTE — PROGRESS NOTES
Problem: Potential for Falls  Goal: Patient will remain free of falls  Description  INTERVENTIONS:  - Assess patient frequently for physical needs  -  Identify cognitive and physical deficits and behaviors that affect risk of falls    -  [place] fall precautions as indicated by assessment   - Educate patient/family on patient safety including physical limitations  - Instruct patient to call for assistance with activity based on assessment  - Modify environment to reduce risk of injury  - Consider OT/PT consult to assist with strengthening/mobility  Outcome: Progressing     Problem: PAIN - ADULT  Goal: Verbalizes/displays adequate comfort level or baseline comfort level  Description  Interventions:  - Encourage patient to monitor pain and request assistance  - Assess pain using appropriate pain scale  - Administer analgesics based on type and severity of pain and evaluate response  - Implement non-pharmacological measures as appropriate and evaluate response  - Consider cultural and social influences on pain and pain management  - Notify physician/advanced practitioner if interventions unsuccessful or patient reports new pain  Outcome: Progressing     Problem: INFECTION - ADULT  Goal: Absence or prevention of progression during hospitalization  Description  INTERVENTIONS:  - Assess and monitor for signs and symptoms of infection  - Monitor lab/diagnostic results  - Monitor all insertion sites, i e  indwelling lines, tubes, and drains  - Monitor endotracheal if appropriate and nasal secretions for changes in amount and color  - [place] appropriate cooling/warming therapies per order  - Administer medications as ordered  - Instruct and encourage patient and family to use good hand hygiene technique  - Identify and instruct in appropriate isolation precautions for identified infection/condition  Outcome: Progressing  Goal: Absence of fever/infection during neutropenic period  Description  INTERVENTIONS:  - Monitor Pt tested + for COVID by report. Fits high risk criteria, GFR> 60. I will send Paxlovid to his pharmacy   WBC    Outcome: Progressing     Problem: SAFETY ADULT  Goal: Maintain or return to baseline ADL function  Description  INTERVENTIONS:  -  Assess patient's ability to carry out ADLs; assess patient's baseline for ADL function and identify physical deficits which impact ability to perform ADLs (bathing, care of mouth/teeth, toileting, grooming, dressing, etc )  - Assess/evaluate cause of self-care deficits   - Assess range of motion  - Assess patient's mobility; develop plan if impaired  - Assess patient's need for assistive devices and provide as appropriate  - Encourage maximum independence but intervene and supervise when necessary  - Involve family in performance of ADLs  - Assess for home care needs following discharge   - Consider OT consult to assist with ADL evaluation and planning for discharge  - Provide patient education as appropriate  Outcome: Progressing  Goal: Maintain or return mobility status to optimal level  Description  INTERVENTIONS:  - Assess patient's baseline mobility status (ambulation, transfers, stairs, etc )    - Identify cognitive and physical deficits and behaviors that affect mobility  - Identify mobility aids required to assist with transfers and/or ambulation (gait belt, sit-to-stand, lift, walker, cane, etc )  - [place] fall precautions as indicated by assessment  - Record patient progress and toleration of activity level on Mobility SBAR; progress patient to next Phase/Stage  - Instruct patient to call for assistance with activity based on assessment  - Consider rehabilitation consult to assist with strengthening/weightbearing, etc   Outcome: Progressing     Problem: DISCHARGE PLANNING  Goal: Discharge to home or other facility with appropriate resources  Description  INTERVENTIONS:  - Identify barriers to discharge w/patient and caregiver  - Arrange for needed discharge resources and transportation as appropriate  - Identify discharge learning needs (meds, wound care, etc )  - Arrange for interpretive services to assist at discharge as needed  - Refer to Case Management Department for coordinating discharge planning if the patient needs post-hospital services based on physician/advanced practitioner order or complex needs related to functional status, cognitive ability, or social support system  Outcome: Progressing     Problem: Knowledge Deficit  Goal: Patient/family/caregiver demonstrates understanding of disease process, treatment plan, medications, and discharge instructions  Description  Complete learning assessment and assess knowledge base  Interventions:  - Provide teaching at level of understanding  - Provide teaching via preferred learning methods  Outcome: Progressing     Problem: Nutrition/Hydration-ADULT  Goal: Nutrient/Hydration intake appropriate for improving, restoring or maintaining nutritional needs  Description  Monitor and assess patient's nutrition/hydration status for malnutrition  Collaborate with interdisciplinary team and initiate plan and interventions as ordered  Monitor patient's weight and dietary intake as ordered or per policy  Utilize nutrition screening tool and intervene as necessary  Determine patient's food preferences and provide high-protein, high-caloric foods as appropriate       INTERVENTIONS:  - Monitor oral intake, urinary output, labs, and treatment plans  - Assess nutrition and hydration status and recommend course of action  - Evaluate amount of meals eaten  - Assist patient with eating if necessary   - Allow adequate time for meals  - Recommend/ encourage appropriate diets, oral nutritional supplements, and vitamin/mineral supplements  - Order, calculate, and assess calorie counts as needed  - Recommend, monitor, and adjust tube feedings and TPN/PPN based on assessed needs  - Assess need for intravenous fluids  - Provide specific nutrition/hydration education as appropriate  - Include patient/family/caregiver in decisions related to nutrition  Outcome: Progressing     Problem: Prexisting or High Potential for Compromised Skin Integrity  Goal: Skin integrity is maintained or improved  Description  INTERVENTIONS:  - Identify patients at risk for skin breakdown  - Assess and monitor skin integrity  - Assess and monitor nutrition and hydration status  - Monitor labs   - Assess for incontinence   - Turn and reposition patient  - Assist with mobility/ambulation  - Relieve pressure over bony prominences  - Avoid friction and shearing  - Provide appropriate hygiene as needed including keeping skin clean and dry  - Evaluate need for skin moisturizer/barrier cream  - Collaborate with interdisciplinary team   - Patient/family teaching  - Consider wound care consult   Outcome: Progressing     Problem: SAFETY,RESTRAINT: NV/NON-SELF DESTRUCTIVE BEHAVIOR  Goal: Remains free of harm/injury (restraint for non violent/non self-detsructive behavior)  Description  INTERVENTIONS:  - Instruct patient/family regarding restraint use   - Assess and monitor physiologic and psychological status   - Provide interventions and comfort measures to meet assessed patient needs   - Identify and implement measures to help patient regain control  - Assess readiness for release of restraint   Outcome: Progressing  Goal: Returns to optimal restraint-free functioning  Description  INTERVENTIONS:  - Assess the patient's behavior and symptoms that indicate continued need for restraint  - Identify and implement measures to help patient regain control  - Assess readiness for release of restraint   Outcome: Progressing

## 2022-07-06 ENCOUNTER — APPOINTMENT (OUTPATIENT)
Dept: MEDICAL GROUP | Facility: CLINIC | Age: 87
End: 2022-07-06
Payer: MEDICARE

## 2022-07-08 ENCOUNTER — NON-PROVIDER VISIT (OUTPATIENT)
Dept: MEDICAL GROUP | Facility: CLINIC | Age: 87
End: 2022-07-08
Payer: MEDICARE

## 2022-07-08 RX ORDER — BENZONATATE 100 MG/1
CAPSULE ORAL
COMMUNITY
Start: 2022-06-22 | End: 2022-10-26

## 2022-07-08 RX ORDER — SULFAMETHOXAZOLE AND TRIMETHOPRIM 800; 160 MG/1; MG/1
TABLET ORAL
COMMUNITY
Start: 2022-06-22 | End: 2022-10-26

## 2022-07-08 RX ORDER — CYANOCOBALAMIN 1000 UG/ML
1000 INJECTION, SOLUTION INTRAMUSCULAR; SUBCUTANEOUS ONCE
Status: COMPLETED | OUTPATIENT
Start: 2022-07-08 | End: 2022-07-08

## 2022-07-08 RX ADMIN — CYANOCOBALAMIN 1000 MCG: 1000 INJECTION, SOLUTION INTRAMUSCULAR; SUBCUTANEOUS at 13:53

## 2022-07-08 NOTE — PROGRESS NOTES
Huong Becker is a 89 y.o. male here for a non-provider visit for B-12 injection.    Reason for injection: Pernicious anemia  Order in MAR?: Yes  Patient supplied?:No  Minimum interval has been met for this injection (per MAR order): Yes    Patient tolerated injection and no adverse effects were observed or reported: Yes

## 2022-08-16 ENCOUNTER — NON-PROVIDER VISIT (OUTPATIENT)
Dept: MEDICAL GROUP | Facility: CLINIC | Age: 87
End: 2022-08-16
Payer: MEDICARE

## 2022-08-16 VITALS
TEMPERATURE: 98 F | OXYGEN SATURATION: 93 % | DIASTOLIC BLOOD PRESSURE: 84 MMHG | SYSTOLIC BLOOD PRESSURE: 152 MMHG | HEART RATE: 73 BPM

## 2022-08-16 PROCEDURE — 96372 THER/PROPH/DIAG INJ SC/IM: CPT | Performed by: FAMILY MEDICINE

## 2022-08-16 RX ORDER — CYANOCOBALAMIN 1000 UG/ML
1000 INJECTION, SOLUTION INTRAMUSCULAR; SUBCUTANEOUS ONCE
Status: COMPLETED | OUTPATIENT
Start: 2022-08-16 | End: 2022-08-16

## 2022-08-16 RX ADMIN — CYANOCOBALAMIN 1000 MCG: 1000 INJECTION, SOLUTION INTRAMUSCULAR; SUBCUTANEOUS at 13:59

## 2022-08-16 NOTE — PROGRESS NOTES
Huong Becker is a 89 y.o. male here for a non-provider visit for B-12 injection.    Reason for injection: B-12 Deficiency   Order in MAR?: Yes  Patient supplied?:No  Minimum interval has been met for this injection (per MAR order): Yes    Patient tolerated injection and no adverse effects were observed or reported: Yes    # of Administrations remaining in MAR: 0      Patient requested his vitals be taken.

## 2022-09-15 ENCOUNTER — OFFICE VISIT (OUTPATIENT)
Dept: MEDICAL GROUP | Facility: CLINIC | Age: 87
End: 2022-09-15
Payer: MEDICARE

## 2022-09-15 VITALS
BODY MASS INDEX: 25.58 KG/M2 | SYSTOLIC BLOOD PRESSURE: 140 MMHG | HEIGHT: 67 IN | OXYGEN SATURATION: 96 % | DIASTOLIC BLOOD PRESSURE: 80 MMHG | WEIGHT: 163 LBS | HEART RATE: 86 BPM | TEMPERATURE: 98 F

## 2022-09-15 DIAGNOSIS — R30.0 DYSURIA: Primary | ICD-10-CM

## 2022-09-15 DIAGNOSIS — R19.7 DIARRHEA, UNSPECIFIED TYPE: ICD-10-CM

## 2022-09-15 LAB
APPEARANCE UR: CLEAR
BILIRUB UR STRIP-MCNC: NORMAL MG/DL
COLOR UR AUTO: YELLOW
GLUCOSE UR STRIP.AUTO-MCNC: NORMAL MG/DL
KETONES UR STRIP.AUTO-MCNC: NORMAL MG/DL
LEUKOCYTE ESTERASE UR QL STRIP.AUTO: NORMAL
NITRITE UR QL STRIP.AUTO: NORMAL
PH UR STRIP.AUTO: 5.5 [PH] (ref 5–8)
PROT UR QL STRIP: NORMAL MG/DL
RBC UR QL AUTO: NORMAL
SP GR UR STRIP.AUTO: 1
UROBILINOGEN UR STRIP-MCNC: 0.2 MG/DL

## 2022-09-15 PROCEDURE — 81002 URINALYSIS NONAUTO W/O SCOPE: CPT | Mod: GC

## 2022-09-15 PROCEDURE — 99213 OFFICE O/P EST LOW 20 MIN: CPT | Mod: GC

## 2022-09-15 ASSESSMENT — PATIENT HEALTH QUESTIONNAIRE - PHQ9: CLINICAL INTERPRETATION OF PHQ2 SCORE: 0

## 2022-09-15 ASSESSMENT — FIBROSIS 4 INDEX: FIB4 SCORE: 2.93

## 2022-09-15 NOTE — ASSESSMENT & PLAN NOTE
Patient's UA here was notable for trace amounts of blood.  Otherwise unremarkable.    After discussion with the patient, the infrequency of his symptoms, and examination of his penis I will defer further work-up of this trace blood and treat symptomatically at this time. I believe it is most likely irritation of his urethra causing this symptoms.Counseled that he could use Bactroban at the tip of his penis, however, I saw no evidence of an infection yeast or bacterial.    At his next appt - on 10/26 - will consider further work-up and investigation if pt continues to have this symptoms.    Patient fervently denies any possibility of having chlamydia and gonorrhea and as such I will not run this test.

## 2022-09-15 NOTE — PROGRESS NOTES
Subjective:     CC: Diarrhea and dysuria    HPI:   Vertrus presents today with    Problem   Dysuria    Burning at the tip of his penis with urination.  However, only sometimes.  Has been going on for 2 to 3 weeks.     Diarrhea    Stools continue to vary in consistency alternating between diarrhea and pebble-like.  Patient has had his colonoscopy on 24 August.  Patient reports that he still does not have the results from his biopsies.           Current Outpatient Medications Ordered in Epic   Medication Sig Dispense Refill    hydrochlorothiazide (MICROZIDE) 12.5 MG capsule TAKE ONE CAPSULE BY MOUTH EVERY DAY 90 Capsule 3    ondansetron (ZOFRAN) 4 MG Tab tablet Take 1 Tablet by mouth every four hours as needed for Nausea/Vomiting. 20 Tablet 1    diphenhydrAMINE (BENADRYL) 25 MG capsule Take 25 mg by mouth every 6 hours as needed.      acetaminophen (TYLENOL) 500 MG Tab Take 500-1,000 mg by mouth every 6 hours as needed.      benzonatate (TESSALON) 100 MG Cap TAKE 1 CAPSULE BY MOUTH 3 TIMES A DAY FOR 7 DAYS AS NEEDED FOR COUGH      sulfamethoxazole-trimethoprim (BACTRIM DS) 800-160 MG tablet TAKE 1 TABLET BY MOUTH TWICE A DAY FOR 7 DAYS      Nirmatrelvir & Ritonavir 20 x 150 MG & 10 x 100MG Tablet Therapy Pack Take 300 mg nirmatrelvir (two 150 mg tablets) with 100 mg ritonavir (one 100 mg tablet) by mouth, with all three tablets taken together twice daily for 5 days. 30 Each 0    pantoprazole (PROTONIX) 20 MG tablet Take 1 Tablet by mouth every day. (Patient not taking: Reported on 9/15/2022) 30 Tablet 5    omeprazole (PRILOSEC) 40 MG delayed-release capsule TAKE 1 CAPSULE BY MOUTH EVERY DAY (Patient not taking: No sig reported) 30 Capsule 2    cetirizine (ZYRTEC) 10 MG Tab Take 1 tablet by mouth every day. (Patient not taking: Reported on 6/7/2022) 30 tablet 0    fluticasone (FLONASE) 50 MCG/ACT nasal spray Administer 1 Spray into affected nostril(S) every day. (Patient not taking: Reported on 9/15/2022) 15.8 mL 0  "    No current [facility]-ordered facility-administered medications on file.       Health Maintenance: Completed    ROS:  Gen: no fevers/chills, no changes in weight  Eyes: no changes in vision  ENT: no sore throat, no hearing loss, no bloody nose  Pulm: no sob, no cough  CV: no chest pain, no palpitations  GI: no nausea/vomiting, no diarrhea  : no dysuria  MSk: no myalgias  Skin: no rash  Neuro: no headaches, no numbness/tingling  Heme/Lymph: no easy bruising      Objective:     Exam:  BP (!) 140/80 (BP Location: Right arm, Patient Position: Sitting, BP Cuff Size: Adult)   Pulse 86   Temp 36.7 °C (98 °F)   Ht 1.702 m (5' 7\")   Wt 73.9 kg (163 lb)   SpO2 96%   BMI 25.53 kg/m²  Body mass index is 25.53 kg/m².    Gen: Alert and oriented, No apparent distress.  HEENT: PERRL, EOMI, NCAT, uvula midline, no exudates  Neck: Neck is supple without lymphadenopathy.  Lungs: Normal effort, CTA bilaterally, no wheezes, rhonchi, or rales  CV: Regular rate and rhythm. No murmurs, rubs, or gallops.  Abd:  Soft, Non-distended, non-tender  Ext: No clubbing, cyanosis, edema.  Skin: No rashes, no erythema    A chaperone was offered to the patient during today's exam. Patient declined chaperone.    Labs: Reviewed    Assessment & Plan:     89 y.o. male with the following:     Problem List Items Addressed This Visit       Diarrhea     Given the patient's elevated amylase and story of diarrhea sometimes with large amounts of mucus I believe it is possible that he has celiac's versus other malabsorptive etiologies.  Either way the result from the colonoscopy and endoscopy should shed further light on what his diagnosis is.  Will defer further work-up at this point in time until those results come back.         Dysuria - Primary     Patient's UA here was notable for trace amounts of blood.  Otherwise unremarkable.    After discussion with the patient, the infrequency of his symptoms, and examination of his penis I will defer further " work-up of this trace blood and treat symptomatically at this time. I believe it is most likely irritation of his urethra causing this symptoms.Counseled that he could use Bactroban at the tip of his penis, however, I saw no evidence of an infection yeast or bacterial.    At his next appt - on 10/26 - will consider further work-up and investigation if pt continues to have this symptoms.    Patient fervently denies any possibility of having chlamydia and gonorrhea and as such I will not run this test.         Relevant Orders    POCT Urinalysis (Completed)           No follow-ups on file.

## 2022-09-15 NOTE — ASSESSMENT & PLAN NOTE
Given the patient's elevated amylase and story of diarrhea sometimes with large amounts of mucus I believe it is possible that he has celiac's versus other malabsorptive etiologies.  Either way the result from the colonoscopy and endoscopy should shed further light on what his diagnosis is.  Will defer further work-up at this point in time until those results come back.

## 2022-09-22 ENCOUNTER — NON-PROVIDER VISIT (OUTPATIENT)
Dept: MEDICAL GROUP | Facility: CLINIC | Age: 87
End: 2022-09-22
Payer: MEDICARE

## 2022-09-22 PROCEDURE — 96372 THER/PROPH/DIAG INJ SC/IM: CPT | Performed by: FAMILY MEDICINE

## 2022-09-22 RX ORDER — CYANOCOBALAMIN 1000 UG/ML
1000 INJECTION, SOLUTION INTRAMUSCULAR; SUBCUTANEOUS ONCE
Status: COMPLETED | OUTPATIENT
Start: 2022-09-22 | End: 2022-09-22

## 2022-09-22 RX ADMIN — CYANOCOBALAMIN 1000 MCG: 1000 INJECTION, SOLUTION INTRAMUSCULAR; SUBCUTANEOUS at 13:50

## 2022-09-22 NOTE — PROGRESS NOTES
Huong Becker is a 89 y.o. male here for a non-provider visit for B12 injection.    Reason for injection: B12 deficiency   Order in MAR?: Yes  Patient supplied?:No  Minimum interval has been met for this injection (per MAR order): Yes    Patient tolerated injection and no adverse effects were observed or reported: Yes    # of Administrations remaining in MAR: 0

## 2022-10-26 ENCOUNTER — OFFICE VISIT (OUTPATIENT)
Dept: MEDICAL GROUP | Facility: CLINIC | Age: 87
End: 2022-10-26
Payer: MEDICARE

## 2022-10-26 VITALS
BODY MASS INDEX: 25.17 KG/M2 | HEIGHT: 68 IN | SYSTOLIC BLOOD PRESSURE: 124 MMHG | WEIGHT: 166.1 LBS | OXYGEN SATURATION: 97 % | DIASTOLIC BLOOD PRESSURE: 72 MMHG | TEMPERATURE: 97 F | HEART RATE: 74 BPM

## 2022-10-26 DIAGNOSIS — I10 PRIMARY HYPERTENSION: ICD-10-CM

## 2022-10-26 DIAGNOSIS — R09.82 POST-NASAL DRIP: ICD-10-CM

## 2022-10-26 DIAGNOSIS — F41.9 ANXIETY: ICD-10-CM

## 2022-10-26 DIAGNOSIS — D51.0 PERNICIOUS ANEMIA: ICD-10-CM

## 2022-10-26 DIAGNOSIS — H69.91 DYSFUNCTION OF RIGHT EUSTACHIAN TUBE: ICD-10-CM

## 2022-10-26 DIAGNOSIS — R14.0 ABDOMINAL BLOATING: ICD-10-CM

## 2022-10-26 PROCEDURE — 99214 OFFICE O/P EST MOD 30 MIN: CPT | Mod: 25 | Performed by: FAMILY MEDICINE

## 2022-10-26 PROCEDURE — 96372 THER/PROPH/DIAG INJ SC/IM: CPT | Performed by: FAMILY MEDICINE

## 2022-10-26 RX ORDER — CYANOCOBALAMIN 1000 UG/ML
1000 INJECTION, SOLUTION INTRAMUSCULAR; SUBCUTANEOUS ONCE
Status: COMPLETED | OUTPATIENT
Start: 2022-10-26 | End: 2022-10-26

## 2022-10-26 RX ORDER — HYDROCHLOROTHIAZIDE 12.5 MG/1
CAPSULE, GELATIN COATED ORAL
Qty: 90 CAPSULE | Refills: 3 | Status: SHIPPED | OUTPATIENT
Start: 2022-10-26 | End: 2023-09-05

## 2022-10-26 RX ORDER — CETIRIZINE HYDROCHLORIDE 10 MG/1
10 TABLET ORAL DAILY
Qty: 30 TABLET | Refills: 0 | Status: SHIPPED | OUTPATIENT
Start: 2022-10-26 | End: 2022-11-23

## 2022-10-26 RX ORDER — TADALAFIL 10 MG/1
10 TABLET ORAL
Qty: 10 TABLET | Refills: 3 | Status: SHIPPED | OUTPATIENT
Start: 2022-10-26

## 2022-10-26 RX ADMIN — CYANOCOBALAMIN 1000 MCG: 1000 INJECTION, SOLUTION INTRAMUSCULAR; SUBCUTANEOUS at 11:34

## 2022-10-26 ASSESSMENT — FIBROSIS 4 INDEX: FIB4 SCORE: 2.93

## 2022-10-26 NOTE — PROGRESS NOTES
Subjective:     CC: abd pain, diarrhea, Pernicious anemia,     HPI:   Vertrus presents today to discuss the following issues     Problem   Abdominal Bloating    Sx's are largely resolved. Saw GI, report from EGD and colonoscopy reviewed: polyps and mild inflammation only.   Believes improvement may be related to stopping dairy.     Prior note:     Pain associated with his bloating has improved considerably.  He has not required any of the hydrocodone I gave him on the last visit.  He does continue to have some gas.  He is being followed by gastroenterology and a scope and EGD are pending.  He remains careful with his diet and is limiting sugars and caffeine.  His weight loss also seems to have reversed.  He has lost about 18 pounds in a 3 to 4-month period and has regained maybe 6 since then.       Htn (Hypertension)    Stable. Has enough meds     Pernicious Anemia    Doing well with this.  It is time for his  monthly injection of B12. We plan to continue these indefinitely.     Anxiety    Much improved. He no longer takes any meds for this including antidepressants.  Notes some ongoing anxiety, partly related to health concerns. It effects his sleep at times.   Stopped watching [employer] news which helped         Current Outpatient Medications Ordered in Epic   Medication Sig Dispense Refill    tadalafil (CIALIS) 10 MG tablet Take 1 Tablet by mouth 1 time a day as needed for Erectile Dysfunction. 10 Tablet 3    hydrochlorothiazide (MICROZIDE) 12.5 MG capsule TAKE ONE CAPSULE BY MOUTH EVERY DAY 90 Capsule 3    cetirizine (ZYRTEC) 10 MG Tab Take 1 Tablet by mouth every day. 30 Tablet 0    ondansetron (ZOFRAN) 4 MG Tab tablet Take 1 Tablet by mouth every four hours as needed for Nausea/Vomiting. 20 Tablet 1    diphenhydrAMINE (BENADRYL) 25 MG capsule Take 25 mg by mouth every 6 hours as needed.      acetaminophen (TYLENOL) 500 MG Tab Take 500-1,000 mg by mouth every 6 hours as needed.      fluticasone (FLONASE) 50 MCG/ACT  "nasal spray Administer 1 Spray into affected nostril(S) every day. (Patient not taking: No sig reported) 15.8 mL 0     No current Epic-ordered facility-administered medications on file.       Health Maintenance:     ROS:  Gen: no fevers/chills, no changes in weight  Eyes: no changes in vision  ENT: no sore throat, no hearing loss, no bloody nose  Pulm: no sob, no cough  CV: no chest pain, no palpitations  GI: no nausea/vomiting, no diarrhea  : no dysuria  MSk: no myalgias  Skin: no rash  Neuro: no headaches, no numbness/tingling  Heme/Lymph: no easy bruising      Objective:     Exam:  /72 (BP Location: Right arm, Patient Position: Sitting, BP Cuff Size: Adult)   Pulse 74   Temp 36.1 °C (97 °F) (Temporal)   Ht 1.727 m (5' 8\")   Wt 75.3 kg (166 lb 1.6 oz)   SpO2 97%   BMI 25.26 kg/m²  Body mass index is 25.26 kg/m².    Gen: Alert and oriented, No apparent distress.  Neck: Neck is supple without lymphadenopathy.  Lungs: Normal effort, CTA bilaterally, no wheezes, rhonchi, or rales  CV: Regular rate and rhythm. No murmurs, rubs, or gallops.  Ext: No clubbing, cyanosis, edema.          Assessment & Plan:     89 y.o. male with the following -     Problem List Items Addressed This Visit       Anxiety     I will continue to follow         Pernicious anemia     Cyanocobalamin given  today         HTN (hypertension)     Continue to monitor         Relevant Medications    tadalafil (CIALIS) 10 MG tablet    hydrochlorothiazide (MICROZIDE) 12.5 MG capsule    Abdominal bloating     Other Visit Diagnoses       Dysfunction of right eustachian tube        Relevant Medications    cetirizine (ZYRTEC) 10 MG Tab    Post-nasal drip        Relevant Medications    cetirizine (ZYRTEC) 10 MG Tab                No follow-ups on file.            "

## 2022-11-02 ENCOUNTER — PATIENT MESSAGE (OUTPATIENT)
Dept: HEALTH INFORMATION MANAGEMENT | Facility: OTHER | Age: 87
End: 2022-11-02

## 2022-11-18 ENCOUNTER — NON-PROVIDER VISIT (OUTPATIENT)
Dept: MEDICAL GROUP | Facility: CLINIC | Age: 87
End: 2022-11-18
Payer: MEDICARE

## 2022-11-18 PROCEDURE — 96372 THER/PROPH/DIAG INJ SC/IM: CPT | Performed by: FAMILY MEDICINE

## 2022-11-18 RX ORDER — CYANOCOBALAMIN 1000 UG/ML
1000 INJECTION, SOLUTION INTRAMUSCULAR; SUBCUTANEOUS ONCE
Status: COMPLETED | OUTPATIENT
Start: 2022-11-18 | End: 2022-11-18

## 2022-11-18 RX ADMIN — CYANOCOBALAMIN 1000 MCG: 1000 INJECTION, SOLUTION INTRAMUSCULAR; SUBCUTANEOUS at 11:32

## 2022-11-18 NOTE — PROGRESS NOTES
Huong Becker is a 89 y.o. male here for a non-provider visit for B12 injection.    Reason for injection: Vitamin B12 Deficiency   Order in MAR?: Yes  Patient supplied?:No  Minimum interval has been met for this injection (per MAR order): Yes    Patient tolerated injection and no adverse effects were observed or reported: Yes    # of Administrations remaining in MAR: 0

## 2022-11-23 DIAGNOSIS — H69.91 DYSFUNCTION OF RIGHT EUSTACHIAN TUBE: ICD-10-CM

## 2022-11-23 DIAGNOSIS — R09.82 POST-NASAL DRIP: ICD-10-CM

## 2022-11-23 RX ORDER — CETIRIZINE HYDROCHLORIDE 10 MG/1
10 TABLET ORAL DAILY
Qty: 30 TABLET | Refills: 11 | Status: SHIPPED | OUTPATIENT
Start: 2022-11-23

## 2022-12-13 ENCOUNTER — OFFICE VISIT (OUTPATIENT)
Dept: MEDICAL GROUP | Facility: CLINIC | Age: 87
End: 2022-12-13
Payer: MEDICARE

## 2022-12-13 DIAGNOSIS — Z00.00 HEALTH CARE MAINTENANCE: ICD-10-CM

## 2022-12-13 DIAGNOSIS — R14.0 ABDOMINAL BLOATING: ICD-10-CM

## 2022-12-13 DIAGNOSIS — D51.0 PERNICIOUS ANEMIA: ICD-10-CM

## 2022-12-13 DIAGNOSIS — Z23 NEED FOR VACCINATION: ICD-10-CM

## 2022-12-13 RX ORDER — CYANOCOBALAMIN 1000 UG/ML
1000 INJECTION, SOLUTION INTRAMUSCULAR; SUBCUTANEOUS ONCE
Status: COMPLETED | OUTPATIENT
Start: 2022-12-13 | End: 2022-12-13

## 2022-12-13 RX ADMIN — CYANOCOBALAMIN 1000 MCG: 1000 INJECTION, SOLUTION INTRAMUSCULAR; SUBCUTANEOUS at 11:39

## 2022-12-13 ASSESSMENT — FIBROSIS 4 INDEX: FIB4 SCORE: 2.93

## 2022-12-13 NOTE — PROGRESS NOTES
Subjective:     CC: f/u bloating, pernicious anemia, HCM      HPI:   April presents today to discuss the following issues     Problem   Health Care Maintenance    Will give Vert the hepatitis B vaccine today, along with a high-dose flu shot.     Abdominal Bloating    For did a pretty extensive work-up including colonoscopy and EGD that were unremarkable.  He is pretty convinced now that dairy is causing the symptoms.  He has not been entirely successful in eliminating this from the diet but does note a clear association.  No new symptoms.    Prior note:    Sx's are largely resolved. Saw GI, report from EGD and colonoscopy reviewed: polyps and mild inflammation only.   Believes improvement may be related to stopping dairy.     Prior note:     Pain associated with his bloating has improved considerably.  He has not required any of the hydrocodone I gave him on the last visit.  He does continue to have some gas.  He is being followed by gastroenterology and a scope and EGD are pending.  He remains careful with his diet and is limiting sugars and caffeine.  His weight loss also seems to have reversed.  He has lost about 18 pounds in a 3 to 4-month period and has regained maybe 6 since then.       Pernicious Anemia    No new oral or other complaints. Doing well with this.  It is time for his  monthly injection of B12. We plan to continue these indefinitely.         Current Outpatient Medications Ordered in Epic   Medication Sig Dispense Refill    cetirizine (ZYRTEC) 10 MG Tab TAKE 1 TABLET BY MOUTH EVERY DAY 30 Tablet 11    tadalafil (CIALIS) 10 MG tablet Take 1 Tablet by mouth 1 time a day as needed for Erectile Dysfunction. 10 Tablet 3    hydrochlorothiazide (MICROZIDE) 12.5 MG capsule TAKE ONE CAPSULE BY MOUTH EVERY DAY 90 Capsule 3    ondansetron (ZOFRAN) 4 MG Tab tablet Take 1 Tablet by mouth every four hours as needed for Nausea/Vomiting. 20 Tablet 1    diphenhydrAMINE (BENADRYL) 25 MG capsule Take 25 mg by mouth  every 6 hours as needed.      acetaminophen (TYLENOL) 500 MG Tab Take 500-1,000 mg by mouth every 6 hours as needed.      fluticasone (FLONASE) 50 MCG/ACT nasal spray Administer 1 Spray into affected nostril(S) every day. (Patient not taking: Reported on 9/15/2022) 15.8 mL 0     Current Facility-Administered Medications Ordered in Epic   Medication Dose Route Frequency Provider Last Rate Last Admin    cyanocobalamin (VITAMIN B-12) injection 1,000 mcg  1,000 mcg Intramuscular Once Dontae Sotelo M.D.           Health Maintenance:     ROS:  Gen: no fevers/chills, no changes in weight  Eyes: no changes in vision  ENT: no sore throat, no hearing loss, no bloody nose  Pulm: no sob, no cough  CV: no chest pain, no palpitations  GI: no nausea/vomiting, no diarrhea  : no dysuria  MSk: no myalgias  Skin: no rash  Neuro: no headaches, no numbness/tingling  Heme/Lymph: no easy bruising      Objective:     Exam:  BP (P) 128/72 (BP Location: Right arm, Patient Position: Sitting, BP Cuff Size: Adult)   Pulse (P) 68   Temp (P) 36.3 °C (97.4 °F) (Temporal)   Wt (P) 74.7 kg (164 lb 9.6 oz)   SpO2 (P) 94%   BMI (P) 25.03 kg/m²  Body mass index is 25.03 kg/m² (pended).    Gen: Alert and oriented, No apparent distress.  Neck: Neck is supple without lymphadenopathy.  Lungs: Normal effort, CTA bilaterally, no wheezes, rhonchi, or rales  CV: Regular rate and rhythm. No murmurs, rubs, or gallops.  Ext: No clubbing, cyanosis, edema.          Assessment & Plan:     89 y.o. male with the following -     Problem List Items Addressed This Visit       Pernicious anemia     cyanocobalmin given today.         Abdominal bloating     I reviewed some dairy alternatives with him and we will continue to follow.  I see no indication for further evaluation or treatment at the moment.         Health care maintenance     COVID vaccine today as well.  F/u 3 months.           Other Visit Diagnoses       Need for vaccination                  No  follow-ups on file.

## 2022-12-13 NOTE — ASSESSMENT & PLAN NOTE
I reviewed some dairy alternatives with him and we will continue to follow.  I see no indication for further evaluation or treatment at the moment.

## 2022-12-13 NOTE — ASSESSMENT & PLAN NOTE
[facility]                                     Progress Note    Patient Name: Javon Carrasco  Date: 22         Service Type: Individual      Session Start Time: 7:04a  Session End Time: 7:56a     Session Length: 52    Session #: 2    Attendees: Client attended alone    Service Modality:  Video Visit:      Provider verified identity through the following two step process.  Patient provided:  Patient     Telemedicine Visit: The patient's condition can be safely assessed and treated via synchronous audio and visual telemedicine encounter.      Reason for Telemedicine Visit: Services only offered telehealth    Originating Site (Patient Location): Patient's home    Distant Site (Provider Location): Provider Remote Setting- Home Office    Consent:  The patient/guardian has verbally consented to: the potential risks and benefits of telemedicine (video visit) versus in person care; bill my insurance or make self-payment for services provided; and responsibility for payment of non-covered services.     Patient would like the video invitation sent by:  Send to e-mail at: [email or web address]    Mode of Communication:  Video Conference via [employer]    As the provider I attest to compliance with applicable laws and regulations related to telemedicine.    DATA  Interactive Complexity: No  Crisis: No        Progress Since Last Session (Related to Symptoms / Goals / Homework):   Symptoms: No change MOod at 4.5/10    Homework: Partially completed      Episode of Care Goals: Satisfactory progress - ACTION (Actively working towards change); Intervened by reinforcing change plan / affirming steps taken     Current / Ongoing Stressors and Concerns:   Last session , pt reports he is not going to school in person these last 2 weeks of school. Pt continues to live with his friend and will through the summer for summer school which starts in a couple weeks. Past 3 weeks have been ok, he continues to  cyanocobalmin given today.   work, spend time with friends and listen to music. PT notes that he does want to be physically in the school due to not wanting to sit that long, too many people. In the fall, he wants to attend [employer].    Mood decreased from last appt, stressors include thinking about things from the past that he has not dealt with. Once summer starts, he will increase his work hours.    PT talked about him having a overwhelming sense of lonliness that stemmed from his childhood and having to stay with his godparents who he feels were bad people. He stayed with them for 2 years around ages [age] in [place] due to mom not being able to fully care for him. He never felt safe with them and would get punished often and was bullied by their kids. The only security he felt was within himself and the only thing he felt safe around was the dog. He moved back with his mom after 4th grade and things were better. Things became more stable in middle school. Currently he finds he will think about this 2 year period often and he noted that he forced himself to be happy while there even though he was very sad and had some SI even back around age 9. This experience made him more reserved and keeping to himself which has persisted today. Reservation is a way of life because he will avoid losing things.      Goals:  Complete distress thermometer activity and begin writing down details of 1 incident from living with godparents.           Treatment Objective(s) Addressed in This Session:   identify 1 strategies to more effectively address stressors  Distress thermometer     Intervention:   Motivational Interviewing    MI Intervention: Supported Autonomy, Collaboration, Evocation, Permission to raise concern or advise, Open-ended questions and Reflections: simple and complex     Change Talk Expressed by the Patient: Reasons to change Need to change Committment to change    Provider Response to Change Talk: E - Evoked more  info from patient about behavior change, A - Affirmed patient's thoughts, decisions, or attempts at behavior change and R - Reflected patient's change talk        ASSESSMENT: Current Emotional / Mental Status (status of significant symptoms):   Risk status (Self / Other harm or suicidal ideation)   Patient denies current fears or concerns for personal safety.   Patient denies current or recent suicidal ideation or behaviors.   Patient denies current or recent homicidal ideation or behaviors.   Patient denies current or recent self injurious behavior or ideation.   Patient denies other safety concerns.   Patient reports there has been no change in risk factors since their last session.     Patient reports there has been no change in protective factors since their last session.     Recommended that patient call 911 or go to the local ED should there be a change in any of these risk factors.     Appearance:   Appropriate    Eye Contact:   Fair    Psychomotor Behavior: Normal    Attitude:   Cooperative    Orientation:   All   Speech    Rate / Production: Normal     Volume:  Normal    Mood:    Depressed  Sad    Affect:    Flat    Thought Content:  Clear    Thought Form:  Coherent  Logical    Insight:    Fair      Medication Review:   No changes to current psychiatric medication(s)     Medication Compliance:   Yes     Changes in Health Issues:   None reported     Chemical Use Review:   Substance Use: Chemical use reviewed, no active concerns identified      Tobacco Use: No current tobacco use.      Diagnosis:  Adjustment disorder with depressed mood    Collateral Reports Completed:   Not Applicable    PLAN: (Patient Tasks / Therapist Tasks / Other)  PT will complete distress thermometer exercises        ALLI Marcelo   6/16/22                                                       ______________________________________________________________________    Individual Treatment Plan    Patient's Name: Javon CHRISTOPHER  Danilo  YOB: 2006    Date of Creation: 6/16/22  Date Treatment Plan Last Reviewed/Revised: 9/16/22    DSM5 Diagnoses: Adjustment Disorders  309.0 (F43.21) With depressed mood  Psychosocial / Contextual Factors: PT working, living with friend  PROMIS (reviewed every 90 days):     Referral / Collaboration:  Referral to another professional/service is not indicated at this time..    Anticipated number of session for this episode of care:   Anticipation frequency of session: Biweekly  Anticipated Duration of each session: 38-52 minutes  Treatment plan will be reviewed in 90 days or when goals have been changed.       MeasurableTreatment Goal(s) related to diagnosis / functional impairment(s)  Goal 1: Patient will engage in 1x daily healthy coping skills    Objective #A (Patient Action)    Patient will identify 1 strategies to more effectively address stressors.  Status: New - Date: 6/16/22     Intervention(s)  Therapist will teach distraction skills. Healthy coping skills.    Patient has reviewed and agreed to the above plan.      ALLI Marcelo  June 16, 2022

## 2023-01-01 NOTE — DISCHARGE SUMMARY
Discharge Summary    CHIEF COMPLAINT ON ADMISSION  Chief Complaint   Patient presents with   • Epigastric Pain       Reason for Admission  EMS     Admission Date  6/18/2019    CODE STATUS  Full Code    HPI & HOSPITAL COURSE  This is a 86 y.o. male here with HTN, HLD, parkinson's, GERD who presented with severe epigastric pain. Serial troponins were negative. TTE showed EF 60%, mild MR and TR. CT aorta showed normal aorta, enlargement of pancreatic head and mass that is unchanged from 2018. While hospitalized he developed fever and found with E coli bacteremia and transaminitis. Viral hepatitis screen was negative. UA was normal. CT A/P showed stable pancreatic mass. MRCP showed stable multiloculated cyst, no bile duct dilation. Dr. Snug with ID was consulted, he was kept on zosyn and transitioned to cipro. QTC was 468. Repeat blood cx from 6/20 were negative. His epigastric pain and nausea resolved and he was tolerating a diet prior to discharge. ID recommended he continue cipro until 6/30/19.   He did not have BEVERLEY.    Therefore, he is discharged in good and stable condition to home with close outpatient follow-up.    The patient met 2-midnight criteria for an inpatient stay at the time of discharge.    Discharge Date  6/22/2019    FOLLOW UP ITEMS POST DISCHARGE  F/u with Dr. Garcia regarding pancreatitic lesion  Ensure resolution of transaminitis    DISCHARGE DIAGNOSES  Active Problems:    Bacteremia POA: Yes    Pancreatic mass POA: Yes    Hypophosphatemia POA: Yes    Hypokalemia POA: Yes    Transaminitis POA: Yes    Essential hypertension POA: Yes  Resolved Problems:    Epigastric abdominal pain POA: Yes    Sepsis (HCC) POA: Unknown    Nausea POA: Unknown      FOLLOW UP  Dontae Sotelo M.D.  [address]  [place]  [facility] [id number]  [phone]    Go on 7/2/2019  Please arrive at 12:50 pm for a 1:00 pm appointment with primary care. You will see Dr. Sveta Watson. She works with Dr. Sotelo.    Dontae SMITH  NAHUN Sotelo  [address]  [place]  [place] [id number]  [phone]    In 1 week        MEDICATIONS ON DISCHARGE     Medication List      START taking these medications      Instructions   ciprofloxacin 500 MG Tabs  Commonly known as:  CIPRO   Take 1 Tab by mouth every 12 hours for 8 days. Take with food  Dose:  500 mg     potassium chloride SA 20 MEQ Tbcr  Commonly known as:  Kdur   Take 1 Tab by mouth every day for 7 days.  Dose:  20 mEq        CONTINUE taking these medications      Instructions   candesartan 32 MG tablet  Commonly known as:  ATACAND   Take 32 mg by mouth every day.  Dose:  32 mg     cetirizine 10 MG Tabs  Commonly known as:  ZYRTEC   Take 10 mg by mouth 1 time daily as needed.  Dose:  10 mg     hydrochlorothiazide 12.5 MG capsule  Commonly known as:  MICROZIDE   Take 12.5 mg by mouth every day.  Dose:  12.5 mg            Allergies  No Known Allergies    DIET  Orders Placed This Encounter   Procedures   • Diet Order Low Fiber(GI Soft)     Standing Status:   Standing     Number of Occurrences:   1     Order Specific Question:   Diet:     Answer:   Low Fiber(GI Soft) [2]       ACTIVITY  As tolerated.  Weight bearing as tolerated    CONSULTATIONS  ID - Dr. Sung    PROCEDURES  MR-ABDOMEN-WITH & W/O   Final Result      1.  The multiloculated cystic mass in the pancreatic head is similar to slightly decreased in size without definite new solid enhancing components. This is most likely a benign multiloculated pseudocyst as a serous or mucinous cystic neoplasm would be    unusual in a male of this age group.   2.  There is no pancreatic ductal dilatation at this time.   3.  Gallbladder is surgically absent and there is no biliary dilatation.            CT-ABDOMEN-PELVIS WITH   Final Result      1.  No acute intra-abdominal findings.      2.  Multiloculated cystic pancreatic mass is not significantly changed from the prior exam in 2018.      3.  Small hiatal hernia with postoperative changes at the  gastroesophageal junction.      4.  Atherosclerosis.      DX-CHEST-PORTABLE (1 VIEW)   Final Result      New basal atelectasis and minor fissure visualization which could be from edema or atelectasis      EC-ECHOCARDIOGRAM COMPLETE W/O CONT   Final Result      US-EXTREMITY VENOUS LOWER BILAT   Final Result      US-RUQ   Final Result         1.  Borderline common bile duct dilatation, most commonly associated with postcholecystectomy physiology. Consider causes of biliary obstruction with additional workup as clinically appropriate.   2.  Mild renal cortical thinning, appearance suggests medical renal disease.   3.  Echogenic liver, compatible with fatty change versus fibrosis      CT-CTA COMPLETE THORACOABDOMINAL AORTA   Final Result         1.  Normal caliber aorta without visualized aneurysm or dissection.   2.  Enlargement of the pancreatic head with low density, concerning for infiltrating pancreatic mass. Recommend follow-up pancreatic mass protocol MRI for further characterization as clinically appropriate.   3.  Scattered low-density hepatic lesions, consider cysts versus hemangioma or other hepatic lesion.   4.  Atherosclerosis and atherosclerotic coronary artery disease      These findings were discussed with the patient's clinician, DIANA RUTLEDGE, on 6/18/2019 3:25 AM.            LABORATORY  Lab Results   Component Value Date    SODIUM 138 06/22/2019    POTASSIUM 3.3 (L) 06/22/2019    CHLORIDE 106 06/22/2019    CO2 21 06/22/2019    GLUCOSE 112 (H) 06/22/2019    BUN 12 06/22/2019    CREATININE 0.80 06/22/2019    CREATININE 1.2 02/16/2008        Lab Results   Component Value Date    WBC 7.4 06/22/2019    HEMOGLOBIN 11.8 (L) 06/22/2019    HEMATOCRIT 36.3 (L) 06/22/2019    PLATELETCT 134 (L) 06/22/2019        Total time of the discharge process exceeds 32 minutes.   2

## 2023-01-12 ENCOUNTER — NON-PROVIDER VISIT (OUTPATIENT)
Dept: MEDICAL GROUP | Facility: CLINIC | Age: 88
End: 2023-01-12
Payer: MEDICARE

## 2023-01-12 DIAGNOSIS — E53.8 B12 DEFICIENCY: ICD-10-CM

## 2023-01-12 RX ORDER — UBIDECARENONE 75 MG
100 CAPSULE ORAL DAILY
Qty: 30 TABLET | Refills: 3 | Status: SHIPPED | OUTPATIENT
Start: 2023-01-12

## 2023-01-13 RX ORDER — CYANOCOBALAMIN 1000 UG/ML
1000 INJECTION, SOLUTION INTRAMUSCULAR; SUBCUTANEOUS ONCE
Status: COMPLETED | OUTPATIENT
Start: 2023-01-13 | End: 2023-01-13

## 2023-01-13 RX ADMIN — CYANOCOBALAMIN 1000 MCG: 1000 INJECTION, SOLUTION INTRAMUSCULAR; SUBCUTANEOUS at 16:29

## 2023-01-13 NOTE — PROGRESS NOTES
Huong Becker is a 89 y.o. male here for a non-provider visit for b12 injection.    Reason for injection: anemia  Order in MAR?: Yes  Patient supplied?:No  Minimum interval has been met for this injection (per MAR order): Yes    Patient tolerated injection and no [id number]

## 2023-02-03 ENCOUNTER — OFFICE VISIT (OUTPATIENT)
Dept: MEDICAL GROUP | Facility: CLINIC | Age: 88
End: 2023-02-03
Payer: MEDICARE

## 2023-02-03 DIAGNOSIS — R20.2 PARESTHESIA AND PAIN OF BOTH UPPER EXTREMITIES: ICD-10-CM

## 2023-02-03 DIAGNOSIS — D48.9 NEOPLASM OF UNCERTAIN BEHAVIOR: ICD-10-CM

## 2023-02-03 DIAGNOSIS — M79.601 PARESTHESIA AND PAIN OF BOTH UPPER EXTREMITIES: ICD-10-CM

## 2023-02-03 DIAGNOSIS — M79.602 PARESTHESIA AND PAIN OF BOTH UPPER EXTREMITIES: ICD-10-CM

## 2023-02-03 DIAGNOSIS — R07.81 PLEURITIC CHEST PAIN: ICD-10-CM

## 2023-02-03 PROCEDURE — 96372 THER/PROPH/DIAG INJ SC/IM: CPT | Mod: 59 | Performed by: FAMILY MEDICINE

## 2023-02-03 PROCEDURE — 99215 OFFICE O/P EST HI 40 MIN: CPT | Mod: 25 | Performed by: FAMILY MEDICINE

## 2023-02-03 RX ORDER — CYANOCOBALAMIN 1000 UG/ML
1000 INJECTION, SOLUTION INTRAMUSCULAR; SUBCUTANEOUS ONCE
Status: COMPLETED | OUTPATIENT
Start: 2023-02-03 | End: 2023-02-03

## 2023-02-03 RX ADMIN — CYANOCOBALAMIN 1000 MCG: 1000 INJECTION, SOLUTION INTRAMUSCULAR; SUBCUTANEOUS at 16:46

## 2023-02-03 ASSESSMENT — FIBROSIS 4 INDEX: FIB4 SCORE: 2.93

## 2023-02-03 NOTE — ASSESSMENT & PLAN NOTE
Symptoms do not seem to fit in a nerve distribution,  they could be related to generalized arthritis.  I have suggested that we check labs first to look for electrolyte abnormalities.  I am going to hold on other imaging for now.  We might consider neurology referral if we do not get answers. F/u one month.

## 2023-02-03 NOTE — PROGRESS NOTES
"Subjective:     CC: Mole check, pleuritic pain, paresthesias, pernicious anemia    HPI:   April presents today to discuss the following issues     Problem   Pleuritic Chest Pain    Huong has about a 2-week history of pleuritic type chest pain on his right axillary area.  There has been no significant cough no new dyspnea no trauma no fever and he does not feel ill.  Symptoms are very episodic and days may go by in between episodes.  There does not seem to be pain or rash externally.     Neoplasm of Uncertain Behavior    Byron has a mole on the left side of his forehead he has been scratching at.  He has a number of similar lesions on his scalp but his wife suggested that we check this 1.  There has been no bleeding.     Paresthesia and Pain of Both Upper Extremities    Huong has noticed an increase in a prior symptom of pins-and-needles sensation in both upper extremities and lower extremities.  This happens primarily when he sleeping.  It is pretty bothersome, it sometimes keeps him awake, it does improve when he \"shakes it out .  It does not seem to happen during the day at all including when he is sitting or reclining for long periods.    Prior visit:     He is getting tingling and discomfort down into both arms and hands.  This seems to happen mostly after he has been lying down or sleeping.  He is experiencing some as well in the lower extremities primarily of the right.  There is some mild neck discomfort but no trauma or history of impingement.         Current Outpatient Medications Ordered in Epic   Medication Sig Dispense Refill    cyanocobalamin (VITAMIN B-12) 100 MCG Tab Take 1 Tablet by mouth every day. 30 Tablet 3    cetirizine (ZYRTEC) 10 MG Tab TAKE 1 TABLET BY MOUTH EVERY DAY 30 Tablet 11    tadalafil (CIALIS) 10 MG tablet Take 1 Tablet by mouth 1 time a day as needed for Erectile Dysfunction. 10 Tablet 3    hydrochlorothiazide (MICROZIDE) 12.5 MG capsule TAKE ONE CAPSULE BY MOUTH EVERY DAY 90 Capsule " "3    ondansetron (ZOFRAN) 4 MG Tab tablet Take 1 Tablet by mouth every four hours as needed for Nausea/Vomiting. 20 Tablet 1    diphenhydrAMINE (BENADRYL) 25 MG capsule Take 25 mg by mouth every 6 hours as needed.      acetaminophen (TYLENOL) 500 MG Tab Take 500-1,000 mg by mouth every 6 hours as needed.      fluticasone (FLONASE) 50 MCG/ACT nasal spray Administer 1 Spray into affected nostril(S) every day. (Patient not taking: Reported on 9/15/2022) 15.8 mL 0     Current Facility-Administered Medications Ordered in Epic   Medication Dose Route Frequency Provider Last Rate Last Admin    cyanocobalamin (VITAMIN B-12) injection 1,000 mcg  1,000 mcg Intramuscular Once Dontae Sotelo M.D.           Health Maintenance:     ROS:  Gen: no fevers/chills, no changes in weight  Eyes: no changes in vision  ENT: no sore throat, no hearing loss, no bloody nose  Pulm: no sob, no cough  CV: no chest pain, no palpitations  GI: no nausea/vomiting, no diarrhea  : no dysuria  MSk: no myalgias  Skin: no rash  Neuro: no headaches, no numbness/tingling  Heme/Lymph: no easy bruising      Objective:     Exam:  BP (P) 130/82 (BP Location: Right arm, Patient Position: Sitting, BP Cuff Size: Adult)   Pulse (P) 94   Temp (P) 36.7 °C (98.1 °F) (Temporal)   Ht (P) 1.727 m (5' 8\")   Wt (P) 74.9 kg (165 lb 1.6 oz)   SpO2 (P) 96%   BMI (P) 25.10 kg/m²  Body mass index is 25.1 kg/m² (pended).    Gen: Alert and oriented, No apparent distress.  Neck: Neck is supple without lymphadenopathy.  Lungs: Normal effort, CTA bilaterally, no wheezes, rhonchi, or rales  CV: Regular rate and rhythm. No murmurs, rubs, or gallops.  Ext: No clubbing, cyanosis, edema.          Assessment & Plan:     89 y.o. male with the following -     Problem List Items Addressed This Visit       Paresthesia and pain of both upper extremities     Symptoms do not seem to fit in a nerve distribution,  they could be related to generalized arthritis.  I have suggested that we " check labs first to look for electrolyte abnormalities.  I am going to hold on other imaging for now.  We might consider neurology referral if we do not get answers. F/u one month.          Relevant Orders    CBC WITH DIFFERENTIAL    Comp Metabolic Panel    TSH WITH REFLEX TO FT4    Pleuritic chest pain     I reviewed potential causes of pleuritic pain.  His exam is normal I do not hear friction rub there is good airflow throughout.  He has no viral symptoms.  I will be checking labs for other reasons and we will look at that.  I will recheck when I see him back, he knows if symptoms get worse including much increased pain or dyspnea to get that checked at the ER if necessary.         Relevant Orders    CBC WITH DIFFERENTIAL    Comp Metabolic Panel    TSH WITH REFLEX TO FT4    Neoplasm of uncertain behavior     Is a somewhat excoriated raised papular lesion on the left scalp that has no concerning features including ulcerations vascularity are unusual pigmentation.  I have asked him to try to leave it alone and let me check again in a month.  If there are any concerns we can certainly do a shave biopsy or otherwise remove it.            I spent a total of 43 minutes with record review, exam, communication with the patient, communication with other providers, and documentation of this encounter.      No follow-ups on file.

## 2023-02-03 NOTE — ASSESSMENT & PLAN NOTE
I reviewed potential causes of pleuritic pain.  His exam is normal I do not hear friction rub there is good airflow throughout.  He has no viral symptoms.  I will be checking labs for other reasons and we will look at that.  I will recheck when I see him back, he knows if symptoms get worse including much increased pain or dyspnea to get that checked at the ER if necessary.

## 2023-02-04 NOTE — ASSESSMENT & PLAN NOTE
Is a somewhat excoriated raised papular lesion on the left scalp that has no concerning features including ulcerations vascularity are unusual pigmentation.  I have asked him to try to leave it alone and let me check again in a month.  If there are any concerns we can certainly do a shave biopsy or otherwise remove it.

## 2023-03-15 ENCOUNTER — OFFICE VISIT (OUTPATIENT)
Dept: MEDICAL GROUP | Facility: CLINIC | Age: 88
End: 2023-03-15
Payer: MEDICARE

## 2023-03-15 DIAGNOSIS — G62.9 NEUROPATHY: ICD-10-CM

## 2023-03-15 DIAGNOSIS — J40 BRONCHITIS: ICD-10-CM

## 2023-03-15 DIAGNOSIS — D48.9 NEOPLASM OF UNCERTAIN BEHAVIOR: ICD-10-CM

## 2023-03-15 PROCEDURE — 99213 OFFICE O/P EST LOW 20 MIN: CPT | Performed by: FAMILY MEDICINE

## 2023-03-15 RX ORDER — BENZONATATE 100 MG/1
CAPSULE ORAL
COMMUNITY
Start: 2023-03-09 | End: 2023-05-31

## 2023-03-15 RX ORDER — METHYLPREDNISOLONE 4 MG/1
TABLET ORAL
COMMUNITY
Start: 2023-03-09 | End: 2023-04-20

## 2023-03-15 RX ORDER — DOXYCYCLINE HYCLATE 100 MG/1
CAPSULE ORAL
COMMUNITY
Start: 2023-03-09 | End: 2023-05-31

## 2023-03-15 RX ORDER — ALBUTEROL SULFATE 90 UG/1
AEROSOL, METERED RESPIRATORY (INHALATION)
COMMUNITY
Start: 2023-03-09

## 2023-03-15 RX ORDER — CYANOCOBALAMIN 1000 UG/ML
1000 INJECTION, SOLUTION INTRAMUSCULAR; SUBCUTANEOUS ONCE
OUTPATIENT
Start: 2023-03-15 | End: 2023-03-18

## 2023-03-15 ASSESSMENT — FIBROSIS 4 INDEX: FIB4 SCORE: 2.93

## 2023-03-15 NOTE — PROGRESS NOTES
Subjective:     CC: neuropathy, f/u bronchitis, skin lesion. Lab results not posted.     HPI:   April presents today to discuss the following issues       Problem   Bronchitis    Seen in UC for persistent cough.  He was given doxycycline 100 mg twice a day along with methylprednisolone and albuterol.  He was also given Tessalon Perles for cough.  This was 6 days ago he feels considerably better but still has a few doxycycline left to take.  Chest x-ray was not done.     Neoplasm of Uncertain Behavior    The mole that Huong was concerned about last visit looks about the same, and his pretty much identical to a multitude of dark well-circumscribed moles on his scalp and face.  I do not see any advantage to biopsying this or the others at this point and Huong is okay with that.      Neuropathy    Byron has had chronic but progressive neuropathy symptoms in his upper extremities for a number of years that seem to be more pronounced when lying in bed.  In the last few months he is noticed similar symptoms in his lower extremities that include a burning sensation in the lower back and into the legs most noticeable when he gets out of bed in the mornings.  Not experiencing any weakness loss of bowel or bladder. symptoms do resolve after a while with movement.         Current Outpatient Medications Ordered in Epic   Medication Sig Dispense Refill    benzonatate (TESSALON) 100 MG Cap TAKE 1 CAPSULE BY MOUTH 3 TIMES A DAY FOR 10 DAYS AS NEEDED FOR COUGH      albuterol 108 (90 Base) MCG/ACT Aero Soln inhalation aerosol PLEASE SEE ATTACHED FOR DETAILED DIRECTIONS      doxycycline (VIBRAMYCIN) 100 MG Cap TAKE 1 CAPSULE BY MOUTH TWICE A DAY FOR 10 DAYS      methylPREDNISolone (MEDROL DOSEPAK) 4 MG Tablet Therapy Pack TAKE 6 TABLETS ON DAY 1 AS DIRECTED ON PACKAGE AND DECREASE BY 1 TAB EACH DAY FOR A TOTAL OF 6 DAYS      cyanocobalamin (VITAMIN B-12) 100 MCG Tab Take 1 Tablet by mouth every day. 30 Tablet 3    cetirizine (ZYRTEC)  "10 MG Tab TAKE 1 TABLET BY MOUTH EVERY DAY 30 Tablet 11    tadalafil (CIALIS) 10 MG tablet Take 1 Tablet by mouth 1 time a day as needed for Erectile Dysfunction. 10 Tablet 3    hydrochlorothiazide (MICROZIDE) 12.5 MG capsule TAKE ONE CAPSULE BY MOUTH EVERY DAY 90 Capsule 3    ondansetron (ZOFRAN) 4 MG Tab tablet Take 1 Tablet by mouth every four hours as needed for Nausea/Vomiting. 20 Tablet 1    diphenhydrAMINE (BENADRYL) 25 MG capsule Take 25 mg by mouth every 6 hours as needed.      acetaminophen (TYLENOL) 500 MG Tab Take 500-1,000 mg by mouth every 6 hours as needed.      fluticasone (FLONASE) 50 MCG/ACT nasal spray Administer 1 Spray into affected nostril(S) every day. (Patient not taking: Reported on 9/15/2022) 15.8 mL 0     No current Epic-ordered facility-administered medications on file.       Health Maintenance:     ROS:  Gen: no fevers/chills, no changes in weight  Eyes: no changes in vision  ENT: no sore throat, no hearing loss, no bloody nose  Pulm: no sob, no cough  CV: no chest pain, no palpitations  GI: no nausea/vomiting, no diarrhea  : no dysuria  MSk: no myalgias  Skin: no rash  Neuro: no headaches, no numbness/tingling  Heme/Lymph: no easy bruising      Objective:     Exam:  BP (P) 118/78 (BP Location: Left arm, Patient Position: Sitting, BP Cuff Size: Adult)   Pulse (P) 76   Temp (P) 36.4 °C (97.6 °F) (Temporal)   Ht (P) 1.727 m (5' 8\")   Wt (P) 74.7 kg (164 lb 9.6 oz)   SpO2 (P) 95%   BMI (P) 25.03 kg/m²  Body mass index is 25.03 kg/m² (pended).    Gen: Alert and oriented, No apparent distress.  Neck: Neck is supple without lymphadenopathy.  Lungs: Normal effort, CTA bilaterally, no wheezes, rhonchi, or rales  CV: Regular rate and rhythm. No murmurs, rubs, or gallops.  Ext: No clubbing, cyanosis, edema.          Assessment & Plan:     89 y.o. male with the following -     Problem List Items Addressed This Visit       Neuropathy     I reviewed options for further evaluation and " treatment.  I think that there is probably nerve irritation perhaps related to arthritis that would tie this together.  I discussed doing imaging, physical therapy, EMG studies and possible medications.  After reviewing all of those for decided he would like to hold off for now and try higher dose of Tylenol on a regular basis.  I think that is reasonable, if that is not enough we might consider a low-dose of a muscle relaxant at night.  We will hold on more invasive evaluation and I do not think further lab work is in order.         Neoplasm of uncertain behavior     We will continue to follow along for any change in these lesions.         Bronchitis     F/u for CXR if recurrent sx's.             No follow-ups on file.             Imiquimod Counseling:  I discussed with the patient the risks of imiquimod including but not limited to erythema, scaling, itching, weeping, crusting, and pain.  Patient understands that the inflammatory response to imiquimod is variable from person to person and was educated regarded proper titration schedule.  If flu-like symptoms develop, patient knows to discontinue the medication and contact us.

## 2023-03-15 NOTE — ASSESSMENT & PLAN NOTE
I reviewed options for further evaluation and treatment.  I think that there is probably nerve irritation perhaps related to arthritis that would tie this together.  I discussed doing imaging, physical therapy, EMG studies and possible medications.  After reviewing all of those for decided he would like to hold off for now and try higher dose of Tylenol on a regular basis.  I think that is reasonable, if that is not enough we might consider a low-dose of a muscle relaxant at night.  We will hold on more invasive evaluation and I do not think further lab work is in order.

## 2023-04-17 ENCOUNTER — TELEPHONE (OUTPATIENT)
Dept: MEDICAL GROUP | Facility: CLINIC | Age: 88
End: 2023-04-17
Payer: MEDICARE

## 2023-04-17 RX ORDER — CYCLOBENZAPRINE HCL 10 MG
TABLET ORAL
Qty: 20 TABLET | Refills: 1 | Status: SHIPPED | OUTPATIENT
Start: 2023-04-17 | End: 2023-08-02

## 2023-04-17 NOTE — TELEPHONE ENCOUNTER
Caller Name: April Becker    Call Back Number: [phone]      How would the patient prefer to be contacted with a response: Phone call OK to leave a detailed message    Patient called stating he is still have bowel issue and is wondering if he could have muscle relaxers until his next appointment. He stated he is in a lot of pain and is not comfortable. Please advise.

## 2023-04-20 ENCOUNTER — OFFICE VISIT (OUTPATIENT)
Dept: MEDICAL GROUP | Facility: CLINIC | Age: 88
End: 2023-04-20
Payer: MEDICARE

## 2023-04-20 VITALS
TEMPERATURE: 97.8 F | DIASTOLIC BLOOD PRESSURE: 88 MMHG | WEIGHT: 163.13 LBS | HEART RATE: 81 BPM | BODY MASS INDEX: 24.72 KG/M2 | HEIGHT: 68 IN | OXYGEN SATURATION: 93 % | SYSTOLIC BLOOD PRESSURE: 154 MMHG

## 2023-04-20 DIAGNOSIS — R79.89 OTHER SPECIFIED ABNORMAL FINDINGS OF BLOOD CHEMISTRY: ICD-10-CM

## 2023-04-20 DIAGNOSIS — G62.9 NEUROPATHY: ICD-10-CM

## 2023-04-20 DIAGNOSIS — I10 PRIMARY HYPERTENSION: ICD-10-CM

## 2023-04-20 PROCEDURE — 99214 OFFICE O/P EST MOD 30 MIN: CPT | Mod: GC | Performed by: STUDENT IN AN ORGANIZED HEALTH CARE EDUCATION/TRAINING PROGRAM

## 2023-04-20 RX ORDER — PREDNISONE 20 MG/1
40 TABLET ORAL DAILY
Qty: 8 TABLET | Refills: 0 | Status: SHIPPED | OUTPATIENT
Start: 2023-04-20 | End: 2023-04-24

## 2023-04-20 RX ORDER — OXYCODONE HYDROCHLORIDE 5 MG/1
5 TABLET ORAL EVERY 6 HOURS PRN
Qty: 20 TABLET | Refills: 0 | Status: SHIPPED | OUTPATIENT
Start: 2023-04-20 | End: 2023-04-24

## 2023-04-20 ASSESSMENT — FIBROSIS 4 INDEX: FIB4 SCORE: 2.93

## 2023-04-20 NOTE — ASSESSMENT & PLAN NOTE
- Given new onset symptoms with this chronic ongoing problem, and the addition of chills, will order CT of the L-spine with contrast to look for malignancy/infection  - We will also check labs including CBC, ESR, CRP, CMP, UA  - Patient will trial low-dose oxycodone over the next couple days to see if this is helpful in alleviating the pain, and he has also been explicitly told to get his labs checked before starting a 4-day course of prednisone  - Patient has follow-up on Monday of next week with his PCP  - ED precautions for cord compression explicitly discussed with patient in no uncertain terms

## 2023-04-20 NOTE — PROGRESS NOTES
"Subjective:     CC: Bilateral lower extremity neuropathy    HPI:   Vertrus presents today with:    Problem   Htn (Hypertension)    - Patient takes hydrochlorothiazide 12.5 mg daily, states he took his medication today, but was anxious about coming into today's appointment and also has been in significant pain from bilateral lower extremity neuropathy  - Blood pressure today is 154/88 mmHg  - Patient denies headache, vision changes, chest pain, shortness of breath     Neuropathy    Byron has had chronic but progressive neuropathy symptoms in his upper extremities for a number of years that seem to be more pronounced when lying in bed.  In the last few months he is noticed similar symptoms in his lower extremities that include a burning sensation in the lower back and into the legs most noticeable when he gets out of bed in the mornings.  Not experiencing any weakness loss of bowel or bladder. symptoms do resolve after a while with movement.    Update 4/20/2023:  - Patient presents today stating that he has ongoing neuropathy symptoms, same as prior in the upper extremities, but now more pronounced in his legs  - The pain begins in the midline lower back and bilateral SI joints and travels through the bilateral buttocks down to the ventral surface of the feet; no extremity has worse neuropathy than the other  - Patient finds it almost unbearable, has tried Tylenol and cyclobenzaprine without relief  - Patient also endorses chills and states that this is new, \"I always feel cold\"  - She denies fever, bowel or bladder incontinence, saddle anesthesia, paralysis of either lower extremity         Current Outpatient Medications Ordered in Epic   Medication Sig Dispense Refill    predniSONE (DELTASONE) 20 MG Tab Take 2 Tablets by mouth every day for 4 days. 8 Tablet 0    oxyCODONE immediate-release (ROXICODONE) 5 MG Tab Take 1 Tablet by mouth every 6 hours as needed for Severe Pain for up to 5 days. 20 Tablet 0    benzonatate " (TESSALON) 100 MG Cap TAKE 1 CAPSULE BY MOUTH 3 TIMES A DAY FOR 10 DAYS AS NEEDED FOR COUGH      albuterol 108 (90 Base) MCG/ACT Aero Soln inhalation aerosol PLEASE SEE ATTACHED FOR DETAILED DIRECTIONS      cyanocobalamin (VITAMIN B-12) 100 MCG Tab Take 1 Tablet by mouth every day. 30 Tablet 3    cetirizine (ZYRTEC) 10 MG Tab TAKE 1 TABLET BY MOUTH EVERY DAY 30 Tablet 11    hydrochlorothiazide (MICROZIDE) 12.5 MG capsule TAKE ONE CAPSULE BY MOUTH EVERY DAY 90 Capsule 3    diphenhydrAMINE (BENADRYL) 25 MG capsule Take 25 mg by mouth every 6 hours as needed.      acetaminophen (TYLENOL) 500 MG Tab Take 500-1,000 mg by mouth every 6 hours as needed.      fluticasone (FLONASE) 50 MCG/ACT nasal spray Administer 1 Spray into affected nostril(S) every day. 15.8 mL 0    cyclobenzaprine (FLEXERIL) 10 mg Tab Take one at bedtime prn abdominal cramping and pain. May take one in am if needed, will cause drowsiness. (Patient not taking: Reported on 4/20/2023) 20 Tablet 1    doxycycline (VIBRAMYCIN) 100 MG Cap TAKE 1 CAPSULE BY MOUTH TWICE A DAY FOR 10 DAYS (Patient not taking: Reported on 4/20/2023)      tadalafil (CIALIS) 10 MG tablet Take 1 Tablet by mouth 1 time a day as needed for Erectile Dysfunction. 10 Tablet 3    ondansetron (ZOFRAN) 4 MG Tab tablet Take 1 Tablet by mouth every four hours as needed for Nausea/Vomiting. (Patient not taking: Reported on 4/20/2023) 20 Tablet 1     No current Epic-ordered facility-administered medications on file.       ROS:  Gen: no fevers/chills, no changes in weight  Eyes: no changes in vision  ENT: no changes in hearing  Pulm: no sob, no cough  CV: no chest pain, no palpitations  GI: no nausea/vomiting, no diarrhea  MSk: Patient endorses bilateral lower extremity neuropathy  Skin: no rash  Neuro: no headaches, no numbness/tingling      Objective:     Exam:  BP (!) 154/88 (BP Location: Left arm, Patient Position: Sitting, BP Cuff Size: Adult)   Pulse 81   Temp 36.6 °C (97.8 °F)  "(Temporal)   Ht 1.727 m (5' 8\")   Wt 74 kg (163 lb 2 oz)   SpO2 93%   BMI 24.80 kg/m²  Body mass index is 24.8 kg/m².    Gen: Alert and oriented, No apparent distress.  Neck: Neck is supple without lymphadenopathy.  Lungs: Normal effort, CTA bilaterally, no wheezes, rhonchi, or rales  CV: Regular rate and rhythm. No murmurs, rubs, or gallops.  ABD: Bowel sounds present; no tenderness palpation; no CVA tenderness  MSK: There is no cervical, thoracic, or lumbar midline spine tenderness; there is full range of motion of the spine; there is tenderness to palpation of the SI joints bilaterally with positive provocation/distraction testing  Neuro: Power is 5/5 bilateral upper and lower extremities; sensation is intact in bilateral lower extremities   Ext: No clubbing, cyanosis, edema.      Assessment & Plan:     89 y.o. male with the following -     Problem List Items Addressed This Visit       Neuropathy     - Given new onset symptoms with this chronic ongoing problem, and the addition of chills, will order CT of the L-spine with contrast to look for malignancy/infection  - We will also check labs including CBC, ESR, CRP, CMP, UA  - Patient will trial low-dose oxycodone over the next couple days to see if this is helpful in alleviating the pain, and he has also been explicitly told to get his labs checked before starting a 4-day course of prednisone  - Patient has follow-up on Monday of next week with his PCP  - ED precautions for cord compression explicitly discussed with patient in no uncertain terms         Relevant Medications    oxyCODONE immediate-release (ROXICODONE) 5 MG Tab    Other Relevant Orders    CBC WITH DIFFERENTIAL    Comp Metabolic Panel    HEMOGLOBIN A1C    CRP QUANTITIVE (NON-CARDIAC)    Sed Rate    URINALYSIS,CULTURE IF INDICATED    CT-LSPINE WITH PLUS RECONS    HTN (hypertension)     - I believe that the patient's blood pressure is mildly elevated to the 150s today due to his acute pain and " meeting a new provider  - Patient does not have any symptoms of hypertensive emergency  - Continue to monitor blood pressure at subsequent visits, but no need for intervention, especially given the patient's age and mildly increased systolic number          Other Visit Diagnoses       Other specified abnormal findings of blood chemistry        Relevant Orders    HEMOGLOBIN A1C              Return in about 4 days (around 4/24/2023).    Checo Cantu MD   PGY-3 Family Medicine Resident   [facility]

## 2023-04-20 NOTE — ASSESSMENT & PLAN NOTE
- I believe that the patient's blood pressure is mildly elevated to the 150s today due to his acute pain and meeting a new provider  - Patient does not have any symptoms of hypertensive emergency  - Continue to monitor blood pressure at subsequent visits, but no need for intervention, especially given the patient's age and mildly increased systolic number

## 2023-04-24 ENCOUNTER — OFFICE VISIT (OUTPATIENT)
Dept: MEDICAL GROUP | Facility: CLINIC | Age: 88
End: 2023-04-24
Payer: MEDICARE

## 2023-04-24 VITALS
TEMPERATURE: 98.8 F | SYSTOLIC BLOOD PRESSURE: 140 MMHG | DIASTOLIC BLOOD PRESSURE: 78 MMHG | HEART RATE: 82 BPM | WEIGHT: 164.1 LBS | BODY MASS INDEX: 24.87 KG/M2 | OXYGEN SATURATION: 95 % | HEIGHT: 68 IN

## 2023-04-24 DIAGNOSIS — K59.09 OTHER CONSTIPATION: ICD-10-CM

## 2023-04-24 DIAGNOSIS — M79.601 PARESTHESIA AND PAIN OF BOTH UPPER EXTREMITIES: ICD-10-CM

## 2023-04-24 DIAGNOSIS — M79.602 PARESTHESIA AND PAIN OF BOTH UPPER EXTREMITIES: ICD-10-CM

## 2023-04-24 DIAGNOSIS — G62.9 NEUROPATHY: ICD-10-CM

## 2023-04-24 DIAGNOSIS — R20.2 PARESTHESIA AND PAIN OF BOTH UPPER EXTREMITIES: ICD-10-CM

## 2023-04-24 PROCEDURE — 96372 THER/PROPH/DIAG INJ SC/IM: CPT | Performed by: FAMILY MEDICINE

## 2023-04-24 PROCEDURE — 99214 OFFICE O/P EST MOD 30 MIN: CPT | Mod: 25 | Performed by: FAMILY MEDICINE

## 2023-04-24 RX ORDER — HYDROCODONE BITARTRATE AND ACETAMINOPHEN 7.5; 325 MG/1; MG/1
1 TABLET ORAL EVERY 6 HOURS PRN
Qty: 20 TABLET | Refills: 0 | Status: SHIPPED | OUTPATIENT
Start: 2023-04-24 | End: 2023-05-24

## 2023-04-24 RX ORDER — CYANOCOBALAMIN 1000 UG/ML
1000 INJECTION, SOLUTION INTRAMUSCULAR; SUBCUTANEOUS ONCE
Status: COMPLETED | OUTPATIENT
Start: 2023-04-24 | End: 2023-04-24

## 2023-04-24 RX ADMIN — CYANOCOBALAMIN 1000 MCG: 1000 INJECTION, SOLUTION INTRAMUSCULAR; SUBCUTANEOUS at 13:44

## 2023-04-24 ASSESSMENT — FIBROSIS 4 INDEX: FIB4 SCORE: 2.94

## 2023-04-24 NOTE — ASSESSMENT & PLAN NOTE
Symptoms are unabated, physical exam remains the same.  I reviewed all of his labs, and the inflammatory markers are well within normal as is his CBC and CHEM panel.  Discussed with him that I think checking his CT for impingement is or important neck step.  Not is not helpful if it does show arthritis or impingement and referral for epidural injection would likely be where we go from there.  If that exam is unhelpful and may want to look at a CT of his pelvis.     Reviewed his lab findings with him.  I also ordered hydrocodone 7.5/325 #20 we will try this instead of the oxycodone 5 mg which was not helping very much.  Going to give him another burst of steroids right now that did not do much.  Has used muscle relaxants initially for this and that was also unhelpful.

## 2023-04-24 NOTE — ASSESSMENT & PLAN NOTE
I think he is doing the right things, he also knows that the opioids will make this problem worse.  I do not believe there is a another secondary GI problem going on but as mentioned elsewhere I do have some concerns of pelvic issues.  We will follow this closely.

## 2023-04-24 NOTE — PROGRESS NOTES
"Subjective:     CC: radiculopathy, constipation      HPI:   April presents today to discuss the following issues     Problem   Other Constipation    Had a hard painful stools prior to having to go back on opiates and it is now worse.  He is drinking a lot of water, using a stool softener and fiber.  He is also just begun using MiraLAX on a regular basis.     Paresthesia and Pain of Both Upper Extremities    Byron continues to have radicular type pain from his back into both legs that is somewhat incapacitating.  He was seen here last week and placed on a burst of steroids and given oxycodone for the pain.  He had some slight improvement with the steroids and the pain pills but symptoms remain overall.  He did have labs done which I have reviewed.  He also had a CAT scan ordered of his L-spine and that is still pending.  Had some very mild dysuria and frequency of urination to accompany this but that may be related to increased intake of water.  He also has some separate but somewhat similar symptoms in both arms.    Prior note:     Huong has noticed an increase in a prior symptom of pins-and-needles sensation in both upper extremities and lower extremities.  This happens primarily when he sleeping.  It is pretty bothersome, it sometimes keeps him awake, it does improve when he \"shakes it out .  It does not seem to happen during the day at all including when he is sitting or reclining for long periods.    Prior visit:     He is getting tingling and discomfort down into both arms and hands.  This seems to happen mostly after he has been lying down or sleeping.  He is experiencing some as well in the lower extremities primarily of the right.  There is some mild neck discomfort but no trauma or history of impingement.         Current Outpatient Medications Ordered in Epic   Medication Sig Dispense Refill    HYDROcodone-acetaminophen (NORCO) 7.5-325 MG tab Take 1 Tablet by mouth every 6 hours as needed for Severe Pain " for up to 30 days. 20 Tablet 0    predniSONE (DELTASONE) 20 MG Tab Take 2 Tablets by mouth every day for 4 days. 8 Tablet 0    albuterol 108 (90 Base) MCG/ACT Aero Soln inhalation aerosol PLEASE SEE ATTACHED FOR DETAILED DIRECTIONS      cyanocobalamin (VITAMIN B-12) 100 MCG Tab Take 1 Tablet by mouth every day. 30 Tablet 3    cetirizine (ZYRTEC) 10 MG Tab TAKE 1 TABLET BY MOUTH EVERY DAY 30 Tablet 11    tadalafil (CIALIS) 10 MG tablet Take 1 Tablet by mouth 1 time a day as needed for Erectile Dysfunction. 10 Tablet 3    hydrochlorothiazide (MICROZIDE) 12.5 MG capsule TAKE ONE CAPSULE BY MOUTH EVERY DAY 90 Capsule 3    diphenhydrAMINE (BENADRYL) 25 MG capsule Take 25 mg by mouth every 6 hours as needed.      acetaminophen (TYLENOL) 500 MG Tab Take 500-1,000 mg by mouth every 6 hours as needed.      fluticasone (FLONASE) 50 MCG/ACT nasal spray Administer 1 Spray into affected nostril(S) every day. 15.8 mL 0    cyclobenzaprine (FLEXERIL) 10 mg Tab Take one at bedtime prn abdominal cramping and pain. May take one in am if needed, will cause drowsiness. (Patient not taking: Reported on 4/20/2023) 20 Tablet 1    benzonatate (TESSALON) 100 MG Cap TAKE 1 CAPSULE BY MOUTH 3 TIMES A DAY FOR 10 DAYS AS NEEDED FOR COUGH (Patient not taking: Reported on 4/24/2023)      doxycycline (VIBRAMYCIN) 100 MG Cap TAKE 1 CAPSULE BY MOUTH TWICE A DAY FOR 10 DAYS (Patient not taking: Reported on 4/20/2023)      ondansetron (ZOFRAN) 4 MG Tab tablet Take 1 Tablet by mouth every four hours as needed for Nausea/Vomiting. (Patient not taking: Reported on 4/20/2023) 20 Tablet 1     No current Epic-ordered facility-administered medications on file.       Health Maintenance:     ROS:  Gen: no fevers/chills, no changes in weight  Eyes: no changes in vision  ENT: no sore throat, no hearing loss, no bloody nose  Pulm: no sob, no cough  CV: no chest pain, no palpitations  GI: no nausea/vomiting, no diarrhea  : no dysuria  MSk: no myalgias  Skin: no  "rash  Neuro: no headaches, no numbness/tingling  Heme/Lymph: no easy bruising      Objective:     Exam:  BP (!) 140/78 (BP Location: Left arm, Patient Position: Sitting, BP Cuff Size: Adult)   Pulse 82   Temp 37.1 °C (98.8 °F) (Temporal)   Ht 1.727 m (5' 8\")   Wt 74.4 kg (164 lb 1.6 oz)   SpO2 95%   BMI 24.95 kg/m²  Body mass index is 24.95 kg/m².    Gen: Alert and oriented, No apparent distress.  Neck: Neck is supple without lymphadenopathy.  Lungs: Normal effort, CTA bilaterally, no wheezes, rhonchi, or rales  CV: Regular rate and rhythm. No murmurs, rubs, or gallops.  Ext: No clubbing, cyanosis, edema.          Assessment & Plan:     89 y.o. male with the following -     Problem List Items Addressed This Visit       Paresthesia and pain of both upper extremities     Symptoms are unabated, physical exam remains the same.  I reviewed all of his labs, and the inflammatory markers are well within normal as is his CBC and CHEM panel.  Discussed with him that I think checking his CT for impingement is or important neck step.  Not is not helpful if it does show arthritis or impingement and referral for epidural injection would likely be where we go from there.  If that exam is unhelpful and may want to look at a CT of his pelvis.     Reviewed his lab findings with him.  I also ordered hydrocodone 7.5/325 #20 we will try this instead of the oxycodone 5 mg which was not helping very much.  Going to give him another burst of steroids right now that did not do much.  Has used muscle relaxants initially for this and that was also unhelpful.         Relevant Medications    HYDROcodone-acetaminophen (NORCO) 7.5-325 MG tab    Other constipation     I think he is doing the right things, he also knows that the opioids will make this problem worse.  I do not believe there is a another secondary GI problem going on but as mentioned elsewhere I do have some concerns of pelvic issues.  We will follow this closely.      "           No follow-ups on file.

## 2023-05-11 ENCOUNTER — TELEPHONE (OUTPATIENT)
Dept: MEDICAL GROUP | Facility: CLINIC | Age: 88
End: 2023-05-11

## 2023-05-11 DIAGNOSIS — M54.15 RADICULOPATHY OF THORACOLUMBAR REGION: ICD-10-CM

## 2023-05-11 NOTE — TELEPHONE ENCOUNTER
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 Nothing that appears new and likely nothing that would be helped by surgery.   I placed a referral to pain management to talk about injections.   Should be seen again to discuss other treatment options and other evaluation, with first available doc.  ER is an option if pain intolerable in the meantime.

## 2023-05-11 NOTE — TELEPHONE ENCOUNTER
Called patient, let him know the results on the MRI, and let him know Dr. Sotelo put in that referral to pain management. Patient would like another refill on his pain medication. He is going to wait to see Dr. Sotelo on 05/31/2023 if his pain becomes to much he will go to the ER. No further action is required at this time.

## 2023-05-11 NOTE — TELEPHONE ENCOUNTER
Caller Name: April eBcker    Call Back Number: [phone]      How would the patient prefer to be contacted with a response: Phone call OK to leave a detailed message    Patient called stating that he had his imaging done and it was resulted and was unable to read it and was wondering if you could let him know what it is say? He is also still in pain and was wondering what we think patient should do? He doesn't know if he should go to the ER or tried to get seen? Please advise, thank you.

## 2023-05-31 ENCOUNTER — OFFICE VISIT (OUTPATIENT)
Dept: MEDICAL GROUP | Facility: CLINIC | Age: 88
End: 2023-05-31
Payer: MEDICARE

## 2023-05-31 DIAGNOSIS — E53.8 VITAMIN B12 DEFICIENCY: ICD-10-CM

## 2023-05-31 DIAGNOSIS — M54.15 RADICULOPATHY OF THORACOLUMBAR REGION: ICD-10-CM

## 2023-05-31 PROCEDURE — 99214 OFFICE O/P EST MOD 30 MIN: CPT | Performed by: FAMILY MEDICINE

## 2023-05-31 RX ORDER — CYANOCOBALAMIN 1000 UG/ML
1000 INJECTION, SOLUTION INTRAMUSCULAR; SUBCUTANEOUS ONCE
Status: COMPLETED | OUTPATIENT
Start: 2023-05-31 | End: 2023-05-31

## 2023-05-31 RX ORDER — TRAMADOL HYDROCHLORIDE 50 MG/1
50 TABLET ORAL EVERY 4 HOURS PRN
Qty: 30 TABLET | Refills: 0 | Status: SHIPPED | OUTPATIENT
Start: 2023-05-31 | End: 2023-06-30

## 2023-05-31 RX ADMIN — CYANOCOBALAMIN 1000 MCG: 1000 INJECTION, SOLUTION INTRAMUSCULAR; SUBCUTANEOUS at 10:10

## 2023-05-31 ASSESSMENT — FIBROSIS 4 INDEX: FIB4 SCORE: 2.94

## 2023-05-31 NOTE — ASSESSMENT & PLAN NOTE
Symptoms do remain consistent with radiculopathy.  I am going to order an MRI w/o contrast of the lower spine to further delineate this.  I would like for Polot to continue to try to get through the pain management for possible epidural steroid injection and I am going to place another referral to see if we can move this along.  I am offering him tramadol 50 mg that he can use for breakthrough pain if necessary.   is reviewed and risks and benefits also discussed.  He knows not to drive if he uses any this medicine or if he is in significant pain.  He understands and will comply.  I did fill out his DMV 's form.

## 2023-05-31 NOTE — PROGRESS NOTES
Subjective:     CC: Radiculopathy, Pernicious anemia    HPI:   April presents today to discuss the following issues     Problem   Radiculopathy of Thoracolumbar Region    Huong continues to have a great deal of pain originating in his lower back and progressing down his legs into his feet.  Symptoms are much worse when he wakes up in the morning and throughout the day.  They are greatly relieved when he lies down to go to sleep.  He did have a CAT scan done which does not show any serious pathology.  Referral was made to pain management and he did get that number but has been unable to get a response after 4 phone calls.  Done well with opiate medicines in the past as they were too strong.  He is taking up to 4 g of Tylenol a day which is making it more tolerable.       Vitamin B12 Deficiency    We continue monthly B12 injections. He is doing well with this. He is due today           Current Outpatient Medications Ordered in Epic   Medication Sig Dispense Refill    traMADol (ULTRAM) 50 MG Tab Take 1 Tablet by mouth every four hours as needed for Severe Pain for up to 30 days. 30 Tablet 0    albuterol 108 (90 Base) MCG/ACT Aero Soln inhalation aerosol PLEASE SEE ATTACHED FOR DETAILED DIRECTIONS      cyanocobalamin (VITAMIN B-12) 100 MCG Tab Take 1 Tablet by mouth every day. 30 Tablet 3    cetirizine (ZYRTEC) 10 MG Tab TAKE 1 TABLET BY MOUTH EVERY DAY 30 Tablet 11    tadalafil (CIALIS) 10 MG tablet Take 1 Tablet by mouth 1 time a day as needed for Erectile Dysfunction. 10 Tablet 3    hydrochlorothiazide (MICROZIDE) 12.5 MG capsule TAKE ONE CAPSULE BY MOUTH EVERY DAY 90 Capsule 3    diphenhydrAMINE (BENADRYL) 25 MG capsule Take 25 mg by mouth every 6 hours as needed.      acetaminophen (TYLENOL) 500 MG Tab Take 500-1,000 mg by mouth every 6 hours as needed.      fluticasone (FLONASE) 50 MCG/ACT nasal spray Administer 1 Spray into affected nostril(S) every day. 15.8 mL 0    cyclobenzaprine (FLEXERIL) 10 mg Tab Take one  at bedtime prn abdominal cramping and pain. May take one in am if needed, will cause drowsiness. (Patient not taking: Reported on 4/20/2023) 20 Tablet 1    ondansetron (ZOFRAN) 4 MG Tab tablet Take 1 Tablet by mouth every four hours as needed for Nausea/Vomiting. 20 Tablet 1     No current Epic-ordered facility-administered medications on file.       Health Maintenance:     ROS:  Gen: no fevers/chills, no changes in weight  Eyes: no changes in vision  ENT: no sore throat, no hearing loss, no bloody nose  Pulm: no sob, no cough  CV: no chest pain, no palpitations  GI: no nausea/vomiting, no diarrhea  : no dysuria  MSk: no myalgias  Skin: no rash  Neuro: no headaches, no numbness/tingling  Heme/Lymph: no easy bruising      Objective:     Exam:  BP (P) 132/78 (BP Location: Left arm, Patient Position: Sitting, BP Cuff Size: Adult)   Pulse (P) 72   Temp (P) 36.3 °C (97.3 °F) (Temporal)   Wt (P) 73.9 kg (162 lb 14.4 oz)   SpO2 (P) 98%   BMI (P) 24.77 kg/m²  Body mass index is 24.77 kg/m² (pended).    Gen: Alert and oriented, No apparent distress.  Neck: Neck is supple without lymphadenopathy.  Lungs: Normal effort, CTA bilaterally, no wheezes, rhonchi, or rales  CV: Regular rate and rhythm. No murmurs, rubs, or gallops.  Ext: No clubbing, cyanosis, edema.          Assessment & Plan:     89 y.o. male with the following -     Problem List Items Addressed This Visit       Vitamin B12 deficiency     cyanocobalamin given today           Radiculopathy of thoracolumbar region     Symptoms do remain consistent with radiculopathy.  I am going to order an MRI w/o contrast of the lower spine to further delineate this.  I would like for Huong to continue to try to get through the pain management for possible epidural steroid injection and I am going to place another referral to see if we can move this along.  I am offering him tramadol 50 mg that he can use for breakthrough pain if necessary.   is reviewed and risks and  benefits also discussed.  He knows not to drive if he uses any this medicine or if he is in significant pain.  He understands and will comply.  I did fill out his DMV 's form.           Relevant Medications    traMADol (ULTRAM) 50 MG Tab    Other Relevant Orders    Referral to Pain Management    MR-LUMBAR SPINE-W/O           No follow-ups on file.

## 2023-06-06 DIAGNOSIS — F41.9 ANXIETY: ICD-10-CM

## 2023-06-06 RX ORDER — ALPRAZOLAM 1 MG/1
TABLET ORAL
Qty: 3 TABLET | Refills: 0 | Status: SHIPPED | OUTPATIENT
Start: 2023-06-06 | End: 2023-07-05

## 2023-06-26 ENCOUNTER — NON-PROVIDER VISIT (OUTPATIENT)
Dept: MEDICAL GROUP | Facility: CLINIC | Age: 88
End: 2023-06-26
Payer: MEDICARE

## 2023-06-26 PROCEDURE — 96372 THER/PROPH/DIAG INJ SC/IM: CPT | Performed by: FAMILY MEDICINE

## 2023-06-26 RX ORDER — GABAPENTIN 100 MG/1
CAPSULE ORAL
COMMUNITY
Start: 2023-06-15 | End: 2023-08-02

## 2023-06-26 RX ORDER — CYANOCOBALAMIN 1000 UG/ML
1000 INJECTION, SOLUTION INTRAMUSCULAR; SUBCUTANEOUS ONCE
Status: COMPLETED | OUTPATIENT
Start: 2023-06-26 | End: 2023-06-26

## 2023-06-26 RX ADMIN — CYANOCOBALAMIN 1000 MCG: 1000 INJECTION, SOLUTION INTRAMUSCULAR; SUBCUTANEOUS at 14:10

## 2023-06-26 NOTE — PROGRESS NOTES
Huong Becker is a 89 y.o. male here for a non-provider visit for B12 injection.    Reason for injection: B12 defiency  Order in MAR?: Yes  Patient supplied?:No  Minimum interval has been met for this injection (per MAR order): Yes    Patient tolerated injection and no adverse effects were observed or reported: Yes    # of Administrations remaining in MAR: 0

## 2023-07-13 ENCOUNTER — OFFICE VISIT (OUTPATIENT)
Dept: MEDICAL GROUP | Facility: CLINIC | Age: 88
End: 2023-07-13
Payer: MEDICARE

## 2023-07-13 DIAGNOSIS — G62.9 NEUROPATHY: ICD-10-CM

## 2023-07-13 PROCEDURE — 99213 OFFICE O/P EST LOW 20 MIN: CPT | Performed by: FAMILY MEDICINE

## 2023-07-13 ASSESSMENT — FIBROSIS 4 INDEX: FIB4 SCORE: 2.97

## 2023-07-13 NOTE — ASSESSMENT & PLAN NOTE
He is unsure if he got the Tramadol but will check. It doesn't appear it helped in any case. Is now off gabapentin entirely.   We will go ahead with the physical therapy to see what kind of benefit he can get from that.  It appears that insurance will not cover epidural or other injection until he has had that full trial.  I will not offer other medicines at this visit but fortunately he is seeing pain management next week and they can explore other options then.  If he does have tramadol at home and wants to try that again that is fine.  Exam is unchanged

## 2023-07-13 NOTE — PROGRESS NOTES
Subjective:     CC: Radicular Pain       HPI:   April presents today to discuss the following issues     Problem   Neuropathy     Huong continues to have pretty severe pain that radiates from his lower back down into both legs.  We have been following this for some time.  We were finally able to get him into pain management and he did have a visit about a month ago. They agreed that an epidural injection may be in order, but first needed to try physical therapy and prescribed gabapentin in a tapering dose.  He did not really tolerate the gabapentin  and was instructed to taper off.  He is no longer on that medicine.  He has had a single physical therapy visit and was able to get more scheduled.  So far this has not affected his symptoms.  His only medicine now is Tylenol on a scheduled basis.  I prescribed tramadol on the last visit as he did not tolerate stronger opioids.  It does not appear that he got that medicine or at least is unfamiliar with it.  He has a follow-up with pain management in about a week.    Prior:     Huong has had chronic but progressive neuropathy symptoms in his upper extremities for a number of years that seem to be more pronounced when lying in bed.  In the last few months he is noticed similar symptoms in his lower extremities that include a burning sensation in the lower back and into the legs most noticeable when he gets out of bed in the mornings.  Not experiencing any weakness loss of bowel or bladder. symptoms do resolve after a while with movement.    Update 4/20/2023:  - Patient presents today stating that he has ongoing neuropathy symptoms, same as prior in the upper extremities, but now more pronounced in his legs  - The pain begins in the midline lower back and bilateral SI joints and travels through the bilateral buttocks down to the ventral surface of the feet; no extremity has worse neuropathy than the other  - Patient finds it almost unbearable, has tried Tylenol and  "cyclobenzaprine without relief  - Patient also endorses chills and states that this is new, \"I always feel cold\"  - She denies fever, bowel or bladder incontinence, saddle anesthesia, paralysis of either lower extremity         Current Outpatient Medications Ordered in Epic   Medication Sig Dispense Refill    albuterol 108 (90 Base) MCG/ACT Aero Soln inhalation aerosol PLEASE SEE ATTACHED FOR DETAILED DIRECTIONS      cyanocobalamin (VITAMIN B-12) 100 MCG Tab Take 1 Tablet by mouth every day. 30 Tablet 3    cetirizine (ZYRTEC) 10 MG Tab TAKE 1 TABLET BY MOUTH EVERY DAY 30 Tablet 11    tadalafil (CIALIS) 10 MG tablet Take 1 Tablet by mouth 1 time a day as needed for Erectile Dysfunction. 10 Tablet 3    hydrochlorothiazide (MICROZIDE) 12.5 MG capsule TAKE ONE CAPSULE BY MOUTH EVERY DAY 90 Capsule 3    ondansetron (ZOFRAN) 4 MG Tab tablet Take 1 Tablet by mouth every four hours as needed for Nausea/Vomiting. 20 Tablet 1    diphenhydrAMINE (BENADRYL) 25 MG capsule Take 25 mg by mouth every 6 hours as needed.      acetaminophen (TYLENOL) 500 MG Tab Take 500-1,000 mg by mouth every 6 hours as needed.      fluticasone (FLONASE) 50 MCG/ACT nasal spray Administer 1 Spray into affected nostril(S) every day. 15.8 mL 0    gabapentin (NEURONTIN) 100 MG Cap PLEASE SEE ATTACHED FOR DETAILED DIRECTIONS (Patient not taking: Reported on 7/13/2023)      cyclobenzaprine (FLEXERIL) 10 mg Tab Take one at bedtime prn abdominal cramping and pain. May take one in am if needed, will cause drowsiness. (Patient not taking: Reported on 4/20/2023) 20 Tablet 1     No current Epic-ordered facility-administered medications on file.       Health Maintenance:     ROS:  Gen: no fevers/chills, no changes in weight  Eyes: no changes in vision  ENT: no sore throat, no hearing loss, no bloody nose  Pulm: no sob, no cough  CV: no chest pain, no palpitations  GI: no nausea/vomiting, no diarrhea  : no dysuria  MSk: no myalgias  Skin: no rash  Neuro: no " "headaches, no numbness/tingling  Heme/Lymph: no easy bruising      Objective:     Exam:  BP (P) 128/78 (BP Location: Left arm, Patient Position: Sitting, BP Cuff Size: Adult)   Pulse (P) 76   Temp (P) 36.5 °C (97.7 °F) (Temporal)   Ht (P) 1.702 m (5' 7\")   Wt (P) 74.1 kg (163 lb 6.4 oz)   SpO2 (P) 97%   BMI (P) 25.59 kg/m²  Body mass index is 25.59 kg/m² (pended).    Gen: Alert and oriented, No apparent distress.  Neck: Neck is supple without lymphadenopathy.  Lungs: Normal effort, CTA bilaterally, no wheezes, rhonchi, or rales  CV: Regular rate and rhythm. No murmurs, rubs, or gallops.  Ext: No clubbing, cyanosis, edema.          Assessment & Plan:     89 y.o. male with the following -     Problem List Items Addressed This Visit       Neuropathy     He is unsure if he got the Tramadol but will check. It doesn't appear it helped in any case. Is now off gabapentin entirely.   We will go ahead with the physical therapy to see what kind of benefit he can get from that.  It appears that insurance will not cover epidural or other injection until he has had that full trial.  I will not offer other medicines at this visit but fortunately he is seeing pain management next week and they can explore other options then.  If he does have tramadol at home and wants to try that again that is fine.  Exam is unchanged            I spent a total of 23 minutes with record review, exam, communication with the patient, communication with other providers, and documentation of this encounter.      No follow-ups on file.            "

## 2023-08-02 ENCOUNTER — OFFICE VISIT (OUTPATIENT)
Dept: MEDICAL GROUP | Facility: CLINIC | Age: 88
End: 2023-08-02
Payer: MEDICARE

## 2023-08-02 DIAGNOSIS — M54.15 RADICULOPATHY OF THORACOLUMBAR REGION: ICD-10-CM

## 2023-08-02 DIAGNOSIS — E53.8 B12 DEFICIENCY: ICD-10-CM

## 2023-08-02 DIAGNOSIS — R60.0 LOWER EXTREMITY EDEMA: ICD-10-CM

## 2023-08-02 PROCEDURE — 99214 OFFICE O/P EST MOD 30 MIN: CPT | Mod: 25 | Performed by: FAMILY MEDICINE

## 2023-08-02 PROCEDURE — 96372 THER/PROPH/DIAG INJ SC/IM: CPT | Performed by: FAMILY MEDICINE

## 2023-08-02 RX ORDER — PREGABALIN 25 MG/1
50 CAPSULE ORAL
COMMUNITY
Start: 2023-07-27

## 2023-08-02 RX ORDER — CYANOCOBALAMIN 1000 UG/ML
1000 INJECTION, SOLUTION INTRAMUSCULAR; SUBCUTANEOUS ONCE
Status: COMPLETED | OUTPATIENT
Start: 2023-08-02 | End: 2023-08-02

## 2023-08-02 RX ADMIN — CYANOCOBALAMIN 1000 MCG: 1000 INJECTION, SOLUTION INTRAMUSCULAR; SUBCUTANEOUS at 13:34

## 2023-08-02 ASSESSMENT — FIBROSIS 4 INDEX: FIB4 SCORE: 2.97

## 2023-08-02 NOTE — PROGRESS NOTES
Subjective:     CC: Radicular Pain, LE edema, Pernicious anemia    HPI:   April presents today to discuss the following issues     Problem   Lower Extremity Edema    Huong continues to have bilateral lower extremity edema.  Generally improved in the morning and worsens as the day goes along.  No signs or symptoms referable to heart failure and his renal function is good.  Right side is slightly more swollen than the left but he denies,, redness, calf pain.     Radiculopathy of Thoracolumbar Region    Now on Lyrica and tolerating so far. Gabapentin was stopped due to side effects and not helpful.   Has an epidural scheduled August 10, he is also scheduled to see neurosurgery the following day for consultation. He continues PT, but this has not been helpful and he will stop soon.      Prior visit:     Huong continues to have a great deal of pain originating in his lower back and progressing down his legs into his feet.  Symptoms are much worse when he wakes up in the morning and throughout the day.  They are greatly relieved when he lies down to go to sleep.  He did have a CAT scan done which does not show any serious pathology.  Referral was made to pain management      B12 Deficiency    Doing well,remains asymptomatic and does wish to continue the monthly B12 shots.           Current Outpatient Medications Ordered in Epic   Medication Sig Dispense Refill    pregabalin (LYRICA) 25 MG Cap TAKE ONE CAPSULE BY MOUTH AT BEDTIME FOR 5 DAYS, TAKE ONE CAPSULE TWICE A DAY THEREAFTER      albuterol 108 (90 Base) MCG/ACT Aero Soln inhalation aerosol PLEASE SEE ATTACHED FOR DETAILED DIRECTIONS      cyanocobalamin (VITAMIN B-12) 100 MCG Tab Take 1 Tablet by mouth every day. 30 Tablet 3    cetirizine (ZYRTEC) 10 MG Tab TAKE 1 TABLET BY MOUTH EVERY DAY 30 Tablet 11    tadalafil (CIALIS) 10 MG tablet Take 1 Tablet by mouth 1 time a day as needed for Erectile Dysfunction. 10 Tablet 3    hydrochlorothiazide (MICROZIDE) 12.5 MG  "capsule TAKE ONE CAPSULE BY MOUTH EVERY DAY 90 Capsule 3    ondansetron (ZOFRAN) 4 MG Tab tablet Take 1 Tablet by mouth every four hours as needed for Nausea/Vomiting. 20 Tablet 1    diphenhydrAMINE (BENADRYL) 25 MG capsule Take 25 mg by mouth every 6 hours as needed.      acetaminophen (TYLENOL) 500 MG Tab Take 500-1,000 mg by mouth every 6 hours as needed.      fluticasone (FLONASE) 50 MCG/ACT nasal spray Administer 1 Spray into affected nostril(S) every day. 15.8 mL 0     No current Epic-ordered facility-administered medications on file.       Health Maintenance:     ROS:  Gen: no fevers/chills, no changes in weight  Eyes: no changes in vision  ENT: no sore throat, no hearing loss, no bloody nose  Pulm: no sob, no cough  CV: no chest pain, no palpitations  GI: no nausea/vomiting, no diarrhea  : no dysuria  MSk: no myalgias  Skin: no rash  Neuro: no headaches, no numbness/tingling  Heme/Lymph: no easy bruising      Objective:     Exam:  BP (P) 122/78 (BP Location: Left arm, Patient Position: Sitting, BP Cuff Size: Adult)   Pulse (P) 76   Temp (P) 36.6 °C (97.9 °F) (Temporal)   Ht (P) 1.702 m (5' 7\")   Wt (P) 72.8 kg (160 lb 8 oz)   SpO2 (P) 97%   BMI (P) 25.14 kg/m²  Body mass index is 25.14 kg/m² (pended).    Gen: Alert and oriented, No apparent distress.  Neck: Neck is supple without lymphadenopathy.  Lungs: Normal effort, CTA bilaterally, no wheezes, rhonchi, or rales  CV: Regular rate and rhythm. No murmurs, rubs, or gallops.  Ext: No clubbing, cyanosis, edema.          Assessment & Plan:     89 y.o. male with the following -     Problem List Items Addressed This Visit       B12 deficiency     Given monthly injection         Radiculopathy of thoracolumbar region     F/u w me after the epidural injection and to discuss the surgical consultation         Relevant Medications    pregabalin (LYRICA) 25 MG Cap    Lower extremity edema     This does appear to be consistent with venous stasis edema.  I " reviewed elevation and compression treatments.  I also discussed with him the need for emergent evaluation and treatment if any unilateral pain swelling or redness.            I spent a total of 32 minutes with record review, exam, communication with the patient, communication with other providers, and documentation of this encounter.      No follow-ups on file.

## 2023-08-02 NOTE — ASSESSMENT & PLAN NOTE
This does appear to be consistent with venous stasis edema.  I reviewed elevation and compression treatments.  I also discussed with him the need for emergent evaluation and treatment if any unilateral pain swelling or redness.

## 2023-09-05 RX ORDER — HYDROCHLOROTHIAZIDE 12.5 MG/1
CAPSULE, GELATIN COATED ORAL
Qty: 90 CAPSULE | Refills: 3 | Status: SHIPPED | OUTPATIENT
Start: 2023-09-05 | End: 2023-10-25 | Stop reason: SDUPTHER

## 2023-09-25 ENCOUNTER — NON-PROVIDER VISIT (OUTPATIENT)
Dept: MEDICAL GROUP | Facility: CLINIC | Age: 88
End: 2023-09-25
Payer: MEDICARE

## 2023-09-25 PROCEDURE — 96372 THER/PROPH/DIAG INJ SC/IM: CPT | Performed by: FAMILY MEDICINE

## 2023-09-25 RX ORDER — CYANOCOBALAMIN 1000 UG/ML
1000 INJECTION, SOLUTION INTRAMUSCULAR; SUBCUTANEOUS ONCE
Status: COMPLETED | OUTPATIENT
Start: 2023-09-25 | End: 2023-09-25

## 2023-09-25 RX ADMIN — CYANOCOBALAMIN 1000 MCG: 1000 INJECTION, SOLUTION INTRAMUSCULAR; SUBCUTANEOUS at 14:44

## 2023-09-25 NOTE — PROGRESS NOTES
Huong Becker is a 89 y.o. male here for a non-provider visit for B-12 injection injection.    Reason for injection: B-12 deiciency   Order in MAR?: No  Patient supplied?:No  Minimum interval has been met for this injection (per MAR order): Yes    Patient tolerated injection and no adverse effects were observed or reported: Yes    # of Administrations remaining in MAR: 0

## 2023-10-25 ENCOUNTER — NON-PROVIDER VISIT (OUTPATIENT)
Dept: MEDICAL GROUP | Facility: CLINIC | Age: 88
End: 2023-10-25
Payer: MEDICARE

## 2023-10-25 ENCOUNTER — APPOINTMENT (OUTPATIENT)
Dept: MEDICAL GROUP | Facility: CLINIC | Age: 88
End: 2023-10-25
Payer: MEDICARE

## 2023-10-25 DIAGNOSIS — E53.8 B12 DEFICIENCY: Primary | ICD-10-CM

## 2023-10-25 PROCEDURE — 96372 THER/PROPH/DIAG INJ SC/IM: CPT | Performed by: FAMILY MEDICINE

## 2023-10-25 RX ORDER — CYANOCOBALAMIN 1000 UG/ML
1000 INJECTION, SOLUTION INTRAMUSCULAR; SUBCUTANEOUS ONCE
Status: COMPLETED | OUTPATIENT
Start: 2023-10-25 | End: 2023-10-25

## 2023-10-25 RX ORDER — HYDROCHLOROTHIAZIDE 12.5 MG/1
CAPSULE, GELATIN COATED ORAL
Qty: 90 CAPSULE | Refills: 3 | Status: SHIPPED | OUTPATIENT
Start: 2023-10-25

## 2023-10-25 RX ADMIN — CYANOCOBALAMIN 1000 MCG: 1000 INJECTION, SOLUTION INTRAMUSCULAR; SUBCUTANEOUS at 16:36

## 2023-10-25 NOTE — PROGRESS NOTES
Huong Becker is a 89 y.o. male here for a non-provider visit for B12 injection.    Reason for injection: vitamin B12 insufficiency  Order in MAR?: Yes  Patient supplied?:No  Minimum interval has been met for this injection (per MAR order): Yes    Patient tolerated injection and no adverse effects were observed or reported: Yes    # of Administrations remaining in MAR: 0

## 2023-11-27 ENCOUNTER — NON-PROVIDER VISIT (OUTPATIENT)
Dept: MEDICAL GROUP | Facility: CLINIC | Age: 88
End: 2023-11-27
Payer: MEDICARE

## 2023-11-27 PROCEDURE — 99999 PR NO CHARGE: CPT | Performed by: FAMILY MEDICINE

## 2023-11-27 PROCEDURE — 96372 THER/PROPH/DIAG INJ SC/IM: CPT | Performed by: FAMILY MEDICINE

## 2023-11-27 RX ORDER — CYANOCOBALAMIN 1000 UG/ML
1000 INJECTION, SOLUTION INTRAMUSCULAR; SUBCUTANEOUS ONCE
Status: COMPLETED | OUTPATIENT
Start: 2023-11-27 | End: 2023-11-27

## 2023-11-27 RX ADMIN — CYANOCOBALAMIN 1000 MCG: 1000 INJECTION, SOLUTION INTRAMUSCULAR; SUBCUTANEOUS at 14:21

## 2023-11-27 NOTE — PROGRESS NOTES
Huong Becker is a 89 y.o. male here for a non-provider visit for B-12 injection.    Reason for injection: B-12 injection   Order in MAR?: Yes  Patient supplied?:No  Minimum interval has been met for this injection (per MAR order): Yes    Patient tolerated injection and no adverse effects were observed or reported: Yes    # of Administrations remaining in MAR: 0

## 2023-12-05 ENCOUNTER — OFFICE VISIT (OUTPATIENT)
Dept: MEDICAL GROUP | Facility: CLINIC | Age: 88
End: 2023-12-05
Payer: MEDICARE

## 2023-12-05 VITALS
SYSTOLIC BLOOD PRESSURE: 150 MMHG | TEMPERATURE: 97.9 F | DIASTOLIC BLOOD PRESSURE: 78 MMHG | BODY MASS INDEX: 27.23 KG/M2 | HEART RATE: 76 BPM | WEIGHT: 173.5 LBS | HEIGHT: 67 IN | OXYGEN SATURATION: 96 %

## 2023-12-05 DIAGNOSIS — B07.9 WART OF HAND: ICD-10-CM

## 2023-12-05 DIAGNOSIS — M54.15 RADICULOPATHY OF THORACOLUMBAR REGION: ICD-10-CM

## 2023-12-05 DIAGNOSIS — Z23 NEED FOR VACCINATION: ICD-10-CM

## 2023-12-05 DIAGNOSIS — Z00.00 ENCOUNTER FOR GENERAL ADULT MEDICAL EXAMINATION WITHOUT ABNORMAL FINDINGS: ICD-10-CM

## 2023-12-05 DIAGNOSIS — J30.2 SEASONAL ALLERGIES: ICD-10-CM

## 2023-12-05 PROCEDURE — 90662 IIV NO PRSV INCREASED AG IM: CPT | Performed by: FAMILY MEDICINE

## 2023-12-05 PROCEDURE — 90677 PCV20 VACCINE IM: CPT | Performed by: FAMILY MEDICINE

## 2023-12-05 PROCEDURE — 99214 OFFICE O/P EST MOD 30 MIN: CPT | Mod: 25 | Performed by: FAMILY MEDICINE

## 2023-12-05 PROCEDURE — G0008 ADMIN INFLUENZA VIRUS VAC: HCPCS | Performed by: FAMILY MEDICINE

## 2023-12-05 PROCEDURE — G0009 ADMIN PNEUMOCOCCAL VACCINE: HCPCS | Performed by: FAMILY MEDICINE

## 2023-12-05 PROCEDURE — 3077F SYST BP >= 140 MM HG: CPT | Performed by: FAMILY MEDICINE

## 2023-12-05 PROCEDURE — 3078F DIAST BP <80 MM HG: CPT | Performed by: FAMILY MEDICINE

## 2023-12-05 ASSESSMENT — FIBROSIS 4 INDEX: FIB4 SCORE: 2.97

## 2023-12-05 NOTE — PROGRESS NOTES
Subjective:     CC: Radiculopathy, wart, noisy  respirations, HCM    HPI:   April presents today to discuss the following issues        Problem   Wart of Hand    Huong has two warts on his hand at the PIP he would like evaluated. He has used OTC freezing methods and has tried to express fluid     Radiculopathy of Thoracolumbar Region    Huong has now had 3 epidural injections and they have not been particularly helpful.  The neurosurgeon apparently did feel there was a surgical degree of spinal stenosis but did not want to jump into surgical treatment largely over concerns about age and anesthesia.  Planning on following up with them next.    Prior note:    Now on Lyrica and tolerating so far. Gabapentin was stopped due to side effects and not helpful.   Has an epidural scheduled August 10, he is also scheduled to see neurosurgery the following day for consultation. He continues PT, but this has not been helpful and he will stop soon.     Encounter for General Adult Medical Examination Without Abnormal Findings    Reviewed health care gaps     Seasonal Allergies    Huong has had a mild cough and morning congestion No dyspnea, fever, non productive.         Current Outpatient Medications Ordered in Epic   Medication Sig Dispense Refill    hydrochlorothiazide (MICROZIDE) 12.5 MG capsule TAKE 1 CAPSULE BY MOUTH EVERY DAY 90 Capsule 3    pregabalin (LYRICA) 25 MG Cap TAKE ONE CAPSULE BY MOUTH AT BEDTIME FOR 5 DAYS, TAKE ONE CAPSULE TWICE A DAY THEREAFTER      albuterol 108 (90 Base) MCG/ACT Aero Soln inhalation aerosol PLEASE SEE ATTACHED FOR DETAILED DIRECTIONS      cyanocobalamin (VITAMIN B-12) 100 MCG Tab Take 1 Tablet by mouth every day. 30 Tablet 3    cetirizine (ZYRTEC) 10 MG Tab TAKE 1 TABLET BY MOUTH EVERY DAY 30 Tablet 11    tadalafil (CIALIS) 10 MG tablet Take 1 Tablet by mouth 1 time a day as needed for Erectile Dysfunction. 10 Tablet 3    ondansetron (ZOFRAN) 4 MG Tab tablet Take 1 Tablet by mouth every four  "hours as needed for Nausea/Vomiting. 20 Tablet 1    diphenhydrAMINE (BENADRYL) 25 MG capsule Take 25 mg by mouth every 6 hours as needed.      acetaminophen (TYLENOL) 500 MG Tab Take 500-1,000 mg by mouth every 6 hours as needed.      fluticasone (FLONASE) 50 MCG/ACT nasal spray Administer 1 Spray into affected nostril(S) every day. 15.8 mL 0     No current Epic-ordered facility-administered medications on file.       Health Maintenance:     ROS:  Gen: no fevers/chills, no changes in weight  Eyes: no changes in vision  ENT: no sore throat, no hearing loss, no bloody nose  Pulm: no sob, no cough  CV: no chest pain, no palpitations  GI: no nausea/vomiting, no diarrhea  : no dysuria  MSk: no myalgias  Skin: no rash  Neuro: no headaches, no numbness/tingling  Heme/Lymph: no easy bruising      Objective:     Exam:  BP (!) 150/78 (BP Location: Left arm, Patient Position: Sitting, BP Cuff Size: Adult)   Pulse 76   Temp 36.6 °C (97.9 °F) (Temporal)   Ht 1.702 m (5' 7\")   Wt 78.7 kg (173 lb 8 oz)   SpO2 96%   BMI 27.17 kg/m²  Body mass index is 27.17 kg/m².    Gen: Alert and oriented, No apparent distress.  Neck: Neck is supple without lymphadenopathy.  Lungs: Normal effort, CTA bilaterally, no wheezes, rhonchi, or rales  CV: Regular rate and rhythm. No murmurs, rubs, or gallops.  Ext: No clubbing, cyanosis, edema.          Assessment & Plan:     89 y.o. male with the following -     Problem List Items Addressed This Visit       Encounter for general adult medical examination without abnormal findings     Gave flu and Prevnar 20 today. Consider others on f/u         Seasonal allergies     Lungs are clear. Consider flonase vs observation         Radiculopathy of thoracolumbar region     F/u w neurosurgery. Other epidurals, new medications, phyiscal modalities not recommended at this time         Wart of hand     There are two lesions that may be warts or could potentially be synovial cysts. Advised to leave them alone " and I will recheck on f/u          Other Visit Diagnoses       Need for vaccination        Relevant Orders    Influenza Vaccine, High Dose (65+ Only) (Completed)    Pneumococcal Conjugate Vaccine 20-Valent (6 wks+) (Completed)            I spent a total of 30 minutes with record review, exam, communication with the patient, communication with other providers, and documentation of this encounter.      No follow-ups on file.

## 2023-12-05 NOTE — ASSESSMENT & PLAN NOTE
There are two lesions that may be warts or could potentially be synovial cysts. Advised to leave them alone and I will recheck on f/u

## 2023-12-05 NOTE — ASSESSMENT & PLAN NOTE
F/u w neurosurgery. Other epidurals, new medications, phyiscal modalities not recommended at this time

## 2023-12-21 ENCOUNTER — APPOINTMENT (OUTPATIENT)
Dept: MEDICAL GROUP | Facility: CLINIC | Age: 88
End: 2023-12-21
Payer: MEDICARE

## 2023-12-21 ENCOUNTER — OFFICE VISIT (OUTPATIENT)
Dept: MEDICAL GROUP | Facility: CLINIC | Age: 88
End: 2023-12-21
Payer: MEDICARE

## 2023-12-21 VITALS
BODY MASS INDEX: 26.39 KG/M2 | SYSTOLIC BLOOD PRESSURE: 124 MMHG | TEMPERATURE: 97.6 F | DIASTOLIC BLOOD PRESSURE: 88 MMHG | WEIGHT: 168.5 LBS | HEART RATE: 74 BPM | OXYGEN SATURATION: 95 %

## 2023-12-21 DIAGNOSIS — J40 BRONCHITIS: ICD-10-CM

## 2023-12-21 PROCEDURE — 3074F SYST BP LT 130 MM HG: CPT | Performed by: FAMILY MEDICINE

## 2023-12-21 PROCEDURE — 3079F DIAST BP 80-89 MM HG: CPT | Performed by: FAMILY MEDICINE

## 2023-12-21 PROCEDURE — 99214 OFFICE O/P EST MOD 30 MIN: CPT | Performed by: FAMILY MEDICINE

## 2023-12-21 RX ORDER — CYANOCOBALAMIN 1000 UG/ML
1000 INJECTION, SOLUTION INTRAMUSCULAR; SUBCUTANEOUS ONCE
Status: CANCELLED | OUTPATIENT
Start: 2023-12-21 | End: 2023-12-21

## 2023-12-21 RX ORDER — CEFDINIR 300 MG/1
600 CAPSULE ORAL
COMMUNITY
Start: 2023-12-17

## 2023-12-21 RX ORDER — AZITHROMYCIN 250 MG/1
TABLET, FILM COATED ORAL
Qty: 5 TABLET | Refills: 0 | Status: SHIPPED | OUTPATIENT
Start: 2023-12-21

## 2023-12-21 RX ORDER — METHYLPREDNISOLONE 4 MG/1
TABLET ORAL
Qty: 21 TABLET | Refills: 0 | Status: SHIPPED | OUTPATIENT
Start: 2023-12-21

## 2023-12-21 ASSESSMENT — FIBROSIS 4 INDEX: FIB4 SCORE: 2.97

## 2023-12-21 NOTE — PROGRESS NOTES
Subjective:     CC: Bronchitis    HPI:   April presents today to discuss the following issues     Problem   Bronchitis    Was seen in the urgent care 4 days ago for persistent cough with some wheezing and dyspnea.  The cough was productive and he did have an associated mild fever.  He reports that the x-ray was read as normal.  He was given a breathing treatment in the office and placed on cefdinir along with an albuterol inhaler.  His symptoms have persisted and he does not feel any better.  Of note other family members with similar symptoms and they seem to be improving.  He had somewhat similar issues in March  Prior note:    Seen in  for persistent cough.  He was given doxycycline 100 mg twice a day along with methylprednisolone and albuterol.  He was also given Tessalon Perles for cough.  This was 6 days ago he feels considerably better but still has a few doxycycline left to take.  Chest x-ray was not done.         Current Outpatient Medications Ordered in Epic   Medication Sig Dispense Refill    cefdinir (OMNICEF) 300 MG Cap Take 600 mg by mouth every day.  FOR 10 DAYS      azithromycin (ZITHROMAX) 250 MG Tab 2 p.o. now, then one a day until gone 5 Tablet 0    methylPREDNISolone (MEDROL DOSEPAK) 4 MG Tablet Therapy Pack As directed on the packaging label. 21 Tablet 0    hydrochlorothiazide (MICROZIDE) 12.5 MG capsule TAKE 1 CAPSULE BY MOUTH EVERY DAY 90 Capsule 3    pregabalin (LYRICA) 25 MG Cap 50 mg.      albuterol 108 (90 Base) MCG/ACT Aero Soln inhalation aerosol PLEASE SEE ATTACHED FOR DETAILED DIRECTIONS      cyanocobalamin (VITAMIN B-12) 100 MCG Tab Take 1 Tablet by mouth every day. 30 Tablet 3    cetirizine (ZYRTEC) 10 MG Tab TAKE 1 TABLET BY MOUTH EVERY DAY 30 Tablet 11    tadalafil (CIALIS) 10 MG tablet Take 1 Tablet by mouth 1 time a day as needed for Erectile Dysfunction. 10 Tablet 3    ondansetron (ZOFRAN) 4 MG Tab tablet Take 1 Tablet by mouth every four hours as needed for Nausea/Vomiting.  20 Tablet 1    diphenhydrAMINE (BENADRYL) 25 MG capsule Take 25 mg by mouth every 6 hours as needed.      acetaminophen (TYLENOL) 500 MG Tab Take 500-1,000 mg by mouth every 6 hours as needed.      fluticasone (FLONASE) 50 MCG/ACT nasal spray Administer 1 Spray into affected nostril(S) every day. 15.8 mL 0     No current Epic-ordered facility-administered medications on file.       Health Maintenance:     ROS:  Gen: no fevers/chills, no changes in weight  Eyes: no changes in vision  ENT: no sore throat, no hearing loss, no bloody nose  Pulm: no sob, no cough  CV: no chest pain, no palpitations  GI: no nausea/vomiting, no diarrhea  : no dysuria  MSk: no myalgias  Skin: no rash  Neuro: no headaches, no numbness/tingling  Heme/Lymph: no easy bruising      Objective:     Exam:  /88 (BP Location: Left arm, Patient Position: Sitting, BP Cuff Size: Adult)   Pulse 74   Temp 36.4 °C (97.6 °F) (Temporal)   Wt 76.4 kg (168 lb 8 oz)   SpO2 95%   BMI 26.39 kg/m²  Body mass index is 26.39 kg/m².    Gen: Alert and oriented, No apparent distress.  Neck: Neck is supple without lymphadenopathy.  Lungs: Normal effort, CTA bilaterally, no wheezes, rhonchi, or rales  CV: Regular rate and rhythm. No murmurs, rubs, or gallops.  Ext: No clubbing, cyanosis, edema.          Assessment & Plan:     89 y.o. male with the following -     Problem List Items Addressed This Visit       Bronchitis     Vert is in no apparent respiratory distress and is afebrile now.  His heart is regular rate and rhythm and his lungs are relatively clear.  There is good air movement, some minimal upper airway sounds and occasional wheeze.  Since he is not improving I have offered to replace the cefdinir with azithromycin and he will take 2 of the 250 mg tabs today and then 1 a day for another 4 days.  I have also put him on a Medrol Dosepak.  I discussed ER precautions which would include any persistent or significant dyspnea, ongoing fever or other  general concerns.  May continue to use the albuterol inhaler if it is helpful.                No follow-ups on file.

## 2023-12-21 NOTE — ASSESSMENT & PLAN NOTE
Huong is in no apparent respiratory distress and is afebrile now.  His heart is regular rate and rhythm and his lungs are relatively clear.  There is good air movement, some minimal upper airway sounds and occasional wheeze.  Since he is not improving I have offered to replace the cefdinir with azithromycin and he will take 2 of the 250 mg tabs today and then 1 a day for another 4 days.  I have also put him on a Medrol Dosepak.  I discussed ER precautions which would include any persistent or significant dyspnea, ongoing fever or other general concerns.  May continue to use the albuterol inhaler if it is helpful.

## 2024-01-24 ENCOUNTER — OFFICE VISIT (OUTPATIENT)
Dept: MEDICAL GROUP | Facility: CLINIC | Age: 89
End: 2024-01-24
Payer: MEDICARE

## 2024-01-24 VITALS
DIASTOLIC BLOOD PRESSURE: 84 MMHG | WEIGHT: 171.4 LBS | SYSTOLIC BLOOD PRESSURE: 132 MMHG | OXYGEN SATURATION: 97 % | BODY MASS INDEX: 26.85 KG/M2 | TEMPERATURE: 98.2 F | HEART RATE: 82 BPM

## 2024-01-24 DIAGNOSIS — M54.42 CHRONIC BILATERAL LOW BACK PAIN WITH BILATERAL SCIATICA: ICD-10-CM

## 2024-01-24 DIAGNOSIS — G89.29 CHRONIC BILATERAL LOW BACK PAIN WITH BILATERAL SCIATICA: ICD-10-CM

## 2024-01-24 DIAGNOSIS — E53.8 VITAMIN B12 DEFICIENCY: ICD-10-CM

## 2024-01-24 DIAGNOSIS — M54.41 CHRONIC BILATERAL LOW BACK PAIN WITH BILATERAL SCIATICA: ICD-10-CM

## 2024-01-24 DIAGNOSIS — F32.9 REACTIVE DEPRESSION: ICD-10-CM

## 2024-01-24 DIAGNOSIS — B07.9 WART OF HAND: ICD-10-CM

## 2024-01-24 DIAGNOSIS — R60.0 LOWER EXTREMITY EDEMA: ICD-10-CM

## 2024-01-24 DIAGNOSIS — J40 BRONCHITIS: ICD-10-CM

## 2024-01-24 DIAGNOSIS — J33.9 NASAL POLYP: ICD-10-CM

## 2024-01-24 PROCEDURE — 3075F SYST BP GE 130 - 139MM HG: CPT | Performed by: FAMILY MEDICINE

## 2024-01-24 PROCEDURE — 99215 OFFICE O/P EST HI 40 MIN: CPT | Mod: 25 | Performed by: FAMILY MEDICINE

## 2024-01-24 PROCEDURE — 3079F DIAST BP 80-89 MM HG: CPT | Performed by: FAMILY MEDICINE

## 2024-01-24 PROCEDURE — 17110 DESTRUCTION B9 LES UP TO 14: CPT | Performed by: FAMILY MEDICINE

## 2024-01-24 RX ORDER — CYANOCOBALAMIN 1000 UG/ML
1000 INJECTION, SOLUTION INTRAMUSCULAR; SUBCUTANEOUS ONCE
Status: COMPLETED | OUTPATIENT
Start: 2024-01-24 | End: 2024-01-24

## 2024-01-24 RX ADMIN — CYANOCOBALAMIN 1000 MCG: 1000 INJECTION, SOLUTION INTRAMUSCULAR; SUBCUTANEOUS at 11:40

## 2024-01-24 ASSESSMENT — FIBROSIS 4 INDEX: FIB4 SCORE: 2.97

## 2024-01-24 NOTE — ASSESSMENT & PLAN NOTE
There does appear to be a nasal polyp in the right nares.  I am placing a referral to ENT for further evaluation.

## 2024-01-24 NOTE — ASSESSMENT & PLAN NOTE
This still appears to be purely venous stasis. I reviewed other more concerning etiologies with him again.  In the meantime I am recommending elevation while at home, and compressive stocking such as pantyhose.

## 2024-01-24 NOTE — ASSESSMENT & PLAN NOTE
We will hold on medications for the moment.  I have asked him to see our Dr. Richard here to start therapy and refer.   We will discuss again on f/u in a month.

## 2024-01-24 NOTE — PROGRESS NOTES
Subjective:     CC: Wart hand, peripheral edema, anxiety, LBP, Pernicious anemia    HPI:   April presents today to discuss the following issues        Problem   Nasal Polyp    He has noticed a red lesion in the right nostril and mild bleeding on occasion.     Reactive Depression    Both danika and his wife have noticed increased agitation, anxiety and irritability.  He recognized the symptoms and believes they are partly from chronic pain but also from ongoing age-related disabilities.  He has had an element of anxiety at times in the past.  He does not feel he is frankly depressed but would like to work on this.     Wart of Hand    F/u wart on R index finger persists, has not been picking at it.   Prior:  Danika has two warts on his hand at the PIP he would like evaluated. He has used OTC freezing methods and has tried to express fluid     Lower Extremity Edema    Ongoing symptoms as below. No new concerning symptoms.    Prior:    Danika continues to have bilateral lower extremity edema.  Generally improved in the morning and worsens as the day goes along.  No signs or symptoms referable to heart failure and his renal function is good.  Right side is slightly more swollen than the left but he denies,, redness, calf pain.     Bronchitis    Is doing much better. The last round of abx and steroids seemed to resolve his sx's.    Prior:    Was seen in the urgent care 4 days ago for persistent cough with some wheezing and dyspnea.  The cough was productive and he did have an associated mild fever.  He reports that the x-ray was read as normal.  He was given a breathing treatment in the office and placed on cefdinir along with an albuterol inhaler.  His symptoms have persisted and he does not feel any better.  Of note other family members with similar symptoms and they seem to be improving.  He had somewhat similar issues in March  Prior note:    Seen in  for persistent cough.  He was given doxycycline 100 mg twice a day  along with methylprednisolone and albuterol.  He was also given Tessalon Perles for cough.  This was 6 days ago he feels considerably better but still has a few doxycycline left to take.  Chest x-ray was not done.     Low Back Pain    This is much more tolerable, he rates it as a 4 out of 10.  Previously it was often an 8 or 9.  He attributes this to stabilization on a dose of Lyrica at 50 mg 3 times daily.  He did also have epidural injections several weeks prior.  Both treatments are being provided through pain management.     Vitamin B12 Deficiency    We continue monthly B12 injections. He is doing well with this. He is due today. No systemic sx's or new concerns         Current Outpatient Medications Ordered in Epic   Medication Sig Dispense Refill    hydrochlorothiazide (MICROZIDE) 12.5 MG capsule TAKE 1 CAPSULE BY MOUTH EVERY DAY 90 Capsule 3    pregabalin (LYRICA) 25 MG Cap 50 mg.      albuterol 108 (90 Base) MCG/ACT Aero Soln inhalation aerosol PLEASE SEE ATTACHED FOR DETAILED DIRECTIONS      cyanocobalamin (VITAMIN B-12) 100 MCG Tab Take 1 Tablet by mouth every day. 30 Tablet 3    cetirizine (ZYRTEC) 10 MG Tab TAKE 1 TABLET BY MOUTH EVERY DAY 30 Tablet 11    tadalafil (CIALIS) 10 MG tablet Take 1 Tablet by mouth 1 time a day as needed for Erectile Dysfunction. 10 Tablet 3    ondansetron (ZOFRAN) 4 MG Tab tablet Take 1 Tablet by mouth every four hours as needed for Nausea/Vomiting. 20 Tablet 1    diphenhydrAMINE (BENADRYL) 25 MG capsule Take 25 mg by mouth every 6 hours as needed.      acetaminophen (TYLENOL) 500 MG Tab Take 500-1,000 mg by mouth every 6 hours as needed.      fluticasone (FLONASE) 50 MCG/ACT nasal spray Administer 1 Spray into affected nostril(S) every day. 15.8 mL 0    cefdinir (OMNICEF) 300 MG Cap Take 600 mg by mouth every day.  FOR 10 DAYS (Patient not taking: Reported on 1/24/2024)      azithromycin (ZITHROMAX) 250 MG Tab 2 p.o. now, then one a day until gone (Patient not taking:  Reported on 1/24/2024) 5 Tablet 0    methylPREDNISolone (MEDROL DOSEPAK) 4 MG Tablet Therapy Pack As directed on the packaging label. (Patient not taking: Reported on 1/24/2024) 21 Tablet 0     No current Epic-ordered facility-administered medications on file.       Health Maintenance:     ROS:  Gen: no fevers/chills, no changes in weight  Eyes: no changes in vision  ENT: no sore throat, no hearing loss, no bloody nose  Pulm: no sob, no cough  CV: no chest pain, no palpitations  GI: no nausea/vomiting, no diarrhea  : no dysuria  MSk: no myalgias  Skin: no rash  Neuro: no headaches, no numbness/tingling  Heme/Lymph: no easy bruising      Objective:     Exam:  /84 (BP Location: Left arm, Patient Position: Sitting, BP Cuff Size: Adult)   Pulse 82   Temp 36.8 °C (98.2 °F) (Temporal)   Wt 77.7 kg (171 lb 6.4 oz)   SpO2 97%   BMI 26.85 kg/m²  Body mass index is 26.85 kg/m².    Gen: Alert and oriented, No apparent distress.  Neck: Neck is supple without lymphadenopathy.  Lungs: Normal effort, CTA bilaterally, no wheezes, rhonchi, or rales  CV: Regular rate and rhythm. No murmurs, rubs, or gallops.  Ext: No clubbing, cyanosis, edema.          Assessment & Plan:     89 y.o. male with the following -     Problem List Items Addressed This Visit       Low back pain     Follow-up with pain management as directed.         Vitamin B12 deficiency     Continued cyanocobalmin monthly.          Bronchitis     Lungs are clear. Continue to monitor         Lower extremity edema     This still appears to be purely venous stasis. I reviewed other more concerning etiologies with him again.  In the meantime I am recommending elevation while at home, and compressive stocking such as pantyhose.         Wart of hand     Wart on finger frozen with liquid N2.  Recheck in one month         Nasal polyp     There does appear to be a nasal polyp in the right nares.  I am placing a referral to ENT for further evaluation.         Relevant  Orders    Referral to ENT    Reactive depression     We will hold on medications for the moment.  I have asked him to see our Dr. Richard here to start therapy and refer.   We will discuss again on f/u in a month.             I spent a total of 44 minutes with record review, exam, communication with the patient, communication with other providers, and documentation of this encounter.      No follow-ups on file.

## 2024-01-29 ENCOUNTER — OFFICE VISIT (OUTPATIENT)
Dept: MEDICAL GROUP | Facility: CLINIC | Age: 89
End: 2024-01-29
Payer: MEDICARE

## 2024-01-29 DIAGNOSIS — F41.9 ANXIETY: ICD-10-CM

## 2024-01-29 PROCEDURE — 90834 PSYTX W PT 45 MINUTES: CPT | Performed by: PSYCHOLOGIST

## 2024-01-29 NOTE — PROGRESS NOTES
" [facility]  Psychotherapy Consult    Full therapy note has been documented and is under restricted viewing.  Please see below for summary of today's session.     Patient Name: April Becker  Patient MRN: [id number]  Today's Date:  1/29/24    Type of session: intake assessment  Session start time: 3  Session stop time: 3:45  Length of time spent face to face with patient: 45  Persons in attendance: patient    Diagnoses:   1. Anxiety       Pt. States that his aging makes him aware that he has a \"short runway\", and that he is very lonely and unhappy in his marriage of 38 years.    No violence, but lot's of arguing and no sex in years.  He feels that their main problem is communication.    Financially secure, adult children near him, very involved in their [employer].      No SI, no RON.      Wants things to be more comfortable.  Eager for referrals.  Referred to Dr. Dwayne Tan and Earl Anne, and can f/u with this writer for support/consultation.      Cecily Richard, Ph.D.       "

## 2024-02-26 ENCOUNTER — OFFICE VISIT (OUTPATIENT)
Dept: MEDICAL GROUP | Facility: CLINIC | Age: 89
End: 2024-02-26
Payer: MEDICARE

## 2024-02-26 VITALS
WEIGHT: 171.8 LBS | DIASTOLIC BLOOD PRESSURE: 80 MMHG | TEMPERATURE: 97 F | BODY MASS INDEX: 26.91 KG/M2 | SYSTOLIC BLOOD PRESSURE: 132 MMHG | OXYGEN SATURATION: 97 % | HEART RATE: 72 BPM

## 2024-02-26 DIAGNOSIS — M54.15 RADICULOPATHY OF THORACOLUMBAR REGION: ICD-10-CM

## 2024-02-26 DIAGNOSIS — E53.8 B12 DEFICIENCY: ICD-10-CM

## 2024-02-26 DIAGNOSIS — F32.9 REACTIVE DEPRESSION: ICD-10-CM

## 2024-02-26 DIAGNOSIS — R60.0 LOWER EXTREMITY EDEMA: ICD-10-CM

## 2024-02-26 PROCEDURE — 99214 OFFICE O/P EST MOD 30 MIN: CPT | Performed by: FAMILY MEDICINE

## 2024-02-26 PROCEDURE — 3079F DIAST BP 80-89 MM HG: CPT | Performed by: FAMILY MEDICINE

## 2024-02-26 PROCEDURE — 3075F SYST BP GE 130 - 139MM HG: CPT | Performed by: FAMILY MEDICINE

## 2024-02-26 RX ORDER — CYANOCOBALAMIN 1000 UG/ML
1000 INJECTION, SOLUTION INTRAMUSCULAR; SUBCUTANEOUS ONCE
Status: COMPLETED | OUTPATIENT
Start: 2024-02-26 | End: 2024-02-26

## 2024-02-26 RX ADMIN — CYANOCOBALAMIN 1000 MCG: 1000 INJECTION, SOLUTION INTRAMUSCULAR; SUBCUTANEOUS at 11:48

## 2024-02-26 ASSESSMENT — FIBROSIS 4 INDEX: FIB4 SCORE: 2.97

## 2024-02-26 NOTE — PROGRESS NOTES
Subjective:     CC: Edema, neuropathy, wart, mood, B-12    HPI:   April presents today to discuss the following issues        Problem   Reactive Depression    Has established with Psychology and f/u is pending.   Prior:   Both danika and his wife have noticed increased agitation, anxiety and irritability.  He recognized the symptoms and believes they are partly from chronic pain but also from ongoing age-related disabilities.  He has had an element of anxiety at times in the past.  He does not feel he is frankly depressed but would like to work on this.     Lower Extremity Edema    Has not yet tried compression stockings. Sx's unchanged    Prior:    Danika continues to have bilateral lower extremity edema.  Generally improved in the morning and worsens as the day goes along.  No signs or symptoms referable to heart failure and his renal function is good.  Right side is slightly more swollen than the left but he denies,, redness, calf pain.     Radiculopathy of Thoracolumbar Region    Is doing much better on the higher dose of Lyrica (25 mg BID) prescribed by neurosurgery. Still a lot of pain in the morning until he gets moving for a while    Prior:    Danika has now had 3 epidural injections and they have not been particularly helpful.  The neurosurgeon apparently did feel there was a surgical degree of spinal stenosis but did not want to jump into surgical treatment largely over concerns about age and anesthesia.  Planning on following up with them next.    Prior note:    Now on Lyrica and tolerating so far. Gabapentin was stopped due to side effects and not helpful.   Has an epidural scheduled August 10, he is also scheduled to see neurosurgery the following day for consultation. He continues PT, but this has not been helpful and he will stop soon.     B12 Deficiency    Doing well,remains asymptomatic and does wish to continue the monthly B12 shots.  No new concerns.,          Current Outpatient Medications Ordered in  Epic   Medication Sig Dispense Refill    hydrochlorothiazide (MICROZIDE) 12.5 MG capsule TAKE 1 CAPSULE BY MOUTH EVERY DAY 90 Capsule 3    pregabalin (LYRICA) 25 MG Cap 50 mg.      albuterol 108 (90 Base) MCG/ACT Aero Soln inhalation aerosol PLEASE SEE ATTACHED FOR DETAILED DIRECTIONS      cyanocobalamin (VITAMIN B-12) 100 MCG Tab Take 1 Tablet by mouth every day. 30 Tablet 3    tadalafil (CIALIS) 10 MG tablet Take 1 Tablet by mouth 1 time a day as needed for Erectile Dysfunction. 10 Tablet 3    ondansetron (ZOFRAN) 4 MG Tab tablet Take 1 Tablet by mouth every four hours as needed for Nausea/Vomiting. 20 Tablet 1    diphenhydrAMINE (BENADRYL) 25 MG capsule Take 25 mg by mouth every 6 hours as needed.      acetaminophen (TYLENOL) 500 MG Tab Take 500-1,000 mg by mouth every 6 hours as needed.      fluticasone (FLONASE) 50 MCG/ACT nasal spray Administer 1 Spray into affected nostril(S) every day. 15.8 mL 0    cefdinir (OMNICEF) 300 MG Cap Take 600 mg by mouth every day.  FOR 10 DAYS (Patient not taking: Reported on 1/24/2024)      azithromycin (ZITHROMAX) 250 MG Tab 2 p.o. now, then one a day until gone (Patient not taking: Reported on 1/24/2024) 5 Tablet 0    methylPREDNISolone (MEDROL DOSEPAK) 4 MG Tablet Therapy Pack As directed on the packaging label. (Patient not taking: Reported on 1/24/2024) 21 Tablet 0    cetirizine (ZYRTEC) 10 MG Tab TAKE 1 TABLET BY MOUTH EVERY DAY (Patient not taking: Reported on 2/26/2024) 30 Tablet 11     No current [facility]-ordered facility-administered medications on file.       Health Maintenance:     ROS:  Gen: no fevers/chills, no changes in weight  Eyes: no changes in vision  ENT: no sore throat, no hearing loss, no bloody nose  Pulm: no sob, no cough  CV: no chest pain, no palpitations  GI: no nausea/vomiting, no diarrhea  : no dysuria  MSk: no myalgias  Skin: no rash  Neuro: no headaches, no numbness/tingling  Heme/Lymph: no easy bruising      Objective:     Exam:  /80 (BP  Location: Left arm, Patient Position: Sitting, BP Cuff Size: Adult)   Pulse 72   Temp 36.1 °C (97 °F) (Temporal)   Wt 77.9 kg (171 lb 12.8 oz)   SpO2 97%   BMI 26.91 kg/m²  Body mass index is 26.91 kg/m².    Gen: Alert and oriented, No apparent distress.  Neck: Neck is supple without lymphadenopathy.  Lungs: Normal effort, CTA bilaterally, no wheezes, rhonchi, or rales  CV: Regular rate and rhythm. No murmurs, rubs, or gallops.  Ext: No clubbing, cyanosis, edema.          Assessment & Plan:     89 y.o. male with the following -     Problem List Items Addressed This Visit       B12 deficiency     B-12 given today         Radiculopathy of thoracolumbar region     He will try taking the morning dose of Lyrica when he gets up to use the bathroom at 5:00 am.  Does supplement with tylenol in therapeutic doses as needed with some benefit.          Lower extremity edema     Reinforced the recommendation for compression and elevation. No new red flags. Sx's are compounded by his neuropathy.         Reactive depression     F/u as directed.  Feels stable            I spent a total of 31 minutes with record review, exam, communication with the patient, communication with other providers, and documentation of this encounter.      No follow-ups on file.

## 2024-02-26 NOTE — ASSESSMENT & PLAN NOTE
He will try taking the morning dose of Lyrica when he gets up to use the bathroom at 5:00 am.  Does supplement with tylenol in therapeutic doses as needed with some benefit.

## 2024-02-26 NOTE — ASSESSMENT & PLAN NOTE
Reinforced the recommendation for compression and elevation. No new red flags. Sx's are compounded by his neuropathy.

## 2024-03-26 ENCOUNTER — TELEPHONE (OUTPATIENT)
Dept: HEALTH INFORMATION MANAGEMENT | Facility: OTHER | Age: 89
End: 2024-03-26
Payer: MEDICARE

## 2024-04-01 ENCOUNTER — OFFICE VISIT (OUTPATIENT)
Dept: URGENT CARE | Facility: PHYSICIAN GROUP | Age: 89
End: 2024-04-01
Payer: MEDICARE

## 2024-04-01 VITALS
SYSTOLIC BLOOD PRESSURE: 138 MMHG | HEART RATE: 75 BPM | DIASTOLIC BLOOD PRESSURE: 74 MMHG | TEMPERATURE: 98 F | WEIGHT: 155 LBS | RESPIRATION RATE: 16 BRPM | OXYGEN SATURATION: 94 % | HEIGHT: 67 IN | BODY MASS INDEX: 24.33 KG/M2

## 2024-04-01 DIAGNOSIS — K59.00 CONSTIPATION, UNSPECIFIED CONSTIPATION TYPE: ICD-10-CM

## 2024-04-01 DIAGNOSIS — K64.4 EXTERNAL HEMORRHOID, BLEEDING: ICD-10-CM

## 2024-04-01 PROCEDURE — 99213 OFFICE O/P EST LOW 20 MIN: CPT | Performed by: PHYSICIAN ASSISTANT

## 2024-04-01 PROCEDURE — 3078F DIAST BP <80 MM HG: CPT | Performed by: PHYSICIAN ASSISTANT

## 2024-04-01 PROCEDURE — 3075F SYST BP GE 130 - 139MM HG: CPT | Performed by: PHYSICIAN ASSISTANT

## 2024-04-01 RX ORDER — HYDROCORTISONE ACETATE 25 MG/1
25 SUPPOSITORY RECTAL EVERY 12 HOURS
Qty: 12 SUPPOSITORY | Refills: 0 | Status: SHIPPED | OUTPATIENT
Start: 2024-04-01

## 2024-04-01 RX ORDER — DOCUSATE SODIUM 100 MG/1
100 CAPSULE, LIQUID FILLED ORAL 2 TIMES DAILY
Qty: 60 CAPSULE | Refills: 0 | Status: SHIPPED | OUTPATIENT
Start: 2024-04-01

## 2024-04-01 RX ORDER — HYDROCORTISONE 25 MG/G
CREAM TOPICAL
Qty: 28 G | Refills: 0 | Status: SHIPPED | OUTPATIENT
Start: 2024-04-01

## 2024-04-01 ASSESSMENT — ENCOUNTER SYMPTOMS: RECTAL BLEEDING: 1

## 2024-04-01 ASSESSMENT — FIBROSIS 4 INDEX: FIB4 SCORE: 2.97

## 2024-04-01 NOTE — PROGRESS NOTES
Subjective     Huong Becker is a 89 y.o. male who presents with Rectal Bleeding (Pt states he noticed a flush of blood last week while using the restroom and states that he has been noticing blood in his stool on and off. )    PMH:  has a past medical history of Anesthesia, Arthritis, Breath shortness, Cataract, Heart burn, High cholesterol, Hip pain, bilateral (11/30/2018), Hypertension, Pain (2017), Pancreatic tumor, Parkinson's disease, and Pneumonia.  MEDS:   Current Outpatient Medications:     hydrochlorothiazide (MICROZIDE) 12.5 MG capsule, TAKE 1 CAPSULE BY MOUTH EVERY DAY, Disp: 90 Capsule, Rfl: 3    pregabalin (LYRICA) 25 MG Cap, 50 mg., Disp: , Rfl:     acetaminophen (TYLENOL) 500 MG Tab, Take 500-1,000 mg by mouth every 6 hours as needed., Disp: , Rfl:     albuterol 108 (90 Base) MCG/ACT Aero Soln inhalation aerosol, PLEASE SEE ATTACHED FOR DETAILED DIRECTIONS, Disp: , Rfl:     cyanocobalamin (VITAMIN B-12) 100 MCG Tab, Take 1 Tablet by mouth every day., Disp: 30 Tablet, Rfl: 3    cetirizine (ZYRTEC) 10 MG Tab, TAKE 1 TABLET BY MOUTH EVERY DAY (Patient not taking: Reported on 2/26/2024), Disp: 30 Tablet, Rfl: 11    tadalafil (CIALIS) 10 MG tablet, Take 1 Tablet by mouth 1 time a day as needed for Erectile Dysfunction., Disp: 10 Tablet, Rfl: 3    ondansetron (ZOFRAN) 4 MG Tab tablet, Take 1 Tablet by mouth every four hours as needed for Nausea/Vomiting., Disp: 20 Tablet, Rfl: 1    diphenhydrAMINE (BENADRYL) 25 MG capsule, Take 25 mg by mouth every 6 hours as needed., Disp: , Rfl:     fluticasone (FLONASE) 50 MCG/ACT nasal spray, Administer 1 Spray into affected nostril(S) every day., Disp: 15.8 mL, Rfl: 0  ALLERGIES: No Known Allergies  SURGHX:   Past Surgical History:   Procedure Laterality Date    GASTROSCOPY N/A 3/24/2017    Procedure: GASTROSCOPY;  Surgeon: Raphael Crawford M.D.;  Location: SURGERY [facility];  Service:     EGD W/ENDOSCOPIC ULTRASOUND N/A 3/24/2017    Procedure: EGD  "W/ENDOSCOPIC ULTRASOUND- UPPER, LINEAR, RADIAL ON STANDBY;  Surgeon: Raphael Crawford M.D.;  Location: SURGERY [facility];  Service:     EGD WITH ASP/BX N/A 3/24/2017    Procedure: EGD WITH ASP/BX - FINE NEEDLE ASPIRATION;  Surgeon: Raphael Crawford M.D.;  Location: SURGERY [facility];  Service:     CATARACT EXTRACTION WITH IOL Bilateral 2016    CHOLECYSTECTOMY  2013    KNEE ARTHROSCOPY Left 2013    COLONOSCOPY  2013    OTHER SURGICAL PROCEDURE  2000    salivary gland removed.     LUMBAR LAMINECTOMY DISKECTOMY  1995    L5 \"cleaned up\"    TURP-VAPOR  1990    TURP    OTHER SURGICAL PROCEDURE  1988    salivary gland removed.    ULCER EXCISION  1955    gastric ulcer      SOCHX:  reports that he quit smoking about 37 years ago. His smoking use included cigarettes and cigars. He started smoking about 47 years ago. He has a 2 pack-year smoking history. He has never used smokeless tobacco. He reports current alcohol use. He reports that he does not use drugs.  FH: Reviewed with patient, not pertinent to this visit.           Patient presents with:  Rectal Bleeding: Pt states he noticed a flush of blood last week while using the restroom and states that he has been noticing blood in his stool (bright red blood on TP on in the bowl) on and off for about a week. PT does admit to having some stools that have been harder and smaller at times, but not every BM is same. Pt has hemorrhoids, thinks this is what is bleeding. PT has tried suppository which was helpful but not resolved.  No other complaints.         Rectal Bleeding  This is a new problem. The current episode started in the past 7 days. The problem occurs intermittently. The problem has been waxing and waning. Associated symptoms include a change in bowel habit (constipation). Pertinent negatives include no abdominal pain, anorexia, fever, nausea or vomiting. Exacerbated by: hard stools. Treatments tried: suppository. The treatment provided mild relief.       Review " "of Systems   Constitutional:  Negative for fever.   Gastrointestinal:  Positive for blood in stool, change in bowel habit (constipation) and constipation. Negative for abdominal pain, anorexia, diarrhea, heartburn, melena, nausea and vomiting.   All other systems reviewed and are negative.             Objective     /74 (BP Location: Left arm, Patient Position: Sitting, BP Cuff Size: Adult long)   Pulse 75   Temp 36.7 °C (98 °F) (Temporal)   Resp 16   Ht 1.702 m (5' 7\")   Wt 70.3 kg (155 lb)   SpO2 94%   BMI 24.28 kg/m²      Physical Exam  Vitals and nursing note reviewed.   Constitutional:       General: He is not in acute distress.     Appearance: Normal appearance. He is well-developed. He is not ill-appearing or toxic-appearing.   HENT:      Head: Normocephalic and atraumatic.      Right Ear: Tympanic membrane normal.      Left Ear: Tympanic membrane normal.      Nose: Nose normal.      Mouth/Throat:      Lips: Pink.      Mouth: Mucous membranes are moist.      Pharynx: Oropharynx is clear. Uvula midline.   Eyes:      Extraocular Movements: Extraocular movements intact.      Conjunctiva/sclera: Conjunctivae normal.      Pupils: Pupils are equal, round, and reactive to light.   Cardiovascular:      Rate and Rhythm: Normal rate and regular rhythm.      Pulses: Normal pulses.      Heart sounds: Normal heart sounds.   Pulmonary:      Effort: Pulmonary effort is normal.      Breath sounds: Normal breath sounds.   Abdominal:      General: Bowel sounds are normal.      Palpations: Abdomen is soft.   Genitourinary:     Rectum: External hemorrhoid present. No mass or anal fissure.   Musculoskeletal:         General: Normal range of motion.      Cervical back: Normal range of motion and neck supple.   Skin:     General: Skin is warm and dry.      Capillary Refill: Capillary refill takes less than 2 seconds.   Neurological:      General: No focal deficit present.      Mental Status: He is alert and oriented to " person, place, and time.      Cranial Nerves: No cranial nerve deficit.      Motor: Motor function is intact.      Coordination: Coordination is intact.      Gait: Gait normal.   Psychiatric:         Mood and Affect: Mood normal.                             Assessment & Plan                1. External hemorrhoid, bleeding  docusate sodium (COLACE) 100 MG Cap    hydrocortisone (ANUSOL-HC) 25 MG Suppos    hydrocortisone rectal (PERIANAL) 2.5% Cream    witch hazel-glycerin (SOOZE) Pads      2. Constipation, unspecified constipation type  docusate sodium (COLACE) 100 MG Cap    hydrocortisone (ANUSOL-HC) 25 MG Suppos    hydrocortisone rectal (PERIANAL) 2.5% Cream    witch hazel-glycerin (SOOZE) Pads        PT HPI and PE are consistent with small external hemorrhoid that is tender without active bleeding.  No fissure noted.     Rectal suppository Rx sent to pharmacy for pain relief.     I have prescribed colace stool softener to take BID until stools are easier to pass.      PT can take over the counter meds as follows:  Stool softener (docusate sodium 100mg) one capsule 2 times daily.  Miralax 1 capful 1-2 times daily until stools become soft.   Milk of magnesia 4 Tbs once by mouth followed by 20-40 oz of water daily.   Glycerin rectal suppository if no relief from above.     PT can use these medications until constipation resolves.        Differential diagnosis, supportive care, and indications for immediate follow-up discussed with patient.  Instructed to return to clinic or nearest emergency department for any change in condition, further concerns, or worsening of symptoms.    I personally reviewed prior external notes and test results pertinent to today's visit.  I have independently reviewed and interpreted all diagnostics ordered during this urgent care visit.    PT should follow up with PCP in 1-2 days for re-evaluation if symptoms have not improved.      Discussed red flags and reasons to return to UC or ED.       Pt and/or family verbalized understanding of diagnosis and follow up instructions and was offered informational handout on diagnosis.  PT discharged.     Please note that this dictation was created using voice recognition software. I have made every reasonable attempt to correct obvious errors, but I expect that there may be errors of grammar and possibly content that I did not discover before finalizing the note.

## 2024-04-03 ENCOUNTER — APPOINTMENT (OUTPATIENT)
Dept: MEDICAL GROUP | Facility: CLINIC | Age: 89
End: 2024-04-03
Payer: MEDICARE

## 2024-04-03 VITALS
HEART RATE: 74 BPM | HEIGHT: 67 IN | OXYGEN SATURATION: 92 % | TEMPERATURE: 97.5 F | SYSTOLIC BLOOD PRESSURE: 148 MMHG | DIASTOLIC BLOOD PRESSURE: 78 MMHG | BODY MASS INDEX: 26.89 KG/M2 | WEIGHT: 171.3 LBS

## 2024-04-03 DIAGNOSIS — M54.15 RADICULOPATHY OF THORACOLUMBAR REGION: ICD-10-CM

## 2024-04-03 DIAGNOSIS — M67.449 GANGLION CYST OF FINGER: ICD-10-CM

## 2024-04-03 DIAGNOSIS — K64.9 HEMORRHOIDS, UNSPECIFIED HEMORRHOID TYPE: ICD-10-CM

## 2024-04-03 DIAGNOSIS — D51.0 PERNICIOUS ANEMIA: ICD-10-CM

## 2024-04-03 PROCEDURE — 96372 THER/PROPH/DIAG INJ SC/IM: CPT | Performed by: FAMILY MEDICINE

## 2024-04-03 PROCEDURE — 3078F DIAST BP <80 MM HG: CPT | Performed by: FAMILY MEDICINE

## 2024-04-03 PROCEDURE — 3077F SYST BP >= 140 MM HG: CPT | Performed by: FAMILY MEDICINE

## 2024-04-03 PROCEDURE — 99214 OFFICE O/P EST MOD 30 MIN: CPT | Mod: 25 | Performed by: FAMILY MEDICINE

## 2024-04-03 RX ORDER — CYANOCOBALAMIN 1000 UG/ML
1000 INJECTION, SOLUTION INTRAMUSCULAR; SUBCUTANEOUS ONCE
Status: COMPLETED | OUTPATIENT
Start: 2024-04-03 | End: 2024-04-03

## 2024-04-03 RX ADMIN — CYANOCOBALAMIN 1000 MCG: 1000 INJECTION, SOLUTION INTRAMUSCULAR; SUBCUTANEOUS at 15:22

## 2024-04-03 ASSESSMENT — FIBROSIS 4 INDEX: FIB4 SCORE: 2.97

## 2024-04-03 NOTE — ASSESSMENT & PLAN NOTE
I reviewed causes of constipation with vert again I agree with his current treatment of increased fluid, stool softeners, steroid cream and a bulking agent.  I added that I would recommend MiraLAX if he starts to get constipated.  I did not do an exam today.  If symptoms consistent continue or new symptoms develop we will take a look and/or refer him to GI.

## 2024-04-03 NOTE — PROGRESS NOTES
Subjective:     CC: Hemorrhoids, Radiculopathy, Ganglion Cyst finger, Pernicious Anemia    HPI:   Vertrus presents today to discuss the following issues       Problem   Hemorrhoid    Seen in UC about 5 days ago with BRB TX. Dx with hemmorhoid, given steroid cream, suppository and stool softener with TUCKS.   Is on Lyrica, no opiates.   He does have a history of hard stools with straining to defecate.  This episode was preceded by particularly hard pushing.  He does not have any pain in the area now.  More recent bowel movements have been streaked with blood but no overt bleeding.     Ganglion Cyst of Finger    Treated this area initially as a wart with cryotherapy.  Did seem to get better but then came back.  Now its appearance strikes me more is a ganglion cyst coming from that joint.     Radiculopathy of Thoracolumbar Region    He has had some luck from epidural injections and does have another one pending.  The pain continues in much the same manner as before, he is generally good while he is at rest but any movement flares it up to fairly severe range.  Happily he gets some resolution of symptoms late in the afternoon.  He was told that he might be a candidate for spinal stenosis surgery but he is not anxious to do that.    Prior:     Is doing much better on the higher dose of Lyrica (25 mg BID) prescribed by neurosurgery. Still a lot of pain in the morning until he gets moving for a while           Pernicious Anemia    No new oral or other complaints. Doing well with this.  It is time for his  monthly injection of B12. We plan to continue these indefinitely.         Current Outpatient Medications Ordered in Epic   Medication Sig Dispense Refill    docusate sodium (COLACE) 100 MG Cap Take 1 Capsule by mouth 2 times a day. 60 Capsule 0    hydrocortisone (ANUSOL-HC) 25 MG Suppos Insert 1 Suppository into the rectum every 12 hours. 12 Suppository 0    hydrocortisone rectal (PERIANAL) 2.5% Cream Apply small amount  "to rectal area 2 times daily as needed. 28 g 0    witch hazel-glycerin (SOOZE) Pads Insert 1 Each into the rectum as needed (use as needed for pain). 40 Each 3    hydrochlorothiazide (MICROZIDE) 12.5 MG capsule TAKE 1 CAPSULE BY MOUTH EVERY DAY 90 Capsule 3    pregabalin (LYRICA) 25 MG Cap 50 mg.      albuterol 108 (90 Base) MCG/ACT Aero Soln inhalation aerosol PLEASE SEE ATTACHED FOR DETAILED DIRECTIONS      cyanocobalamin (VITAMIN B-12) 100 MCG Tab Take 1 Tablet by mouth every day. 30 Tablet 3    tadalafil (CIALIS) 10 MG tablet Take 1 Tablet by mouth 1 time a day as needed for Erectile Dysfunction. 10 Tablet 3    ondansetron (ZOFRAN) 4 MG Tab tablet Take 1 Tablet by mouth every four hours as needed for Nausea/Vomiting. 20 Tablet 1    diphenhydrAMINE (BENADRYL) 25 MG capsule Take 25 mg by mouth every 6 hours as needed.      acetaminophen (TYLENOL) 500 MG Tab Take 500-1,000 mg by mouth every 6 hours as needed.      fluticasone (FLONASE) 50 MCG/ACT nasal spray Administer 1 Spray into affected nostril(S) every day. 15.8 mL 0    cetirizine (ZYRTEC) 10 MG Tab TAKE 1 TABLET BY MOUTH EVERY DAY (Patient not taking: Reported on 2/26/2024) 30 Tablet 11     No current [facility]-ordered facility-administered medications on file.       Health Maintenance:     ROS:  Gen: no fevers/chills, no changes in weight  Eyes: no changes in vision  ENT: no sore throat, no hearing loss, no bloody nose  Pulm: no sob, no cough  CV: no chest pain, no palpitations  GI: no nausea/vomiting, no diarrhea  : no dysuria  MSk: no myalgias  Skin: no rash  Neuro: no headaches, no numbness/tingling  Heme/Lymph: no easy bruising      Objective:     Exam:  BP (!) 148/78 (BP Location: Left arm, Patient Position: Sitting, BP Cuff Size: Adult)   Pulse 74   Temp 36.4 °C (97.5 °F) (Temporal)   Ht 1.702 m (5' 7\")   Wt 77.7 kg (171 lb 4.8 oz)   SpO2 92%   BMI 26.83 kg/m²  Body mass index is 26.83 kg/m².    Gen: Alert and oriented, No apparent " distress.  Neck: Neck is supple without lymphadenopathy.  Lungs: Normal effort, CTA bilaterally, no wheezes, rhonchi, or rales  CV: Regular rate and rhythm. No murmurs, rubs, or gallops.  Ext: No clubbing, cyanosis, edema.          Assessment & Plan:     89 y.o. male with the following -     Problem List Items Addressed This Visit       Pernicious anemia     Cyanocobalamin 1000 given         Radiculopathy of thoracolumbar region     Will continue to follow.  Discussed some of the risks and benefits of spinal stenosis surgery.         Hemorrhoid     I reviewed causes of constipation with vert again I agree with his current treatment of increased fluid, stool softeners, steroid cream and a bulking agent.  I added that I would recommend MiraLAX if he starts to get constipated.  I did not do an exam today.  If symptoms consistent continue or new symptoms develop we will take a look and/or refer him to GI.         Ganglion cyst of finger     Asked her to follow-up with our sports medicine team.  They may choose to aspirate the area with or without ultrasound guidance.  I do not believe an x-ray is in order at this point but if there is ongoing symptoms or recurrence that might be appropriate.            I spent a total of 32 minutes with record review, exam, communication with the patient, communication with other providers, and documentation of this encounter.      No follow-ups on file.

## 2024-04-03 NOTE — ASSESSMENT & PLAN NOTE
Asked her to follow-up with our sports medicine team.  They may choose to aspirate the area with or without ultrasound guidance.  I do not believe an x-ray is in order at this point but if there is ongoing symptoms or recurrence that might be appropriate.

## 2024-04-04 ASSESSMENT — ENCOUNTER SYMPTOMS
ABDOMINAL PAIN: 0
FEVER: 0
HEARTBURN: 0
NAUSEA: 0
CONSTIPATION: 1
CHANGE IN BOWEL HABIT: 1
ANOREXIA: 0
DIARRHEA: 0
BLOOD IN STOOL: 1
VOMITING: 0

## 2024-04-11 ENCOUNTER — APPOINTMENT (OUTPATIENT)
Dept: MEDICAL GROUP | Facility: CLINIC | Age: 89
End: 2024-04-11
Payer: MEDICARE

## 2024-04-18 ENCOUNTER — APPOINTMENT (OUTPATIENT)
Dept: RADIOLOGY | Facility: CLINIC | Age: 89
End: 2024-04-18
Attending: STUDENT IN AN ORGANIZED HEALTH CARE EDUCATION/TRAINING PROGRAM
Payer: MEDICARE

## 2024-04-18 ENCOUNTER — OFFICE VISIT (OUTPATIENT)
Dept: MEDICAL GROUP | Facility: CLINIC | Age: 89
End: 2024-04-18
Payer: MEDICARE

## 2024-04-18 VITALS
SYSTOLIC BLOOD PRESSURE: 155 MMHG | OXYGEN SATURATION: 92 % | WEIGHT: 174 LBS | DIASTOLIC BLOOD PRESSURE: 73 MMHG | TEMPERATURE: 97.4 F | HEIGHT: 67 IN | BODY MASS INDEX: 27.31 KG/M2 | HEART RATE: 79 BPM

## 2024-04-18 DIAGNOSIS — M79.644 PAIN OF FINGER OF RIGHT HAND: ICD-10-CM

## 2024-04-18 PROCEDURE — 3078F DIAST BP <80 MM HG: CPT | Performed by: STUDENT IN AN ORGANIZED HEALTH CARE EDUCATION/TRAINING PROGRAM

## 2024-04-18 PROCEDURE — 3077F SYST BP >= 140 MM HG: CPT | Performed by: STUDENT IN AN ORGANIZED HEALTH CARE EDUCATION/TRAINING PROGRAM

## 2024-04-18 PROCEDURE — 73120 X-RAY EXAM OF HAND: CPT | Mod: TC,RT | Performed by: RADIOLOGY

## 2024-04-18 PROCEDURE — 99213 OFFICE O/P EST LOW 20 MIN: CPT | Performed by: STUDENT IN AN ORGANIZED HEALTH CARE EDUCATION/TRAINING PROGRAM

## 2024-04-18 ASSESSMENT — FIBROSIS 4 INDEX: FIB4 SCORE: 2.97

## 2024-04-18 NOTE — ASSESSMENT & PLAN NOTE
Patient here for discussion for management for ganglion cyst.  Cyst ruptured last week, and there is no obvious fluid collection left to drain today on ultrasound.  POCUS does demonstrate irregular bony changes of DIP joint, did order x-ray for further evaluation for arthritis.  Discussed with patient that underlying ganglion cyst is likely result of arthritic changes, and that these are subject to recurrence.  Did discuss different management options including nothing, topical Voltaren gel, aspiration and injection for recurrent cyst, as well as referral for surgical evaluation for more definitive management.  Patient noted that he would be fine with more conservative management at this time.  Did prescribe topical Voltaren gel as I suspect some of his pain is related to osteoarthritis.  No cardiac history, labs reviewed shows normal renal function, and not currently taking any NSAIDs.  Discussed avoiding taking oral and topical NSAIDs at the same time.  Can continue with Tylenol as needed.  Should ganglion cyst recur or pain continue to persist, he he can follow-up with me at his convenience.

## 2024-04-18 NOTE — PROGRESS NOTES
Subjective:   April Becker is a 89 y.o. male here for the evaluation and management of Follow-Up    [facility]  Pleasant 89-year-old gentleman here for evaluation for ganglion cyst of right DIP joint.    He saw Dr. Sotelo who referred patient to be seen with sports medicine for evaluation management of ganglion cyst right index DIP joint.  Reports that he did receive cryotherapy for ganglion cyst, and that it did rupture last week.  Still reporting some pain over DIP joint.  Also notes that he feels similar sensation with his right middle finger DIP joint as well.  Full range of motion of hand.  No obvious deformity.  Mild tenderness of right index DIP joint.    ROS  As per [facility]  Current Outpatient Medications   Medication Sig Dispense Refill    diclofenac sodium (VOLTAREN) 1 % Gel Apply 4 g topically 4 times a day as needed (apply to affected areas). 100 g 2    docusate sodium (COLACE) 100 MG Cap Take 1 Capsule by mouth 2 times a day. 60 Capsule 0    hydrocortisone (ANUSOL-HC) 25 MG Suppos Insert 1 Suppository into the rectum every 12 hours. 12 Suppository 0    hydrocortisone rectal (PERIANAL) 2.5% Cream Apply small amount to rectal area 2 times daily as needed. 28 g 0    witch hazel-glycerin (SOOZE) Pads Insert 1 Each into the rectum as needed (use as needed for pain). 40 Each 3    hydrochlorothiazide (MICROZIDE) 12.5 MG capsule TAKE 1 CAPSULE BY MOUTH EVERY DAY 90 Capsule 3    pregabalin (LYRICA) 25 MG Cap 50 mg.      albuterol 108 (90 Base) MCG/ACT Aero Soln inhalation aerosol PLEASE SEE ATTACHED FOR DETAILED DIRECTIONS      cyanocobalamin (VITAMIN B-12) 100 MCG Tab Take 1 Tablet by mouth every day. 30 Tablet 3    tadalafil (CIALIS) 10 MG tablet Take 1 Tablet by mouth 1 time a day as needed for Erectile Dysfunction. 10 Tablet 3    ondansetron (ZOFRAN) 4 MG Tab tablet Take 1 Tablet by mouth every four hours as needed for Nausea/Vomiting. 20 Tablet 1    diphenhydrAMINE (BENADRYL) 25 MG capsule Take 25 mg by  "mouth every 6 hours as needed.      acetaminophen (TYLENOL) 500 MG Tab Take 500-1,000 mg by mouth every 6 hours as needed.      fluticasone (FLONASE) 50 MCG/ACT nasal spray Administer 1 Spray into affected nostril(S) every day. 15.8 mL 0    cetirizine (ZYRTEC) 10 MG Tab TAKE 1 TABLET BY MOUTH EVERY DAY (Patient not taking: Reported on 2/26/2024) 30 Tablet 11     No current facility-administered medications for this visit.     Allergies  Patient has no known allergies.    Past Medical History:   Diagnosis Date    Anesthesia     PONV, and hard to wake up    Arthritis     osteoarthritis, hips    Breath shortness     \"sob came before 11/2016, did a chest x-ray, and it didn't show anything\"    Cataract     Bilateral IOL implants    Heart burn     no meds    High cholesterol     no meds    Hip pain, bilateral 11/30/2018    and lower back.    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 Follow with CT every 6 months and no changes.     Parkinson's disease     no meds    Pneumonia     November 2016, no admission      Patient Active Problem List    Diagnosis Date Noted    Hemorrhoid 04/03/2024    Ganglion cyst of finger 04/03/2024    Nasal polyp 01/24/2024    Reactive depression 01/24/2024    Wart of hand 12/05/2023    Lower extremity edema 08/02/2023    Radiculopathy of thoracolumbar region 05/11/2023    Other constipation 04/24/2023    Bronchitis 03/15/2023    Pleuritic chest pain 02/03/2023    Neoplasm of uncertain behavior 02/03/2023    Health care maintenance 12/13/2022    Chills 06/07/2022    Paresthesia and pain of both upper extremities 06/07/2022    Bilious vomiting with nausea 11/29/2021    Abdominal bloating 10/26/2021    Arthralgia of hand 07/29/2021    Gastroesophageal reflux disease 07/18/2021    Low back pain 07/18/2021    Thyroid nodule 07/18/2021    HTN (hypertension) 07/18/2021    Arthritis of hand 06/17/2021    Lipoma of skin and " subcutaneous tissue of neck 05/17/2021    Nasal discharge 02/24/2021    Ear pain, left 09/18/2020    Tenderness of temporomandibular joint 05/22/2020    Allergic conjunctivitis 04/22/2020    Dysuria 04/22/2020    Polyuria 04/22/2020    Headache 01/16/2020    Eustachian tube dysfunction, left 12/24/2019    Carpal tunnel syndrome, right 11/25/2019    Pain of left heel 11/25/2019    Intertriginous candidiasis 07/31/2019    Hypotensive episode 07/18/2019    Muscle weakness (generalized) 07/18/2019    Fatigue 07/18/2019    Generalized weakness 07/13/2019    Insomnia 07/02/2019    Sepsis (HCC) 07/02/2019    Bacteremia 06/19/2019    Hypophosphatemia 06/19/2019    Hypokalemia 06/18/2019    Transaminitis 06/18/2019    Essential hypertension 06/18/2019    Pancreatic mass 06/18/2019    Obesity with body mass index 30 or greater 06/10/2019    Pernicious anemia 03/06/2019    B12 deficiency 01/24/2019    Arthritis of hip 08/07/2018    Vitamin B12 deficiency 03/02/2018    Glossitis 01/03/2018    Urinary frequency 01/03/2018    Diarrhea 06/23/2017    Anxiety 04/25/2017    Dyspnea 03/07/2017    Disorder of pancreas 02/17/2017    Influenza 02/07/2017    Chest pain, atypical 01/17/2017    Encounter for general adult medical examination without abnormal findings 12/20/2016    Atelectasis 11/30/2016    Arthralgia of hip 06/28/2016    Gastroenteritis 06/28/2016    Microscopic hematuria 08/17/2015    Impaired glucose tolerance 08/17/2015    Neuropathy 07/27/2015    Sciatica 06/22/2015    Vertigo 06/22/2015    Hyperlipidemia 09/29/2014    Seasonal allergies 04/15/2014    Skin tag 04/15/2014       Past Surgical History  Past Surgical History:   Procedure Laterality Date    GASTROSCOPY N/A 3/24/2017    Procedure: GASTROSCOPY;  Surgeon: Raphael Crawford M.D.;  Location: SURGERY [facility];  Service:     EGD W/ENDOSCOPIC ULTRASOUND N/A 3/24/2017    Procedure: EGD W/ENDOSCOPIC ULTRASOUND- UPPER, LINEAR, RADIAL ON STANDBY;  Surgeon: Raphael SEQUEIRA  "NAHUN Crawford;  Location: SURGERY [facility];  Service:     EGD WITH ASP/BX N/A 3/24/2017    Procedure: EGD WITH ASP/BX - FINE NEEDLE ASPIRATION;  Surgeon: Raphael Crawford M.D.;  Location: SURGERY [facility];  Service:     CATARACT EXTRACTION WITH IOL Bilateral 2016    CHOLECYSTECTOMY  2013    KNEE ARTHROSCOPY Left 2013    COLONOSCOPY  2013    OTHER SURGICAL PROCEDURE      salivary gland removed.     LUMBAR LAMINECTOMY DISKECTOMY      L5 \"cleaned up\"    TURP-VAPOR      TURP    OTHER SURGICAL PROCEDURE      salivary gland removed.    ULCER EXCISION      gastric ulcer        Social History     Socioeconomic History    Marital status:      Spouse name: Not on file    Number of children: Not on file    Years of education: Not on file    Highest education level: Not on file   Occupational History    Not on file   Tobacco Use    Smoking status: Former     Current packs/day: 0.00     Average packs/day: 0.2 packs/day for 10.0 years (2.0 ttl pk-yrs)     Types: Cigarettes, Cigars     Start date:      Quit date:      Years since quittin.3    Smokeless tobacco: Never   Vaping Use    Vaping Use: Never used   Substance and Sexual Activity    Alcohol use: Yes     Comment: 2-3 per month    Drug use: No    Sexual activity: Not Currently   Other Topics Concern    Not on file   Social History Narrative    Not on file     Social Determinants of Health     Financial Resource Strain: Not on file   Food Insecurity: Not on file   Transportation Needs: Not on file   Physical Activity: Not on file   Stress: Not on file   Social Connections: Not on file   Intimate Partner Violence: Not on file   Housing Stability: Not on file        Objective:     Vitals:    24 0923   BP: (!) 155/73   BP Location: Left arm   Patient Position: Sitting   Pulse: 79   Temp: 36.3 °C (97.4 °F)   TempSrc: Temporal   SpO2: 92%   Weight: 78.9 kg (174 lb)   Height: 1.702 m (5' 7\")     Body mass index is 27.25 kg/m². "     Physical Exam  General: Well-appearing, no acute distress  MSK: Right hand with well-healing superficial scab present over right index DIP joint. No significant effusion or palpable fluid collection.   POCUS: Right DIP joint appears irregular on US. Scant fluid seen extruding from joint space dorsally. Similar findings of right middle DIP joint as well.   Assessment and Plan:   April Becker is a 89 y.o. male with a Follow-Up     The following was discussed with the patient today.    1. Pain of finger of right hand  - diclofenac sodium (VOLTAREN) 1 % Gel; Apply 4 g topically 4 times a day as needed (apply to affected areas).  Dispense: 100 g; Refill: 2  - DX-HAND 2- RIGHT; Future      Problem List Items Addressed This Visit       Pain of finger of right hand     Patient here for discussion for management for ganglion cyst.  Cyst ruptured last week, and there is no obvious fluid collection left to drain today on ultrasound.  POCUS does demonstrate irregular bony changes of DIP joint, did order x-ray for further evaluation for arthritis.  Discussed with patient that underlying ganglion cyst is likely result of arthritic changes, and that these are subject to recurrence.  Did discuss different management options including nothing, topical Voltaren gel, aspiration and injection for recurrent cyst, as well as referral for surgical evaluation for more definitive management.  Patient noted that he would be fine with more conservative management at this time.  Did prescribe topical Voltaren gel as I suspect some of his pain is related to osteoarthritis.  No cardiac history, labs reviewed shows normal renal function, and not currently taking any NSAIDs.  Discussed avoiding taking oral and topical NSAIDs at the same time.  Can continue with Tylenol as needed.  Should ganglion cyst recur or pain continue to persist, he he can follow-up with me at his convenience.         Relevant Medications    diclofenac sodium  (VOLTAREN) 1 % Gel    Other Relevant Orders    DX-HAND 2- RIGHT     Darius Urbina, DO  [employer] [facility] Fellow

## 2024-05-01 ENCOUNTER — APPOINTMENT (OUTPATIENT)
Dept: RADIOLOGY | Facility: MEDICAL CENTER | Age: 89
End: 2024-05-01
Attending: EMERGENCY MEDICINE
Payer: MEDICARE

## 2024-05-01 ENCOUNTER — HOSPITAL ENCOUNTER (EMERGENCY)
Facility: MEDICAL CENTER | Age: 89
End: 2024-05-01
Attending: EMERGENCY MEDICINE
Payer: MEDICARE

## 2024-05-01 ENCOUNTER — OFFICE VISIT (OUTPATIENT)
Dept: URGENT CARE | Facility: PHYSICIAN GROUP | Age: 89
End: 2024-05-01
Payer: MEDICARE

## 2024-05-01 VITALS
HEIGHT: 67 IN | RESPIRATION RATE: 19 BRPM | OXYGEN SATURATION: 94 % | BODY MASS INDEX: 25.11 KG/M2 | WEIGHT: 160 LBS | SYSTOLIC BLOOD PRESSURE: 188 MMHG | HEART RATE: 91 BPM | DIASTOLIC BLOOD PRESSURE: 87 MMHG | TEMPERATURE: 97.2 F

## 2024-05-01 VITALS
RESPIRATION RATE: 14 BRPM | SYSTOLIC BLOOD PRESSURE: 164 MMHG | HEART RATE: 93 BPM | BODY MASS INDEX: 26.37 KG/M2 | DIASTOLIC BLOOD PRESSURE: 78 MMHG | HEIGHT: 67 IN | TEMPERATURE: 97.6 F | WEIGHT: 168 LBS | OXYGEN SATURATION: 95 %

## 2024-05-01 DIAGNOSIS — R42 DIZZINESS: ICD-10-CM

## 2024-05-01 DIAGNOSIS — R10.10 PAIN OF UPPER ABDOMEN: ICD-10-CM

## 2024-05-01 DIAGNOSIS — R07.9 CHEST PAIN, UNSPECIFIED TYPE: ICD-10-CM

## 2024-05-01 DIAGNOSIS — E86.0 DEHYDRATION: ICD-10-CM

## 2024-05-01 LAB
ALBUMIN SERPL BCP-MCNC: 4.6 G/DL (ref 3.2–4.9)
ALBUMIN/GLOB SERPL: 1.4 G/DL
ALP SERPL-CCNC: 121 U/L (ref 30–99)
ALT SERPL-CCNC: 19 U/L (ref 2–50)
ANION GAP SERPL CALC-SCNC: 16 MMOL/L (ref 7–16)
AST SERPL-CCNC: 23 U/L (ref 12–45)
BASOPHILS # BLD AUTO: 0.4 % (ref 0–1.8)
BASOPHILS # BLD: 0.03 K/UL (ref 0–0.12)
BILIRUB SERPL-MCNC: 1.2 MG/DL (ref 0.1–1.5)
BUN SERPL-MCNC: 12 MG/DL (ref 8–22)
CALCIUM ALBUM COR SERPL-MCNC: 9 MG/DL (ref 8.5–10.5)
CALCIUM SERPL-MCNC: 9.5 MG/DL (ref 8.5–10.5)
CHLORIDE SERPL-SCNC: 98 MMOL/L (ref 96–112)
CO2 SERPL-SCNC: 22 MMOL/L (ref 20–33)
CREAT SERPL-MCNC: 0.83 MG/DL (ref 0.5–1.4)
EKG IMPRESSION: NORMAL
EOSINOPHIL # BLD AUTO: 0.03 K/UL (ref 0–0.51)
EOSINOPHIL NFR BLD: 0.4 % (ref 0–6.9)
ERYTHROCYTE [DISTWIDTH] IN BLOOD BY AUTOMATED COUNT: 42.4 FL (ref 35.9–50)
GFR SERPLBLD CREATININE-BSD FMLA CKD-EPI: 82 ML/MIN/1.73 M 2
GLOBULIN SER CALC-MCNC: 3.3 G/DL (ref 1.9–3.5)
GLUCOSE BLD-MCNC: 110 MG/DL (ref 65–99)
GLUCOSE SERPL-MCNC: 131 MG/DL (ref 65–99)
HCT VFR BLD AUTO: 48.2 % (ref 42–52)
HGB BLD-MCNC: 16.4 G/DL (ref 14–18)
IMM GRANULOCYTES # BLD AUTO: 0.02 K/UL (ref 0–0.11)
IMM GRANULOCYTES NFR BLD AUTO: 0.3 % (ref 0–0.9)
LIPASE SERPL-CCNC: 24 U/L (ref 11–82)
LYMPHOCYTES # BLD AUTO: 1.08 K/UL (ref 1–4.8)
LYMPHOCYTES NFR BLD: 15.3 % (ref 22–41)
MCH RBC QN AUTO: 29.9 PG (ref 27–33)
MCHC RBC AUTO-ENTMCNC: 34 G/DL (ref 32.3–36.5)
MCV RBC AUTO: 88 FL (ref 81.4–97.8)
MONOCYTES # BLD AUTO: 0.58 K/UL (ref 0–0.85)
MONOCYTES NFR BLD AUTO: 8.2 % (ref 0–13.4)
NEUTROPHILS # BLD AUTO: 5.31 K/UL (ref 1.82–7.42)
NEUTROPHILS NFR BLD: 75.4 % (ref 44–72)
NRBC # BLD AUTO: 0 K/UL
NRBC BLD-RTO: 0 /100 WBC (ref 0–0.2)
PLATELET # BLD AUTO: 185 K/UL (ref 164–446)
PMV BLD AUTO: 10.1 FL (ref 9–12.9)
POTASSIUM SERPL-SCNC: 3.4 MMOL/L (ref 3.6–5.5)
PROT SERPL-MCNC: 7.9 G/DL (ref 6–8.2)
RBC # BLD AUTO: 5.48 M/UL (ref 4.7–6.1)
SODIUM SERPL-SCNC: 136 MMOL/L (ref 135–145)
TROPONIN T SERPL-MCNC: 29 NG/L (ref 6–19)
TROPONIN T SERPL-MCNC: 31 NG/L (ref 6–19)
WBC # BLD AUTO: 7.1 K/UL (ref 4.8–10.8)

## 2024-05-01 PROCEDURE — 99214 OFFICE O/P EST MOD 30 MIN: CPT

## 2024-05-01 PROCEDURE — 93000 ELECTROCARDIOGRAM COMPLETE: CPT

## 2024-05-01 PROCEDURE — 3077F SYST BP >= 140 MM HG: CPT

## 2024-05-01 PROCEDURE — 3078F DIAST BP <80 MM HG: CPT

## 2024-05-01 PROCEDURE — 82962 GLUCOSE BLOOD TEST: CPT

## 2024-05-01 RX ORDER — SODIUM CHLORIDE, SODIUM LACTATE, POTASSIUM CHLORIDE, CALCIUM CHLORIDE 600; 310; 30; 20 MG/100ML; MG/100ML; MG/100ML; MG/100ML
1000 INJECTION, SOLUTION INTRAVENOUS ONCE
Status: COMPLETED | OUTPATIENT
Start: 2024-05-01 | End: 2024-05-01

## 2024-05-01 RX ORDER — ALUMINUM HYDROXIDE AND MAGNESIUM TRISILICATE 80; 14.2 MG/1; MG/1
1 TABLET, CHEWABLE ORAL
COMMUNITY

## 2024-05-01 RX ORDER — TRAMADOL HYDROCHLORIDE 50 MG/1
50 TABLET ORAL 3 TIMES DAILY
COMMUNITY
Start: 2024-04-01 | End: 2024-05-03

## 2024-05-01 RX ADMIN — SODIUM CHLORIDE, POTASSIUM CHLORIDE, SODIUM LACTATE AND CALCIUM CHLORIDE 1000 ML: 600; 310; 30; 20 INJECTION, SOLUTION INTRAVENOUS at 15:48

## 2024-05-01 RX ADMIN — IOHEXOL 100 ML: 350 INJECTION, SOLUTION INTRAVENOUS at 19:00

## 2024-05-01 ASSESSMENT — ENCOUNTER SYMPTOMS
TINGLING: 0
FEVER: 0
DIARRHEA: 0
COUGH: 0
ABDOMINAL PAIN: 0
NAUSEA: 1
PALPITATIONS: 0
SHORTNESS OF BREATH: 0
EYE PAIN: 0
VOMITING: 0
PHOTOPHOBIA: 0
NECK PAIN: 0
BLURRED VISION: 0
DIZZINESS: 1
MYALGIAS: 0
FOCAL WEAKNESS: 0
SENSORY CHANGE: 0
DOUBLE VISION: 0
STRIDOR: 0
WEAKNESS: 0
LOSS OF CONSCIOUSNESS: 0
HEADACHES: 0
SPEECH CHANGE: 0
CHILLS: 0
SEIZURES: 0

## 2024-05-01 ASSESSMENT — FIBROSIS 4 INDEX
FIB4 SCORE: 2.97
FIB4 SCORE: 2.97

## 2024-05-01 ASSESSMENT — VISUAL ACUITY: OU: 1

## 2024-05-01 ASSESSMENT — PAIN DESCRIPTION - PAIN TYPE: TYPE: ACUTE PAIN

## 2024-05-01 NOTE — ED PROVIDER NOTES
ED Provider Note    CHIEF COMPLAINT  Chief Complaint   Patient presents with    Dizziness    Epigastric Pain    Chest Pain    Weakness       EXTERNAL RECORDS REVIEWED  Reviewed previous imaging studies regarding apparent benign pancreatic head lesion    HPI/ROS  LIMITATION TO HISTORY   None  OUTSIDE HISTORIAN(S):  Wife provided additional history    April Becker is a 89 y.o. male who presents for evaluation of epigastric discomfort chest pain dizziness and dyspnea on exertion.  The patient is a very pleasant and active 89-year-old.  He has a known history of reportedly benign pancreatic head tumor which has not required intervention chemotherapy etc.  He reports symptoms started yesterday when he had chest discomfort and epigastric discomfort with exertion with associated dyspnea shortness of breath and fatigue.  He denies high fevers or chills.  No chest pain at rest.  No leg swelling or hemoptysis.  He does report mild nausea.  No report of any black or bloody stools hematemesis hematochezia.  He specifically denies any chest pain.    PAST MEDICAL HISTORY   has a past medical history of Anesthesia, Arthritis, Breath shortness, Cataract, Heart burn, High cholesterol, Hip pain, bilateral (11/30/2018), Hypertension, Pain (2017), Pancreatic tumor, Parkinson's disease, and Pneumonia.    SURGICAL HISTORY   has a past surgical history that includes cataract extraction with iol (Bilateral, 2016); cholecystectomy (2013); ulcer excision (1955); gastroscopy (N/A, 3/24/2017); egd w/endoscopic ultrasound (N/A, 3/24/2017); egd with asp/bx (N/A, 3/24/2017); knee arthroscopy (Left, 2013); turp-vapor (1990); other surgical procedure (1988); other surgical procedure (2000); lumbar laminectomy diskectomy (1995); and colonoscopy (2013).    FAMILY HISTORY  No family history on file.    SOCIAL HISTORY  Social History     Tobacco Use    Smoking status: Former     Current packs/day: 0.00     Average packs/day: 0.2 packs/day  "for 10.0 years (2.0 ttl pk-yrs)     Types: Cigarettes, Cigars     Start date:      Quit date:      Years since quittin.3    Smokeless tobacco: Never   Vaping Use    Vaping Use: Never used   Substance and Sexual Activity    Alcohol use: Yes     Comment: 2-3 per month    Drug use: No    Sexual activity: Not Currently       CURRENT MEDICATIONS  Home Medications       Reviewed by Sandra Piedra (Pharmacy Tech) on 24 at 1731  Med List Status: Complete     Medication Last Dose Status   acetaminophen (TYLENOL) 500 MG Tab 2024 Active   Alum Hydroxide-Mag Trisilicate (GAVISCON) 80-14.2 MG Chew Tab PRN Active   cetirizine (ZYRTEC) 10 MG Tab PRN Active   diclofenac sodium (VOLTAREN) 1 % Gel PRN Active   diphenhydrAMINE (BENADRYL) 25 MG capsule PRN Active   docusate sodium (COLACE) 100 MG Cap 2024 Active   hydrochlorothiazide (MICROZIDE) 12.5 MG capsule 2024 Active   hydrocortisone (ANUSOL-HC) 25 MG Suppos 1 WEEK AGO Active   hydrocortisone rectal (PERIANAL) 2.5% Cream PRN Active   pregabalin (LYRICA) 50 MG capsule 2024 Active   traMADol (ULTRAM) 50 MG Tab 2024 Active   witch hazel-glycerin (SOOZE) Pads PRN Active                    ALLERGIES  No Known Allergies    PHYSICAL EXAM  VITAL SIGNS: BP (!) 188/87   Pulse 91   Temp 36.2 °C (97.2 °F) (Temporal)   Resp 19   Ht 1.702 m (5' 7\")   Wt 72.6 kg (160 lb)   SpO2 94%   BMI 25.06 kg/m²    Pulse ox interpretation: I interpret this pulse ox as normal.  Constitutional: Alert and oriented x 3, mild distress  HEENT: Atraumatic normocephalic, pupils are equal round reactive to light extraocular movements are intact. The nares is clear, external ears are normal, mouth shows moist mucous membranes normal dentition for age  Neck: Supple, no JVD no tracheal deviation  Cardiovascular: Regular rate and rhythm no murmur rub or gallop 2+ pulses peripherally x4  Thorax & Lungs: No respiratory distress, no wheezes rales or rhonchi, No chest " tenderness.   GI: Soft mild epigastric discomfort no rebound or guarding  Skin: Warm dry no acute rash or lesion  Musculoskeletal: Moving all extremities with full range and 5 of 5 strength no acute  deformity  Neurologic: Cranial nerves III through XII are grossly intact no sensory deficit no cerebellar dysfunction   Psychiatric: Anxious        EKG/LABS  Results for orders placed or performed during the hospital encounter of 05/01/24   CBC with Differential   Result Value Ref Range    WBC 7.1 4.8 - 10.8 K/uL    RBC 5.48 4.70 - 6.10 M/uL    Hemoglobin 16.4 14.0 - 18.0 g/dL    Hematocrit 48.2 42.0 - 52.0 %    MCV 88.0 81.4 - 97.8 fL    MCH 29.9 27.0 - 33.0 pg    MCHC 34.0 32.3 - 36.5 g/dL    RDW 42.4 35.9 - 50.0 fL    Platelet Count 185 164 - 446 K/uL    MPV 10.1 9.0 - 12.9 fL    Neutrophils-Polys 75.40 (H) 44.00 - 72.00 %    Lymphocytes 15.30 (L) 22.00 - 41.00 %    Monocytes 8.20 0.00 - 13.40 %    Eosinophils 0.40 0.00 - 6.90 %    Basophils 0.40 0.00 - 1.80 %    Immature Granulocytes 0.30 0.00 - 0.90 %    Nucleated RBC 0.00 0.00 - 0.20 /100 WBC    Neutrophils (Absolute) 5.31 1.82 - 7.42 K/uL    Lymphs (Absolute) 1.08 1.00 - 4.80 K/uL    Monos (Absolute) 0.58 0.00 - 0.85 K/uL    Eos (Absolute) 0.03 0.00 - 0.51 K/uL    Baso (Absolute) 0.03 0.00 - 0.12 K/uL    Immature Granulocytes (abs) 0.02 0.00 - 0.11 K/uL    NRBC (Absolute) 0.00 K/uL   Complete Metabolic Panel (CMP)   Result Value Ref Range    Sodium 136 135 - 145 mmol/L    Potassium 3.4 (L) 3.6 - 5.5 mmol/L    Chloride 98 96 - 112 mmol/L    Co2 22 20 - 33 mmol/L    Anion Gap 16.0 7.0 - 16.0    Glucose 131 (H) 65 - 99 mg/dL    Bun 12 8 - 22 mg/dL    Creatinine 0.83 0.50 - 1.40 mg/dL    Calcium 9.5 8.5 - 10.5 mg/dL    Correct Calcium 9.0 8.5 - 10.5 mg/dL    AST(SGOT) 23 12 - 45 U/L    ALT(SGPT) 19 2 - 50 U/L    Alkaline Phosphatase 121 (H) 30 - 99 U/L    Total Bilirubin 1.2 0.1 - 1.5 mg/dL    Albumin 4.6 3.2 - 4.9 g/dL    Total Protein 7.9 6.0 - 8.2 g/dL    Globulin  3.3 1.9 - 3.5 g/dL    A-G Ratio 1.4 g/dL   Troponins NOW   Result Value Ref Range    Troponin T 31 (H) 6 - 19 ng/L   Troponins in two (2) hours   Result Value Ref Range    Troponin T 29 (H) 6 - 19 ng/L   ESTIMATED GFR   Result Value Ref Range    GFR (CKD-EPI) 82 >60 mL/min/1.73 m 2   LIPASE   Result Value Ref Range    Lipase 24 11 - 82 U/L   EKG   Result Value Ref Range    Report       [facility] Emergency Dept.    Test Date:  2024  Pt Name:    AGUIE ROLLINS                Department: ER  MRN:        [id number]                      Room:  Gender:     Male                         Technician: [id number]  :        [id number]                   Requested By:ER TRIAGE PROTOCOL  Order #:    [id number]                    Reading MD:    Measurements  Intervals                                Axis  Rate:       88                           P:          78  WA:         157                          QRS:        -61  QRSD:       110                          T:          49  QT:         360  QTc:        436    Interpretive Statements  Sinus rhythm  Left atrial enlargement  Left axis deviation  RSR' in V1 or V2, probably normal variant  Compared to ECG 10/28/2021 14:32:33  Atrial abnormality now present  Left-axis deviation now present  RSR' in V1 or V2 now present  Intraventricular conduction delay no longer present        I have independently interpreted this EKG  Twelve-lead EKG interpretation by me sinus rhythm rate 88 left atrial enlargement no acute ST segment elevation or evidence of arrhythmia heart block or ischemia  RADIOLOGY/PROCEDURES   I have independently interpreted the diagnostic imaging associated with this visit and am waiting the final reading from the radiologist.   My preliminary interpretation is as follows: No acute process    Radiologist interpretation:  CT-ABDOMEN-PELVIS WITH   Final Result         1. No acute inflammatory change in the abdomen or pelvis.      2. Redemonstration of  multilocular cystic pancreatic head mass, slightly bigger than prior.            DX-CHEST-PORTABLE (1 VIEW)   Final Result      No acute cardiopulmonary disease evident.          COURSE & MEDICAL DECISION MAKING    ASSESSMENT, COURSE AND PLAN  Care Narrative:     This is a very pleasant 89-year-old accompanied with his wife for a transient episode of mild epigastric discomfort and nausea.  Differential diagnosis was extensive including enteritis pancreatitis cholecystitis perforated viscus atypical ACS pneumonia not exclusively.  Here the patient had extensive evaluation.  His CBC and metabolic panel were all reassuring and normal.  Lipase was normal as well.  Chest x-ray did not demonstrate any obvious pneumonia.  EKG did not suggest any STEMI or ischemic findings.  Troponin was indeterminate but was checked 2 hours later and is trending down from 31-29 and he has no chest pain or EKG changes.  CT scan of the abdomen pelvis was performed and demonstrated a known pancreatic cyst that does appear slightly enlarged but no evidence clinically of pancreatitis cancer or any acute surgical or medical process.  I long discussion with the patient.  We did offer admission for the indeterminate troponins for stress test but the patient would prefer to go home which I think is reasonable.  He does have some underlying risk factors but here he had no chest pain ischemic findings on EKG and had indeterminate troponins that were not trending up.  Patient apparently has follow-up with his PCP Dontae Sotelo tomorrow for regularly scheduled appointment at 10 AM.  If the patient wants to pursue further testing and/or imaging his PCP can coordinate that.  Return precautions have been reviewed.  After treatment with IV fluids antiemetics the patient feels much better.  He was able to ambulate without any symptoms.  His blood pressure did fluctuate somewhat but I did not feel it required any intervention at this time.    Hydration: Based  on the patient's presentation of Acute Vomiting and Dehydration the patient was given IV fluids. IV Hydration was used because oral hydration was not adequate alone. Upon recheck following hydration, the patient was improved.          ADDITIONAL PROBLEMS MANAGED      DISPOSITION AND DISCUSSIONS  I have discussed management of the patient with the following physicians and ABI's: None    Discussion of management with other Q or appropriate source(s): None    Escalation of care considered, and ultimately not performed: None    Barriers to care at this time, including but not limited to: None.     Decision tools and prescription drugs considered including, but not limited to: None.    FINAL DIAGNOSIS  1. Dehydration    2. Dizziness    3. Pain of upper abdomen           Electronically signed by: Michele Figueroa M.D., 5/1/2024 3:24 PM

## 2024-05-01 NOTE — ED NOTES
Chief Complaint   Patient presents with    Dizziness    Epigastric Pain    Chest Pain    Weakness     Pt wheeled to triage with above complaints started yesterday. He was sent from urgent , pt also c/o of nausea . EKG completed.

## 2024-05-01 NOTE — PROGRESS NOTES
"Subjective     Vert Shanna Becker is a 89 y.o. male who presents with chest pain, dizziness, hypertension, and weakness x1 day.     HPI:   Huong is a 89yo male presenting for chest pain, dizziness, hypertension, and weakness x1 day. Reports associated nausea. Pertinent medical history of HTN, currently taking hydrochlorothiazide 12.5mg/day as prescribed. Denies headache or eye pain. He is not on blood thinners. Denies palpitations, lower extremity swelling, or arm pain. No fever, shortness of breath, or vomiting. Denies history of MI/DVT/PE/stroke.        Review of Systems   Constitutional:  Negative for chills, fever and malaise/fatigue.   Eyes:  Negative for blurred vision, double vision, photophobia and pain.   Respiratory:  Negative for cough, shortness of breath and stridor.    Cardiovascular:  Positive for chest pain. Negative for palpitations and leg swelling.   Gastrointestinal:  Positive for nausea. Negative for abdominal pain, diarrhea and vomiting.   Genitourinary:  Negative for dysuria.   Musculoskeletal:  Negative for myalgias and neck pain.   Neurological:  Positive for dizziness. Negative for tingling, sensory change, speech change, focal weakness, seizures, loss of consciousness, weakness and headaches.     Past Medical History:   Diagnosis Date    Anesthesia     PONV, and hard to wake up    Arthritis     osteoarthritis, hips    Breath shortness     \"sob came before 11/2016, did a chest x-ray, and it didn't show anything\"    Cataract     Bilateral IOL implants    Heart burn     no meds    High cholesterol     no meds    Hip pain, bilateral 11/30/2018    and lower back.    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 Follow with CT every 6 months and no changes.     Parkinson's disease     no meds    Pneumonia     November 2016, no admission       Past Surgical History:   Procedure Laterality Date    GASTROSCOPY N/A " "3/24/2017    Procedure: GASTROSCOPY;  Surgeon: Raphael Crawford M.D.;  Location: SURGERY [facility];  Service:     EGD W/ENDOSCOPIC ULTRASOUND N/A 3/24/2017    Procedure: EGD W/ENDOSCOPIC ULTRASOUND- UPPER, LINEAR, RADIAL ON STANDBY;  Surgeon: Raphael Crawford M.D.;  Location: SURGERY [facility];  Service:     EGD WITH ASP/BX N/A 3/24/2017    Procedure: EGD WITH ASP/BX - FINE NEEDLE ASPIRATION;  Surgeon: Raphael Crawford M.D.;  Location: SURGERY [facility];  Service:     CATARACT EXTRACTION WITH IOL Bilateral 2016    CHOLECYSTECTOMY  2013    KNEE ARTHROSCOPY Left 2013    COLONOSCOPY  2013    OTHER SURGICAL PROCEDURE  2000    salivary gland removed.     LUMBAR LAMINECTOMY DISKECTOMY  1995    L5 \"cleaned up\"    TURP-VAPOR  1990    TURP    OTHER SURGICAL PROCEDURE  1988    salivary gland removed.    ULCER EXCISION  1955    gastric ulcer       Patient has no known allergies.     Current Outpatient Medications:     traMADol (ULTRAM) 50 MG Tab, Take 50 mg by mouth 3 times a day., Disp: , Rfl:     diclofenac sodium (VOLTAREN) 1 % Gel, Apply 4 g topically 4 times a day as needed (apply to affected areas)., Disp: 100 g, Rfl: 2    docusate sodium (COLACE) 100 MG Cap, Take 1 Capsule by mouth 2 times a day., Disp: 60 Capsule, Rfl: 0    hydrocortisone (ANUSOL-HC) 25 MG Suppos, Insert 1 Suppository into the rectum every 12 hours., Disp: 12 Suppository, Rfl: 0    hydrocortisone rectal (PERIANAL) 2.5% Cream, Apply small amount to rectal area 2 times daily as needed., Disp: 28 g, Rfl: 0    witch hazel-glycerin (SOOZE) Pads, Insert 1 Each into the rectum as needed (use as needed for pain)., Disp: 40 Each, Rfl: 3    hydrochlorothiazide (MICROZIDE) 12.5 MG capsule, TAKE 1 CAPSULE BY MOUTH EVERY DAY, Disp: 90 Capsule, Rfl: 3    pregabalin (LYRICA) 25 MG Cap, 50 mg., Disp: , Rfl:     albuterol 108 (90 Base) MCG/ACT Aero Soln inhalation aerosol, PLEASE SEE ATTACHED FOR DETAILED DIRECTIONS, Disp: , Rfl:     cyanocobalamin (VITAMIN " "B-12) 100 MCG Tab, Take 1 Tablet by mouth every day., Disp: 30 Tablet, Rfl: 3    tadalafil (CIALIS) 10 MG tablet, Take 1 Tablet by mouth 1 time a day as needed for Erectile Dysfunction., Disp: 10 Tablet, Rfl: 3    ondansetron (ZOFRAN) 4 MG Tab tablet, Take 1 Tablet by mouth every four hours as needed for Nausea/Vomiting., Disp: 20 Tablet, Rfl: 1    diphenhydrAMINE (BENADRYL) 25 MG capsule, Take 25 mg by mouth every 6 hours as needed., Disp: , Rfl:     acetaminophen (TYLENOL) 500 MG Tab, Take 500-1,000 mg by mouth every 6 hours as needed., Disp: , Rfl:     fluticasone (FLONASE) 50 MCG/ACT nasal spray, Administer 1 Spray into affected nostril(S) every day., Disp: 15.8 mL, Rfl: 0    Social History     Tobacco Use    Smoking status: Former     Current packs/day: 0.00     Average packs/day: 0.2 packs/day for 10.0 years (2.0 ttl pk-yrs)     Types: Cigarettes, Cigars     Start date:      Quit date:      Years since quittin.3    Smokeless tobacco: Never   Vaping Use    Vaping Use: Never used   Substance Use Topics    Alcohol use: Yes     Comment: 2-3 per month    Drug use: No      History reviewed. No pertinent family history.     Medications, Allergies, and current problem list reviewed today in Epic.      Objective     BP (!) 164/78 (BP Location: Left arm, Patient Position: Sitting, BP Cuff Size: Adult)   Pulse 93   Temp 36.4 °C (97.6 °F) (Temporal)   Resp 14   Ht 1.702 m (5' 7\") Comment: PT reported  Wt 76.2 kg (168 lb)   SpO2 95%   BMI 26.31 kg/m²      Physical Exam  Vitals reviewed.   Constitutional:       General: He is not in acute distress.  HENT:      Nose: Nose normal.   Eyes:      General: Vision grossly intact. Gaze aligned appropriately. No visual field deficit.     Extraocular Movements: Extraocular movements intact.      Conjunctiva/sclera: Conjunctivae normal.      Pupils: Pupils are equal, round, and reactive to light.   Cardiovascular:      Rate and Rhythm: Normal rate and regular " rhythm.      Pulses: Normal pulses.           Radial pulses are 2+ on the right side and 2+ on the left side.      Heart sounds: Normal heart sounds.   Pulmonary:      Effort: Pulmonary effort is normal. No tachypnea, accessory muscle usage, prolonged expiration, respiratory distress or retractions.      Breath sounds: Normal breath sounds. No stridor. No wheezing, rhonchi or rales.   Musculoskeletal:      Cervical back: Full passive range of motion without pain, normal range of motion and neck supple. No rigidity. Normal range of motion.      Right lower leg: No edema.      Left lower leg: No edema.   Skin:     General: Skin is warm and dry.   Neurological:      Mental Status: He is alert. Mental status is at baseline.   Psychiatric:         Mood and Affect: Mood normal.         Behavior: Behavior normal.         Thought Content: Thought content normal.       Results for orders placed or performed in visit on 05/01/24   POCT Glucose   Result Value Ref Range    Glucose - Accu-Ck 110 (A) 65 - 99 mg/dL      EKG: NSR     Assessment & Plan     1. Chest pain, unspecified type   - EKG - Clinic Performed    2. Dizziness   - POCT Glucose  - Orthostatic Blood Pressure       MDM/Comments:   Elderly patient with chest pain, hypertension, dizziness, nausea, and weakness. Unable to rule out CAD at this time, warranting transfer to higher level of care for further evaluation.   Patient verbalizes understanding and is in agreement with the plan of care. Reports called to transfer center.     Differential diagnosis, natural history, supportive care, and indications for immediate follow-up discussed.        Disposition:     Higher level care via private car               Electronically signed by UBALDO Marte

## 2024-05-02 NOTE — ED NOTES
Aox4, ERP at bedside, talking to doctor, denies pain and discomfort. Pt on RA with family at bedside

## 2024-05-02 NOTE — ED NOTES
Pt able to ambulate using walker, pt denies discomfort or complains while ambulating, no dizziness, chest pain and short of breath. Pt HR: 109-111; sats at 91-94%. ERP informed

## 2024-05-02 NOTE — ED NOTES
Bedside report received from off going RN Penny, assumed care of patient.  POC discussed with patient. Call light within reach, all needs addressed at this time.       Fall risk interventions in place: Move the patient closer to the nurse's station, Patient's personal possessions are with in their safe reach, Place fall risk sign on patient's door, Give patient urinal if applicable, Keep floor surfaces clean and dry, and Accompanied to restroom (all applicable per [facility] Fall risk assessment)   Continuous monitoring: Cardiac Leads, Pulse Ox, or Blood Pressure  IVF/IV medications: Not Applicable   Oxygen: Room Air  Bedside sitter: Not Applicable   Isolation: Not Applicable

## 2024-05-02 NOTE — ED NOTES
Vital signs taken and recorded. IV removed. Discharge in stable condition ambulatory accompanied by family. Health teachings given to patient and family with full understanding of the information given. No personal belongings left.

## 2024-05-02 NOTE — ED NOTES
Med Rec complete per patient with family at bedside   Allergies reviewed  Antibiotics in the past 30 days:no  Anticoagulant in past 14 days:no  Pharmacy patient utilizes:[facility] [address]

## 2024-05-03 ENCOUNTER — OFFICE VISIT (OUTPATIENT)
Dept: MEDICAL GROUP | Facility: CLINIC | Age: 89
End: 2024-05-03
Payer: MEDICARE

## 2024-05-03 VITALS
WEIGHT: 167.5 LBS | OXYGEN SATURATION: 96 % | BODY MASS INDEX: 26.29 KG/M2 | DIASTOLIC BLOOD PRESSURE: 82 MMHG | HEIGHT: 67 IN | HEART RATE: 96 BPM | TEMPERATURE: 97.5 F | SYSTOLIC BLOOD PRESSURE: 156 MMHG

## 2024-05-03 DIAGNOSIS — F41.9 ANXIETY: ICD-10-CM

## 2024-05-03 DIAGNOSIS — G89.29 CHRONIC BILATERAL LOW BACK PAIN WITH BILATERAL SCIATICA: ICD-10-CM

## 2024-05-03 DIAGNOSIS — F32.9 REACTIVE DEPRESSION: ICD-10-CM

## 2024-05-03 DIAGNOSIS — M54.41 CHRONIC BILATERAL LOW BACK PAIN WITH BILATERAL SCIATICA: ICD-10-CM

## 2024-05-03 DIAGNOSIS — M54.42 CHRONIC BILATERAL LOW BACK PAIN WITH BILATERAL SCIATICA: ICD-10-CM

## 2024-05-03 DIAGNOSIS — R07.89 CHEST PAIN, ATYPICAL: ICD-10-CM

## 2024-05-03 PROCEDURE — 3077F SYST BP >= 140 MM HG: CPT | Performed by: FAMILY MEDICINE

## 2024-05-03 PROCEDURE — 3079F DIAST BP 80-89 MM HG: CPT | Performed by: FAMILY MEDICINE

## 2024-05-03 PROCEDURE — 99215 OFFICE O/P EST HI 40 MIN: CPT | Performed by: FAMILY MEDICINE

## 2024-05-03 RX ORDER — SERTRALINE HYDROCHLORIDE 25 MG/1
25 TABLET, FILM COATED ORAL DAILY
Qty: 30 TABLET | Refills: 11 | Status: SHIPPED | OUTPATIENT
Start: 2024-05-03 | End: 2024-05-28

## 2024-05-03 ASSESSMENT — FIBROSIS 4 INDEX: FIB4 SCORE: 2.6

## 2024-05-03 NOTE — ASSESSMENT & PLAN NOTE
After full discussion of risks and benefits, I am going to start him on a low-dose of sertraline at 25 mg a day. Any significant side effects and he will stop or check in with me.  We will titrate up as tolerated.  I wrote a note to that effect that he will also show his pain management doctor.  If this is tolerated we might consider Cymbalta.  He will stay off the Tramadol. Will also want to place a referral to the [facility] for aging there does seem to be some global act on his mood and attitude along with general functioning.  Follow-up in 1 month with me.

## 2024-05-03 NOTE — PROGRESS NOTES
"Subjective:     CC: ER f/u, anxiety, LBP    HPI:   April presents today to discuss the following issues     Problem   Low Back Pain    The symptoms remain variable, but he is currently in a lot more pain.  Is following with pain management and there is a new procedure called a \"mild\" procedure that is being considered.  Signs are being adjusted as mentioned elsewhere.  He is fairly miserable with this at the moment.    Prior    This is much more tolerable, he rates it as a 4 out of 10.  Previously it was often an 8 or 9.  He attributes this to stabilization on a dose of Lyrica at 50 mg 3 times daily.  He did also have epidural injections several weeks prior.  Both treatments are being provided through pain management.     Anxiety    Huong had been doing better with this by his report, but both he and his wife say that it has gotten considerably worse.  He perseverates and obsesses over things and apparently can get quite irritable even angry with family members at times.  He does believe that the ongoing pain is adding to this issue.  He has been seeing a counselor although had to miss a few appointments.     Chest Pain, Atypical    Huong is in for a follow-up on a emergency room visit from 2 days ago.  Suffered  atypical chest pain, nausea, wheezing, weakness hot and cold clammy sensation the day before.  He was also quite nauseous.  Did not vomit.  A full workup was undertaken in the ER and I have reviewed those notes.  Labs are generally normal although there is a slight elevation in troponins.  He was quite hypertensive at 1 point and then briefly hypotensive.  Other labs, EKG, CT of the abdomen, x-ray of the chest showed no new or acute abnormality.  He was offered admission to further evaluate but very did not want to stay and was discharged to follow-up here.  At this point he feels back to his baseline far as the respiratory, chest symptoms and GI distress.         Current Outpatient Medications Ordered in " Epic   Medication Sig Dispense Refill    sertraline (ZOLOFT) 25 MG tablet Take 1 Tablet by mouth every day. 30 Tablet 11    Alum Hydroxide-Mag Trisilicate (GAVISCON) 80-14.2 MG Chew Tab Chew 1 Tablet 1 time a day as needed.      diclofenac sodium (VOLTAREN) 1 % Gel Apply 4 g topically 4 times a day as needed (apply to affected areas). 100 g 2    hydrochlorothiazide (MICROZIDE) 12.5 MG capsule TAKE 1 CAPSULE BY MOUTH EVERY DAY 90 Capsule 3    pregabalin (LYRICA) 50 MG capsule Take 50 mg by mouth 2 times a day.      cetirizine (ZYRTEC) 10 MG Tab TAKE 1 TABLET BY MOUTH EVERY DAY (Patient taking differently: Take 10 mg by mouth 1 time a day as needed for Allergies.) 30 Tablet 11    diphenhydrAMINE (BENADRYL) 25 MG capsule Take 25 mg by mouth 1 time a day as needed for Itching.      acetaminophen (TYLENOL) 500 MG Tab Take 500-1,000 mg by mouth every 6 hours as needed.      docusate sodium (COLACE) 100 MG Cap Take 1 Capsule by mouth 2 times a day. (Patient not taking: Reported on 5/3/2024) 60 Capsule 0    hydrocortisone (ANUSOL-HC) 25 MG Suppos Insert 1 Suppository into the rectum every 12 hours. (Patient not taking: Reported on 5/3/2024) 12 Suppository 0    hydrocortisone rectal (PERIANAL) 2.5% Cream Apply small amount to rectal area 2 times daily as needed. (Patient not taking: Reported on 5/3/2024) 28 g 0    witch hazel-glycerin (SOOZE) Pads Insert 1 Each into the rectum as needed (use as needed for pain). (Patient not taking: Reported on 5/3/2024) 40 Each 3     No current Epic-ordered facility-administered medications on file.       Health Maintenance:     ROS:  Gen: no fevers/chills, no changes in weight  Eyes: no changes in vision  ENT: no sore throat, no hearing loss, no bloody nose  Pulm: no sob, no cough  CV: no chest pain, no palpitations  GI: no nausea/vomiting, no diarrhea  : no dysuria  MSk: no myalgias  Skin: no rash  Neuro: no headaches, no numbness/tingling  Heme/Lymph: no easy bruising      Objective:  "    Exam:  BP (!) 156/82 (BP Location: Left arm, Patient Position: Sitting, BP Cuff Size: Adult)   Pulse 96   Temp 36.4 °C (97.5 °F) (Temporal)   Ht 1.702 m (5' 7\")   Wt 76 kg (167 lb 8 oz)   SpO2 96%   BMI 26.23 kg/m²  Body mass index is 26.23 kg/m².    Gen: Alert and oriented, No apparent distress.  Neck: Neck is supple without lymphadenopathy.  Lungs: Normal effort, CTA bilaterally, no wheezes, rhonchi, or rales  CV: Regular rate and rhythm. No murmurs, rubs, or gallops.  Ext: No clubbing, cyanosis, edema.          Assessment & Plan:     89 y.o. male with the following -     Problem List Items Addressed This Visit       Anxiety     After full discussion of risks and benefits, I am going to start him on a low-dose of sertraline at 25 mg a day. Any significant side effects and he will stop or check in with me.  We will titrate up as tolerated.  I wrote a note to that effect that he will also show his pain management doctor.  If this is tolerated we might consider Cymbalta.  He will stay off the Tramadol. Will also want to place a referral to the [facility] for aging there does seem to be some global act on his mood and attitude along with general functioning.  Follow-up in 1 month with me.         Relevant Medications    sertraline (ZOLOFT) 25 MG tablet    Other Relevant Orders    REFERRAL TO [facility]    Chest pain, atypical     The symptoms all seemed to develop after he was restarted on tramadol for his chronic lower back and leg pain which was in addition in addition to Lyrica.  Symptoms did seem to resolve when stopping it.  I did review all of his findings and given the normal workup, the inciting medication and the resolution of symptoms I am not currently recommending further cardiac dilation including treadmill.  He does know ER precautions though and will certainly return if symptoms recur.  Patient's wife was in the room during this discussion and she heard and agrees.         Low back " pain     I reviewed medicines and left a note for his pain doctor. He does not tolerate tramadol.          Reactive depression    Relevant Medications    sertraline (ZOLOFT) 25 MG tablet    Other Relevant Orders    REFERRAL TO [facility]       I spent a total of 44 minutes with record review, exam, communication with the patient, communication with other providers, and documentation of this encounter.      No follow-ups on file.

## 2024-05-03 NOTE — ASSESSMENT & PLAN NOTE
The symptoms all seemed to develop after he was restarted on tramadol for his chronic lower back and leg pain which was in addition in addition to Lyrica.  Symptoms did seem to resolve when stopping it.  I did review all of his findings and given the normal workup, the inciting medication and the resolution of symptoms I am not currently recommending further cardiac dilation including treadmill.  He does know ER precautions though and will certainly return if symptoms recur.  Patient's wife was in the room during this discussion and she heard and agrees.

## 2024-05-28 RX ORDER — SERTRALINE HYDROCHLORIDE 25 MG/1
25 TABLET, FILM COATED ORAL DAILY
Qty: 90 TABLET | Refills: 4 | Status: SHIPPED | OUTPATIENT
Start: 2024-05-28

## 2024-06-05 ENCOUNTER — APPOINTMENT (OUTPATIENT)
Dept: MEDICAL GROUP | Facility: CLINIC | Age: 89
End: 2024-06-05
Payer: MEDICARE

## 2024-06-05 VITALS
TEMPERATURE: 96.9 F | SYSTOLIC BLOOD PRESSURE: 132 MMHG | OXYGEN SATURATION: 96 % | BODY MASS INDEX: 27.49 KG/M2 | HEIGHT: 64 IN | DIASTOLIC BLOOD PRESSURE: 74 MMHG | WEIGHT: 161 LBS | HEART RATE: 78 BPM

## 2024-06-05 DIAGNOSIS — F41.9 ANXIETY: ICD-10-CM

## 2024-06-05 DIAGNOSIS — R51.9 FACIAL PAIN: ICD-10-CM

## 2024-06-05 PROCEDURE — 3075F SYST BP GE 130 - 139MM HG: CPT | Performed by: FAMILY MEDICINE

## 2024-06-05 PROCEDURE — 99214 OFFICE O/P EST MOD 30 MIN: CPT | Performed by: FAMILY MEDICINE

## 2024-06-05 PROCEDURE — 3078F DIAST BP <80 MM HG: CPT | Performed by: FAMILY MEDICINE

## 2024-06-05 ASSESSMENT — FIBROSIS 4 INDEX: FIB4 SCORE: 2.6

## 2024-06-05 NOTE — PROGRESS NOTES
Subjective:     CC: Anxiety/agitation, LBP, facial ear pain    HPI:   April presents today to discuss the following issues     Problem   Facial Pain    Huong has had some episodic lancinating pain in the left ear for the last several weeks.  He is unsure what brings this on, there are no obvious alleviating or exacerbating factors.  There is no tenderness to touch.  His hearing is fine and he has no discharge or redness around the ears.     Anxiety    Huong feels his anxiety and agitation are considerably improved.  He thinks some of his earlier medications may have been contributing such as the pregabalin.  His pain is also somewhat better and he has a plan moving forward.  He has been on 25 mg of sertraline since I saw him last and is tolerating it but does not really feel it is doing much for him    Prior:    Huong had been doing better with this by his report, but both he and his wife say that it has gotten considerably worse.  He perseverates and obsesses over things and apparently can get quite irritable even angry with family members at times.  He does believe that the ongoing pain is adding to this issue.  He has been seeing a counselor although had to miss a few appointments.         Current Outpatient Medications Ordered in Epic   Medication Sig Dispense Refill    sertraline (ZOLOFT) 25 MG tablet TAKE 1 TABLET BY MOUTH EVERY DAY 90 Tablet 4    Alum Hydroxide-Mag Trisilicate (GAVISCON) 80-14.2 MG Chew Tab Chew 1 Tablet 1 time a day as needed.      diclofenac sodium (VOLTAREN) 1 % Gel Apply 4 g topically 4 times a day as needed (apply to affected areas). 100 g 2    hydrochlorothiazide (MICROZIDE) 12.5 MG capsule TAKE 1 CAPSULE BY MOUTH EVERY DAY 90 Capsule 3    diphenhydrAMINE (BENADRYL) 25 MG capsule Take 25 mg by mouth 1 time a day as needed for Itching.      acetaminophen (TYLENOL) 500 MG Tab Take 500-1,000 mg by mouth every 6 hours as needed.       No current Epic-ordered facility-administered medications  "on file.       Health Maintenance:     ROS:  Gen: no fevers/chills, no changes in weight  Eyes: no changes in vision  ENT: no sore throat, no hearing loss, no bloody nose  Pulm: no sob, no cough  CV: no chest pain, no palpitations  GI: no nausea/vomiting, no diarrhea  : no dysuria  MSk: no myalgias  Skin: no rash  Neuro: no headaches, no numbness/tingling  Heme/Lymph: no easy bruising      Objective:     Exam:  /74 (BP Location: Right arm, Patient Position: Sitting, BP Cuff Size: Adult)   Pulse 78   Temp 36.1 °C (96.9 °F) (Skin)   Ht 1.626 m (5' 4\")   Wt 73 kg (161 lb)   SpO2 96%   BMI 27.64 kg/m²  Body mass index is 27.64 kg/m².    Gen: Alert and oriented, No apparent distress.  Neck: Neck is supple without lymphadenopathy.  Lungs: Normal effort, CTA bilaterally, no wheezes, rhonchi, or rales  CV: Regular rate and rhythm. No murmurs, rubs, or gallops.  Ext: No clubbing, cyanosis, edema.          Assessment & Plan:     89 y.o. male with the following -     Problem List Items Addressed This Visit       Anxiety     A referral to the [facility] for [facility] was placed last visit I would still like him to keep that appointment.  I recommended that we try another month sertraline at a higher dose and he agrees and will double his existing 25 mg dose.  He is asked to check in with me by MyChart before he runs out so we can decide to continue or alter this regimen.         Facial pain     Exam is normal, TMs are clear as is the canal.  There is no tenderness over the mastoid no rash  or other lesion.  I recommend that he keep from rubbing at it if at all possible and he may use Voltaren gel empirically.  If symptoms continue or change at all we should reevaluate.            I spent a total of 34 minutes with record review, exam, communication with the patient, communication with other providers, and documentation of this encounter.      No follow-ups on file.            "

## 2024-06-05 NOTE — ASSESSMENT & PLAN NOTE
Exam is normal, TMs are clear as is the canal.  There is no tenderness over the mastoid no rash  or other lesion.  I recommend that he keep from rubbing at it if at all possible and he may use Voltaren gel empirically.  If symptoms continue or change at all we should reevaluate.

## 2024-06-05 NOTE — ASSESSMENT & PLAN NOTE
A referral to the [facility] for aging was placed last visit I would still like him to keep that appointment.  I recommended that we try another month sertraline at a higher dose and he agrees and will double his existing 25 mg dose.  He is asked to check in with me by [place] before he runs out so we can decide to continue or alter this regimen.

## 2024-07-08 ENCOUNTER — APPOINTMENT (OUTPATIENT)
Dept: MEDICAL GROUP | Facility: CLINIC | Age: 89
End: 2024-07-08
Payer: MEDICARE

## 2024-07-08 VITALS
OXYGEN SATURATION: 94 % | TEMPERATURE: 97.7 F | BODY MASS INDEX: 25.88 KG/M2 | WEIGHT: 164.9 LBS | HEART RATE: 73 BPM | HEIGHT: 67 IN | SYSTOLIC BLOOD PRESSURE: 158 MMHG | DIASTOLIC BLOOD PRESSURE: 70 MMHG

## 2024-07-08 DIAGNOSIS — E53.8 B12 DEFICIENCY: ICD-10-CM

## 2024-07-08 DIAGNOSIS — M54.15 RADICULOPATHY OF THORACOLUMBAR REGION: ICD-10-CM

## 2024-07-08 DIAGNOSIS — F32.9 REACTIVE DEPRESSION: ICD-10-CM

## 2024-07-08 PROCEDURE — 3078F DIAST BP <80 MM HG: CPT | Performed by: FAMILY MEDICINE

## 2024-07-08 PROCEDURE — 3077F SYST BP >= 140 MM HG: CPT | Performed by: FAMILY MEDICINE

## 2024-07-08 PROCEDURE — 99213 OFFICE O/P EST LOW 20 MIN: CPT | Performed by: FAMILY MEDICINE

## 2024-07-08 RX ORDER — PREGABALIN 25 MG/1
25 CAPSULE ORAL 2 TIMES DAILY
COMMUNITY
Start: 2024-06-27

## 2024-07-08 ASSESSMENT — FIBROSIS 4 INDEX: FIB4 SCORE: 2.62

## 2024-08-07 ENCOUNTER — PATIENT OUTREACH (OUTPATIENT)
Dept: HEALTH INFORMATION MANAGEMENT | Facility: OTHER | Age: 89
End: 2024-08-07
Payer: MEDICARE

## 2024-08-07 NOTE — PROGRESS NOTES
Attempt #1 made to contact the patient via telephone in order to introduce the PCM program. No answer received. I was able to leave a voicemail with the department phone number at [phone] in order to request a call back.

## 2024-08-08 NOTE — PROGRESS NOTES
Attempt #2 made to contact the patient via telephone in order to introduce the PCM program. No answer received. I was able to leave a voicemail with the department phone number at [phone] in order to request a call back.

## 2024-08-09 NOTE — PROGRESS NOTES
Attempt #3 made to contact the patient via telephone in order to introduce the PCM program. No answer received. I was able to leave a voicemail with the department phone number at [phone] in order to request a call back.

## 2024-08-19 ENCOUNTER — APPOINTMENT (OUTPATIENT)
Dept: MEDICAL GROUP | Facility: CLINIC | Age: 89
End: 2024-08-19
Payer: MEDICARE

## 2024-09-04 ENCOUNTER — APPOINTMENT (OUTPATIENT)
Dept: MEDICAL GROUP | Facility: CLINIC | Age: 89
End: 2024-09-04
Payer: MEDICARE

## 2024-09-04 VITALS
DIASTOLIC BLOOD PRESSURE: 78 MMHG | BODY MASS INDEX: 25.43 KG/M2 | HEIGHT: 67 IN | SYSTOLIC BLOOD PRESSURE: 173 MMHG | RESPIRATION RATE: 16 BRPM | WEIGHT: 162 LBS | HEART RATE: 80 BPM | OXYGEN SATURATION: 94 %

## 2024-09-04 DIAGNOSIS — G89.29 CHRONIC BILATERAL LOW BACK PAIN WITH BILATERAL SCIATICA: ICD-10-CM

## 2024-09-04 DIAGNOSIS — E53.8 VITAMIN B12 DEFICIENCY: ICD-10-CM

## 2024-09-04 DIAGNOSIS — M54.42 CHRONIC BILATERAL LOW BACK PAIN WITH BILATERAL SCIATICA: ICD-10-CM

## 2024-09-04 DIAGNOSIS — M54.41 CHRONIC BILATERAL LOW BACK PAIN WITH BILATERAL SCIATICA: ICD-10-CM

## 2024-09-04 PROCEDURE — 3077F SYST BP >= 140 MM HG: CPT | Performed by: FAMILY MEDICINE

## 2024-09-04 PROCEDURE — 99213 OFFICE O/P EST LOW 20 MIN: CPT | Performed by: FAMILY MEDICINE

## 2024-09-04 PROCEDURE — 3078F DIAST BP <80 MM HG: CPT | Performed by: FAMILY MEDICINE

## 2024-09-04 RX ORDER — CYANOCOBALAMIN 1000 UG/ML
1000 INJECTION, SOLUTION INTRAMUSCULAR; SUBCUTANEOUS ONCE
Status: SHIPPED | OUTPATIENT
Start: 2024-09-04 | End: 2024-09-07

## 2024-09-04 ASSESSMENT — FIBROSIS 4 INDEX: FIB4 SCORE: 2.62

## 2024-09-04 NOTE — PROGRESS NOTES
"Subjective:     CC: Pernicious Anemia, Chronic LBP    HPI:   April presents today to discuss the following issues     Problem   Low Back Pain    Huong had another epidural a few weeks ago and it has not been  helpful so far. He remains on Lyrica 25 mg bid and is  tolerating. He feels it helps somewhat . Sx's are worse in the morning.    Prior    The symptoms remain variable, but he is currently in a lot more pain.  Is following with pain management and there is a new procedure called a \"mild\" procedure that is being considered.  Signs are being adjusted as mentioned elsewhere.  He is fairly miserable with this at the moment.    Prior    This is much more tolerable, he rates it as a 4 out of 10.  Previously it was often an 8 or 9.  He attributes this to stabilization on a dose of Lyrica at 50 mg 3 times daily.  He did also have epidural injections several weeks prior.  Both treatments are being provided through pain management.     Vitamin B12 Deficiency    Doing well with this. We continue monthly B12 injections.   He is due today. No systemic sx's or new concerns         Current Outpatient Medications Ordered in Epic   Medication Sig Dispense Refill    pregabalin (LYRICA) 25 MG Cap Take 25 mg by mouth 2 times a day.      sertraline (ZOLOFT) 25 MG tablet TAKE 1 TABLET BY MOUTH EVERY DAY 90 Tablet 4    Alum Hydroxide-Mag Trisilicate (GAVISCON) 80-14.2 MG Chew Tab Chew 1 Tablet 1 time a day as needed.      diclofenac sodium (VOLTAREN) 1 % Gel Apply 4 g topically 4 times a day as needed (apply to affected areas). 100 g 2    hydrochlorothiazide (MICROZIDE) 12.5 MG capsule TAKE 1 CAPSULE BY MOUTH EVERY DAY 90 Capsule 3    diphenhydrAMINE (BENADRYL) 25 MG capsule Take 25 mg by mouth 1 time a day as needed for Itching.      acetaminophen (TYLENOL) 500 MG Tab Take 500-1,000 mg by mouth every 6 hours as needed.       Current Facility-Administered Medications Ordered in Epic   Medication Dose Route Frequency Provider Last " "Rate Last Admin    cyanocobalamin (Vitamin B-12) injection 1,000 mcg  1,000 mcg Intramuscular Once            Health Maintenance:     ROS:  Gen: no fevers/chills, no changes in weight  Eyes: no changes in vision  ENT: no sore throat, no hearing loss, no bloody nose  Pulm: no sob, no cough  CV: no chest pain, no palpitations  GI: no nausea/vomiting, no diarrhea  : no dysuria  MSk: no myalgias  Skin: no rash  Neuro: no headaches, no numbness/tingling  Heme/Lymph: no easy bruising      Objective:     Exam:  BP (!) 173/78 (BP Location: Right arm, Patient Position: Sitting, BP Cuff Size: Adult)   Pulse 80   Resp 16   Ht 1.702 m (5' 7\")   Wt 73.5 kg (162 lb)   SpO2 94%   BMI 25.37 kg/m²  Body mass index is 25.37 kg/m².    Gen: Alert and oriented, No apparent distress.  Neck: Neck is supple without lymphadenopathy.  Lungs: Normal effort, CTA bilaterally, no wheezes, rhonchi, or rales  CV: Regular rate and rhythm. No murmurs, rubs, or gallops.  Ext: No clubbing, cyanosis, edema.          Assessment & Plan:     89 y.o. male with the following -     Problem List Items Addressed This Visit       Low back pain     I will follow along.  Do not believe it has been on duloxetine yet and that is a medicine that may be considered in the future if necessary.  In the meantime I will defer to pain management.         Vitamin B12 deficiency     Cyanocobalmin given.  RTC one month            I spent a total of 21 minutes with record review, exam, communication with the patient, communication with other providers, and documentation of this encounter.      No follow-ups on file.            "

## 2024-09-04 NOTE — ASSESSMENT & PLAN NOTE
I will follow along.  Do not believe it has been on duloxetine yet and that is a medicine that may be considered in the future if necessary.  In the meantime I will defer to pain management.

## 2024-10-16 ENCOUNTER — OFFICE VISIT (OUTPATIENT)
Dept: MEDICAL GROUP | Facility: CLINIC | Age: 89
End: 2024-10-16
Payer: MEDICARE

## 2024-10-16 VITALS
HEART RATE: 70 BPM | OXYGEN SATURATION: 94 % | HEIGHT: 67 IN | SYSTOLIC BLOOD PRESSURE: 152 MMHG | TEMPERATURE: 97 F | WEIGHT: 169 LBS | BODY MASS INDEX: 26.53 KG/M2 | DIASTOLIC BLOOD PRESSURE: 91 MMHG

## 2024-10-16 DIAGNOSIS — G62.9 NEUROPATHY: ICD-10-CM

## 2024-10-16 DIAGNOSIS — R29.6 FREQUENT FALLS: ICD-10-CM

## 2024-10-16 DIAGNOSIS — Z23 NEED FOR VACCINATION: ICD-10-CM

## 2024-10-16 DIAGNOSIS — E53.8 B12 DEFICIENCY: ICD-10-CM

## 2024-10-16 PROCEDURE — 3080F DIAST BP >= 90 MM HG: CPT | Performed by: FAMILY MEDICINE

## 2024-10-16 PROCEDURE — G0008 ADMIN INFLUENZA VIRUS VAC: HCPCS | Performed by: FAMILY MEDICINE

## 2024-10-16 PROCEDURE — 90662 IIV NO PRSV INCREASED AG IM: CPT | Performed by: FAMILY MEDICINE

## 2024-10-16 PROCEDURE — 99213 OFFICE O/P EST LOW 20 MIN: CPT | Mod: 25 | Performed by: FAMILY MEDICINE

## 2024-10-16 PROCEDURE — 3077F SYST BP >= 140 MM HG: CPT | Performed by: FAMILY MEDICINE

## 2024-10-16 ASSESSMENT — FIBROSIS 4 INDEX: FIB4 SCORE: 2.62

## 2024-11-18 ENCOUNTER — OFFICE VISIT (OUTPATIENT)
Dept: MEDICAL GROUP | Facility: CLINIC | Age: 89
End: 2024-11-18
Payer: MEDICARE

## 2024-11-18 VITALS
OXYGEN SATURATION: 92 % | WEIGHT: 173.6 LBS | HEART RATE: 71 BPM | HEIGHT: 67 IN | DIASTOLIC BLOOD PRESSURE: 81 MMHG | BODY MASS INDEX: 27.25 KG/M2 | TEMPERATURE: 97.4 F | SYSTOLIC BLOOD PRESSURE: 135 MMHG

## 2024-11-18 DIAGNOSIS — G62.9 NEUROPATHY: ICD-10-CM

## 2024-11-18 PROCEDURE — 3079F DIAST BP 80-89 MM HG: CPT | Performed by: FAMILY MEDICINE

## 2024-11-18 PROCEDURE — 99214 OFFICE O/P EST MOD 30 MIN: CPT | Performed by: FAMILY MEDICINE

## 2024-11-18 PROCEDURE — 3075F SYST BP GE 130 - 139MM HG: CPT | Performed by: FAMILY MEDICINE

## 2024-11-18 ASSESSMENT — FIBROSIS 4 INDEX: FIB4 SCORE: 2.62

## 2024-11-18 NOTE — PROGRESS NOTES
Subjective:     CC: Radicular pain, falling, paresthesias.    HPI:   April presents today to discuss the following issues      Problem   Neuropathy    Paresthesias and radicular symptoms are increased in both legs and is also noticing tingling and pain in both hands now.  The latter is new.  Another epidural is being planned for this week by pain management.  His Lyrica dose was also increased to 100 mg a day.    Prior:    He is happy with his new pain management doctor and they have been increasing the dose of pregabalin slowly.  He is still in a fair amount of pain unfortunately.  No injections are currently being considered and I hear that surgery is off the table.    Prior:     Huong continues to have pretty severe pain that radiates from his lower back down into both legs.  We have been following this for some time.  We were finally able to get him into pain management and he did have a visit about a month ago. They agreed that an epidural injection may be in order, but first needed to try physical therapy and prescribed gabapentin in a tapering dose.  He did not really tolerate the gabapentin  and was instructed to taper off.  He is no longer on that medicine.  He has had a single physical therapy visit and was able to get more scheduled.  So far this has not affected his symptoms.  His only medicine now is Tylenol on a scheduled basis.  I prescribed tramadol on the last visit as he did not tolerate stronger opioids.  It does not appear that he got that medicine or at least is unfamiliar with it.  He has a follow-up with pain management in about a week.         Current Outpatient Medications Ordered in Epic   Medication Sig Dispense Refill    pregabalin (LYRICA) 25 MG Cap Take 100 mg by mouth 2 times a day.      sertraline (ZOLOFT) 25 MG tablet TAKE 1 TABLET BY MOUTH EVERY DAY 90 Tablet 4    Alum Hydroxide-Mag Trisilicate (GAVISCON) 80-14.2 MG Chew Tab Chew 1 Tablet 1 time a day as needed.      diclofenac  "sodium (VOLTAREN) 1 % Gel Apply 4 g topically 4 times a day as needed (apply to affected areas). 100 g 2    hydrochlorothiazide (MICROZIDE) 12.5 MG capsule TAKE 1 CAPSULE BY MOUTH EVERY DAY 90 Capsule 3    diphenhydrAMINE (BENADRYL) 25 MG capsule Take 25 mg by mouth 1 time a day as needed for Itching.      acetaminophen (TYLENOL) 500 MG Tab Take 500-1,000 mg by mouth every 6 hours as needed.       No current Epic-ordered facility-administered medications on file.       Health Maintenance:     ROS:  Gen: no fevers/chills, no changes in weight  Eyes: no changes in vision  ENT: no sore throat, no hearing loss, no bloody nose  Pulm: no sob, no cough  CV: no chest pain, no palpitations  GI: no nausea/vomiting, no diarrhea  : no dysuria  MSk: no myalgias  Skin: no rash  Neuro: no headaches, no numbness/tingling  Heme/Lymph: no easy bruising      Objective:     Exam:  /81 (BP Location: Left arm, Patient Position: Sitting, BP Cuff Size: Adult)   Pulse 71   Temp 36.3 °C (97.4 °F) (Temporal)   Ht 1.702 m (5' 7\")   Wt 78.7 kg (173 lb 9.6 oz)   SpO2 92%   BMI 27.19 kg/m²  Body mass index is 27.19 kg/m².    Gen: Alert and oriented, No apparent distress.  Neck: Neck is supple without lymphadenopathy.  Lungs: Normal effort, CTA bilaterally, no wheezes, rhonchi, or rales  CV: Regular rate and rhythm. No murmurs, rubs, or gallops.  Ext: No clubbing, cyanosis, edema.          Assessment & Plan:     89 y.o. male with the following -     Problem List Items Addressed This Visit       Neuropathy     Lower extremity exam is unchanged.  His hands are warm with good pulses in all directions.  This is not affected by head turning or movement.  Lhermitte sign is negative.  Phalen's and Tinel's are negative.  Hand strength is 5+, sensation is intact muscle strength is good.  I am unclear what the new symptoms are related to but its only been in the past 10 days.  I recommended that we wait until his next epidural before he look " into further evaluation such as an MRI of the neck.  Of further concern, danika is falling more often and I am worried that this might be a Lyrica affect.  I have asked him to bring this up with physiatry and to ask whether Cymbalta might be an option down the road.  I would like him to come back in 1 month.  A physical therapy order was placed in October for balance issues but his first appointment is not for another 3 months.                  No follow-ups on file.

## 2024-11-18 NOTE — ASSESSMENT & PLAN NOTE
Lower extremity exam is unchanged.  His hands are warm with good pulses in all directions.  This is not affected by head turning or movement.  Lhermitte sign is negative.  Phalen's and Tinel's are negative.  Hand strength is 5+, sensation is intact muscle strength is good.  I am unclear what the new symptoms are related to but its only been in the past 10 days.  I recommended that we wait until his next epidural before he look into further evaluation such as an MRI of the neck.  Of further concern, danika is falling more often and I am worried that this might be a Lyrica affect.  I have asked him to bring this up with physiatry and to ask whether Cymbalta might be an option down the road.  I would like him to come back in 1 month.  A physical therapy order was placed in October for balance issues but his first appointment is not for another 3 months.

## 2024-11-27 ENCOUNTER — OFFICE VISIT (OUTPATIENT)
Dept: URGENT CARE | Facility: PHYSICIAN GROUP | Age: 89
End: 2024-11-27
Payer: MEDICARE

## 2024-11-27 VITALS
SYSTOLIC BLOOD PRESSURE: 114 MMHG | HEIGHT: 67 IN | OXYGEN SATURATION: 94 % | RESPIRATION RATE: 16 BRPM | DIASTOLIC BLOOD PRESSURE: 50 MMHG | BODY MASS INDEX: 27.11 KG/M2 | TEMPERATURE: 99.1 F | WEIGHT: 172.73 LBS | HEART RATE: 94 BPM

## 2024-11-27 DIAGNOSIS — U07.1 COVID-19: ICD-10-CM

## 2024-11-27 LAB
FLUAV RNA SPEC QL NAA+PROBE: NEGATIVE
FLUBV RNA SPEC QL NAA+PROBE: NEGATIVE
RSV RNA SPEC QL NAA+PROBE: NEGATIVE
SARS-COV-2 RNA RESP QL NAA+PROBE: POSITIVE

## 2024-11-27 PROCEDURE — 3074F SYST BP LT 130 MM HG: CPT | Performed by: REGISTERED NURSE

## 2024-11-27 PROCEDURE — 3078F DIAST BP <80 MM HG: CPT | Performed by: REGISTERED NURSE

## 2024-11-27 PROCEDURE — 99214 OFFICE O/P EST MOD 30 MIN: CPT | Performed by: REGISTERED NURSE

## 2024-11-27 PROCEDURE — 0241U POCT CEPHEID COV-2, FLU A/B, RSV - PCR: CPT | Performed by: REGISTERED NURSE

## 2024-11-27 RX ORDER — BENZONATATE 100 MG/1
100 CAPSULE ORAL 3 TIMES DAILY PRN
Qty: 30 CAPSULE | Refills: 0 | Status: SHIPPED | OUTPATIENT
Start: 2024-11-27 | End: 2024-12-07

## 2024-11-27 ASSESSMENT — ENCOUNTER SYMPTOMS
ABDOMINAL PAIN: 0
FEVER: 0
SHORTNESS OF BREATH: 0
SORE THROAT: 1
COUGH: 1
DIZZINESS: 0

## 2024-11-27 ASSESSMENT — FIBROSIS 4 INDEX: FIB4 SCORE: 2.62

## 2024-11-27 NOTE — PROGRESS NOTES
Subjective:   April Becker is a 89 y.o. male who presents for Cough (C/o sore throat, fever and body chills x 2 days.)      HPI  Cold symptoms x 2 days. No fevers. Using tylenol and cough medicine.  No known exposures.  Denies chronic lung disease.  Tolerating p.o.  Immunizations current.    Review of Systems   Constitutional:  Negative for fever.   HENT:  Positive for congestion and sore throat.    Respiratory:  Positive for cough. Negative for shortness of breath.    Cardiovascular:  Negative for chest pain.   Gastrointestinal:  Negative for abdominal pain.   Skin:  Negative for rash.   Neurological:  Negative for dizziness.       No Known Allergies    Patient Active Problem List    Diagnosis Date Noted    Frequent falls 10/16/2024    Facial pain 06/05/2024    Pain of finger of right hand 04/18/2024    Hemorrhoid 04/03/2024    Ganglion cyst of finger 04/03/2024    Nasal polyp 01/24/2024    Reactive depression 01/24/2024    Wart of hand 12/05/2023    Lower extremity edema 08/02/2023    Radiculopathy of thoracolumbar region 05/11/2023    Other constipation 04/24/2023    Bronchitis 03/15/2023    Pleuritic chest pain 02/03/2023    Neoplasm of uncertain behavior 02/03/2023    Health care maintenance 12/13/2022    Chills 06/07/2022    Paresthesia and pain of both upper extremities 06/07/2022    Bilious vomiting with nausea 11/29/2021    Abdominal bloating 10/26/2021    Arthralgia of hand 07/29/2021    Gastroesophageal reflux disease 07/18/2021    Low back pain 07/18/2021    Thyroid nodule 07/18/2021    HTN (hypertension) 07/18/2021    Arthritis of hand 06/17/2021    Lipoma of skin and subcutaneous tissue of neck 05/17/2021    Nasal discharge 02/24/2021    Ear pain, left 09/18/2020    Tenderness of temporomandibular joint 05/22/2020    Allergic conjunctivitis 04/22/2020    Dysuria 04/22/2020    Polyuria 04/22/2020    Headache 01/16/2020    Eustachian tube dysfunction, left 12/24/2019    Carpal tunnel  syndrome, right 11/25/2019    Pain of left heel 11/25/2019    Intertriginous candidiasis 07/31/2019    Hypotensive episode 07/18/2019    Muscle weakness (generalized) 07/18/2019    Fatigue 07/18/2019    Generalized weakness 07/13/2019    Insomnia 07/02/2019    Sepsis (HCC) 07/02/2019    Bacteremia 06/19/2019    Hypophosphatemia 06/19/2019    Hypokalemia 06/18/2019    Transaminitis 06/18/2019    Essential hypertension 06/18/2019    Pancreatic mass 06/18/2019    Obesity with body mass index 30 or greater 06/10/2019    Pernicious anemia 03/06/2019    B12 deficiency 01/24/2019    Arthritis of hip 08/07/2018    Vitamin B12 deficiency 03/02/2018    Glossitis 01/03/2018    Urinary frequency 01/03/2018    Diarrhea 06/23/2017    Anxiety 04/25/2017    Dyspnea 03/07/2017    Disorder of pancreas 02/17/2017    Influenza 02/07/2017    Chest pain, atypical 01/17/2017    Encounter for general adult medical examination without abnormal findings 12/20/2016    Atelectasis 11/30/2016    Arthralgia of hip 06/28/2016    Gastroenteritis 06/28/2016    Microscopic hematuria 08/17/2015    Impaired glucose tolerance 08/17/2015    Neuropathy 07/27/2015    Sciatica 06/22/2015    Vertigo 06/22/2015    Hyperlipidemia 09/29/2014    Seasonal allergies 04/15/2014    Skin tag 04/15/2014       Current Outpatient Medications Ordered in Epic   Medication Sig Dispense Refill    benzonatate (TESSALON) 100 MG Cap Take 1 Capsule by mouth 3 times a day as needed for Cough for up to 10 days. 30 Capsule 0    pregabalin (LYRICA) 25 MG Cap Take 100 mg by mouth 2 times a day.      sertraline (ZOLOFT) 25 MG tablet TAKE 1 TABLET BY MOUTH EVERY DAY 90 Tablet 4    Alum Hydroxide-Mag Trisilicate (GAVISCON) 80-14.2 MG Chew Tab Chew 1 Tablet 1 time a day as needed.      diclofenac sodium (VOLTAREN) 1 % Gel Apply 4 g topically 4 times a day as needed (apply to affected areas). 100 g 2    hydrochlorothiazide (MICROZIDE) 12.5 MG capsule TAKE 1 CAPSULE BY MOUTH EVERY DAY 90  "Capsule 3    diphenhydrAMINE (BENADRYL) 25 MG capsule Take 25 mg by mouth 1 time a day as needed for Itching.      acetaminophen (TYLENOL) 500 MG Tab Take 500-1,000 mg by mouth every 6 hours as needed.       No current Epic-ordered facility-administered medications on file.       Past Surgical History:   Procedure Laterality Date    GASTROSCOPY N/A 3/24/2017    Procedure: GASTROSCOPY;  Surgeon: Raphael Crawford M.D.;  Location: SURGERY [facility];  Service:     EGD W/ENDOSCOPIC ULTRASOUND N/A 3/24/2017    Procedure: EGD W/ENDOSCOPIC ULTRASOUND- UPPER, LINEAR, RADIAL ON STANDBY;  Surgeon: Raphael Crawford M.D.;  Location: SURGERY [facility];  Service:     EGD WITH ASP/BX N/A 3/24/2017    Procedure: EGD WITH ASP/BX - FINE NEEDLE ASPIRATION;  Surgeon: Raphael Crawford M.D.;  Location: SURGERY [facility];  Service:     CATARACT EXTRACTION WITH IOL Bilateral 2016    CHOLECYSTECTOMY  2013    KNEE ARTHROSCOPY Left 2013    COLONOSCOPY      OTHER SURGICAL PROCEDURE      salivary gland removed.     LUMBAR LAMINECTOMY DISKECTOMY      L5 \"cleaned up\"    TURP-VAPOR      TURP    OTHER SURGICAL PROCEDURE      salivary gland removed.    ULCER EXCISION      gastric ulcer        Social History     Tobacco Use    Smoking status: Former     Current packs/day: 0.00     Average packs/day: 0.2 packs/day for 10.0 years (2.0 ttl pk-yrs)     Types: Cigarettes, Cigars     Start date:      Quit date:      Years since quittin.9    Smokeless tobacco: Never   Vaping Use    Vaping status: Never Used   Substance Use Topics    Alcohol use: Yes     Comment: 2-3 per month    Drug use: No       family history is not on file.     Problem list, medications, and allergies reviewed by myself today in Epic.     Objective:   /50 (BP Location: Right arm, Patient Position: Sitting, BP Cuff Size: Adult)   Pulse 94   Temp 37.3 °C (99.1 °F) (Temporal)   Resp 16   Ht 1.702 m (5' 7\")   Wt 78.4 kg (172 lb 11.7 " oz)   SpO2 94%   BMI 27.05 kg/m²     Physical Exam  Vitals and nursing note reviewed.   Constitutional:       General: He is not in acute distress.     Appearance: Normal appearance. He is well-developed. He is not ill-appearing, toxic-appearing or diaphoretic.   HENT:      Head: Normocephalic and atraumatic.      Right Ear: Tympanic membrane, ear canal and external ear normal.      Left Ear: Tympanic membrane, ear canal and external ear normal.      Nose: Congestion present. No rhinorrhea.      Mouth/Throat:      Mouth: Mucous membranes are moist.      Pharynx: Oropharynx is clear. Uvula midline. Postnasal drip present. No pharyngeal swelling, oropharyngeal exudate, posterior oropharyngeal erythema or uvula swelling.      Comments: Clear speech.  Managing oral secretions.  Eyes:      General:         Right eye: No discharge.         Left eye: No discharge.      Conjunctiva/sclera: Conjunctivae normal.   Cardiovascular:      Rate and Rhythm: Normal rate and regular rhythm.      Pulses: Normal pulses.      Heart sounds: Normal heart sounds.   Pulmonary:      Effort: Pulmonary effort is normal. No respiratory distress.      Breath sounds: Normal breath sounds. No wheezing, rhonchi or rales.   Chest:      Chest wall: No tenderness.   Musculoskeletal:         General: No swelling or tenderness.      Cervical back: Normal range of motion and neck supple.      Right lower leg: No edema.      Left lower leg: No edema.   Lymphadenopathy:      Cervical: No cervical adenopathy.   Skin:     General: Skin is warm and dry.   Neurological:      General: No focal deficit present.      Mental Status: He is alert and oriented to person, place, and time. Mental status is at baseline.   Psychiatric:         Mood and Affect: Mood normal.         Behavior: Behavior normal.         Thought Content: Thought content normal.         Judgment: Judgment normal.         Assessment/Plan:     I personally reviewed prior external notes and test  results pertinent to today's visit as well as additional imaging and testing completed in clinic today.    I introduced myself as Randal Correa a Nurse Practitioner.    1. COVID-19  benzonatate (TESSALON) 100 MG Cap    POCT CoV-2, Flu A/B, RSV by PCR        TESTING: viral test positive for covid-19  VITAL SIGNS: WNL  MDM/PLAN: No signs of distress.  Talking full sentences.  Does have congestion, rhinorrhea and postnasal drainage.  No adventitious lung sounds.  No rash or nuchal rigidity.  Overall, unremarkable exam w/o red flag signs or symptoms.    Benzonatate for cough  OTC cold medication  Pulmonary toilet  Recommend adequate hydration   Rest  Return precautions discussed  . Medication discussed included indication for use and the potential benefits and side effects. The Patient was encouraged to monitor symptoms closely, and we reviewed the signs and symptoms that require a higher level of care through the emergency department. Patient verbalized understanding.    Please note that this dictation was created using voice recognition software. I have made every reasonable attempt to correct obvious errors, but I expect that there are errors of grammar and possibly content that I did not discover before finalizing the note.    This note was electronically signed by NORMA Carrera

## 2024-12-16 ENCOUNTER — OFFICE VISIT (OUTPATIENT)
Dept: MEDICAL GROUP | Facility: CLINIC | Age: 89
End: 2024-12-16
Payer: MEDICARE

## 2024-12-16 VITALS
HEART RATE: 78 BPM | WEIGHT: 171 LBS | OXYGEN SATURATION: 95 % | DIASTOLIC BLOOD PRESSURE: 73 MMHG | HEIGHT: 67 IN | BODY MASS INDEX: 26.84 KG/M2 | SYSTOLIC BLOOD PRESSURE: 163 MMHG

## 2024-12-16 DIAGNOSIS — G89.29 CHRONIC BILATERAL LOW BACK PAIN WITH BILATERAL SCIATICA: ICD-10-CM

## 2024-12-16 DIAGNOSIS — M54.42 CHRONIC BILATERAL LOW BACK PAIN WITH BILATERAL SCIATICA: ICD-10-CM

## 2024-12-16 DIAGNOSIS — U07.1 COVID-19: ICD-10-CM

## 2024-12-16 DIAGNOSIS — M54.41 CHRONIC BILATERAL LOW BACK PAIN WITH BILATERAL SCIATICA: ICD-10-CM

## 2024-12-16 DIAGNOSIS — D51.0 PERNICIOUS ANEMIA: ICD-10-CM

## 2024-12-16 PROCEDURE — 3077F SYST BP >= 140 MM HG: CPT | Performed by: FAMILY MEDICINE

## 2024-12-16 PROCEDURE — 3078F DIAST BP <80 MM HG: CPT | Performed by: FAMILY MEDICINE

## 2024-12-16 PROCEDURE — 99214 OFFICE O/P EST MOD 30 MIN: CPT | Performed by: FAMILY MEDICINE

## 2024-12-16 RX ORDER — CYANOCOBALAMIN 1000 UG/ML
1000 INJECTION, SOLUTION INTRAMUSCULAR; SUBCUTANEOUS ONCE
Status: COMPLETED | OUTPATIENT
Start: 2024-12-16 | End: 2024-12-16

## 2024-12-16 RX ORDER — HYDROCHLOROTHIAZIDE 12.5 MG/1
CAPSULE ORAL
Qty: 90 CAPSULE | Refills: 3 | Status: SHIPPED | OUTPATIENT
Start: 2024-12-16

## 2024-12-16 RX ORDER — BENZONATATE 100 MG/1
100 CAPSULE ORAL 3 TIMES DAILY PRN
Qty: 60 CAPSULE | Refills: 0 | Status: SHIPPED | OUTPATIENT
Start: 2024-12-16

## 2024-12-16 RX ADMIN — CYANOCOBALAMIN 1000 MCG: 1000 INJECTION, SOLUTION INTRAMUSCULAR; SUBCUTANEOUS at 13:35

## 2024-12-16 ASSESSMENT — FIBROSIS 4 INDEX: FIB4 SCORE: 2.62

## 2024-12-16 NOTE — PROGRESS NOTES
Subjective:     CC: f/u COVID, B12, back pain    HPI:   April presents today to discuss the following issues        Problem   Covid-19    Huong was diagnosed with a COVID infection on the day before Thanksgiving.  He was not given Paxlovid.  He did not require hospitalization and was never hypoxic or in respiratory distress.  He was treated symptomatically with oral rehydration and Tessalon Perles.  He no longer has any measurable fever but notes some chills at times.  There is no respiratory distress.  No sore throat, dysuria or foul urine.  He does continue to have coughing spasms at times which are largely nonproductive.     Low Back Pain    Pain management is continuing to uptitrate his pregabalin we will go to I believe 150 mg twice a day.  He has not yet started that dose as they are waiting for the higher strength tablet to arrive at the pharmacy.  His pain remains about the same, he did not get the last epidural because of his COVID infection.    Prior    Hunog had another epidural a few weeks ago and it has not been  helpful so far. He remains on Lyrica 25 mg bid and is  tolerating. He feels it helps somewhat . Sx's are worse in the morning.     Pernicious Anemia    No new oral or other complaints. Doing well with this.  It is time for his  monthly injection of B12. We plan to continue these indefinitely.         Current Outpatient Medications Ordered in Epic   Medication Sig Dispense Refill    benzonatate (TESSALON) 100 MG Cap Take 1 Capsule by mouth 3 times a day as needed for Cough. 60 Capsule 0    hydrochlorothiazide (MICROZIDE) 12.5 MG capsule TAKE 1 CAPSULE BY MOUTH EVERY DAY 90 Capsule 3    pregabalin (LYRICA) 25 MG Cap Take 100 mg by mouth 2 times a day.      sertraline (ZOLOFT) 25 MG tablet TAKE 1 TABLET BY MOUTH EVERY DAY 90 Tablet 4    Alum Hydroxide-Mag Trisilicate (GAVISCON) 80-14.2 MG Chew Tab Chew 1 Tablet 1 time a day as needed.      diclofenac sodium (VOLTAREN) 1 % Gel Apply 4 g topically 4  "times a day as needed (apply to affected areas). 100 g 2    diphenhydrAMINE (BENADRYL) 25 MG capsule Take 25 mg by mouth 1 time a day as needed for Itching.      acetaminophen (TYLENOL) 500 MG Tab Take 500-1,000 mg by mouth every 6 hours as needed.       No current Epic-ordered facility-administered medications on file.       Health Maintenance:     ROS:  Gen: no fevers/chills, no changes in weight  Eyes: no changes in vision  ENT: no sore throat, no hearing loss, no bloody nose  Pulm: no sob, no cough  CV: no chest pain, no palpitations  GI: no nausea/vomiting, no diarrhea  : no dysuria  MSk: no myalgias  Skin: no rash  Neuro: no headaches, no numbness/tingling  Heme/Lymph: no easy bruising      Objective:     Exam:  BP (!) 163/73 (BP Location: Right arm, Patient Position: Sitting)   Pulse 78   Ht 1.702 m (5' 7\")   Wt 77.6 kg (171 lb)   SpO2 95%   BMI 26.78 kg/m²  Body mass index is 26.78 kg/m².    Gen: Alert and oriented, No apparent distress.  Neck: Neck is supple without lymphadenopathy.  Lungs: Normal effort, CTA bilaterally, no wheezes, rhonchi, or rales  CV: Regular rate and rhythm. No murmurs, rubs, or gallops.  Ext: No clubbing, cyanosis, edema.          Assessment & Plan:     89 y.o. male with the following -     Problem List Items Addressed This Visit       Low back pain     I will follow along.  We were remaining cognizant that the pregabalin could worsen his dizziness and falls but so far that seems not to have been the case.  If there is later interest in starting duloxetine, I would recommend tapering him off the sertraline first.         Pernicious anemia     Cyanocobalmin administered today.  RTC one month         COVID-19     His lungs are clear and he is in no respiratory distress.  Symptoms are nagging but not worsening.  I did refill his Tessalon Perles and reviewed ER precautions.  I am not recommending further imaging or testing at the moment but we will do so if necessary.      "           No follow-ups on file.

## 2024-12-16 NOTE — ASSESSMENT & PLAN NOTE
I will follow along.  We were remaining cognizant that the pregabalin could worsen his dizziness and falls but so far that seems not to have been the case.  If there is later interest in starting duloxetine, I would recommend tapering him off the sertraline first.

## 2024-12-16 NOTE — ASSESSMENT & PLAN NOTE
His lungs are clear and he is in no respiratory distress.  Symptoms are nagging but not worsening.  I did refill his Tessalon Perles and reviewed ER precautions.  I am not recommending further imaging or testing at the moment but we will do so if necessary.

## 2025-01-13 ENCOUNTER — APPOINTMENT (OUTPATIENT)
Dept: RADIOLOGY | Facility: CLINIC | Age: OVER 89
End: 2025-01-13
Attending: FAMILY MEDICINE
Payer: MEDICARE

## 2025-01-13 ENCOUNTER — HOSPITAL ENCOUNTER (OUTPATIENT)
Facility: MEDICAL CENTER | Age: OVER 89
End: 2025-01-13
Attending: FAMILY MEDICINE
Payer: MEDICARE

## 2025-01-13 ENCOUNTER — APPOINTMENT (OUTPATIENT)
Dept: MEDICAL GROUP | Facility: CLINIC | Age: OVER 89
End: 2025-01-13
Payer: MEDICARE

## 2025-01-13 VITALS
OXYGEN SATURATION: 93 % | WEIGHT: 171 LBS | HEIGHT: 67 IN | TEMPERATURE: 97.3 F | SYSTOLIC BLOOD PRESSURE: 166 MMHG | DIASTOLIC BLOOD PRESSURE: 81 MMHG | BODY MASS INDEX: 26.84 KG/M2 | HEART RATE: 67 BPM

## 2025-01-13 DIAGNOSIS — M54.15 RADICULOPATHY OF THORACOLUMBAR REGION: ICD-10-CM

## 2025-01-13 DIAGNOSIS — M79.609 PARESTHESIA AND PAIN OF RIGHT EXTREMITY: ICD-10-CM

## 2025-01-13 DIAGNOSIS — M79.671 RIGHT FOOT PAIN: ICD-10-CM

## 2025-01-13 DIAGNOSIS — E53.8 B12 DEFICIENCY: ICD-10-CM

## 2025-01-13 DIAGNOSIS — R20.2 PARESTHESIA AND PAIN OF RIGHT EXTREMITY: ICD-10-CM

## 2025-01-13 DIAGNOSIS — D51.0 PERNICIOUS ANEMIA: ICD-10-CM

## 2025-01-13 LAB
ALBUMIN SERPL BCP-MCNC: 4.4 G/DL (ref 3.2–4.9)
ALBUMIN/GLOB SERPL: 1.6 G/DL
ALP SERPL-CCNC: 103 U/L (ref 30–99)
ALT SERPL-CCNC: 19 U/L (ref 2–50)
ANION GAP SERPL CALC-SCNC: 13 MMOL/L (ref 7–16)
AST SERPL-CCNC: 26 U/L (ref 12–45)
BASOPHILS # BLD AUTO: 0.8 % (ref 0–1.8)
BASOPHILS # BLD: 0.05 K/UL (ref 0–0.12)
BILIRUB SERPL-MCNC: 0.5 MG/DL (ref 0.1–1.5)
BUN SERPL-MCNC: 11 MG/DL (ref 8–22)
CALCIUM ALBUM COR SERPL-MCNC: 9.1 MG/DL (ref 8.5–10.5)
CALCIUM SERPL-MCNC: 9.4 MG/DL (ref 8.5–10.5)
CHLORIDE SERPL-SCNC: 99 MMOL/L (ref 96–112)
CO2 SERPL-SCNC: 25 MMOL/L (ref 20–33)
CREAT SERPL-MCNC: 0.86 MG/DL (ref 0.5–1.4)
EOSINOPHIL # BLD AUTO: 0.19 K/UL (ref 0–0.51)
EOSINOPHIL NFR BLD: 3.2 % (ref 0–6.9)
ERYTHROCYTE [DISTWIDTH] IN BLOOD BY AUTOMATED COUNT: 46.9 FL (ref 35.9–50)
EST. AVERAGE GLUCOSE BLD GHB EST-MCNC: 111 MG/DL
FASTING STATUS PATIENT QL REPORTED: NORMAL
GFR SERPLBLD CREATININE-BSD FMLA CKD-EPI: 81 ML/MIN/1.73 M 2
GLOBULIN SER CALC-MCNC: 2.7 G/DL (ref 1.9–3.5)
GLUCOSE SERPL-MCNC: 81 MG/DL (ref 65–99)
HBA1C MFR BLD: 5.5 % (ref 4–5.6)
HCT VFR BLD AUTO: 44.8 % (ref 42–52)
HGB BLD-MCNC: 14.8 G/DL (ref 14–18)
IMM GRANULOCYTES # BLD AUTO: 0.01 K/UL (ref 0–0.11)
IMM GRANULOCYTES NFR BLD AUTO: 0.2 % (ref 0–0.9)
LYMPHOCYTES # BLD AUTO: 1.55 K/UL (ref 1–4.8)
LYMPHOCYTES NFR BLD: 26 % (ref 22–41)
MCH RBC QN AUTO: 30 PG (ref 27–33)
MCHC RBC AUTO-ENTMCNC: 33 G/DL (ref 32.3–36.5)
MCV RBC AUTO: 90.7 FL (ref 81.4–97.8)
MONOCYTES # BLD AUTO: 0.75 K/UL (ref 0–0.85)
MONOCYTES NFR BLD AUTO: 12.6 % (ref 0–13.4)
NEUTROPHILS # BLD AUTO: 3.41 K/UL (ref 1.82–7.42)
NEUTROPHILS NFR BLD: 57.2 % (ref 44–72)
NRBC # BLD AUTO: 0 K/UL
NRBC BLD-RTO: 0 /100 WBC (ref 0–0.2)
PLATELET # BLD AUTO: 196 K/UL (ref 164–446)
PMV BLD AUTO: 11.4 FL (ref 9–12.9)
POTASSIUM SERPL-SCNC: 4.1 MMOL/L (ref 3.6–5.5)
PROT SERPL-MCNC: 7.1 G/DL (ref 6–8.2)
RBC # BLD AUTO: 4.94 M/UL (ref 4.7–6.1)
SODIUM SERPL-SCNC: 137 MMOL/L (ref 135–145)
TSH SERPL DL<=0.005 MIU/L-ACNC: 1.17 UIU/ML (ref 0.38–5.33)
WBC # BLD AUTO: 6 K/UL (ref 4.8–10.8)

## 2025-01-13 PROCEDURE — 36415 COLL VENOUS BLD VENIPUNCTURE: CPT

## 2025-01-13 PROCEDURE — 3079F DIAST BP 80-89 MM HG: CPT | Performed by: FAMILY MEDICINE

## 2025-01-13 PROCEDURE — 73620 X-RAY EXAM OF FOOT: CPT | Mod: TC,RT | Performed by: FAMILY MEDICINE

## 2025-01-13 PROCEDURE — 85025 COMPLETE CBC W/AUTO DIFF WBC: CPT

## 2025-01-13 PROCEDURE — 3077F SYST BP >= 140 MM HG: CPT | Performed by: FAMILY MEDICINE

## 2025-01-13 PROCEDURE — 84443 ASSAY THYROID STIM HORMONE: CPT

## 2025-01-13 PROCEDURE — 80053 COMPREHEN METABOLIC PANEL: CPT

## 2025-01-13 PROCEDURE — 99214 OFFICE O/P EST MOD 30 MIN: CPT | Performed by: FAMILY MEDICINE

## 2025-01-13 PROCEDURE — 83036 HEMOGLOBIN GLYCOSYLATED A1C: CPT

## 2025-01-13 ASSESSMENT — PATIENT HEALTH QUESTIONNAIRE - PHQ9
7. TROUBLE CONCENTRATING ON THINGS, SUCH AS READING THE NEWSPAPER OR WATCHING TELEVISION: NOT AT ALL
2. FEELING DOWN, DEPRESSED, IRRITABLE, OR HOPELESS: NOT AT ALL
SUM OF ALL RESPONSES TO PHQ9 QUESTIONS 1 AND 2: 0
6. FEELING BAD ABOUT YOURSELF - OR THAT YOU ARE A FAILURE OR HAVE LET YOURSELF OR YOUR FAMILY DOWN: NOT AL ALL
1. LITTLE INTEREST OR PLEASURE IN DOING THINGS: NOT AT ALL
8. MOVING OR SPEAKING SO SLOWLY THAT OTHER PEOPLE COULD HAVE NOTICED. OR THE OPPOSITE, BEING SO FIGETY OR RESTLESS THAT YOU HAVE BEEN MOVING AROUND A LOT MORE THAN USUAL: NOT AT ALL
5. POOR APPETITE OR OVEREATING: NOT AT ALL
9. THOUGHTS THAT YOU WOULD BE BETTER OFF DEAD, OR OF HURTING YOURSELF: NOT AT ALL
3. TROUBLE FALLING OR STAYING ASLEEP OR SLEEPING TOO MUCH: NOT AT ALL
SUM OF ALL RESPONSES TO PHQ QUESTIONS 1-9: 0
4. FEELING TIRED OR HAVING LITTLE ENERGY: NOT AT ALL

## 2025-01-13 ASSESSMENT — FIBROSIS 4 INDEX: FIB4 SCORE: 2.62

## 2025-01-13 NOTE — PROGRESS NOTES
Subjective:     CC: R foot swelling, Radicular pain,     HPI:   Vertrus presents today to discuss the following issues       Problem   Right Foot Pain    Huong has had some swelling, discomfort and decreased movement in the right foot.  He feels he cannot flex his toes the way he used to.  He did have a fall after his last epidural about a month ago but does not feel that he injured himself at that point.  His physiatrist suggested he be checked for diabetes.  There has been no other trauma he does have ongoing edema in both lower extremities which is chronic.     Radiculopathy of Thoracolumbar Region    His last epidural done about a month ago only helped for roughly 10 days.  A new MRI and CT are pending.  Apparently physiatry wants to try 1 more epidural.  Huong is on 150 mg of Lyrica now and tolerating.       Pernicious Anemia    No new oral or other complaints. Doing well with this.  It is time for his  monthly injection of B12. We plan to continue these indefinitely. Due today         Current Outpatient Medications Ordered in Epic   Medication Sig Dispense Refill    benzonatate (TESSALON) 100 MG Cap Take 1 Capsule by mouth 3 times a day as needed for Cough. 60 Capsule 0    hydrochlorothiazide (MICROZIDE) 12.5 MG capsule TAKE 1 CAPSULE BY MOUTH EVERY DAY 90 Capsule 3    pregabalin (LYRICA) 25 MG Cap Take 100 mg by mouth 2 times a day.      sertraline (ZOLOFT) 25 MG tablet TAKE 1 TABLET BY MOUTH EVERY DAY 90 Tablet 4    Alum Hydroxide-Mag Trisilicate (GAVISCON) 80-14.2 MG Chew Tab Chew 1 Tablet 1 time a day as needed.      diclofenac sodium (VOLTAREN) 1 % Gel Apply 4 g topically 4 times a day as needed (apply to affected areas). 100 g 2    diphenhydrAMINE (BENADRYL) 25 MG capsule Take 25 mg by mouth 1 time a day as needed for Itching.      acetaminophen (TYLENOL) 500 MG Tab Take 500-1,000 mg by mouth every 6 hours as needed.       No current Epic-ordered facility-administered medications on file.       Health  "Maintenance:     ROS:  Gen: no fevers/chills, no changes in weight  Eyes: no changes in vision  ENT: no sore throat, no hearing loss, no bloody nose  Pulm: no sob, no cough  CV: no chest pain, no palpitations  GI: no nausea/vomiting, no diarrhea  : no dysuria  MSk: no myalgias  Skin: no rash  Neuro: no headaches, no numbness/tingling  Heme/Lymph: no easy bruising      Objective:     Exam:  BP (!) 166/81 (BP Location: Left arm, Patient Position: Sitting, BP Cuff Size: Adult)   Pulse 67   Temp 36.3 °C (97.3 °F) (Temporal)   Ht 1.702 m (5' 7\")   Wt 77.6 kg (171 lb)   SpO2 93%   BMI 26.78 kg/m²  Body mass index is 26.78 kg/m².    Gen: Alert and oriented, No apparent distress.  Neck: Neck is supple without lymphadenopathy.  Lungs: Normal effort, CTA bilaterally, no wheezes, rhonchi, or rales  CV: Regular rate and rhythm. No murmurs, rubs, or gallops.  Ext: No clubbing, cyanosis, edema.          Assessment & Plan:     89 y.o. male with the following -     Problem List Items Addressed This Visit       B12 deficiency    Relevant Orders    CBC WITH DIFFERENTIAL    Pernicious anemia     B12 is given today.  I am rechecking a CBC and some other labs as well.         Radiculopathy of thoracolumbar region     I will follow along.  No discussion of Cymbalta at this point and he does seem to be tolerating the Lyrica although there was some earlier concern it might be exacerbating his falls.  It is unclear if it is helping.         Right foot pain     Both feet are cool to the touch but pulses are present.  He does seem to have decreased volitional control of his toes on the right.  There is mild edema.  There is negative Homans, erythema or tenderness in the calf or the thigh.  I do think these are nerve related issues but we will go ahead and get an x-ray of his foot now.  I have also ordered labs to include electrolytes, thyroid and diabetic testing.  [facility]         " Relevant Orders    DX-FOOT-2- RIGHT (Completed)    CBC WITH DIFFERENTIAL     Other Visit Diagnoses       Paresthesia and pain of right extremity        Relevant Orders    HEMOGLOBIN A1C    Comp Metabolic Panel    CBC WITH DIFFERENTIAL    TSH WITH REFLEX TO FT4                No follow-ups on file.

## 2025-01-13 NOTE — ASSESSMENT & PLAN NOTE
Both feet are cool to the touch but pulses are present.  He does seem to have decreased volitional control of his toes on the right.  There is mild edema.  There is negative Homans, erythema or tenderness in the calf or the thigh.  I do think these are nerve related issues but we will go ahead and get an x-ray of his foot now.  I have also ordered labs to include electrolytes, thyroid and diabetic testing.  I will relay the results to Vert as they become available

## 2025-01-13 NOTE — ASSESSMENT & PLAN NOTE
I will follow along.  No discussion of Cymbalta at this point and he does seem to be tolerating the Lyrica although there was some earlier concern it might be exacerbating his falls.  It is unclear if it is helping.

## 2025-02-03 ENCOUNTER — PHYSICAL THERAPY (OUTPATIENT)
Dept: PHYSICAL THERAPY | Facility: REHABILITATION | Age: OVER 89
End: 2025-02-03
Attending: FAMILY MEDICINE
Payer: MEDICARE

## 2025-02-03 DIAGNOSIS — R29.6 FREQUENT FALLS: ICD-10-CM

## 2025-02-03 PROCEDURE — 97162 PT EVAL MOD COMPLEX 30 MIN: CPT

## 2025-02-03 PROCEDURE — 97110 THERAPEUTIC EXERCISES: CPT

## 2025-02-03 SDOH — ECONOMIC STABILITY: GENERAL: QUALITY OF LIFE: GOOD

## 2025-02-03 ASSESSMENT — ENCOUNTER SYMPTOMS
QUALITY: DULL ACHE
QUALITY: SQUEEZING
PAIN TIMING: WHEN ACTIVE
PAIN SCALE AT LOWEST: 1
PAIN TIMING: IN THE MORNING
PAIN SCALE AT HIGHEST: 8
QUALITY: TINGLING
PAIN SCALE: 6
QUALITY: TIGHTNESS
QUALITY: STABBING

## 2025-02-03 ASSESSMENT — ACTIVITIES OF DAILY LIVING (ADL): POOR_BALANCE: 1

## 2025-02-03 NOTE — OP THERAPY EVALUATION
Outpatient Physical Therapy  INITIAL EVALUATION    [facility] [place]  [address], [address]  [facility] [address]  Phone:  [phone]  Fax:  [phone]    Date of Evaluation: 2025    Patient: Huong Becker  YOB: 1932  MRN: [id number]     Referring Provider: [place] [id number]   Referring Diagnosis No admission diagnoses are documented for this encounter.     Time Calculation  Start time: 1030  Stop time: 1115 Time Calculation (min): 45 minutes         Chief Complaint: Loss Of Balance    Visit Diagnoses     ICD-10-CM   1. Frequent falls  R29.6       Date of onset of impairment: 2/3/2020    Subjective:   History of Present Illness:     Date of onset:  2/3/2020    Mechanism of injury:  Pt is a 89 yo male presenting to PT with c/o loss of balance and low back pain with radiating symptoms. Pt reports the pain in the low back occurs with walking and in the morning (last till 2pm most days). Pt reports his R foot drags and he can fall from it. Pt reports 6-7 falls in the last 7 months. Pt reports he did not go to the hospital after any of the falls but does require assistance to get up.  Pt reports it has been in his home and the community. Pt reports he goes to a pain doctor for the pain in the low back he receives epidural shots but they only last 2 weeks. Pt reports he has an MRI scheduled for his low back soon. Pt reports he saw a spinal surgeon and they did not recommend surgery due to his age. Pt also reports some BLE swelling during the day. Pt reports B numbness in the leg     Aggs: standing (x<1 min), walking (x<1 min), morning LBP  Easers: sitting , medication  Irritability: moderate   Quality of life:  Good  Sleep disturbance:  Not disrupted (medication makes him sleepy)  Pain:     Current pain ratin    At best pain ratin (seated)    At worst pain ratin    Quality:  Tingling, tightness, dull ache,  "squeezing and stabbing    Pain timing:  When active and in the morning    Progression:  Unchanged  Social Support:     Lives in:  Multiple-level home (15 steps with handrail on L cane on R no falls (bedroom on 2nd floor))  Treatments:     Previous treatment:  Physical therapy (Didnt help)  Activities of Daily Living:     Patient reported ADL status: Pt reports he his retired. Pt reports all ADL's are independent but does require \"furniture surfing\" to manitain balance.   Patient Goals:     Patient goals for therapy:  Increased strength, independence with ADLs/IADLs, improved balance, increased motion and decreased pain  y    Past Medical History:   Diagnosis Date    Anesthesia     PONV, and hard to wake up    Arthritis     osteoarthritis, hips    Breath shortness     \"sob came before 11/2016, did a chest x-ray, and it didn't show anything\"    Cataract     Bilateral IOL implants    Heart burn     no meds    High cholesterol     no meds    Hip pain, bilateral 11/30/2018    and lower back.    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 Follow with CT every 6 months and no changes.     Parkinson's disease     no meds    Pneumonia     November 2016, no admission      Past Surgical History:   Procedure Laterality Date    GASTROSCOPY N/A 3/24/2017    Procedure: GASTROSCOPY;  Surgeon: Raphael Crawford M.D.;  Location: [facility];  Service:     EGD W/ENDOSCOPIC ULTRASOUND N/A 3/24/2017    Procedure: EGD W/ENDOSCOPIC ULTRASOUND- UPPER, LINEAR, RADIAL ON STANDBY;  Surgeon: Raphael Crawford M.D.;  Location: [facility];  Service:     EGD WITH ASP/BX N/A 3/24/2017    Procedure: EGD WITH ASP/BX - FINE NEEDLE ASPIRATION;  Surgeon: Raphael Crawford M.D.;  Location: [facility];  Service:     CATARACT EXTRACTION WITH IOL Bilateral 2016    CHOLECYSTECTOMY  2013    KNEE ARTHROSCOPY Left 2013    COLONOSCOPY  2013    OTHER SURGICAL " "PROCEDURE      salivary gland removed.     LUMBAR LAMINECTOMY DISKECTOMY      L5 \"cleaned up\"    TURP-VAPOR      TURP    OTHER SURGICAL PROCEDURE      salivary gland removed.    ULCER EXCISION      gastric ulcer      Social History     Tobacco Use    Smoking status: Former     Current packs/day: 0.00     Average packs/day: 0.2 packs/day for 10.0 years (2.0 ttl pk-yrs)     Types: Cigarettes, Cigars     Start date:      Quit date:      Years since quittin.1    Smokeless tobacco: Never   Substance Use Topics    Alcohol use: Yes     Comment: 2-3 per month     Family and Occupational History     Socioeconomic History    Marital status:      Spouse name: Not on file    Number of children: Not on file    Years of education: Not on file    Highest education level: Not on file   Occupational History    Not on file       Objective     Observations   Central spine     Positive for thoracic kyphosis.    Additional Observation Details  Bent forward with the use of 4WW to decrease pain in LB and BLE    Postural Observations  Seated posture: fair  Standing posture: poor      Hip Screen   Hip range of motion within functional limits.    Neurological Testing     Dermatome testing   Lumbar (left)   All left lumbar dermatomes intact    Lumbar (right)   All right lumbar dermatomes intact    Palpation   Left   Tenderness of the lumbar paraspinals and proximal biceps femoris.     Right   Tenderness of the lumbar paraspinals and proximal biceps femoris.     Tenderness     Left Hip   No tenderness in the sacroiliac joint.     Right Hip   Tenderness in the sacroiliac joint.     Active Range of Motion     Lumbar   Flexion: decreased (best position)  Extension: decreased (pain)  Left lateral flexion: decreased (pain)  Right lateral flexion: decreased (pain)  Left rotation: decreased (pain)  Right rotation: decreased (pain)    Additional Active Range of Motion Details  Pain down RLE primarily     Joint " Play   Spine     Central PA [place]        L4: hypomobile and painful       L5: hypomobile and painful       S1: painful and hypomobile      Strength:      Left Hip   Planes of Motion   Flexion: 4-  Extension: 4-    Right Hip   Planes of Motion   Flexion: 4-  Extension: 4-    Left Knee   Flexion: 4-  Extension: 4    Right Knee   Flexion: 4-  Extension: 4    Left Ankle/Foot   Dorsiflexion: 3+  Plantar flexion: 3+    Right Ankle/Foot   Dorsiflexion: 4-  Plantar flexion: 4-    Tests       Lumbar spine (left)      Negative slump.   Lumbar spine (right)     Negative slump.     Functional Assessment     Comments  Will asses balance further at next appointment.        Therapeutic Exercises (CPT 01170):     1. LTR    2. SKTC    3. glute squeezes    4. clamshells no band      Therapeutic Exercise Summary: Pt was given HEP prior to leaving and verbalized understanding.        Time-based treatments/modalities:    Physical Therapy Timed Treatment Charges  Therapeutic exercise minutes (CPT 67064): 15 minutes      Assessment, Response and Plan:   Impairments: impaired functional mobility, impaired balance, impaired physical strength, limited ADL's, limited mobility, pain with function and safety issue    Assessment details:  Pt is a 89 yo cis-male presenting to PT w/ clinical findings suggestive of general weakness with decrease balance and radicular l/s pain. Pertinent clinical findings include decrease gait speed, global weakness, radicular symptoms, and decreased AROM of the L/s . Impairments are associated w/ decrease functional activity tolerance and increase fall risk. The patient would benefit from skilled PT to address strength and ROM deficits in order to increase participation with daily activities. The patient states they understand and agree with the plan of care. HEP w/ handout was reviewed and provided to the pt.    Barriers to therapy:  Age  Prognosis: fair    Goals:   Short Term Goals:   1. Pt will be compliant with  HEP daily for improving strength and stability  2. Pt will demo improved functional LE strength with 30 sec STS score 5 or better  3. Pt will complete most appropriate balance outcome measure  Short term goal time span:  4-6 weeks  Patient progress towards short term goals:          Long Term Goals:    1. Pt will demo improved functional mobility with TUG score with in MCID or better  2. Pt will demo improved functional LE strength with 30 sec STS with in MCID or better  3. Pt will demo improved CV endurance and gait speed with 6MWT score with in MCID or better  4. Pt will demo improved balance scoring low fall risk on most appropriate outcome measure  5. Pt will demo improved gait speed 1.3 m/s or better to dec fall risk    Long term goal time span:  2-4 months    Plan:   Therapy options:  Physical therapy treatment to continue  Planned therapy interventions:  Neuromuscular Re-education (CPT 98090), Therapeutic Exercise (CPT 94881), Therapeutic Activities (CPT 10297), Manual Therapy (CPT 26854) and Gait Training (CPT 90465)  Frequency:  2x week  Duration in weeks:  6  Duration in visits:  12  Discussed with:  Patient      Functional Assessment Used        Referring provider co-signature:  I have reviewed this plan of care and my co-signature certifies the need for services.    Certification Period: 02/03/2025 to  03/31/25    Physician Signature: ________________________________ Date: ______________

## 2025-02-05 ENCOUNTER — APPOINTMENT (OUTPATIENT)
Dept: PHYSICAL THERAPY | Facility: REHABILITATION | Age: OVER 89
End: 2025-02-05
Attending: FAMILY MEDICINE
Payer: MEDICARE

## 2025-02-12 ENCOUNTER — APPOINTMENT (OUTPATIENT)
Dept: PHYSICAL THERAPY | Facility: REHABILITATION | Age: OVER 89
End: 2025-02-12
Attending: FAMILY MEDICINE
Payer: MEDICARE

## 2025-02-14 ENCOUNTER — APPOINTMENT (OUTPATIENT)
Dept: PHYSICAL THERAPY | Facility: REHABILITATION | Age: OVER 89
End: 2025-02-14
Attending: FAMILY MEDICINE
Payer: MEDICARE

## 2025-02-19 ENCOUNTER — APPOINTMENT (OUTPATIENT)
Dept: PHYSICAL THERAPY | Facility: REHABILITATION | Age: OVER 89
End: 2025-02-19
Attending: FAMILY MEDICINE
Payer: MEDICARE

## 2025-02-21 ENCOUNTER — APPOINTMENT (OUTPATIENT)
Dept: PHYSICAL THERAPY | Facility: REHABILITATION | Age: OVER 89
End: 2025-02-21
Attending: FAMILY MEDICINE
Payer: MEDICARE

## 2025-02-24 ENCOUNTER — OFFICE VISIT (OUTPATIENT)
Dept: MEDICAL GROUP | Facility: CLINIC | Age: OVER 89
End: 2025-02-24
Payer: MEDICARE

## 2025-02-24 VITALS
OXYGEN SATURATION: 92 % | DIASTOLIC BLOOD PRESSURE: 91 MMHG | HEIGHT: 67 IN | WEIGHT: 177 LBS | HEART RATE: 66 BPM | SYSTOLIC BLOOD PRESSURE: 156 MMHG | TEMPERATURE: 97.5 F | BODY MASS INDEX: 27.78 KG/M2

## 2025-02-24 DIAGNOSIS — M54.15 RADICULOPATHY OF THORACOLUMBAR REGION: ICD-10-CM

## 2025-02-24 PROCEDURE — 3080F DIAST BP >= 90 MM HG: CPT | Performed by: FAMILY MEDICINE

## 2025-02-24 PROCEDURE — 3077F SYST BP >= 140 MM HG: CPT | Performed by: FAMILY MEDICINE

## 2025-02-24 PROCEDURE — 99213 OFFICE O/P EST LOW 20 MIN: CPT | Performed by: FAMILY MEDICINE

## 2025-02-24 ASSESSMENT — FIBROSIS 4 INDEX: FIB4 SCORE: 2.8

## 2025-02-24 NOTE — ASSESSMENT & PLAN NOTE
He will follow-up with physiatry as noted.  I asked him again to enquire as to whether Cymbalta would be in order with or without the Lyrica taper.  I also began a discussion of inflammatory foods.  This would include but is not limited to processed meats, fried foods, sugar, carbohydrates.  He notes that he consumes a lot of all of these.  He is going to work on tapering off and we will see if we can get a little improvement through diet as well.    I did also recommend that he go ahead and lie down back in bed for a 1 hour nap midday to buy a little relief and perhaps some psychological comfort.  Follow-up with me in 1 month

## 2025-02-24 NOTE — PROGRESS NOTES
Subjective:     CC: Radiculopathy, pernicious anemia    HPI:   April presents today to discuss the following issues        Problem   Radiculopathy of Thoracolumbar Region    Huong is still pretty miserable with his radicular pain.  He is followed by pain management and his Lyrica is now up to 200 mg a day.  He feels that overall this has helped him maybe 25% in total.  He is sedated with it but no other obvious side effects.  MRI has also been ordered and due to some paperwork confusion that has been pushed off until mid March.  He will have follow-up with physiatry after that.  He notes that he is all but pain-free while he is supine and in bed at night but within an hour of getting up the pain is quite intense.  He does also so spend a fair amount of time in his lazy boy chair with some mild improvement.  Prior:  His last epidural done about a month ago only helped for roughly 10 days.  A new MRI and CT are pending.  Apparently physiatry wants to try 1 more epidural.  Huong is on 150 mg of Lyrica now and tolerating.           Current Outpatient Medications Ordered in Epic   Medication Sig Dispense Refill    benzonatate (TESSALON) 100 MG Cap Take 1 Capsule by mouth 3 times a day as needed for Cough. 60 Capsule 0    hydrochlorothiazide (MICROZIDE) 12.5 MG capsule TAKE 1 CAPSULE BY MOUTH EVERY DAY 90 Capsule 3    pregabalin (LYRICA) 25 MG Cap Take 100 mg by mouth 2 times a day.      sertraline (ZOLOFT) 25 MG tablet TAKE 1 TABLET BY MOUTH EVERY DAY 90 Tablet 4    Alum Hydroxide-Mag Trisilicate (GAVISCON) 80-14.2 MG Chew Tab Chew 1 Tablet 1 time a day as needed.      diclofenac sodium (VOLTAREN) 1 % Gel Apply 4 g topically 4 times a day as needed (apply to affected areas). 100 g 2    diphenhydrAMINE (BENADRYL) 25 MG capsule Take 25 mg by mouth 1 time a day as needed for Itching.      acetaminophen (TYLENOL) 500 MG Tab Take 500-1,000 mg by mouth every 6 hours as needed.       No current Epic-ordered  "facility-administered medications on file.       Health Maintenance:     ROS:  Gen: no fevers/chills, no changes in weight  Eyes: no changes in vision  ENT: no sore throat, no hearing loss, no bloody nose  Pulm: no sob, no cough  CV: no chest pain, no palpitations  GI: no nausea/vomiting, no diarrhea  : no dysuria  MSk: no myalgias  Skin: no rash  Neuro: no headaches, no numbness/tingling  Heme/Lymph: no easy bruising      Objective:     Exam:  BP (!) 156/91 (BP Location: Left arm, Patient Position: Sitting, BP Cuff Size: Adult)   Pulse 66   Temp 36.4 °C (97.5 °F) (Temporal)   Ht 1.702 m (5' 7\")   Wt 80.3 kg (177 lb)   SpO2 92%   BMI 27.72 kg/m²  Body mass index is 27.72 kg/m².    Gen: Alert and oriented, No apparent distress.  Neck: Neck is supple without lymphadenopathy.  Lungs: Normal effort, CTA bilaterally, no wheezes, rhonchi, or rales  CV: Regular rate and rhythm. No murmurs, rubs, or gallops.  Ext: No clubbing, cyanosis, edema.          Assessment & Plan:     89 y.o. male with the following -     Problem List Items Addressed This Visit       Radiculopathy of thoracolumbar region    He will follow-up with physiatry as noted.  I asked him again to enquire as to whether Cymbalta would be in order with or without the Lyrica taper.  I also began a discussion of inflammatory foods.  This would include but is not limited to processed meats, fried foods, sugar, carbohydrates.  He notes that he consumes a lot of all of these.  He is going to work on tapering off and we will see if we can get a little improvement through diet as well.    I did also recommend that he go ahead and lie down back in bed for a 1 hour nap midday to buy a little relief and perhaps some psychological comfort.  Follow-up with me in 1 month            I spent a total of 25 minutes with record review, exam, communication with the patient, communication with other providers, and documentation of this encounter.      No follow-ups on " file.

## 2025-02-26 ENCOUNTER — APPOINTMENT (OUTPATIENT)
Dept: PHYSICAL THERAPY | Facility: REHABILITATION | Age: OVER 89
End: 2025-02-26
Attending: FAMILY MEDICINE
Payer: MEDICARE

## 2025-02-28 ENCOUNTER — APPOINTMENT (OUTPATIENT)
Dept: PHYSICAL THERAPY | Facility: REHABILITATION | Age: OVER 89
End: 2025-02-28
Attending: FAMILY MEDICINE
Payer: MEDICARE

## 2025-03-05 ENCOUNTER — APPOINTMENT (OUTPATIENT)
Dept: PHYSICAL THERAPY | Facility: REHABILITATION | Age: OVER 89
End: 2025-03-05
Attending: FAMILY MEDICINE
Payer: MEDICARE

## 2025-03-05 ENCOUNTER — APPOINTMENT (OUTPATIENT)
Dept: RADIOLOGY | Facility: MEDICAL CENTER | Age: OVER 89
End: 2025-03-05
Attending: NEUROMUSCULOSKELETAL MEDICINE & OMM
Payer: MEDICARE

## 2025-03-07 ENCOUNTER — APPOINTMENT (OUTPATIENT)
Dept: PHYSICAL THERAPY | Facility: REHABILITATION | Age: OVER 89
End: 2025-03-07
Attending: FAMILY MEDICINE
Payer: MEDICARE

## 2025-03-12 ENCOUNTER — APPOINTMENT (OUTPATIENT)
Dept: PHYSICAL THERAPY | Facility: REHABILITATION | Age: OVER 89
End: 2025-03-12
Attending: FAMILY MEDICINE
Payer: MEDICARE

## 2025-03-22 ENCOUNTER — OFFICE VISIT (OUTPATIENT)
Dept: URGENT CARE | Facility: PHYSICIAN GROUP | Age: OVER 89
End: 2025-03-22
Payer: MEDICARE

## 2025-03-22 ENCOUNTER — RESULTS FOLLOW-UP (OUTPATIENT)
Dept: URGENT CARE | Facility: PHYSICIAN GROUP | Age: OVER 89
End: 2025-03-22

## 2025-03-22 VITALS
HEIGHT: 67 IN | OXYGEN SATURATION: 95 % | WEIGHT: 176.37 LBS | SYSTOLIC BLOOD PRESSURE: 126 MMHG | DIASTOLIC BLOOD PRESSURE: 80 MMHG | BODY MASS INDEX: 27.68 KG/M2 | RESPIRATION RATE: 15 BRPM | TEMPERATURE: 97.9 F | HEART RATE: 86 BPM

## 2025-03-22 DIAGNOSIS — R68.83 CHILLS: ICD-10-CM

## 2025-03-22 LAB
APPEARANCE UR: CLEAR
BILIRUB UR STRIP-MCNC: NEGATIVE MG/DL
COLOR UR AUTO: YELLOW
FLUAV RNA SPEC QL NAA+PROBE: NEGATIVE
FLUBV RNA SPEC QL NAA+PROBE: NEGATIVE
GLUCOSE UR STRIP.AUTO-MCNC: NEGATIVE MG/DL
KETONES UR STRIP.AUTO-MCNC: NEGATIVE MG/DL
LEUKOCYTE ESTERASE UR QL STRIP.AUTO: NEGATIVE
NITRITE UR QL STRIP.AUTO: NEGATIVE
PH UR STRIP.AUTO: 6.5 [PH] (ref 5–8)
PROT UR QL STRIP: NEGATIVE MG/DL
RBC UR QL AUTO: NEGATIVE
RSV RNA SPEC QL NAA+PROBE: NEGATIVE
SARS-COV-2 RNA RESP QL NAA+PROBE: NEGATIVE
SP GR UR STRIP.AUTO: 1.01
UROBILINOGEN UR STRIP-MCNC: 0.2 MG/DL

## 2025-03-22 PROCEDURE — 0241U POCT CEPHEID COV-2, FLU A/B, RSV - PCR: CPT

## 2025-03-22 PROCEDURE — 99213 OFFICE O/P EST LOW 20 MIN: CPT

## 2025-03-22 PROCEDURE — 81002 URINALYSIS NONAUTO W/O SCOPE: CPT

## 2025-03-22 PROCEDURE — 3079F DIAST BP 80-89 MM HG: CPT

## 2025-03-22 PROCEDURE — 3074F SYST BP LT 130 MM HG: CPT

## 2025-03-22 ASSESSMENT — ENCOUNTER SYMPTOMS
COUGH: 0
DIARRHEA: 0
NAUSEA: 0
VOMITING: 0
HEADACHES: 0
MYALGIAS: 0
EYES NEGATIVE: 1
ABDOMINAL PAIN: 0
DIZZINESS: 0
FEVER: 0
SHORTNESS OF BREATH: 0
CHILLS: 1

## 2025-03-22 ASSESSMENT — FIBROSIS 4 INDEX: FIB4 SCORE: 2.8

## 2025-03-22 NOTE — PROGRESS NOTES
Subjective:     Chief Complaint   Patient presents with    Chills     Woke up Tuesday morning with body chills after getting out of bed too fast   Also felt slight spins and chills p/t denies pressure or ear issues   Wed: felt fine but felt pressure in his head and felt nausea but no vommiting, chills began again from the waist up. The chills seem to be coming and going   Yesterday the chills were coming and going   P/t does have a history of spinal stenosis is getting regular injestions to manage  pain and symptoms   Today p/t feels fine but wants to be checked out to ru       HPI:  April Becker is a 89 y.o. male who presents for Chills since Tuesday. Intermittent. No associated symptoms. No fever, no cough, no SOB, no congestion. Pt states he feels fine today but his wife wanted him to get check out.       ROS:  Review of Systems   Constitutional:  Positive for chills. Negative for fever.   HENT: Negative.     Eyes: Negative.    Respiratory:  Negative for cough and shortness of breath.    Cardiovascular:  Negative for chest pain.   Gastrointestinal:  Negative for abdominal pain, diarrhea, nausea and vomiting.   Genitourinary:  Negative for dysuria, frequency and urgency.   Musculoskeletal:  Negative for myalgias.   Skin:  Negative for rash.   Neurological:  Negative for dizziness and headaches.   All other systems reviewed and are negative.       CURRENT MEDICATIONS:  Current Outpatient Medications   Medication Sig Refill Last Dispense    acetaminophen (TYLENOL) 500 MG Tab Take 500-1,000 mg by mouth every 6 hours as needed.  Unknown (patient-reported)    Alum Hydroxide-Mag Trisilicate (GAVISCON) 80-14.2 MG Chew Tab Chew 1 Tablet 1 time a day as needed.  Unknown (patient-reported)    benzonatate (TESSALON) 100 MG Cap Take 1 Capsule by mouth 3 times a day as needed for Cough. 0 Unknown (outside pharmacy)    diclofenac sodium (VOLTAREN) 1 % Gel Apply 4 g topically 4 times a day as needed (apply to  "affected areas). 2 Unknown (outside pharmacy)    diphenhydrAMINE (BENADRYL) 25 MG capsule Take 25 mg by mouth 1 time a day as needed for Itching.  Unknown (patient-reported)    hydrochlorothiazide (MICROZIDE) 12.5 MG capsule TAKE 1 CAPSULE BY MOUTH EVERY DAY 3 Unknown (outside pharmacy)    pregabalin (LYRICA) 25 MG Cap Take 100 mg by mouth 2 times a day.  Unknown (patient-reported)       ALLERGIES:   No Known Allergies    PROBLEM LIST:    does not have any pertinent problems on file.    Allergies, Medications, & Tobacco/Substance Use were reconciled by the Medical Assistant and reviewed by myself.     Objective:   /80 (BP Location: Left arm, Patient Position: Sitting, BP Cuff Size: Adult)   Pulse 86   Temp 36.6 °C (97.9 °F) (Temporal)   Resp 15   Ht 1.702 m (5' 7\")   Wt 80 kg (176 lb 5.9 oz)   SpO2 95%   BMI 27.62 kg/m²     Physical Exam  Constitutional:       General: He is not in acute distress.     Appearance: He is not ill-appearing or toxic-appearing.   HENT:      Right Ear: Tympanic membrane normal. Tympanic membrane is not erythematous or bulging.      Left Ear: Tympanic membrane normal. Tympanic membrane is not erythematous or bulging.      Nose: No congestion.      Mouth/Throat:      Mouth: Mucous membranes are moist.      Pharynx: Oropharynx is clear. No oropharyngeal exudate or posterior oropharyngeal erythema.   Eyes:      Conjunctiva/sclera: Conjunctivae normal.      Pupils: Pupils are equal, round, and reactive to light.   Cardiovascular:      Rate and Rhythm: Normal rate and regular rhythm.   Pulmonary:      Effort: Pulmonary effort is normal. No respiratory distress.      Breath sounds: Normal breath sounds. No wheezing.   Abdominal:      General: Abdomen is flat. There is no distension.      Palpations: Abdomen is soft.      Tenderness: There is no abdominal tenderness.   Musculoskeletal:      Cervical back: No tenderness.   Lymphadenopathy:      Cervical: No cervical adenopathy. "   Skin:     General: Skin is warm and dry.   Neurological:      Mental Status: He is alert.       Assessment/Plan:   Pt's history and physical exam consistent with chill, unknown origin. Because of his age and comorbidities, I will check his urine and perform a viral swab.symptoms Testing performed in office with negative results.  Patient informed of results via MyChart and phone call.  Patient has stable vital signs and is non-toxic appearing. Discussed supportive care measures and maintaining adequate hydration. Patient demonstrated understanding of treatment plan and agreed to return to the clinic if symptoms worsen or fail to resolve.   Assessment & Plan  Chills  Orders:    POCT CEPHEID COV-2, FLU A/B, RSV - PCR    POCT Urinalysis      Discussed differential diagnosis, management options, risks/benefits, and alternatives to planned treatment. Pt expressed understanding and the treatment plan was agreed upon. Questions were encouraged and answered. Pt encouraged to return to urgent care as needed if new or worsening symptoms or if there is no improvement in condition. Pt educated in red flags and indications to immediately call 911 or present to the Emergency Department. Advised the patient to follow-up with the primary care physician for recheck, reevaluation, and further management.    I personally reviewed prior external notes and test results pertinent to today's visit. I have independently reviewed and interpreted all diagnostics ordered during this visit.    Please note that this dictation was created using voice recognition software. I have made a reasonable attempt to correct obvious errors, but I expect that there are errors of grammar and possibly content that I did not discover before finalizing the note.    This note was electronically signed by UBALDO Cedeño

## 2025-03-24 ENCOUNTER — OFFICE VISIT (OUTPATIENT)
Dept: MEDICAL GROUP | Facility: CLINIC | Age: OVER 89
End: 2025-03-24
Payer: MEDICARE

## 2025-03-24 VITALS
WEIGHT: 177 LBS | OXYGEN SATURATION: 96 % | HEIGHT: 67 IN | RESPIRATION RATE: 16 BRPM | TEMPERATURE: 98 F | SYSTOLIC BLOOD PRESSURE: 137 MMHG | HEART RATE: 78 BPM | BODY MASS INDEX: 27.78 KG/M2 | DIASTOLIC BLOOD PRESSURE: 71 MMHG

## 2025-03-24 DIAGNOSIS — K86.9 DISORDER OF PANCREAS: ICD-10-CM

## 2025-03-24 DIAGNOSIS — G89.29 CHRONIC BILATERAL LOW BACK PAIN WITH BILATERAL SCIATICA: ICD-10-CM

## 2025-03-24 DIAGNOSIS — M54.42 CHRONIC BILATERAL LOW BACK PAIN WITH BILATERAL SCIATICA: ICD-10-CM

## 2025-03-24 DIAGNOSIS — R68.83 CHILLS: ICD-10-CM

## 2025-03-24 DIAGNOSIS — M54.41 CHRONIC BILATERAL LOW BACK PAIN WITH BILATERAL SCIATICA: ICD-10-CM

## 2025-03-24 DIAGNOSIS — D51.0 PERNICIOUS ANEMIA: ICD-10-CM

## 2025-03-24 RX ORDER — CYANOCOBALAMIN 1000 UG/ML
1000 INJECTION, SOLUTION INTRAMUSCULAR; SUBCUTANEOUS ONCE
Status: COMPLETED | OUTPATIENT
Start: 2025-03-24 | End: 2025-03-24

## 2025-03-24 RX ORDER — CYANOCOBALAMIN 1000 UG/ML
1000 INJECTION, SOLUTION INTRAMUSCULAR; SUBCUTANEOUS ONCE
Qty: 1 ML | Refills: 0 | Status: SHIPPED | OUTPATIENT
Start: 2025-03-24 | End: 2025-03-24

## 2025-03-24 RX ORDER — CYANOCOBALAMIN 1000 UG/ML
1000 INJECTION, SOLUTION INTRAMUSCULAR; SUBCUTANEOUS ONCE
Status: CANCELLED | OUTPATIENT
Start: 2025-03-24 | End: 2025-03-24

## 2025-03-24 RX ADMIN — CYANOCOBALAMIN 1000 MCG: 1000 INJECTION, SOLUTION INTRAMUSCULAR; SUBCUTANEOUS at 14:38

## 2025-03-24 ASSESSMENT — FIBROSIS 4 INDEX: FIB4 SCORE: 2.8

## 2025-03-24 NOTE — ASSESSMENT & PLAN NOTE
He does feel better when he lies down physically for naps during the day and this gives him a little bit of mental break.  He is working on the anti-inflammatory diet which includes cutting a lot of oils and grease out of his diet and decreasing sugar.  I will continue to follow along.  Follow-up in 1 month.

## 2025-03-24 NOTE — PROGRESS NOTES
Subjective:     CC: URI, LBP, pernicious anemia    HPI:   April presents today to discuss the following issues       Problem   Chills    Huong was suffering from chills not obviously associated with fever, sore throat earache, dysuria flank pain or cough.  After about 4 days he went to the urgent care who did check him for flu RSV and COVID all of which were negative and a urinalysis was also negative.  Symptoms improved and as of this morning he does believe that they have resolved.     Low Back Pain    MRI is pending and he has a f/u appt this friday. Has not yet enrique duloxetine but in hindsight he did not tolerate SSRIs very well so this may not be a viable option.    Prior:    Pain management is continuing to uptitrate his pregabalin we will go to I believe 150 mg twice a day.  He has not yet started that dose as they are waiting for the higher strength tablet to arrive at the pharmacy.  His pain remains about the same, he did not get the last epidural because of his COVID infection.    Prior    Huong had another epidural a few weeks ago and it has not been  helpful so far. He remains on Lyrica 25 mg bid and is  tolerating. He feels it helps somewhat . Sx's are worse in the morning.     Pernicious Anemia    No new oral or other complaints. Doing well with this.  It is time for his  monthly injection of B12. We plan to continue these indefinitely. Due today and tolerating.      Disorder of Pancreas    He has a longstanding pancreatic mass that was not fully evaluated due to difficulties with procedure.  It has been present for at least 10 years however.  Any time injury imaging is done a new level of alarm is raised and that is again the case now that MRIs are being repeated of his lumbar spine.         Current Outpatient Medications Ordered in Epic   Medication Sig Dispense Refill    benzonatate (TESSALON) 100 MG Cap Take 1 Capsule by mouth 3 times a day as needed for Cough. 60 Capsule 0    hydrochlorothiazide  "(MICROZIDE) 12.5 MG capsule TAKE 1 CAPSULE BY MOUTH EVERY DAY 90 Capsule 3    pregabalin (LYRICA) 25 MG Cap Take 100 mg by mouth 2 times a day.      Alum Hydroxide-Mag Trisilicate (GAVISCON) 80-14.2 MG Chew Tab Chew 1 Tablet 1 time a day as needed.      diclofenac sodium (VOLTAREN) 1 % Gel Apply 4 g topically 4 times a day as needed (apply to affected areas). 100 g 2    diphenhydrAMINE (BENADRYL) 25 MG capsule Take 25 mg by mouth 1 time a day as needed for Itching.      acetaminophen (TYLENOL) 500 MG Tab Take 500-1,000 mg by mouth every 6 hours as needed.       Current Facility-Administered Medications Ordered in Epic   Medication Dose Route Frequency Provider Last Rate Last Admin    cyanocobalamin (Vitamin B-12) injection 1,000 mcg  1,000 mcg Intramuscular Once            Health Maintenance:     ROS:  Gen: no fevers/chills, no changes in weight  Eyes: no changes in vision  ENT: no sore throat, no hearing loss, no bloody nose  Pulm: no sob, no cough  CV: no chest pain, no palpitations  GI: no nausea/vomiting, no diarrhea  : no dysuria  MSk: no myalgias  Skin: no rash  Neuro: no headaches, no numbness/tingling  Heme/Lymph: no easy bruising      Objective:     Exam:  /71   Pulse 78   Temp 36.7 °C (98 °F) (Temporal)   Resp 16   Ht 1.702 m (5' 7\")   Wt 80.3 kg (177 lb)   SpO2 96%   BMI 27.72 kg/m²  Body mass index is 27.72 kg/m².    Gen: Alert and oriented, No apparent distress.  Neck: Neck is supple without lymphadenopathy.  Lungs: Normal effort, CTA bilaterally, no wheezes, rhonchi, or rales  CV: Regular rate and rhythm. No murmurs, rubs, or gallops.  Ext: No clubbing, cyanosis, edema.          Assessment & Plan:     89 y.o. male with the following -     Problem List Items Addressed This Visit       Low back pain    He does feel better when he lies down physically for naps during the day and this gives him a little bit of mental break.  He is working on the anti-inflammatory diet which includes cutting " a lot of oils and grease out of his diet and decreasing sugar.  I will continue to follow along.  Follow-up in 1 month.         Pernicious anemia    Cyanocobalamin 1000 mcg given today.         Disorder of pancreas    I will monitor along with vert.  Clearly any new alarming pathology will need to be dealt with appropriately, but he and I feel any unnecessary procedures would not be beneficial.         Chills    It appears that this was a viral illness that has resolved.  He will message me if he has any recurrence or new symptoms develop.            I spent a total of 45 minutes with record review, exam, communication with the patient, communication with other providers, and documentation of this encounter.      No follow-ups on file.

## 2025-03-24 NOTE — ASSESSMENT & PLAN NOTE
I will monitor along with vert.  Clearly any new alarming pathology will need to be dealt with appropriately, but he and I feel any unnecessary procedures would not be beneficial.

## 2025-03-24 NOTE — ASSESSMENT & PLAN NOTE
It appears that this was a viral illness that has resolved.  He will message me if he has any recurrence or new symptoms develop.

## 2025-03-26 ENCOUNTER — APPOINTMENT (OUTPATIENT)
Dept: RADIOLOGY | Facility: MEDICAL CENTER | Age: OVER 89
End: 2025-03-26
Attending: NEUROMUSCULOSKELETAL MEDICINE & OMM
Payer: MEDICARE

## 2025-03-26 DIAGNOSIS — M54.17 LUMBOSACRAL NEURITIS: ICD-10-CM

## 2025-03-26 PROCEDURE — 72148 MRI LUMBAR SPINE W/O DYE: CPT

## 2025-03-26 NOTE — PROGRESS NOTES
MRI nursing note:    Patient to MRI department as self sedation.  Patient took medication per prescription instructions and states that his MRI was tolerable.   Patient wheeled out and safely transferred into car with home walker and all personal belongings with family member Annamaria.

## 2025-04-21 ENCOUNTER — APPOINTMENT (OUTPATIENT)
Dept: MEDICAL GROUP | Facility: CLINIC | Age: OVER 89
End: 2025-04-21
Payer: MEDICARE

## 2025-04-28 ENCOUNTER — OFFICE VISIT (OUTPATIENT)
Dept: MEDICAL GROUP | Facility: CLINIC | Age: OVER 89
End: 2025-04-28
Payer: MEDICARE

## 2025-04-28 VITALS
OXYGEN SATURATION: 92 % | HEART RATE: 91 BPM | HEIGHT: 67 IN | DIASTOLIC BLOOD PRESSURE: 71 MMHG | WEIGHT: 176 LBS | BODY MASS INDEX: 27.62 KG/M2 | SYSTOLIC BLOOD PRESSURE: 124 MMHG | TEMPERATURE: 97.1 F

## 2025-04-28 DIAGNOSIS — M54.42 CHRONIC BILATERAL LOW BACK PAIN WITH BILATERAL SCIATICA: ICD-10-CM

## 2025-04-28 DIAGNOSIS — J06.9 VIRAL UPPER RESPIRATORY TRACT INFECTION: ICD-10-CM

## 2025-04-28 DIAGNOSIS — G89.29 CHRONIC BILATERAL LOW BACK PAIN WITH BILATERAL SCIATICA: ICD-10-CM

## 2025-04-28 DIAGNOSIS — M54.41 CHRONIC BILATERAL LOW BACK PAIN WITH BILATERAL SCIATICA: ICD-10-CM

## 2025-04-28 DIAGNOSIS — E53.8 B12 DEFICIENCY: ICD-10-CM

## 2025-04-28 ASSESSMENT — FIBROSIS 4 INDEX: FIB4 SCORE: 2.8

## 2025-04-28 NOTE — ASSESSMENT & PLAN NOTE
I will follow.  He is doing better on his antiinflammatory diet as far as reducing oils, sugars and salt.

## 2025-04-28 NOTE — ASSESSMENT & PLAN NOTE
Exam is normal in the nares and throat.  There is no adenopathy and he is afebrile.  There is a hair back into the canal on his left ear which may be causing some of the pulling sensation.  I am not going to be able to easily remove it now and Vert will just watch it and report back if symptoms worsen.

## 2025-04-28 NOTE — PROGRESS NOTES
"Subjective:     CC: f/u LBP, Ear discomfort, B12    HPI:   April presents today to discuss the following issues        Problem   Viral Upper Respiratory Tract Infection    Huong is noticing some mild throat and irritation in the right nostril.  He is not coughing more than usual, denies fever sneezing rhinorrhea.  He is also complaining of a sensation of movement or \"a bug\" in his left ear is variable.     Low Back Pain    Huong is feeling better today and a bit more hopeful.  Repeat MRI did not show any real progression from prior on his degenerative issues.  Pain management did another set of epidurals about a week ago and is slightly different location which he describes in closer to the SI joint.  He has gotten reasonable improvement and he is pleased with that.  He remains on the pregabalin and the symptoms are largely the same.    Prior    MRI is pending and he has a f/u appt this friday. Has not yet enrique duloxetine but in hindsight he did not tolerate SSRIs very well so this may not be a viable option.    Prior:    Pain management is continuing to uptitrate his pregabalin we will go to I believe 150 mg twice a day.  He has not yet started that dose as they are waiting for the higher strength tablet to arrive at the pharmacy.  His pain remains about the same, he did not get the last epidural because of his COVID infection.        B12 Deficiency    Due for a follow up, Doing well,remains asymptomatic and does wish to continue the monthly B12 shots.  No new concerns.,          Current Outpatient Medications Ordered in Epic   Medication Sig Dispense Refill    benzonatate (TESSALON) 100 MG Cap Take 1 Capsule by mouth 3 times a day as needed for Cough. 60 Capsule 0    hydrochlorothiazide (MICROZIDE) 12.5 MG capsule TAKE 1 CAPSULE BY MOUTH EVERY DAY 90 Capsule 3    pregabalin (LYRICA) 25 MG Cap Take 100 mg by mouth 2 times a day.      Alum Hydroxide-Mag Trisilicate (GAVISCON) 80-14.2 MG Chew Tab Chew 1 Tablet 1 time " "a day as needed.      diclofenac sodium (VOLTAREN) 1 % Gel Apply 4 g topically 4 times a day as needed (apply to affected areas). 100 g 2    diphenhydrAMINE (BENADRYL) 25 MG capsule Take 25 mg by mouth 1 time a day as needed for Itching.      acetaminophen (TYLENOL) 500 MG Tab Take 500-1,000 mg by mouth every 6 hours as needed.       No current Epic-ordered facility-administered medications on file.       Health Maintenance:     ROS:  Gen: no fevers/chills, no changes in weight  Eyes: no changes in vision  ENT: no sore throat, no hearing loss, no bloody nose  Pulm: no sob, no cough  CV: no chest pain, no palpitations  GI: no nausea/vomiting, no diarrhea  : no dysuria  MSk: no myalgias  Skin: no rash  Neuro: no headaches, no numbness/tingling  Heme/Lymph: no easy bruising      Objective:     Exam:  /71 (BP Location: Right arm, Patient Position: Sitting)   Pulse 91   Temp 36.2 °C (97.1 °F)   Ht 1.702 m (5' 7\")   Wt 79.8 kg (176 lb)   SpO2 92%   BMI 27.57 kg/m²  Body mass index is 27.57 kg/m².    Gen: Alert and oriented, No apparent distress.  Neck: Neck is supple without lymphadenopathy.  Lungs: Normal effort, CTA bilaterally, no wheezes, rhonchi, or rales  CV: Regular rate and rhythm. No murmurs, rubs, or gallops.  Ext: No clubbing, cyanosis, edema.          Assessment & Plan:     89 y.o. male with the following -     Problem List Items Addressed This Visit       B12 deficiency    B12 given today. RTC 1 mo         Low back pain    I will follow.  He is doing better on his antiinflammatory diet as far as reducing oils, sugars and salt.         Viral upper respiratory tract infection    Exam is normal in the nares and throat.  There is no adenopathy and he is afebrile.  There is a hair back into the canal on his left ear which may be causing some of the pulling sensation.  I am not going to be able to easily remove it now and Vert will just watch it and report back if symptoms worsen.            I spent a " total of 32 minutes with record review, exam, communication with the patient, communication with other providers, and documentation of this encounter.      No follow-ups on file.

## 2025-05-30 ENCOUNTER — OFFICE VISIT (OUTPATIENT)
Dept: MEDICAL GROUP | Facility: CLINIC | Age: OVER 89
End: 2025-05-30
Payer: MEDICARE

## 2025-05-30 VITALS
OXYGEN SATURATION: 93 % | TEMPERATURE: 97.3 F | DIASTOLIC BLOOD PRESSURE: 80 MMHG | WEIGHT: 175 LBS | HEART RATE: 68 BPM | HEIGHT: 67 IN | BODY MASS INDEX: 27.47 KG/M2 | SYSTOLIC BLOOD PRESSURE: 129 MMHG

## 2025-05-30 DIAGNOSIS — D22.9 ATYPICAL MOLE: Primary | ICD-10-CM

## 2025-05-30 DIAGNOSIS — M54.42 CHRONIC BILATERAL LOW BACK PAIN WITH BILATERAL SCIATICA: ICD-10-CM

## 2025-05-30 DIAGNOSIS — M54.41 CHRONIC BILATERAL LOW BACK PAIN WITH BILATERAL SCIATICA: ICD-10-CM

## 2025-05-30 DIAGNOSIS — E53.8 B12 DEFICIENCY: ICD-10-CM

## 2025-05-30 DIAGNOSIS — G89.29 CHRONIC BILATERAL LOW BACK PAIN WITH BILATERAL SCIATICA: ICD-10-CM

## 2025-05-30 ASSESSMENT — FIBROSIS 4 INDEX: FIB4 SCORE: 2.8

## 2025-05-30 NOTE — Clinical Note
REFERRAL APPROVAL NOTICE         Sent on May 30, 2025                   Huong Becker  [address] [place] [address]                   Dear Mr. Becker,    After a careful review of the medical information and benefit coverage, [facility] has processed your referral. See below for additional details.    If applicable, you must be actively enrolled with your insurance for coverage of the authorized service. If you have any questions regarding your coverage, please contact your insurance directly.    REFERRAL INFORMATION   Referral #:  [id number]  Referred-To Department    Referred-By Provider:  Dermatology    Dontae Sotelo M.D.   [facility]      [address]  [place] [address]  [phone] [address]  [place] [id number] [phone]    Referral Start Date:  05/30/2025  Referral End Date:   05/30/2026             SCHEDULING  If you do not already have an appointment, please call [phone] to make an appointment.     MORE INFORMATION  If you do not already have a [employer] account, sign up at: [email or web address]  You can access your medical information, make appointments, see lab results, billing information, and more.  If you have questions regarding this referral, please contact  the [facility] Referrals department at:             [phone]. Monday - Friday 8:00AM - 5:00PM.     Sincerely,    [facility]

## 2025-05-30 NOTE — ASSESSMENT & PLAN NOTE
Derm referral due to rapidly increasing size and location of facial mole. No other immediate concerning change.

## 2025-05-30 NOTE — PROGRESS NOTES
"Subjective:     CC: f/u pain, pernicious anemia    HPI:   Polotr presents today to discuss the following issues       Problem   Atypical Mole    Increasing size of a facial mole and one on his forehead and left temple.  He has had multiple similar melanocytic moles for years.       Low Back Pain    Vert to more epidural since I talked to them last a month ago.  He got about 10 days of relief and then symptoms returned.  He is a little discouraged and feels that those are no longer helpful and that the Lyrica is really not going to be the answer either.  He wants to know if I have other thoughts.  He does have follow-up with pain management in the next couple of weeks.  Prior:    Vert is feeling better today and a bit more hopeful.  Repeat MRI did not show any real progression from prior on his degenerative issues.  Pain management did another set of epidurals about a week ago and is slightly different location which he describes in closer to the SI joint.  He has gotten reasonable improvement and he is pleased with that.  He remains on the pregabalin and the symptoms are largely the same.     B12 Deficiency    Due for a follow up, Doing well,remains asymptomatic and does wish to continue the monthly B12 shots.  No new concerns.,          Current Medications and Prescriptions Ordered in [facility][1]    Health Maintenance:     ROS:  Gen: no fevers/chills, no changes in weight  Eyes: no changes in vision  ENT: no sore throat, no hearing loss, no bloody nose  Pulm: no sob, no cough  CV: no chest pain, no palpitations  GI: no nausea/vomiting, no diarrhea  : no dysuria  MSk: no myalgias  Skin: no rash  Neuro: no headaches, no numbness/tingling  Heme/Lymph: no easy bruising      Objective:     Exam:  /80 (BP Location: Left arm, Patient Position: Sitting, BP Cuff Size: Adult)   Pulse 68   Temp 36.3 °C (97.3 °F) (Temporal)   Ht 1.702 m (5' 7\")   Wt 79.4 kg (175 lb)   SpO2 93%   BMI 27.41 kg/m²  Body mass index is " 27.41 kg/m².    Gen: Alert and oriented, No apparent distress.  Neck: Neck is supple without lymphadenopathy.  Lungs: Normal effort, CTA bilaterally, no wheezes, rhonchi, or rales  CV: Regular rate and rhythm. No murmurs, rubs, or gallops.  Ext: No clubbing, cyanosis, edema.          Assessment & Plan:     89 y.o. male with the following -     Problem List Items Addressed This Visit       B12 deficiency    Cyanocobalmin given.RTC one month.         Low back pain    Valerie is wanting to taper and stop the Lyrica and I support that.  He will bring that up with his physiatrist.  We have gone over a number of options over the years and he has decided and knows that surgery is probably not a good option.  I did discuss with him that we could continue to explore things like acupuncture, psychology and perhaps CBD.  Of him some resources for those things.  He is also working on reducing inflammatory foods from his diet including sugar and oils follow-up in 1 month.         Atypical mole - Primary    Derm referral due to rapidly increasing size and location of facial mole. No other immediate concerning change.          Relevant Orders    Referral to Dermatology       I spent a total of 35 minutes with record review, exam, communication with the patient, communication with other providers, and documentation of this encounter.      No follow-ups on file.                 [1]   Current Outpatient Medications Ordered in Epic   Medication Sig Dispense Refill    benzonatate (TESSALON) 100 MG Cap Take 1 Capsule by mouth 3 times a day as needed for Cough. 60 Capsule 0    hydrochlorothiazide (MICROZIDE) 12.5 MG capsule TAKE 1 CAPSULE BY MOUTH EVERY DAY 90 Capsule 3    pregabalin (LYRICA) 25 MG Cap Take 100 mg by mouth 2 times a day.      Alum Hydroxide-Mag Trisilicate (GAVISCON) 80-14.2 MG Chew Tab Chew 1 Tablet 1 time a day as needed.      diclofenac sodium (VOLTAREN) 1 % Gel Apply 4 g topically 4 times a day as needed (apply to  affected areas). 100 g 2    diphenhydrAMINE (BENADRYL) 25 MG capsule Take 25 mg by mouth 1 time a day as needed for Itching.      acetaminophen (TYLENOL) 500 MG Tab Take 500-1,000 mg by mouth every 6 hours as needed.       No current Epic-ordered facility-administered medications on file.

## 2025-05-30 NOTE — ASSESSMENT & PLAN NOTE
Valerie is wanting to taper and stop the Lyrica and I support that.  He will bring that up with his physiatrist.  We have gone over a number of options over the years and he has decided and knows that surgery is probably not a good option.  I did discuss with him that we could continue to explore things like acupuncture, psychology and perhaps CBD.  Of him some resources for those things.  He is also working on reducing inflammatory foods from his diet including sugar and oils follow-up in 1 month.

## 2025-06-10 ENCOUNTER — OFFICE VISIT (OUTPATIENT)
Dept: DERMATOLOGY | Facility: IMAGING CENTER | Age: OVER 89
End: 2025-06-10
Payer: MEDICARE

## 2025-06-10 DIAGNOSIS — L82.0 INFLAMED SEBORRHEIC KERATOSIS: ICD-10-CM

## 2025-06-10 PROCEDURE — 17110 DESTRUCTION B9 LES UP TO 14: CPT | Performed by: STUDENT IN AN ORGANIZED HEALTH CARE EDUCATION/TRAINING PROGRAM

## 2025-06-10 NOTE — PROGRESS NOTES
[facility] DERMATOLOGY CLINIC NOTE         HPI:    April Becker is a 89 y.o. male here for evaluation of spots of concern on face scalp and lt temple. Per patient they have bled.      No other symptomatic (itching, painful, burning) or changing lesions.           Review of Systems: No fevers, chill. Pertinent positives and negatives above.       Medications, Medical History, Surgical History, Family History & Allergies:  Reviewed in the chart, relevant history noted above.       PHYSICAL EXAM  Focused skin exam of scalp, ears, and face    - tan to erythematous scaly stuck on papule on the L parietal scalp, L temple, R cheek  - tan to brown stuck-on waxy papules and plaques on scalp, face    ASSESSMENT & PLAN    # Inflamed seborrheic keratosis  - reassured benign, but given lesion(s) symptomatic, treatment with cryotherapy discussed and patient amenable.  CRYOTHERAPY:  Risks (including, but not limited to: hypo or hyperpigmentation, redness, blister, blood blister, recurrence, need for further treatment, infection, scar) and benefits of cryotherapy discussed. Patient verbally agreed to proceed with treatment. Cryotherapy freeze thaw cycles of 5-10 seconds were applied.  - LN2 x 3 lesion(s).  Patient tolerated procedure well. Aftercare instructions given.            Return if symptoms worsen or fail to improve.        Suyapa Humphrey MD  [facility] Dermatology

## 2025-07-06 ENCOUNTER — HOSPITAL ENCOUNTER (OUTPATIENT)
Dept: RADIOLOGY | Facility: MEDICAL CENTER | Age: OVER 89
End: 2025-07-06
Payer: MEDICARE

## 2025-07-06 ENCOUNTER — OFFICE VISIT (OUTPATIENT)
Dept: URGENT CARE | Facility: PHYSICIAN GROUP | Age: OVER 89
End: 2025-07-06
Payer: MEDICARE

## 2025-07-06 VITALS
BODY MASS INDEX: 27.31 KG/M2 | TEMPERATURE: 97.5 F | HEART RATE: 72 BPM | RESPIRATION RATE: 16 BRPM | WEIGHT: 174 LBS | SYSTOLIC BLOOD PRESSURE: 124 MMHG | OXYGEN SATURATION: 94 % | HEIGHT: 67 IN | DIASTOLIC BLOOD PRESSURE: 70 MMHG

## 2025-07-06 DIAGNOSIS — M79.642 HAND PAIN, LEFT: ICD-10-CM

## 2025-07-06 DIAGNOSIS — M25.532 LEFT WRIST PAIN: ICD-10-CM

## 2025-07-06 DIAGNOSIS — M25.532 LEFT WRIST PAIN: Primary | ICD-10-CM

## 2025-07-06 PROCEDURE — 99213 OFFICE O/P EST LOW 20 MIN: CPT

## 2025-07-06 PROCEDURE — 73110 X-RAY EXAM OF WRIST: CPT | Mod: LT

## 2025-07-06 PROCEDURE — 3074F SYST BP LT 130 MM HG: CPT

## 2025-07-06 PROCEDURE — 73130 X-RAY EXAM OF HAND: CPT | Mod: LT

## 2025-07-06 PROCEDURE — 3078F DIAST BP <80 MM HG: CPT

## 2025-07-06 ASSESSMENT — FIBROSIS 4 INDEX: FIB4 SCORE: 2.8

## 2025-07-06 NOTE — PROGRESS NOTES
"Chief Complaint   Patient presents with    Fall     Fell on Lt side 10 days ago,  injured elbow, arm and wrist pain and numbness         Subjective:   HISTORY OF PRESENT ILLNESS: April Becker is a 89 y.o. male who presents for left wrist and hand pain after a trip and fall last week.  He landed on his out stretch wrist and then his elbow.  He has pain mostly in the dorsal wrist but pain shoots up his arm.  He has swelling at the wrist and tingling in the fingers.        Medications, Allergies, current problem list, Social and Family history reviewed today in Epic.     Objective:     /70 (BP Location: Right arm, Patient Position: Sitting, BP Cuff Size: Adult)   Pulse 72   Temp 36.4 °C (97.5 °F) (Temporal)   Resp 16   Ht 1.702 m (5' 7\")   Wt 78.9 kg (174 lb)   SpO2 94%     Physical Exam  Vitals reviewed.   Constitutional:       Appearance: Normal appearance.   HENT:      Mouth/Throat:      Mouth: Mucous membranes are moist.   Cardiovascular:      Rate and Rhythm: Normal rate.   Pulmonary:      Effort: Pulmonary effort is normal.   Musculoskeletal:      Comments: Swelling noted to left wrist and dorsal hand   Skin:     General: Skin is warm and dry.   Neurological:      Mental Status: He is alert and oriented to person, place, and time.   Psychiatric:         Mood and Affect: Mood normal.          Assessment/Plan:     Diagnosis and associated orders    I personally reviewed prior external notes and test results pertinent to today's visit.     1. Left wrist pain  DX-WRIST-COMPLETE 3+ LEFT    DX-HAND 3+ LEFT      2. Hand pain, left  DX-WRIST-COMPLETE 3+ LEFT    DX-HAND 3+ LEFT        Today's radiology imaging personally reviewed by me today on day of visit and Radiology readings reviewed and discussed w/ patient today.     RADIOLOGY RESULTS   DX-HAND 3+ LEFT  Result Date: 7/6/2025 7/6/2025 12:11 PM HISTORY/REASON FOR EXAM:  Pain/Deformity Following Trauma. TECHNIQUE/EXAM DESCRIPTION AND NUMBER OF " VIEWS:  3 views of the LEFT hand. COMPARISON: None FINDINGS: Scattered changes of osteoarthrosis are noted. There is no definite fracture, dislocation, or erosion.     No radiographic evidence of acute traumatic injury.    DX-WRIST-COMPLETE 3+ LEFT  Result Date: 7/6/2025 7/6/2025 12:11 PM HISTORY/REASON FOR EXAM:  Pain/Deformity Following Trauma. TECHNIQUE/EXAM DESCRIPTION AND NUMBER OF VIEWS:  4 views of the LEFT wrist. COMPARISON: None FINDINGS: There is no definite fracture or dislocation. There is no definite erosion. Note is made of chondrocalcinosis and mild osteoarthrosis.     No radiographic evidence of acute traumatic injury.              IMPRESSION:  Pt presents with wirst pain and swelling after  a FOOSH.  No pain to shoulder or elbow, he did not hit his head. .  The patient is well appearing here with reassuring vitals signs.  No evidence of injury on xray.  The tingling is likely due to the swelling.  Have placed him in a splint and instructed to FU with his PCP next week, he has an appoint in 3 days schedule.   .  Discussed anticipated duration of illness, return to work/school, and indications to RTC or go to the Emergency department.    Patient states understanding of the plan of care and discharge instructions.  They are discharged in stable condition.         Please note that this dictation was created using voice recognition software. I have made a reasonable attempt to correct obvious errors, but I expect that there are errors of grammar and possibly content that I did not discover before finalizing the note.    This note was electronically signed by NORMA Steven

## 2025-07-09 ENCOUNTER — APPOINTMENT (OUTPATIENT)
Dept: MEDICAL GROUP | Facility: CLINIC | Age: OVER 89
End: 2025-07-09
Payer: MEDICARE

## 2025-07-09 VITALS
SYSTOLIC BLOOD PRESSURE: 139 MMHG | WEIGHT: 170 LBS | HEART RATE: 73 BPM | DIASTOLIC BLOOD PRESSURE: 73 MMHG | HEIGHT: 67 IN | OXYGEN SATURATION: 90 % | TEMPERATURE: 98.1 F | BODY MASS INDEX: 26.68 KG/M2

## 2025-07-09 DIAGNOSIS — R29.6 FREQUENT FALLS: Primary | ICD-10-CM

## 2025-07-09 DIAGNOSIS — M54.15 RADICULOPATHY OF THORACOLUMBAR REGION: ICD-10-CM

## 2025-07-09 PROCEDURE — 99214 OFFICE O/P EST MOD 30 MIN: CPT | Performed by: FAMILY MEDICINE

## 2025-07-09 PROCEDURE — 3078F DIAST BP <80 MM HG: CPT | Performed by: FAMILY MEDICINE

## 2025-07-09 PROCEDURE — 3075F SYST BP GE 130 - 139MM HG: CPT | Performed by: FAMILY MEDICINE

## 2025-07-09 RX ORDER — DULOXETIN HYDROCHLORIDE 30 MG/1
30 CAPSULE, DELAYED RELEASE ORAL
COMMUNITY
Start: 2025-07-03

## 2025-07-09 ASSESSMENT — FIBROSIS 4 INDEX: FIB4 SCORE: 2.83

## 2025-07-09 NOTE — PROGRESS NOTES
Subjective:     CC: Pain, falls, B12    HPI:   April presents today to discuss the following issues        Problem   Frequent Falls    Huong continues to complain of poor balance and had another fall while traveling recently.  he hurt his wrist which was x rayed and seen not to have a fracture.   He has seen physical therapy for balance with little improvement. He does use a walker when out and about.      Radiculopathy of Thoracolumbar Region    He stopped his Lyrica abruptly in order to give CBD Gummies a trial.  He felt truly miserable during that timeframe including sweats irritability and nausea.  The CBD Gummies made him very high and he did not want to try them again.  He discussed this with his pain management doctor who advised him to go back on the Lyrica at the prior dose which he did.  Also offered duloxetine 30 mg capsules but Huong did not want to try that while on the other medicines.    Prior:    Huong is still pretty miserable with his radicular pain.  He is followed by pain management and his Lyrica is now up to 200 mg a day.  He feels that overall this has helped him maybe 25% in total.  He is sedated with it but no other obvious side effects.  MRI has also been ordered and due to some paperwork confusion that has been pushed off until mid March.  He will have follow-up with physiatry after that.  He notes that he is all but pain-free while he is supine and in bed at night but within an hour of getting up the pain is quite intense.  He does also so spend a fair amount of time in his lazy boy chair with some mild improvement.  Prior:  His last epidural done about a month ago only helped for roughly 10 days.  A new MRI and CT are pending.  Apparently physiatry wants to try 1 more epidural.  Huong is on 150 mg of Lyrica now and tolerating.           Current Medications and Prescriptions Ordered in [facility][1]    Health Maintenance:     ROS:  Gen: no fevers/chills, no changes in weight  Eyes: no changes in  "vision  ENT: no sore throat, no hearing loss, no bloody nose  Pulm: no sob, no cough  CV: no chest pain, no palpitations  GI: no nausea/vomiting, no diarrhea  : no dysuria  MSk: no myalgias  Skin: no rash  Neuro: no headaches, no numbness/tingling  Heme/Lymph: no easy bruising      Objective:     Exam:  /73 (BP Location: Right arm, Patient Position: Sitting, BP Cuff Size: Adult)   Pulse 73   Temp 36.7 °C (98.1 °F) (Temporal)   Ht 1.702 m (5' 7\")   Wt 77.1 kg (170 lb)   SpO2 90%   BMI 26.63 kg/m²  Body mass index is 26.63 kg/m².    Gen: Alert and oriented, No apparent distress.  Neck: Neck is supple without lymphadenopathy.  Lungs: Normal effort, CTA bilaterally, no wheezes, rhonchi, or rales  CV: Regular rate and rhythm. No murmurs, rubs, or gallops.  Ext: No clubbing, cyanosis, edema.          Assessment & Plan:     89 y.o. male with the following -     Problem List Items Addressed This Visit       Radiculopathy of thoracolumbar region    He would like to try to taper and stop the Lyrica and I am supportive of this.  He will discuss with PMNR first.  He is going to wait on the Cymbalta which I actually had been advising we give a try to.  I am concerned however that he did poorly with antidepressants we will approach this medicine with caution and likely not until he is off the Lyrica.  Follow-up in 1 month.         Relevant Medications    DULoxetine (CYMBALTA) 30 MG Cap DR Particles    Frequent falls - Primary    There are no doubt if you issues contributing to his balance and falls including history of radiculopathy.  I remained concerned that medications especially the Lyrica are also adding to the problem.    (I did swap out his wrist brace for an ACE wrap which felt better)             I spent a total of 31 minutes with record review, exam, communication with the patient, communication with other providers, and documentation of this encounter.      No follow-ups on file.                 [1] "   Current Outpatient Medications Ordered in Epic   Medication Sig Dispense Refill    hydrochlorothiazide (MICROZIDE) 12.5 MG capsule TAKE 1 CAPSULE BY MOUTH EVERY DAY 90 Capsule 3    pregabalin (LYRICA) 25 MG Cap Take 100 mg by mouth 2 times a day.      Alum Hydroxide-Mag Trisilicate (GAVISCON) 80-14.2 MG Chew Tab Chew 1 Tablet 1 time a day as needed.      diphenhydrAMINE (BENADRYL) 25 MG capsule Take 25 mg by mouth 1 time a day as needed for Itching.      acetaminophen (TYLENOL) 500 MG Tab Take 500-1,000 mg by mouth every 6 hours as needed.      DULoxetine (CYMBALTA) 30 MG Cap DR Particles Take 30 mg by mouth every day.      benzonatate (TESSALON) 100 MG Cap Take 1 Capsule by mouth 3 times a day as needed for Cough. (Patient not taking: Reported on 7/9/2025) 60 Capsule 0    diclofenac sodium (VOLTAREN) 1 % Gel Apply 4 g topically 4 times a day as needed (apply to affected areas). (Patient not taking: Reported on 7/9/2025) 100 g 2     No current Epic-ordered facility-administered medications on file.

## 2025-07-09 NOTE — ASSESSMENT & PLAN NOTE
He would like to try to taper and stop the Lyrica and I am supportive of this.  He will discuss with PMNR first.  He is going to wait on the Cymbalta which I actually had been advising we give a try to.  I am concerned however that he did poorly with antidepressants we will approach this medicine with caution and likely not until he is off the Lyrica.  Follow-up in 1 month.

## 2025-07-09 NOTE — ASSESSMENT & PLAN NOTE
There are no doubt if you issues contributing to his balance and falls including history of radiculopathy.  I remained concerned that medications especially the Lyrica are also adding to the problem.    (I did swap out his wrist brace for an ACE wrap which felt better)

## 2025-07-11 ENCOUNTER — OFFICE VISIT (OUTPATIENT)
Dept: DERMATOLOGY | Facility: IMAGING CENTER | Age: OVER 89
End: 2025-07-11
Payer: MEDICARE

## 2025-07-11 DIAGNOSIS — L82.0 INFLAMED SEBORRHEIC KERATOSIS: ICD-10-CM

## 2025-07-11 PROCEDURE — 17110 DESTRUCTION B9 LES UP TO 14: CPT | Performed by: STUDENT IN AN ORGANIZED HEALTH CARE EDUCATION/TRAINING PROGRAM

## 2025-07-11 NOTE — PROGRESS NOTES
[facility] DERMATOLOGY CLINIC NOTE         HPI:    April Becker is a 89 y.o. male here for evaluation of spots over scalp and face that have been previously Tx with LN2. Notes the one on the face fell off and is pleased. The one on scalp fell off partially, but still irritating, gets caught on comb.        No other symptomatic (itching, painful, burning) or changing lesions.           Review of Systems: No fevers, chill. Pertinent positives and negatives above.       Medications, Medical History, Surgical History, Family History & Allergies:  Reviewed in the chart, relevant history noted above.       PHYSICAL EXAM  Focused skin exam of scalp, ears, and face    - tan to erythematous scaly stuck on papule on the L parietal scalp, L temple  - tan to brown stuck-on waxy papules and plaques on scalp, face    ASSESSMENT & PLAN    # Inflamed seborrheic keratosis  - reassured benign, but given lesion(s) symptomatic, treatment with cryotherapy discussed and patient amenable.  CRYOTHERAPY:  Risks (including, but not limited to: hypo or hyperpigmentation, redness, blister, blood blister, recurrence, need for further treatment, infection, scar) and benefits of cryotherapy discussed. Patient verbally agreed to proceed with treatment. Cryotherapy freeze thaw cycles of 5-10 seconds were applied.  - LN2 x 2 lesion(s).  Patient tolerated procedure well. Aftercare instructions given.            Return if symptoms worsen or fail to improve.        Suyapa Humphrey MD  [facility]

## 2025-07-23 ENCOUNTER — NON-PROVIDER VISIT (OUTPATIENT)
Dept: MEDICAL GROUP | Facility: CLINIC | Age: OVER 89
End: 2025-07-23
Payer: MEDICARE

## 2025-07-23 DIAGNOSIS — E53.8 B12 DEFICIENCY: Primary | ICD-10-CM

## 2025-07-23 RX ORDER — CYANOCOBALAMIN 1000 UG/ML
1000 INJECTION, SOLUTION INTRAMUSCULAR; SUBCUTANEOUS ONCE
Status: COMPLETED | OUTPATIENT
Start: 2025-07-23 | End: 2025-07-23

## 2025-07-23 RX ADMIN — CYANOCOBALAMIN 1000 MCG: 1000 INJECTION, SOLUTION INTRAMUSCULAR; SUBCUTANEOUS at 10:42

## 2025-07-23 NOTE — PROGRESS NOTES
Huong Becker is a 89 y.o. male here for a non-provider visit for B-12 injection.    Reason for injection: B-12 deficiency  Order in MAR?: Yes  Patient supplied?:No  Minimum interval has been met for this injection (per MAR order): Yes    Patient tolerated injection and no adverse effects were observed or reported: Yes    # of Administrations remaining in MAR: 0

## 2025-08-12 ENCOUNTER — OFFICE VISIT (OUTPATIENT)
Dept: MEDICAL GROUP | Facility: CLINIC | Age: OVER 89
End: 2025-08-12
Payer: MEDICARE

## 2025-08-12 VITALS
SYSTOLIC BLOOD PRESSURE: 122 MMHG | WEIGHT: 169 LBS | HEIGHT: 67 IN | HEART RATE: 68 BPM | TEMPERATURE: 97.9 F | DIASTOLIC BLOOD PRESSURE: 74 MMHG | OXYGEN SATURATION: 92 % | BODY MASS INDEX: 26.53 KG/M2

## 2025-08-12 DIAGNOSIS — R05.2 SUBACUTE COUGH: ICD-10-CM

## 2025-08-12 DIAGNOSIS — M54.15 RADICULOPATHY OF THORACOLUMBAR REGION: ICD-10-CM

## 2025-08-12 DIAGNOSIS — K21.9 GASTROESOPHAGEAL REFLUX DISEASE, UNSPECIFIED WHETHER ESOPHAGITIS PRESENT: ICD-10-CM

## 2025-08-12 DIAGNOSIS — E53.8 B12 DEFICIENCY: Primary | ICD-10-CM

## 2025-08-12 PROCEDURE — 3078F DIAST BP <80 MM HG: CPT | Performed by: FAMILY MEDICINE

## 2025-08-12 PROCEDURE — 99214 OFFICE O/P EST MOD 30 MIN: CPT | Performed by: FAMILY MEDICINE

## 2025-08-12 PROCEDURE — 3074F SYST BP LT 130 MM HG: CPT | Performed by: FAMILY MEDICINE

## 2025-08-12 RX ORDER — BENZONATATE 100 MG/1
100 CAPSULE ORAL 3 TIMES DAILY PRN
Qty: 60 CAPSULE | Refills: 0 | Status: SHIPPED | OUTPATIENT
Start: 2025-08-12

## 2025-08-12 RX ORDER — CYANOCOBALAMIN 1000 UG/ML
1000 INJECTION, SOLUTION INTRAMUSCULAR; SUBCUTANEOUS ONCE
Status: CANCELLED | OUTPATIENT
Start: 2025-08-12 | End: 2025-08-12

## 2025-08-12 ASSESSMENT — FIBROSIS 4 INDEX: FIB4 SCORE: 2.83

## 2025-09-16 ENCOUNTER — APPOINTMENT (OUTPATIENT)
Dept: MEDICAL GROUP | Facility: CLINIC | Age: OVER 89
End: 2025-09-16
Payer: MEDICARE

## (undated) DEVICE — SPONGE GAUZE NON-STERILE 4X4 - (2000/CA 10PK/CA)

## (undated) DEVICE — ELECTRODE 850 FOAM ADHESIVE - HYDROGEL RADIOTRNSPRNT (50/PK)

## (undated) DEVICE — NEEDLE EUS DELIVERY SYSTEM FNB PRE LOADED 22 GA

## (undated) DEVICE — SENSOR SPO2 ADULT LNCS ADTX (20/BX) ORDER ITEM #19593

## (undated) DEVICE — CATHETER IV SAFETY 20 GA X 1-1/4 (50/BX)

## (undated) DEVICE — TUBING CLEARLINK DUO-VENT - C-FLO (48EA/CA)

## (undated) DEVICE — BASIN EMESIS DISP. - (250/CA)

## (undated) DEVICE — CANNULA W/ SUPPLY TUBING O2 - (50/CA)

## (undated) DEVICE — BITE BLOCK ADULT 60FR (100EA/CA)

## (undated) DEVICE — NEEDLE 22G EXPECT SLIMLINE (SL)  (5/BX)

## (undated) DEVICE — TUBE SUCTION YANKAUER  1/4 X 6FT (20EA/CA)"

## (undated) DEVICE — TUBE CONNECTING SUCTION - CLEAR PLASTIC STERILE 72 IN (50EA/CA)

## (undated) DEVICE — SYRINGE SAFETY 3 ML 18 GA X 1 1/2 BLUNT LL (100/BX 8BX/CA)

## (undated) DEVICE — GLOVE, LITE (PAIR)

## (undated) DEVICE — FORCEP RADIAL JAW 4 STANDARD CAPACITY W/NEEDLE 240CM (40EA/BX)

## (undated) DEVICE — SET EXTENSION WITH 2 PORTS (48EA/CA) ***PART #2C8610 IS A SUBSTITUTE*****

## (undated) DEVICE — GOWN SURGEONS LARGE - (32/CA)

## (undated) DEVICE — LACTATED RINGERS INJ 1000 ML - (14EA/CA 60CA/PF)

## (undated) DEVICE — SYRINGE DISP. 50CC LS - (40/BX)

## (undated) DEVICE — KIT  I.V. START (100EA/CA)

## (undated) DEVICE — CANISTER SUCTION RIGID RED 1500CC (40EA/CA)

## (undated) DEVICE — SYRINGE SAFETY 5 ML 18 GA X 1-1/2 BLUNT LL (100/BX 4BX/CA)